# Patient Record
Sex: FEMALE | Race: BLACK OR AFRICAN AMERICAN | NOT HISPANIC OR LATINO | URBAN - METROPOLITAN AREA
[De-identification: names, ages, dates, MRNs, and addresses within clinical notes are randomized per-mention and may not be internally consistent; named-entity substitution may affect disease eponyms.]

---

## 2017-01-20 ENCOUNTER — INPATIENT (INPATIENT)
Facility: HOSPITAL | Age: 72
LOS: 2 days | Discharge: HOME | End: 2017-01-23
Attending: INTERNAL MEDICINE | Admitting: INTERNAL MEDICINE
Payer: COMMERCIAL

## 2017-01-20 VITALS
SYSTOLIC BLOOD PRESSURE: 108 MMHG | DIASTOLIC BLOOD PRESSURE: 60 MMHG | TEMPERATURE: 98 F | OXYGEN SATURATION: 99 % | HEART RATE: 87 BPM | RESPIRATION RATE: 16 BRPM

## 2017-01-20 DIAGNOSIS — Z98.89 OTHER SPECIFIED POSTPROCEDURAL STATES: Chronic | ICD-10-CM

## 2017-01-20 DIAGNOSIS — R57.9 SHOCK, UNSPECIFIED: ICD-10-CM

## 2017-01-20 LAB
ALBUMIN SERPL ELPH-MCNC: 3.6 G/DL — SIGNIFICANT CHANGE UP (ref 3.3–5)
ALP SERPL-CCNC: 98 U/L — SIGNIFICANT CHANGE UP (ref 40–120)
ALT FLD-CCNC: 26 U/L — SIGNIFICANT CHANGE UP (ref 4–33)
ANISOCYTOSIS BLD QL: SLIGHT — SIGNIFICANT CHANGE UP
APPEARANCE UR: CLEAR — SIGNIFICANT CHANGE UP
AST SERPL-CCNC: 48 U/L — HIGH (ref 4–32)
BASOPHILS # BLD AUTO: 0.02 K/UL — SIGNIFICANT CHANGE UP (ref 0–0.2)
BASOPHILS NFR BLD AUTO: 0.1 % — SIGNIFICANT CHANGE UP (ref 0–2)
BASOPHILS NFR SPEC: 0 % — SIGNIFICANT CHANGE UP (ref 0–2)
BILIRUB SERPL-MCNC: 0.5 MG/DL — SIGNIFICANT CHANGE UP (ref 0.2–1.2)
BILIRUB UR-MCNC: NEGATIVE — SIGNIFICANT CHANGE UP
BLOOD UR QL VISUAL: NEGATIVE — SIGNIFICANT CHANGE UP
BUN SERPL-MCNC: 21 MG/DL — SIGNIFICANT CHANGE UP (ref 7–23)
CALCIUM SERPL-MCNC: 8.5 MG/DL — SIGNIFICANT CHANGE UP (ref 8.4–10.5)
CHLORIDE SERPL-SCNC: 103 MMOL/L — SIGNIFICANT CHANGE UP (ref 98–107)
CO2 SERPL-SCNC: 19 MMOL/L — LOW (ref 22–31)
COLOR SPEC: YELLOW — SIGNIFICANT CHANGE UP
CREAT SERPL-MCNC: 0.75 MG/DL — SIGNIFICANT CHANGE UP (ref 0.5–1.3)
EOSINOPHIL # BLD AUTO: 0.37 K/UL — SIGNIFICANT CHANGE UP (ref 0–0.5)
EOSINOPHIL NFR BLD AUTO: 1 % — SIGNIFICANT CHANGE UP (ref 0–6)
EOSINOPHIL NFR FLD: 1 % — SIGNIFICANT CHANGE UP (ref 0–6)
GLUCOSE SERPL-MCNC: 192 MG/DL — HIGH (ref 70–99)
GLUCOSE UR-MCNC: NEGATIVE — SIGNIFICANT CHANGE UP
HCT VFR BLD CALC: 31.2 % — LOW (ref 34.5–45)
HGB BLD-MCNC: 10.5 G/DL — LOW (ref 11.5–15.5)
HYALINE CASTS # UR AUTO: SIGNIFICANT CHANGE UP (ref 0–?)
HYPOCHROMIA BLD QL: SLIGHT — SIGNIFICANT CHANGE UP
IMM GRANULOCYTES NFR BLD AUTO: 1.6 % — HIGH (ref 0–1.5)
KETONES UR-MCNC: NEGATIVE — SIGNIFICANT CHANGE UP
LEUKOCYTE ESTERASE UR-ACNC: NEGATIVE — SIGNIFICANT CHANGE UP
LG PLATELETS BLD QL AUTO: SLIGHT — SIGNIFICANT CHANGE UP
LYMPHOCYTES # BLD AUTO: 1.44 K/UL — SIGNIFICANT CHANGE UP (ref 1–3.3)
LYMPHOCYTES # BLD AUTO: 3.8 % — LOW (ref 13–44)
LYMPHOCYTES NFR SPEC AUTO: 9 % — LOW (ref 13–44)
MACROCYTES BLD QL: SLIGHT — SIGNIFICANT CHANGE UP
MANUAL SMEAR VERIFICATION: SIGNIFICANT CHANGE UP
MCHC RBC-ENTMCNC: 31.6 PG — SIGNIFICANT CHANGE UP (ref 27–34)
MCHC RBC-ENTMCNC: 33.7 % — SIGNIFICANT CHANGE UP (ref 32–36)
MCV RBC AUTO: 94 FL — SIGNIFICANT CHANGE UP (ref 80–100)
MONOCYTES # BLD AUTO: 0.33 K/UL — SIGNIFICANT CHANGE UP (ref 0–0.9)
MONOCYTES NFR BLD AUTO: 0.9 % — LOW (ref 2–14)
MONOCYTES NFR BLD: 0 % — LOW (ref 2–9)
MUCOUS THREADS # UR AUTO: SIGNIFICANT CHANGE UP
NEUTROPHIL AB SER-ACNC: 85 % — HIGH (ref 43–77)
NEUTROPHILS # BLD AUTO: 35.42 K/UL — HIGH (ref 1.8–7.4)
NEUTROPHILS NFR BLD AUTO: 92.6 % — HIGH (ref 43–77)
NEUTS BAND # BLD: 5 % — SIGNIFICANT CHANGE UP (ref 0–6)
NITRITE UR-MCNC: NEGATIVE — SIGNIFICANT CHANGE UP
OVALOCYTES BLD QL SMEAR: SLIGHT — SIGNIFICANT CHANGE UP
PH UR: 6.5 — SIGNIFICANT CHANGE UP (ref 4.6–8)
PLATELET # BLD AUTO: 167 K/UL — SIGNIFICANT CHANGE UP (ref 150–400)
PLATELET COUNT - ESTIMATE: NORMAL — SIGNIFICANT CHANGE UP
PMV BLD: 12 FL — SIGNIFICANT CHANGE UP (ref 7–13)
POTASSIUM SERPL-MCNC: 3.6 MMOL/L — SIGNIFICANT CHANGE UP (ref 3.5–5.3)
POTASSIUM SERPL-MCNC: SIGNIFICANT CHANGE UP MMOL/L (ref 3.5–5.3)
POTASSIUM SERPL-SCNC: 3.6 MMOL/L — SIGNIFICANT CHANGE UP (ref 3.5–5.3)
POTASSIUM SERPL-SCNC: SIGNIFICANT CHANGE UP MMOL/L (ref 3.5–5.3)
PROT SERPL-MCNC: 6.6 G/DL — SIGNIFICANT CHANGE UP (ref 6–8.3)
PROT UR-MCNC: 10 — SIGNIFICANT CHANGE UP
RBC # BLD: 3.32 M/UL — LOW (ref 3.8–5.2)
RBC # FLD: 16.9 % — HIGH (ref 10.3–14.5)
RBC CASTS # UR COMP ASSIST: SIGNIFICANT CHANGE UP (ref 0–?)
SODIUM SERPL-SCNC: 139 MMOL/L — SIGNIFICANT CHANGE UP (ref 135–145)
SP GR SPEC: 1.02 — SIGNIFICANT CHANGE UP (ref 1–1.03)
SQUAMOUS # UR AUTO: SIGNIFICANT CHANGE UP
TROPONIN T SERPL-MCNC: < 0.06 NG/ML — SIGNIFICANT CHANGE UP (ref 0–0.06)
UROBILINOGEN FLD QL: NORMAL E.U. — SIGNIFICANT CHANGE UP (ref 0.1–0.2)
WBC # BLD: 38.19 K/UL — HIGH (ref 3.8–10.5)
WBC # FLD AUTO: 38.19 K/UL — HIGH (ref 3.8–10.5)
WBC UR QL: SIGNIFICANT CHANGE UP (ref 0–?)

## 2017-01-20 PROCEDURE — 71010: CPT | Mod: 26

## 2017-01-20 RX ORDER — SODIUM CHLORIDE 9 MG/ML
1000 INJECTION INTRAMUSCULAR; INTRAVENOUS; SUBCUTANEOUS ONCE
Qty: 0 | Refills: 0 | Status: COMPLETED | OUTPATIENT
Start: 2017-01-20 | End: 2017-01-20

## 2017-01-20 RX ORDER — ENOXAPARIN SODIUM 100 MG/ML
40 INJECTION SUBCUTANEOUS DAILY
Qty: 0 | Refills: 0 | Status: DISCONTINUED | OUTPATIENT
Start: 2017-01-20 | End: 2017-01-23

## 2017-01-20 RX ORDER — MECLIZINE HCL 12.5 MG
25 TABLET ORAL ONCE
Qty: 0 | Refills: 0 | Status: COMPLETED | OUTPATIENT
Start: 2017-01-20 | End: 2017-01-20

## 2017-01-20 RX ADMIN — SODIUM CHLORIDE 1000 MILLILITER(S): 9 INJECTION INTRAMUSCULAR; INTRAVENOUS; SUBCUTANEOUS at 22:19

## 2017-01-20 RX ADMIN — Medication 25 MILLIGRAM(S): at 22:19

## 2017-01-20 NOTE — ED ADULT NURSE NOTE - ED STAT RN HANDOFF DETAILS
Patient is in NAD, and has room available.  Report given to RADS RN & Pt transported by RN to RADS @, Family member at bedside.

## 2017-01-20 NOTE — ED PROVIDER NOTE - MEDICAL DECISION MAKING DETAILS
70 yo woman w/ syncope. Unknown etiology, though potentially related to chemotherapy or poor po intake. Will check labs, ua, cxr, ekg.

## 2017-01-20 NOTE — ED ADULT NURSE REASSESSMENT NOTE - NS ED NURSE REASSESS COMMENT FT1
Report received from previous shift RN. Pt A&Ox4, in NAD. VS as noted. Pt awaiting CXR results. Family at bedside, call bell within reach, will continue to monitor for safety and comfort.

## 2017-01-20 NOTE — ED PROVIDER NOTE - OBJECTIVE STATEMENT
72 yo woman w/ h/o htn, pancreatic ca (stage ii on chemo) p/w syncope. States she had gone to oncologist for shots (, afterward was in supermarket with son when she began to feel lightheaded and passed out. Was caught by son prior to falling to floor. States after regaining consciousness she has felt fine. Denies fever, cough, abd pain, n/v/d, chest pain, sob, poor po.

## 2017-01-20 NOTE — ED ADULT TRIAGE NOTE - CHIEF COMPLAINT QUOTE
Pt s/p fall/syncope today after feeling weak.  Denies head injury, sob, chest pain.  Pt last chemo Wednesday

## 2017-01-20 NOTE — ED PROVIDER NOTE - PMH
Asthma    Depression    HTN (hypertension)    Hyperlipidemia    Malignant neoplasm of pancreas, unspecified location of malignancy    Malignant neoplasm of right female breast, unspecified site of breast

## 2017-01-20 NOTE — ED ADULT NURSE NOTE - OBJECTIVE STATEMENT
Patient received to room 4 awake, alert, oriented x3, able to make needs known verbally.  Patient vitally stable, s/p syncopal episode at grocery store this morning.  Son was with her and helped her to the ground, she did not fall or hit her head.  Cardiac monitor shows normal sinus rhythm in the 70's.  No complaints of SOB or dizziness.  Patient undergoing chemo medication, last dose given by injection today. labwork obtained as per provider order.  Peripheral IV started.  Patient has left chest wall infusiport not accessed.   Skin intact, lives at home with son.  Patient ambulatory free from injury.  call bell within reach, safety maintained, will continue to monitor.

## 2017-01-20 NOTE — ED PROVIDER NOTE - PROGRESS NOTE DETAILS
Graciela PGY3: Labs, imaging reviewed. Unknown cause of elevated wbc. Decision made to hold abx for now as no source identified and no other signs of infection. Received meclizine and ivf. Case d/w Dr. Mehta, accepted to service. MAR singout @[id number]

## 2017-01-20 NOTE — ED PROVIDER NOTE - PSH
H/O abdominal hysterectomy    H/O umbilical hernia repair    Status post right breast lumpectomy  malignant treated with radiation

## 2017-01-20 NOTE — ED PROVIDER NOTE - ATTENDING CONTRIBUTION TO CARE
INDIRA Attending Note - Dr. Marino  72 yo woman w/ h/o htn, pancreatic ca (stage ii on chemo) p/w syncope. Pt had prodrome lasting almost a minute before she syncopised.   PE: pt is alert and oriented, perrl, ent normal, membranes are moist, neck supple. no lymphadenopathy or thyroid enlargement, No JVD.  Chest clear to P&A, Heart- reg rhythm without murmur, rubs or gallops, radial pulses equal bilaterally.  Abd is soft, non-tender, Bowel sounds are active. no mass or organomegaly. : No CVA tenderness. Neuro:  Pt alert and oriented x 3. Perrl    Distal neurosensory is intact. Motor function is 5/5 strength bilaterally.  No focal deficits. Extremities:  No edema.  Skin: warm and dry.

## 2017-01-21 DIAGNOSIS — C25.9 MALIGNANT NEOPLASM OF PANCREAS, UNSPECIFIED: ICD-10-CM

## 2017-01-21 DIAGNOSIS — D72.829 ELEVATED WHITE BLOOD CELL COUNT, UNSPECIFIED: ICD-10-CM

## 2017-01-21 DIAGNOSIS — F32.9 MAJOR DEPRESSIVE DISORDER, SINGLE EPISODE, UNSPECIFIED: ICD-10-CM

## 2017-01-21 DIAGNOSIS — R55 SYNCOPE AND COLLAPSE: ICD-10-CM

## 2017-01-21 DIAGNOSIS — Z29.9 ENCOUNTER FOR PROPHYLACTIC MEASURES, UNSPECIFIED: ICD-10-CM

## 2017-01-21 DIAGNOSIS — I10 ESSENTIAL (PRIMARY) HYPERTENSION: ICD-10-CM

## 2017-01-21 DIAGNOSIS — J45.909 UNSPECIFIED ASTHMA, UNCOMPLICATED: ICD-10-CM

## 2017-01-21 DIAGNOSIS — E78.5 HYPERLIPIDEMIA, UNSPECIFIED: ICD-10-CM

## 2017-01-21 LAB
BUN SERPL-MCNC: 15 MG/DL — SIGNIFICANT CHANGE UP (ref 7–23)
CALCIUM SERPL-MCNC: 8.8 MG/DL — SIGNIFICANT CHANGE UP (ref 8.4–10.5)
CHLORIDE SERPL-SCNC: 101 MMOL/L — SIGNIFICANT CHANGE UP (ref 98–107)
CHOLEST SERPL-MCNC: 113 MG/DL — LOW (ref 120–199)
CK SERPL-CCNC: 34 U/L — SIGNIFICANT CHANGE UP (ref 25–170)
CO2 SERPL-SCNC: 25 MMOL/L — SIGNIFICANT CHANGE UP (ref 22–31)
CREAT SERPL-MCNC: 0.55 MG/DL — SIGNIFICANT CHANGE UP (ref 0.5–1.3)
GLUCOSE SERPL-MCNC: 87 MG/DL — SIGNIFICANT CHANGE UP (ref 70–99)
HBA1C BLD-MCNC: 6.8 % — HIGH (ref 4–5.6)
HCT VFR BLD CALC: 32.6 % — LOW (ref 34.5–45)
HDLC SERPL-MCNC: 51 MG/DL — SIGNIFICANT CHANGE UP (ref 45–65)
HGB BLD-MCNC: 10.8 G/DL — LOW (ref 11.5–15.5)
LIPID PNL WITH DIRECT LDL SERPL: 58 MG/DL — SIGNIFICANT CHANGE UP
MCHC RBC-ENTMCNC: 31.5 PG — SIGNIFICANT CHANGE UP (ref 27–34)
MCHC RBC-ENTMCNC: 33.1 % — SIGNIFICANT CHANGE UP (ref 32–36)
MCV RBC AUTO: 95 FL — SIGNIFICANT CHANGE UP (ref 80–100)
PLATELET # BLD AUTO: 233 K/UL — SIGNIFICANT CHANGE UP (ref 150–400)
PMV BLD: 11.1 FL — SIGNIFICANT CHANGE UP (ref 7–13)
POTASSIUM SERPL-MCNC: 3.2 MMOL/L — LOW (ref 3.5–5.3)
POTASSIUM SERPL-SCNC: 3.2 MMOL/L — LOW (ref 3.5–5.3)
RBC # BLD: 3.43 M/UL — LOW (ref 3.8–5.2)
RBC # FLD: 16.7 % — HIGH (ref 10.3–14.5)
SODIUM SERPL-SCNC: 142 MMOL/L — SIGNIFICANT CHANGE UP (ref 135–145)
TRIGL SERPL-MCNC: 89 MG/DL — SIGNIFICANT CHANGE UP (ref 10–149)
TROPONIN T SERPL-MCNC: < 0.06 NG/ML — SIGNIFICANT CHANGE UP (ref 0–0.06)
WBC # BLD: 45.03 K/UL — CRITICAL HIGH (ref 3.8–10.5)
WBC # FLD AUTO: 45.03 K/UL — CRITICAL HIGH (ref 3.8–10.5)

## 2017-01-21 RX ORDER — SERTRALINE 25 MG/1
75 TABLET, FILM COATED ORAL DAILY
Qty: 0 | Refills: 0 | Status: DISCONTINUED | OUTPATIENT
Start: 2017-01-21 | End: 2017-01-23

## 2017-01-21 RX ORDER — BUDESONIDE AND FORMOTEROL FUMARATE DIHYDRATE 160; 4.5 UG/1; UG/1
2 AEROSOL RESPIRATORY (INHALATION)
Qty: 0 | Refills: 0 | Status: DISCONTINUED | OUTPATIENT
Start: 2017-01-21 | End: 2017-01-23

## 2017-01-21 RX ORDER — AMLODIPINE BESYLATE 2.5 MG/1
5 TABLET ORAL DAILY
Qty: 0 | Refills: 0 | Status: DISCONTINUED | OUTPATIENT
Start: 2017-01-21 | End: 2017-01-23

## 2017-01-21 RX ORDER — TRAZODONE HCL 50 MG
50 TABLET ORAL AT BEDTIME
Qty: 0 | Refills: 0 | Status: DISCONTINUED | OUTPATIENT
Start: 2017-01-21 | End: 2017-01-23

## 2017-01-21 RX ORDER — PANTOPRAZOLE SODIUM 20 MG/1
40 TABLET, DELAYED RELEASE ORAL
Qty: 0 | Refills: 0 | Status: DISCONTINUED | OUTPATIENT
Start: 2017-01-21 | End: 2017-01-23

## 2017-01-21 RX ORDER — SIMETHICONE 80 MG/1
80 TABLET, CHEWABLE ORAL ONCE
Qty: 0 | Refills: 0 | Status: COMPLETED | OUTPATIENT
Start: 2017-01-21 | End: 2017-01-21

## 2017-01-21 RX ORDER — ATORVASTATIN CALCIUM 80 MG/1
10 TABLET, FILM COATED ORAL AT BEDTIME
Qty: 0 | Refills: 0 | Status: DISCONTINUED | OUTPATIENT
Start: 2017-01-21 | End: 2017-01-23

## 2017-01-21 RX ORDER — ACETAMINOPHEN 500 MG
650 TABLET ORAL ONCE
Qty: 0 | Refills: 0 | Status: COMPLETED | OUTPATIENT
Start: 2017-01-21 | End: 2017-01-21

## 2017-01-21 RX ORDER — POTASSIUM CHLORIDE 20 MEQ
20 PACKET (EA) ORAL
Qty: 0 | Refills: 0 | Status: COMPLETED | OUTPATIENT
Start: 2017-01-21 | End: 2017-01-21

## 2017-01-21 RX ORDER — SODIUM CHLORIDE 9 MG/ML
1000 INJECTION INTRAMUSCULAR; INTRAVENOUS; SUBCUTANEOUS
Qty: 0 | Refills: 0 | Status: DISCONTINUED | OUTPATIENT
Start: 2017-01-21 | End: 2017-01-23

## 2017-01-21 RX ORDER — ALBUTEROL 90 UG/1
2 AEROSOL, METERED ORAL EVERY 6 HOURS
Qty: 0 | Refills: 0 | Status: DISCONTINUED | OUTPATIENT
Start: 2017-01-21 | End: 2017-01-23

## 2017-01-21 RX ORDER — LOSARTAN POTASSIUM 100 MG/1
100 TABLET, FILM COATED ORAL DAILY
Qty: 0 | Refills: 0 | Status: DISCONTINUED | OUTPATIENT
Start: 2017-01-21 | End: 2017-01-23

## 2017-01-21 RX ORDER — DOCUSATE SODIUM 100 MG
100 CAPSULE ORAL DAILY
Qty: 0 | Refills: 0 | Status: DISCONTINUED | OUTPATIENT
Start: 2017-01-21 | End: 2017-01-23

## 2017-01-21 RX ADMIN — SERTRALINE 75 MILLIGRAM(S): 25 TABLET, FILM COATED ORAL at 12:47

## 2017-01-21 RX ADMIN — Medication 100 MILLIGRAM(S): at 22:56

## 2017-01-21 RX ADMIN — SODIUM CHLORIDE 75 MILLILITER(S): 9 INJECTION INTRAMUSCULAR; INTRAVENOUS; SUBCUTANEOUS at 21:27

## 2017-01-21 RX ADMIN — SODIUM CHLORIDE 75 MILLILITER(S): 9 INJECTION INTRAMUSCULAR; INTRAVENOUS; SUBCUTANEOUS at 18:13

## 2017-01-21 RX ADMIN — Medication 20 MILLIEQUIVALENT(S): at 10:18

## 2017-01-21 RX ADMIN — Medication 650 MILLIGRAM(S): at 22:57

## 2017-01-21 RX ADMIN — PANTOPRAZOLE SODIUM 40 MILLIGRAM(S): 20 TABLET, DELAYED RELEASE ORAL at 18:00

## 2017-01-21 RX ADMIN — SIMETHICONE 80 MILLIGRAM(S): 80 TABLET, CHEWABLE ORAL at 22:57

## 2017-01-21 RX ADMIN — BUDESONIDE AND FORMOTEROL FUMARATE DIHYDRATE 2 PUFF(S): 160; 4.5 AEROSOL RESPIRATORY (INHALATION) at 09:40

## 2017-01-21 RX ADMIN — BUDESONIDE AND FORMOTEROL FUMARATE DIHYDRATE 2 PUFF(S): 160; 4.5 AEROSOL RESPIRATORY (INHALATION) at 21:27

## 2017-01-21 RX ADMIN — Medication 20 MILLIEQUIVALENT(S): at 12:46

## 2017-01-21 RX ADMIN — PANTOPRAZOLE SODIUM 40 MILLIGRAM(S): 20 TABLET, DELAYED RELEASE ORAL at 06:57

## 2017-01-21 RX ADMIN — ATORVASTATIN CALCIUM 10 MILLIGRAM(S): 80 TABLET, FILM COATED ORAL at 21:27

## 2017-01-21 RX ADMIN — LOSARTAN POTASSIUM 100 MILLIGRAM(S): 100 TABLET, FILM COATED ORAL at 06:23

## 2017-01-21 RX ADMIN — ENOXAPARIN SODIUM 40 MILLIGRAM(S): 100 INJECTION SUBCUTANEOUS at 12:47

## 2017-01-21 RX ADMIN — AMLODIPINE BESYLATE 5 MILLIGRAM(S): 2.5 TABLET ORAL at 06:57

## 2017-01-21 RX ADMIN — Medication 650 MILLIGRAM(S): at 23:58

## 2017-01-21 RX ADMIN — Medication 20 MILLIEQUIVALENT(S): at 14:16

## 2017-01-21 NOTE — H&P ADULT. - HISTORY OF PRESENT ILLNESS
Self ambulating 71 F with a history of stage 2,  Pancreatic CA, diagnosed 11/ 2016, currently receiving chemo therapy, last treatment was 1/18, s/p Neupogen injections on 1/19 and 1/20, R breast CA s/p lumpectomy and radiation, Asthma ., depression, dyslipidemia. She was out with her son shopping on 1/20. She began to feel dizzy, like her head was spinning and she experienced b/l LE weakness. She had a positive LOC and her son held her up to prevent a free fall to the floor. He laid her on the floor and she came too in 1 minutes. She was on the floor for 3 to 4 minutes, helped up and was taken to the Ed by her son.  She denies nausea, vomit, tinnitus, chest pain , palpitations, chill and diaphoresis.  She only feels generalized weakness, which is chronic due to her condition.      In the ED, she had CE negative x 1, CXR preliminary = clear, EKG NSR

## 2017-01-21 NOTE — H&P ADULT. - PROBLEM SELECTOR PLAN 1
CM  F/U CE & EKG  Give O2,   Check orthostatics and Neuro checks  Get TTE   Fall and Aspiration precautions

## 2017-01-22 LAB
BUN SERPL-MCNC: 15 MG/DL — SIGNIFICANT CHANGE UP (ref 7–23)
CALCIUM SERPL-MCNC: 8.6 MG/DL — SIGNIFICANT CHANGE UP (ref 8.4–10.5)
CHLORIDE SERPL-SCNC: 104 MMOL/L — SIGNIFICANT CHANGE UP (ref 98–107)
CO2 SERPL-SCNC: 22 MMOL/L — SIGNIFICANT CHANGE UP (ref 22–31)
CREAT SERPL-MCNC: 0.58 MG/DL — SIGNIFICANT CHANGE UP (ref 0.5–1.3)
GLUCOSE SERPL-MCNC: 103 MG/DL — HIGH (ref 70–99)
HCT VFR BLD CALC: 31.3 % — LOW (ref 34.5–45)
HGB BLD-MCNC: 10.3 G/DL — LOW (ref 11.5–15.5)
MCHC RBC-ENTMCNC: 30.9 PG — SIGNIFICANT CHANGE UP (ref 27–34)
MCHC RBC-ENTMCNC: 32.9 % — SIGNIFICANT CHANGE UP (ref 32–36)
MCV RBC AUTO: 94 FL — SIGNIFICANT CHANGE UP (ref 80–100)
PLATELET # BLD AUTO: 223 K/UL — SIGNIFICANT CHANGE UP (ref 150–400)
PMV BLD: 11.3 FL — SIGNIFICANT CHANGE UP (ref 7–13)
POTASSIUM SERPL-MCNC: 3.6 MMOL/L — SIGNIFICANT CHANGE UP (ref 3.5–5.3)
POTASSIUM SERPL-SCNC: 3.6 MMOL/L — SIGNIFICANT CHANGE UP (ref 3.5–5.3)
RBC # BLD: 3.33 M/UL — LOW (ref 3.8–5.2)
RBC # FLD: 16.4 % — HIGH (ref 10.3–14.5)
SODIUM SERPL-SCNC: 141 MMOL/L — SIGNIFICANT CHANGE UP (ref 135–145)
WBC # BLD: 19.07 K/UL — HIGH (ref 3.8–10.5)
WBC # FLD AUTO: 19.07 K/UL — HIGH (ref 3.8–10.5)

## 2017-01-22 PROCEDURE — 70450 CT HEAD/BRAIN W/O DYE: CPT | Mod: 26

## 2017-01-22 PROCEDURE — 74176 CT ABD & PELVIS W/O CONTRAST: CPT | Mod: 26

## 2017-01-22 RX ORDER — PYRIDOXINE HCL (VITAMIN B6) 100 MG
50 TABLET ORAL DAILY
Qty: 0 | Refills: 0 | Status: DISCONTINUED | OUTPATIENT
Start: 2017-01-22 | End: 2017-01-23

## 2017-01-22 RX ORDER — ACETAMINOPHEN 500 MG
650 TABLET ORAL EVERY 6 HOURS
Qty: 0 | Refills: 0 | Status: DISCONTINUED | OUTPATIENT
Start: 2017-01-22 | End: 2017-01-23

## 2017-01-22 RX ORDER — GABAPENTIN 400 MG/1
200 CAPSULE ORAL AT BEDTIME
Qty: 0 | Refills: 0 | Status: DISCONTINUED | OUTPATIENT
Start: 2017-01-22 | End: 2017-01-23

## 2017-01-22 RX ADMIN — ATORVASTATIN CALCIUM 10 MILLIGRAM(S): 80 TABLET, FILM COATED ORAL at 22:07

## 2017-01-22 RX ADMIN — Medication 50 MILLIGRAM(S): at 17:20

## 2017-01-22 RX ADMIN — Medication 100 MILLIGRAM(S): at 11:17

## 2017-01-22 RX ADMIN — SERTRALINE 75 MILLIGRAM(S): 25 TABLET, FILM COATED ORAL at 11:18

## 2017-01-22 RX ADMIN — AMLODIPINE BESYLATE 5 MILLIGRAM(S): 2.5 TABLET ORAL at 05:18

## 2017-01-22 RX ADMIN — LOSARTAN POTASSIUM 100 MILLIGRAM(S): 100 TABLET, FILM COATED ORAL at 05:18

## 2017-01-22 RX ADMIN — PANTOPRAZOLE SODIUM 40 MILLIGRAM(S): 20 TABLET, DELAYED RELEASE ORAL at 17:20

## 2017-01-22 RX ADMIN — GABAPENTIN 200 MILLIGRAM(S): 400 CAPSULE ORAL at 22:07

## 2017-01-22 RX ADMIN — Medication 650 MILLIGRAM(S): at 11:45

## 2017-01-22 RX ADMIN — BUDESONIDE AND FORMOTEROL FUMARATE DIHYDRATE 2 PUFF(S): 160; 4.5 AEROSOL RESPIRATORY (INHALATION) at 08:47

## 2017-01-22 RX ADMIN — ENOXAPARIN SODIUM 40 MILLIGRAM(S): 100 INJECTION SUBCUTANEOUS at 11:18

## 2017-01-22 RX ADMIN — PANTOPRAZOLE SODIUM 40 MILLIGRAM(S): 20 TABLET, DELAYED RELEASE ORAL at 05:18

## 2017-01-22 RX ADMIN — BUDESONIDE AND FORMOTEROL FUMARATE DIHYDRATE 2 PUFF(S): 160; 4.5 AEROSOL RESPIRATORY (INHALATION) at 22:07

## 2017-01-22 RX ADMIN — Medication 650 MILLIGRAM(S): at 11:17

## 2017-01-23 VITALS
DIASTOLIC BLOOD PRESSURE: 67 MMHG | SYSTOLIC BLOOD PRESSURE: 121 MMHG | TEMPERATURE: 98 F | HEART RATE: 72 BPM | RESPIRATION RATE: 18 BRPM | OXYGEN SATURATION: 96 %

## 2017-01-23 LAB
BUN SERPL-MCNC: 14 MG/DL — SIGNIFICANT CHANGE UP (ref 7–23)
CALCIUM SERPL-MCNC: 8.6 MG/DL — SIGNIFICANT CHANGE UP (ref 8.4–10.5)
CHLORIDE SERPL-SCNC: 105 MMOL/L — SIGNIFICANT CHANGE UP (ref 98–107)
CO2 SERPL-SCNC: 22 MMOL/L — SIGNIFICANT CHANGE UP (ref 22–31)
CREAT SERPL-MCNC: 0.54 MG/DL — SIGNIFICANT CHANGE UP (ref 0.5–1.3)
GLUCOSE SERPL-MCNC: 103 MG/DL — HIGH (ref 70–99)
HCT VFR BLD CALC: 30 % — LOW (ref 34.5–45)
HGB BLD-MCNC: 9.9 G/DL — LOW (ref 11.5–15.5)
MAGNESIUM SERPL-MCNC: 1.8 MG/DL — SIGNIFICANT CHANGE UP (ref 1.6–2.6)
MCHC RBC-ENTMCNC: 30.8 PG — SIGNIFICANT CHANGE UP (ref 27–34)
MCHC RBC-ENTMCNC: 33 % — SIGNIFICANT CHANGE UP (ref 32–36)
MCV RBC AUTO: 93.5 FL — SIGNIFICANT CHANGE UP (ref 80–100)
PLATELET # BLD AUTO: 222 K/UL — SIGNIFICANT CHANGE UP (ref 150–400)
PMV BLD: 11 FL — SIGNIFICANT CHANGE UP (ref 7–13)
POTASSIUM SERPL-MCNC: 3.4 MMOL/L — LOW (ref 3.5–5.3)
POTASSIUM SERPL-SCNC: 3.4 MMOL/L — LOW (ref 3.5–5.3)
RBC # BLD: 3.21 M/UL — LOW (ref 3.8–5.2)
RBC # FLD: 16.2 % — HIGH (ref 10.3–14.5)
SODIUM SERPL-SCNC: 141 MMOL/L — SIGNIFICANT CHANGE UP (ref 135–145)
WBC # BLD: 6.93 K/UL — SIGNIFICANT CHANGE UP (ref 3.8–10.5)
WBC # FLD AUTO: 6.93 K/UL — SIGNIFICANT CHANGE UP (ref 3.8–10.5)

## 2017-01-23 PROCEDURE — 93306 TTE W/DOPPLER COMPLETE: CPT | Mod: 26

## 2017-01-23 RX ORDER — POTASSIUM CHLORIDE 20 MEQ
20 PACKET (EA) ORAL ONCE
Qty: 0 | Refills: 0 | Status: COMPLETED | OUTPATIENT
Start: 2017-01-23 | End: 2017-01-23

## 2017-01-23 RX ORDER — PYRIDOXINE HCL (VITAMIN B6) 100 MG
1 TABLET ORAL
Qty: 0 | Refills: 0 | COMMUNITY
Start: 2017-01-23

## 2017-01-23 RX ORDER — TRAZODONE HCL 50 MG
1 TABLET ORAL
Qty: 0 | Refills: 0 | COMMUNITY

## 2017-01-23 RX ORDER — GABAPENTIN 400 MG/1
2 CAPSULE ORAL
Qty: 60 | Refills: 0 | OUTPATIENT
Start: 2017-01-23 | End: 2017-02-22

## 2017-01-23 RX ORDER — DOCUSATE SODIUM 100 MG
1 CAPSULE ORAL
Qty: 0 | Refills: 0 | COMMUNITY
Start: 2017-01-23

## 2017-01-23 RX ORDER — LOSARTAN POTASSIUM 100 MG/1
1 TABLET, FILM COATED ORAL
Qty: 0 | Refills: 0 | COMMUNITY
Start: 2017-01-23

## 2017-01-23 RX ORDER — GABAPENTIN 400 MG/1
2 CAPSULE ORAL
Qty: 0 | Refills: 0 | COMMUNITY
Start: 2017-01-23

## 2017-01-23 RX ORDER — LOSARTAN POTASSIUM 100 MG/1
1 TABLET, FILM COATED ORAL
Qty: 30 | Refills: 0 | OUTPATIENT
Start: 2017-01-23 | End: 2017-02-22

## 2017-01-23 RX ADMIN — ENOXAPARIN SODIUM 40 MILLIGRAM(S): 100 INJECTION SUBCUTANEOUS at 11:48

## 2017-01-23 RX ADMIN — BUDESONIDE AND FORMOTEROL FUMARATE DIHYDRATE 2 PUFF(S): 160; 4.5 AEROSOL RESPIRATORY (INHALATION) at 10:54

## 2017-01-23 RX ADMIN — PANTOPRAZOLE SODIUM 40 MILLIGRAM(S): 20 TABLET, DELAYED RELEASE ORAL at 05:02

## 2017-01-23 RX ADMIN — Medication 50 MILLIGRAM(S): at 11:47

## 2017-01-23 RX ADMIN — Medication 50 MILLIGRAM(S): at 00:45

## 2017-01-23 RX ADMIN — PANTOPRAZOLE SODIUM 40 MILLIGRAM(S): 20 TABLET, DELAYED RELEASE ORAL at 17:48

## 2017-01-23 RX ADMIN — LOSARTAN POTASSIUM 100 MILLIGRAM(S): 100 TABLET, FILM COATED ORAL at 05:01

## 2017-01-23 RX ADMIN — Medication 100 MILLIGRAM(S): at 11:48

## 2017-01-23 RX ADMIN — AMLODIPINE BESYLATE 5 MILLIGRAM(S): 2.5 TABLET ORAL at 05:01

## 2017-01-23 RX ADMIN — Medication 20 MILLIEQUIVALENT(S): at 11:47

## 2017-01-23 RX ADMIN — SERTRALINE 75 MILLIGRAM(S): 25 TABLET, FILM COATED ORAL at 11:48

## 2017-01-23 NOTE — DISCHARGE NOTE ADULT - PLAN OF CARE
Follow up with your primary care physician for further monitoring in 1-2 weeks. Please call to arrange appointment. Continue current medications as directed. Stay well hydrated. Follow up with your hematologist/oncologist, Dr. Mitchell for further monitoring in 1 week. Please call to arrange appointment. You will need to arrange for PET scan Follow up with your primary care physician for further monitoring in 1-2 weeks. Please call to arrange appointment. Low cholesterol diet. Follow up with your primary care physician for further monitoring in 1-2 weeks. Please call to arrange appointment. Low salt diet. Continue current medications as directed to maintain blood pressure goal 120/80mmhg. Continue Lipitor at bedtime. Goal LDL <100

## 2017-01-23 NOTE — DISCHARGE NOTE ADULT - SECONDARY DIAGNOSIS.
Malignant neoplasm of pancreas, unspecified location of malignancy Hyperlipidemia HTN (hypertension)

## 2017-01-23 NOTE — DISCHARGE NOTE ADULT - PATIENT PORTAL LINK FT
“You can access the Follow[facility] Patient Portal, offered by [facility], by registering with the following website: [email or web address]”

## 2017-01-23 NOTE — DISCHARGE NOTE ADULT - CARE PROVIDER_API CALL
Sunny Mitchell (CESAR), Hematology; HospicePalliative Medicine; Internal Medicine; Medical Oncology  [id number] [place] [address]  Phone: [phone]  Fax: [phone]

## 2017-01-23 NOTE — DISCHARGE NOTE ADULT - MEDICATION SUMMARY - MEDICATIONS TO TAKE
I will START or STAY ON the medications listed below when I get home from the hospital:    losartan 100 mg oral tablet  -- 1 tab(s) by mouth once a day  -- Indication: For High blood pressure    gabapentin 100 mg oral capsule  -- 2 cap(s) by mouth once a day (at bedtime)  -- Indication: For Neuropathy    traZODone 50 mg oral tablet  -- 1 tab(s) by mouth once a day (at bedtime), As Needed - for insomnia  -- Indication: For Insomnia    sertraline 25 mg oral tablet  -- 3 tab(s) by mouth once a day  -- Indication: For Depression, unspecified depression type    atorvastatin 10 mg oral tablet  -- 1 tab(s) by mouth once a day  -- Indication: For High cholesterol    ProAir HFA 90 mcg/inh inhalation aerosol  -- 2 puff(s) inhaled 4 times a day, As Needed  -- Indication: For Asthma    Advair Diskus 250 mcg-50 mcg inhalation powder  -- 1 puff(s) inhaled 2 times a day  -- Indication: For Asthma    amLODIPine 5 mg oral tablet  -- 1 tab(s) by mouth once a day  -- Indication: For High blood pressure    docusate sodium 100 mg oral capsule  -- 1 cap(s) by mouth once a day  -- Indication: For Constipation, take as needed (Over the counter)    Protonix 40 mg oral delayed release tablet  -- 1 tab(s) by mouth 2 times a day  -- Indication: For Acid reflux    pyridoxine 50 mg oral tablet  -- 1 tab(s) by mouth once a day  -- Indication: For Supplement

## 2017-01-23 NOTE — DISCHARGE NOTE ADULT - HOSPITAL COURSE
Self ambulating 71 F with a history of stage 2,  Pancreatic CA, diagnosed 11/ 2016, currently receiving chemo therapy, last treatment was 1/18, s/p Neupogen injections on 1/19 and 1/20, R breast CA s/p lumpectomy and radiation, Asthma ., depression, dyslipidemia. She was out with her son shopping on 1/20. She began to feel dizzy, like her head was spinning and she experienced b/l LE weakness. She had a positive LOC and her son held her up to prevent a free fall to the floor. He laid her on the floor and she came too in 1 minutes. She was on the floor for 3 to 4 minutes, helped up and was taken to the Ed by her son.  She denies nausea, vomit, tinnitus, chest pain , palpitations, chill and diaphoresis.  She only feels generalized weakness, which is chronic due to her condition.      On admission: Self ambulating 71 F with a history of stage 2,  Pancreatic CA, diagnosed 11/ 2016, currently receiving chemo therapy, last treatment was 1/18, s/p Neupogen injections on 1/19 and 1/20, R breast CA s/p lumpectomy and radiation, Asthma ., depression, dyslipidemia. She was out with her son shopping on 1/20. She began to feel dizzy, like her head was spinning and she experienced b/l LE weakness. She had a positive LOC and her son held her up to prevent a free fall to the floor. He laid her on the floor and she came too in 1 minutes. She was on the floor for 3 to 4 minutes, helped up and was taken to the Ed by her son.  She denies nausea, vomit, tinnitus, chest pain , palpitations, chill and diaphoresis.  She only feels generalized weakness, which is chronic due to her condition.      On admission: EKG: NSR @ 71b/ min , Q wave 1, AVL ,QT/ QTC = 408/ 443. ACS ruled out by negative cardiac enzymes.      WBC: 38.49-->45.03 (following Neupogen UA negative. HgbA1-c: 6.8%). CXR: clear lungs.     Heme/Onc c/s: Dr Mitchell: check CT head. For echo. Start neurontin 200mg qhs. Start pyridoxine 50mg qd. Outpt PET scheduled for 1/28. Medicine recommended discontune HCTZ. CT Head: No evidence of edema or mass effect to suggest presence of   space-occupying lesion. No CT evidence of acute territorial infarct. No  intracranial hemorrhage. Contrast-enhanced CT or MRI would be more  sensitive for further evaluation. CT abd/pel: No bowel obstruction. Low-attenuation pancreatic head lesion with persistent encasement of the superior mesenteric vein, not significantly changed. Patent common bile duct stent.    Cardiology was consulted, Dr. Be, recommended orthostatics which were negative and echocardiogram which displayed ********incomplete. Self ambulating 71 F with a history of stage 2,  Pancreatic CA, diagnosed 11/ 2016, currently receiving chemo therapy, last treatment was 1/18, s/p Neupogen injections on 1/19 and 1/20, R breast CA s/p lumpectomy and radiation, Asthma ., depression, dyslipidemia. She was out with her son shopping on 1/20. She began to feel dizzy, like her head was spinning and she experienced b/l LE weakness. She had a positive LOC and her son held her up to prevent a free fall to the floor. He laid her on the floor and she came too in 1 minutes. She was on the floor for 3 to 4 minutes, helped up and was taken to the Ed by her son.  She denies nausea, vomit, tinnitus, chest pain , palpitations, chill and diaphoresis.  She only feels generalized weakness, which is chronic due to her condition.      On admission: EKG: NSR @ 71b/ min , Q wave 1, AVL ,QT/ QTC = 408/ 443. ACS ruled out by negative cardiac enzymes. WBC: 38.49-->45.03 (following Neupogen UA negative. HgbA1-c: 6.8%). CXR: clear lungs.     Heme/Onc c/s: Dr Mitchell: check CT head. For echo. Start Neurontin 200mg qhs. Start pyridoxine 50mg qd. Outpt PET scheduled for 1/28. Medicine recommended discontinue HCTZ. CT Head: No evidence of edema or mass effect to suggest presence of space-occupying lesion. No CT evidence of acute territorial infarct. No  intracranial hemorrhage. Contrast-enhanced CT or MRI would be more  sensitive for further evaluation. CT abd/pel: No bowel obstruction. Low-attenuation pancreatic head lesion with persistent encasement of the superior mesenteric vein, not significantly changed. Patent common bile duct stent.    Cardiology was consulted, Dr. Be, recommended orthostatics which were negative and echocardiogram which displayed ********incomplete. Self ambulating 71 F with a history of stage 2,  Pancreatic CA, diagnosed 11/ 2016, currently receiving chemo therapy, last treatment was 1/18, s/p Neupogen injections on 1/19 and 1/20, R breast CA s/p lumpectomy and radiation, Asthma ., depression, dyslipidemia. She was out with her son shopping on 1/20. She began to feel dizzy, like her head was spinning and she experienced b/l LE weakness. She had a positive LOC and her son held her up to prevent a free fall to the floor. He laid her on the floor and she came too in 1 minutes. She was on the floor for 3 to 4 minutes, helped up and was taken to the Ed by her son.  She denies nausea, vomit, tinnitus, chest pain , palpitations, chill and diaphoresis.  She only feels generalized weakness, which is chronic due to her condition.      On admission: EKG: NSR @ 71b/ min , Q wave 1, AVL ,QT/ QTC = 408/ 443. ACS ruled out by negative cardiac enzymes. WBC: 38.49-->45.03 (following Neupogen UA negative. HgbA1-c: 6.8%). CXR: clear lungs.     Heme/Onc c/s: Dr Mitchell: check CT head. For echo. Start Neurontin 200mg qhs. Start pyridoxine 50mg qd. Outpt PET scheduled for 1/28. Medicine recommended discontinue HCTZ. CT Head: No evidence of edema or mass effect to suggest presence of space-occupying lesion. No CT evidence of acute territorial infarct. No  intracranial hemorrhage. Contrast-enhanced CT or MRI would be more  sensitive for further evaluation. CT abd/pel: No bowel obstruction. Low-attenuation pancreatic head lesion with persistent encasement of the superior mesenteric vein, not significantly changed. Patent common bile duct stent.    Cardiology was consulted, Dr. Be, recommended orthostatics which were negative and echocardiogram which displayed EF 66%. Mitral annular calcification, otherwise normal mitral valve. Minimal mitral regurgitation. Normal left ventricular internal dimensions and wall thicknesses. Normal left ventricular systolic function. No segmental wall motion abnormalities. Normal right ventricular size and function. Estimated right ventricular systolic pressure equals 38  mm Hg, assuming right atrial pressure equals 10 mm Hg, consistent with borderline pulmonary hypertension. *** Compared with echocardiogram of 9/22/2016, no significant changes noted.    Case discussed with Dr. Mehta, Pt medically cleared for discharge to home with outpatient follow up noted above.

## 2017-01-23 NOTE — DISCHARGE NOTE ADULT - CARE PLAN
Principal Discharge DX:	Syncope  Goal:	Continue current medications as directed. Stay well hydrated.  Instructions for follow-up, activity and diet:	Follow up with your primary care physician for further monitoring in 1-2 weeks. Please call to arrange appointment.  Secondary Diagnosis:	Malignant neoplasm of pancreas, unspecified location of malignancy  Instructions for follow-up, activity and diet:	Follow up with your hematologist/oncologist, Dr. Mitchell for further monitoring in 1 week. Please call to arrange appointment. You will need to arrange for PET scan  Secondary Diagnosis:	Hyperlipidemia  Goal:	Continue Lipitor at bedtime. Goal LDL <100  Instructions for follow-up, activity and diet:	Follow up with your primary care physician for further monitoring in 1-2 weeks. Please call to arrange appointment. Low cholesterol diet.  Secondary Diagnosis:	HTN (hypertension)  Goal:	Continue current medications as directed to maintain blood pressure goal 120/80mmhg.  Instructions for follow-up, activity and diet:	Follow up with your primary care physician for further monitoring in 1-2 weeks. Please call to arrange appointment. Low salt diet.

## 2017-01-23 NOTE — DISCHARGE NOTE ADULT - MEDICATION SUMMARY - MEDICATIONS TO STOP TAKING
I will STOP taking the medications listed below when I get home from the hospital:    losartan-hydroCHLOROthiazide 100mg-12.5mg oral tablet  -- 1 tab(s) by mouth once a day

## 2017-01-28 ENCOUNTER — APPOINTMENT (OUTPATIENT)
Dept: NUCLEAR MEDICINE | Facility: IMAGING CENTER | Age: 72
End: 2017-01-28

## 2017-01-28 ENCOUNTER — OUTPATIENT (OUTPATIENT)
Dept: OUTPATIENT SERVICES | Facility: HOSPITAL | Age: 72
LOS: 1 days | End: 2017-01-28
Payer: COMMERCIAL

## 2017-01-28 DIAGNOSIS — Z98.89 OTHER SPECIFIED POSTPROCEDURAL STATES: Chronic | ICD-10-CM

## 2017-01-28 DIAGNOSIS — Z00.8 ENCOUNTER FOR OTHER GENERAL EXAMINATION: ICD-10-CM

## 2017-01-28 PROCEDURE — 78816 PET IMAGE W/CT FULL BODY: CPT

## 2017-01-28 PROCEDURE — A9552: CPT

## 2017-01-31 ENCOUNTER — APPOINTMENT (OUTPATIENT)
Dept: RADIATION ONCOLOGY | Facility: CLINIC | Age: 72
End: 2017-01-31

## 2017-01-31 ENCOUNTER — OUTPATIENT (OUTPATIENT)
Dept: OUTPATIENT SERVICES | Facility: HOSPITAL | Age: 72
LOS: 1 days | Discharge: HOME | End: 2017-01-31

## 2017-01-31 DIAGNOSIS — Z98.89 OTHER SPECIFIED POSTPROCEDURAL STATES: Chronic | ICD-10-CM

## 2017-02-01 ENCOUNTER — FORM ENCOUNTER (OUTPATIENT)
Age: 72
End: 2017-02-01

## 2017-02-02 ENCOUNTER — APPOINTMENT (OUTPATIENT)
Dept: CT IMAGING | Facility: IMAGING CENTER | Age: 72
End: 2017-02-02

## 2017-02-02 ENCOUNTER — OUTPATIENT (OUTPATIENT)
Dept: OUTPATIENT SERVICES | Facility: HOSPITAL | Age: 72
LOS: 1 days | End: 2017-02-02
Payer: COMMERCIAL

## 2017-02-02 DIAGNOSIS — C25.9 MALIGNANT NEOPLASM OF PANCREAS, UNSPECIFIED: ICD-10-CM

## 2017-02-02 DIAGNOSIS — Z98.89 OTHER SPECIFIED POSTPROCEDURAL STATES: Chronic | ICD-10-CM

## 2017-02-02 PROCEDURE — 74170 CT ABD WO CNTRST FLWD CNTRST: CPT

## 2017-02-21 ENCOUNTER — OUTPATIENT (OUTPATIENT)
Dept: OUTPATIENT SERVICES | Facility: HOSPITAL | Age: 72
LOS: 1 days | End: 2017-02-21
Payer: COMMERCIAL

## 2017-02-21 VITALS
SYSTOLIC BLOOD PRESSURE: 119 MMHG | DIASTOLIC BLOOD PRESSURE: 83 MMHG | HEIGHT: 64 IN | HEART RATE: 90 BPM | WEIGHT: 181.44 LBS | TEMPERATURE: 98 F | OXYGEN SATURATION: 97 % | RESPIRATION RATE: 16 BRPM

## 2017-02-21 DIAGNOSIS — Z98.89 OTHER SPECIFIED POSTPROCEDURAL STATES: Chronic | ICD-10-CM

## 2017-02-21 DIAGNOSIS — Z01.818 ENCOUNTER FOR OTHER PREPROCEDURAL EXAMINATION: ICD-10-CM

## 2017-02-21 DIAGNOSIS — C25.9 MALIGNANT NEOPLASM OF PANCREAS, UNSPECIFIED: ICD-10-CM

## 2017-02-21 DIAGNOSIS — C25.3 MALIGNANT NEOPLASM OF PANCREATIC DUCT: ICD-10-CM

## 2017-02-21 LAB
ALBUMIN SERPL ELPH-MCNC: 3.9 G/DL — SIGNIFICANT CHANGE UP (ref 3.3–5)
ALP SERPL-CCNC: 82 U/L — SIGNIFICANT CHANGE UP (ref 40–120)
ALT FLD-CCNC: 22 U/L — SIGNIFICANT CHANGE UP (ref 10–45)
ANION GAP SERPL CALC-SCNC: 18 MMOL/L — HIGH (ref 5–17)
AST SERPL-CCNC: 30 U/L — SIGNIFICANT CHANGE UP (ref 10–40)
BILIRUB SERPL-MCNC: 0.4 MG/DL — SIGNIFICANT CHANGE UP (ref 0.2–1.2)
BUN SERPL-MCNC: 12 MG/DL — SIGNIFICANT CHANGE UP (ref 7–23)
CALCIUM SERPL-MCNC: 9.6 MG/DL — SIGNIFICANT CHANGE UP (ref 8.4–10.5)
CHLORIDE SERPL-SCNC: 101 MMOL/L — SIGNIFICANT CHANGE UP (ref 96–108)
CO2 SERPL-SCNC: 22 MMOL/L — SIGNIFICANT CHANGE UP (ref 22–31)
CREAT SERPL-MCNC: 0.69 MG/DL — SIGNIFICANT CHANGE UP (ref 0.5–1.3)
GLUCOSE SERPL-MCNC: 108 MG/DL — HIGH (ref 70–99)
HCT VFR BLD CALC: 36.9 % — SIGNIFICANT CHANGE UP (ref 34.5–45)
HGB BLD-MCNC: 12.4 G/DL — SIGNIFICANT CHANGE UP (ref 11.5–15.5)
MCHC RBC-ENTMCNC: 31.3 PG — SIGNIFICANT CHANGE UP (ref 27–34)
MCHC RBC-ENTMCNC: 33.6 GM/DL — SIGNIFICANT CHANGE UP (ref 32–36)
MCV RBC AUTO: 93.2 FL — SIGNIFICANT CHANGE UP (ref 80–100)
PLATELET # BLD AUTO: 199 K/UL — SIGNIFICANT CHANGE UP (ref 150–400)
POTASSIUM SERPL-MCNC: 3.7 MMOL/L — SIGNIFICANT CHANGE UP (ref 3.5–5.3)
POTASSIUM SERPL-SCNC: 3.7 MMOL/L — SIGNIFICANT CHANGE UP (ref 3.5–5.3)
PROT SERPL-MCNC: 7.3 G/DL — SIGNIFICANT CHANGE UP (ref 6–8.3)
RBC # BLD: 3.96 M/UL — SIGNIFICANT CHANGE UP (ref 3.8–5.2)
RBC # FLD: 15.1 % — HIGH (ref 10.3–14.5)
SODIUM SERPL-SCNC: 141 MMOL/L — SIGNIFICANT CHANGE UP (ref 135–145)
WBC # BLD: 7.59 K/UL — SIGNIFICANT CHANGE UP (ref 3.8–10.5)
WBC # FLD AUTO: 7.59 K/UL — SIGNIFICANT CHANGE UP (ref 3.8–10.5)

## 2017-02-21 PROCEDURE — 36415 COLL VENOUS BLD VENIPUNCTURE: CPT

## 2017-02-21 PROCEDURE — 80053 COMPREHEN METABOLIC PANEL: CPT

## 2017-02-21 PROCEDURE — G0463: CPT

## 2017-02-21 PROCEDURE — 85027 COMPLETE CBC AUTOMATED: CPT

## 2017-02-21 RX ORDER — ATORVASTATIN CALCIUM 80 MG/1
1 TABLET, FILM COATED ORAL
Qty: 0 | Refills: 0 | COMMUNITY

## 2017-02-21 RX ORDER — LOVASTATIN 20 MG
1 TABLET ORAL
Qty: 0 | Refills: 0 | COMMUNITY

## 2017-02-21 RX ORDER — TRAZODONE HCL 50 MG
1 TABLET ORAL
Qty: 0 | Refills: 0 | COMMUNITY

## 2017-02-21 NOTE — H&P PST ADULT - NSANTHOSAYNRD_GEN_A_CORE
No. AIMEE screening performed.  STOP BANG Legend: 0-2 = LOW Risk; 3-4 = INTERMEDIATE Risk; 5-8 = HIGH Risk

## 2017-02-21 NOTE — H&P PST ADULT - RS GEN PE MLT RESP DETAILS PC
respirations non-labored/clear to auscultation bilaterally/breath sounds equal/good air movement/airway patent

## 2017-02-21 NOTE — H&P PST ADULT - PMH
Asthma    Colitis  due to chemo 1/2017  Depression    Hiatal hernia with GERD  no changes  HTN (hypertension)    Hyperlipidemia    Insomnia    Malignant neoplasm of pancreas, unspecified location of malignancy  started chemo 10/2016, has mediport - left chest  Malignant neoplasm of right female breast, unspecified site of breast

## 2017-02-21 NOTE — H&P PST ADULT - FAMILY HISTORY
Mother  Still living? No  Family history of ovarian cancer, Age at diagnosis: Age Unknown     Sibling  Still living? No  Family history of brain cancer, Age at diagnosis: Age Unknown     Sibling  Still living? No  Family history of kidney cancer, Age at diagnosis: Age Unknown

## 2017-02-21 NOTE — H&P PST ADULT - HISTORY OF PRESENT ILLNESS
70 yr old female with medical hx of HTN, Asthma, GERD, h/o breast ca s/p right lumpectomy treated with radiation ( 1996), newly diagnosed pancreatic cancer, on chemo weekly, presents to PST today for scheduled upper endoscopy with bx on 2/28/17. 70 yr old female with medical hx of HTN, Asthma, GERD, h/o breast ca s/p right lumpectomy treated with radiation ( 1996), pancreatic cancer, diagnosed 11/2016,  on chemo weekly, presents to PST today for scheduled upper endoscopy with bx on 2/28/17.

## 2017-02-21 NOTE — H&P PST ADULT - PSH
H/O abdominal hysterectomy    H/O umbilical hernia repair  with mesh  Status post right breast lumpectomy  malignant treated with radiation

## 2017-02-28 ENCOUNTER — OUTPATIENT (OUTPATIENT)
Dept: OUTPATIENT SERVICES | Facility: HOSPITAL | Age: 72
LOS: 1 days | End: 2017-02-28
Payer: COMMERCIAL

## 2017-02-28 DIAGNOSIS — C25.3 MALIGNANT NEOPLASM OF PANCREATIC DUCT: ICD-10-CM

## 2017-02-28 DIAGNOSIS — Z98.89 OTHER SPECIFIED POSTPROCEDURAL STATES: Chronic | ICD-10-CM

## 2017-02-28 PROCEDURE — 10035 PLMT SFT TISS LOCLZJ DEV 1ST: CPT

## 2017-05-15 ENCOUNTER — APPOINTMENT (OUTPATIENT)
Dept: RADIATION ONCOLOGY | Facility: CLINIC | Age: 72
End: 2017-05-15

## 2017-05-15 VITALS
OXYGEN SATURATION: 95 % | HEART RATE: 72 BPM | SYSTOLIC BLOOD PRESSURE: 136 MMHG | RESPIRATION RATE: 16 BRPM | BODY MASS INDEX: 28.64 KG/M2 | WEIGHT: 177.47 LBS | DIASTOLIC BLOOD PRESSURE: 79 MMHG

## 2017-06-26 ENCOUNTER — FORM ENCOUNTER (OUTPATIENT)
Age: 72
End: 2017-06-26

## 2017-06-27 ENCOUNTER — OUTPATIENT (OUTPATIENT)
Dept: OUTPATIENT SERVICES | Facility: HOSPITAL | Age: 72
LOS: 1 days | End: 2017-06-27
Payer: COMMERCIAL

## 2017-06-27 ENCOUNTER — APPOINTMENT (OUTPATIENT)
Dept: CT IMAGING | Facility: IMAGING CENTER | Age: 72
End: 2017-06-27

## 2017-06-27 DIAGNOSIS — C25.9 MALIGNANT NEOPLASM OF PANCREAS, UNSPECIFIED: ICD-10-CM

## 2017-06-27 DIAGNOSIS — Z98.89 OTHER SPECIFIED POSTPROCEDURAL STATES: Chronic | ICD-10-CM

## 2017-06-27 PROCEDURE — 74160 CT ABDOMEN W/CONTRAST: CPT

## 2017-06-27 PROCEDURE — 82565 ASSAY OF CREATININE: CPT

## 2017-07-06 ENCOUNTER — APPOINTMENT (OUTPATIENT)
Dept: SURGICAL ONCOLOGY | Facility: CLINIC | Age: 72
End: 2017-07-06
Payer: COMMERCIAL

## 2017-07-06 VITALS
HEART RATE: 65 BPM | WEIGHT: 178 LBS | DIASTOLIC BLOOD PRESSURE: 84 MMHG | BODY MASS INDEX: 29.66 KG/M2 | SYSTOLIC BLOOD PRESSURE: 145 MMHG | OXYGEN SATURATION: 97 % | HEIGHT: 65 IN

## 2017-07-06 PROCEDURE — 99214 OFFICE O/P EST MOD 30 MIN: CPT

## 2017-08-21 ENCOUNTER — APPOINTMENT (OUTPATIENT)
Dept: THORACIC SURGERY | Facility: CLINIC | Age: 72
End: 2017-08-21
Payer: MEDICARE

## 2017-08-21 VITALS
DIASTOLIC BLOOD PRESSURE: 87 MMHG | SYSTOLIC BLOOD PRESSURE: 146 MMHG | WEIGHT: 184 LBS | OXYGEN SATURATION: 96 % | HEART RATE: 68 BPM | RESPIRATION RATE: 16 BRPM | HEIGHT: 65 IN | BODY MASS INDEX: 30.66 KG/M2

## 2017-08-21 PROCEDURE — 99201 OFFICE OUTPATIENT NEW 10 MINUTES: CPT

## 2017-08-23 ENCOUNTER — APPOINTMENT (OUTPATIENT)
Dept: PULMONOLOGY | Facility: CLINIC | Age: 72
End: 2017-08-23
Payer: MEDICARE

## 2017-08-23 PROCEDURE — 94726 PLETHYSMOGRAPHY LUNG VOLUMES: CPT

## 2017-08-23 PROCEDURE — 94010 BREATHING CAPACITY TEST: CPT

## 2017-08-23 PROCEDURE — 94729 DIFFUSING CAPACITY: CPT

## 2017-08-25 ENCOUNTER — OUTPATIENT (OUTPATIENT)
Dept: OUTPATIENT SERVICES | Facility: HOSPITAL | Age: 72
LOS: 1 days | End: 2017-08-25
Payer: MEDICARE

## 2017-08-25 VITALS
TEMPERATURE: 98 F | HEIGHT: 65 IN | DIASTOLIC BLOOD PRESSURE: 82 MMHG | RESPIRATION RATE: 16 BRPM | HEART RATE: 55 BPM | SYSTOLIC BLOOD PRESSURE: 148 MMHG | WEIGHT: 182.98 LBS | OXYGEN SATURATION: 98 %

## 2017-08-25 DIAGNOSIS — E78.5 HYPERLIPIDEMIA, UNSPECIFIED: ICD-10-CM

## 2017-08-25 DIAGNOSIS — G47.00 INSOMNIA, UNSPECIFIED: ICD-10-CM

## 2017-08-25 DIAGNOSIS — Z95.828 PRESENCE OF OTHER VASCULAR IMPLANTS AND GRAFTS: Chronic | ICD-10-CM

## 2017-08-25 DIAGNOSIS — F32.9 MAJOR DEPRESSIVE DISORDER, SINGLE EPISODE, UNSPECIFIED: ICD-10-CM

## 2017-08-25 DIAGNOSIS — R91.1 SOLITARY PULMONARY NODULE: ICD-10-CM

## 2017-08-25 DIAGNOSIS — C25.9 MALIGNANT NEOPLASM OF PANCREAS, UNSPECIFIED: ICD-10-CM

## 2017-08-25 DIAGNOSIS — I10 ESSENTIAL (PRIMARY) HYPERTENSION: ICD-10-CM

## 2017-08-25 DIAGNOSIS — Z95.828 PRESENCE OF OTHER VASCULAR IMPLANTS AND GRAFTS: ICD-10-CM

## 2017-08-25 DIAGNOSIS — Z98.89 OTHER SPECIFIED POSTPROCEDURAL STATES: Chronic | ICD-10-CM

## 2017-08-25 DIAGNOSIS — K21.9 GASTRO-ESOPHAGEAL REFLUX DISEASE WITHOUT ESOPHAGITIS: ICD-10-CM

## 2017-08-25 LAB
ALBUMIN SERPL ELPH-MCNC: 3.7 G/DL — SIGNIFICANT CHANGE UP (ref 3.3–5)
ALP SERPL-CCNC: 62 U/L — SIGNIFICANT CHANGE UP (ref 40–120)
ALT FLD-CCNC: 17 U/L — SIGNIFICANT CHANGE UP (ref 4–33)
AST SERPL-CCNC: 19 U/L — SIGNIFICANT CHANGE UP (ref 4–32)
BILIRUB SERPL-MCNC: 0.4 MG/DL — SIGNIFICANT CHANGE UP (ref 0.2–1.2)
BLD GP AB SCN SERPL QL: NEGATIVE — SIGNIFICANT CHANGE UP
BUN SERPL-MCNC: 17 MG/DL — SIGNIFICANT CHANGE UP (ref 7–23)
CALCIUM SERPL-MCNC: 8.9 MG/DL — SIGNIFICANT CHANGE UP (ref 8.4–10.5)
CHLORIDE SERPL-SCNC: 107 MMOL/L — SIGNIFICANT CHANGE UP (ref 98–107)
CO2 SERPL-SCNC: 25 MMOL/L — SIGNIFICANT CHANGE UP (ref 22–31)
CREAT SERPL-MCNC: 0.67 MG/DL — SIGNIFICANT CHANGE UP (ref 0.5–1.3)
GLUCOSE SERPL-MCNC: 61 MG/DL — LOW (ref 70–99)
HCT VFR BLD CALC: 36.4 % — SIGNIFICANT CHANGE UP (ref 34.5–45)
HGB BLD-MCNC: 11.8 G/DL — SIGNIFICANT CHANGE UP (ref 11.5–15.5)
MCHC RBC-ENTMCNC: 29.6 PG — SIGNIFICANT CHANGE UP (ref 27–34)
MCHC RBC-ENTMCNC: 32.4 % — SIGNIFICANT CHANGE UP (ref 32–36)
MCV RBC AUTO: 91.5 FL — SIGNIFICANT CHANGE UP (ref 80–100)
NRBC # FLD: 0 — SIGNIFICANT CHANGE UP
PLATELET # BLD AUTO: 208 K/UL — SIGNIFICANT CHANGE UP (ref 150–400)
PMV BLD: 11.2 FL — SIGNIFICANT CHANGE UP (ref 7–13)
POTASSIUM SERPL-MCNC: 3.5 MMOL/L — SIGNIFICANT CHANGE UP (ref 3.5–5.3)
POTASSIUM SERPL-SCNC: 3.5 MMOL/L — SIGNIFICANT CHANGE UP (ref 3.5–5.3)
PROT SERPL-MCNC: 7.1 G/DL — SIGNIFICANT CHANGE UP (ref 6–8.3)
RBC # BLD: 3.98 M/UL — SIGNIFICANT CHANGE UP (ref 3.8–5.2)
RBC # FLD: 13.2 % — SIGNIFICANT CHANGE UP (ref 10.3–14.5)
RH IG SCN BLD-IMP: POSITIVE — SIGNIFICANT CHANGE UP
SODIUM SERPL-SCNC: 143 MMOL/L — SIGNIFICANT CHANGE UP (ref 135–145)
WBC # BLD: 6.49 K/UL — SIGNIFICANT CHANGE UP (ref 3.8–10.5)
WBC # FLD AUTO: 6.49 K/UL — SIGNIFICANT CHANGE UP (ref 3.8–10.5)

## 2017-08-25 PROCEDURE — 93010 ELECTROCARDIOGRAM REPORT: CPT

## 2017-08-25 RX ORDER — FAMOTIDINE 10 MG/ML
1 INJECTION INTRAVENOUS
Qty: 0 | Refills: 0 | COMMUNITY

## 2017-08-25 RX ORDER — SODIUM CHLORIDE 9 MG/ML
3 INJECTION INTRAMUSCULAR; INTRAVENOUS; SUBCUTANEOUS EVERY 8 HOURS
Qty: 0 | Refills: 0 | Status: DISCONTINUED | OUTPATIENT
Start: 2017-09-05 | End: 2017-09-07

## 2017-08-25 RX ORDER — SODIUM CHLORIDE 9 MG/ML
1000 INJECTION, SOLUTION INTRAVENOUS
Qty: 0 | Refills: 0 | Status: DISCONTINUED | OUTPATIENT
Start: 2017-09-05 | End: 2017-09-05

## 2017-08-25 RX ORDER — POTASSIUM CHLORIDE 20 MEQ
10 PACKET (EA) ORAL
Qty: 0 | Refills: 0 | COMMUNITY

## 2017-08-25 RX ORDER — GABAPENTIN 400 MG/1
3 CAPSULE ORAL
Qty: 0 | Refills: 0 | COMMUNITY

## 2017-08-25 NOTE — H&P PST ADULT - NS MD HP INPLANTS MED DEV
port-a-cath left chest wall, Pancreatic stent Vascular access device/port-a-cath left chest wall, Pancreatic stent

## 2017-08-25 NOTE — H&P PST ADULT - NEGATIVE PSYCHIATRIC SYMPTOMS
no memory loss/no mood swings/no agitation/no depression/no paranoia/no suicidal ideation/no anxiety

## 2017-08-25 NOTE — H&P PST ADULT - ENDOCRINE COMMENTS
Pancreatic cancer completed chem/radiation treatment Pancreatic cancer completed chemotherapy & radiation treatment

## 2017-08-25 NOTE — H&P PST ADULT - PSH
H/O abdominal hysterectomy    H/O umbilical hernia repair  with mesh  Port-a-cath in place    Status post right breast lumpectomy  malignant treated with radiation

## 2017-08-25 NOTE — H&P PST ADULT - CONSTITUTIONAL DETAILS
well-nourished/BMI 30.4/well-groomed/no distress/obese/well-developed well-nourished/BMI 30.4/no distress/well-developed/well-groomed

## 2017-08-25 NOTE — H&P PST ADULT - HISTORY OF PRESENT ILLNESS
70yo female with HDL, HTN, Asthma, GERD, Insomnia, Urinary urgency, she reports being diagnosed with Pancreatic cancer in 11/2016. Pt reports completing chemotherapy and radiation in 3/2017. Pt reports Lung nodule was found via CT during examinations for Pancreatic cancer. Pt presents today for presurgical evaluation for Robotic Assisted Flexible Bronchoscopy, Wedge Resection Possible Lobectomy scheduled for 9/05/2017. 72yo female with HDL, HTN, Asthma, GERD, Insomnia, and Overactive bladder, reports being diagnosed with Pancreatic cancer in 11/2016. Pt reports completing chemotherapy and radiation in 3/2017, and Lung nodule was found via CT during examinations for Pancreatic cancer. Pt presents today for presurgical evaluation for Robotic Assisted Flexible Bronchoscopy, Wedge Resection Possible Lobectomy scheduled for 9/05/2017.

## 2017-08-25 NOTE — H&P PST ADULT - MUSCULOSKELETAL
details… detailed exam ROM intact/no calf tenderness/no joint warmth/no joint erythema/no joint swelling/normal strength

## 2017-08-25 NOTE — H&P PST ADULT - PROBLEM SELECTOR PLAN 1
Robotic Assisted Flexible Bronchoscopy, Wedge Resection Possible Lobectomy scheduled for 9/05/2017.  Pre-op instructions given. Pt verbalized understanding.  Chlorhexidine wash instructions given.  ABO ordered STAT for day of procedure .  Medical clearance requested - including copy of Stress/Echo results if available

## 2017-08-25 NOTE — H&P PST ADULT - LYMPHATIC
anterior cervical L/supraclavicular L/posterior cervical L/supraclavicular R/anterior cervical R/posterior cervical R

## 2017-08-25 NOTE — H&P PST ADULT - PMH
Asthma    Colitis  due to chemo 1/2017  Depression    Hiatal hernia with GERD  no changes  HTN (hypertension)    Hyperlipidemia    Insomnia    Malignant neoplasm of pancreas, unspecified location of malignancy  started chemo 10/2016, has mediport - left chest  Malignant neoplasm of right female breast, unspecified site of breast Asthma    Colitis  due to chemo 1/2017  Depression    Hiatal hernia with GERD  no changes  HTN (hypertension)    Hyperlipidemia    Insomnia    Malignant neoplasm of pancreas, unspecified location of malignancy  started chemo 10/2016, has mediport - left chest  Malignant neoplasm of right female breast, unspecified site of breast    Solitary pulmonary nodule

## 2017-08-29 DIAGNOSIS — R91.1 SOLITARY PULMONARY NODULE: ICD-10-CM

## 2017-09-01 NOTE — ASU PATIENT PROFILE, ADULT - PMH
Asthma    Colitis  due to chemo 1/2017  Depression    Hiatal hernia with GERD  no changes  HTN (hypertension)    Hyperlipidemia    Insomnia    Malignant neoplasm of pancreas, unspecified location of malignancy  started chemo 10/2016, has mediport - left chest  Malignant neoplasm of right female breast, unspecified site of breast    Solitary pulmonary nodule

## 2017-09-05 ENCOUNTER — TRANSCRIPTION ENCOUNTER (OUTPATIENT)
Age: 72
End: 2017-09-05

## 2017-09-05 ENCOUNTER — INPATIENT (INPATIENT)
Facility: HOSPITAL | Age: 72
LOS: 1 days | Discharge: HOME | End: 2017-09-07
Attending: THORACIC SURGERY (CARDIOTHORACIC VASCULAR SURGERY) | Admitting: THORACIC SURGERY (CARDIOTHORACIC VASCULAR SURGERY)
Payer: MEDICARE

## 2017-09-05 ENCOUNTER — APPOINTMENT (OUTPATIENT)
Dept: THORACIC SURGERY | Facility: HOSPITAL | Age: 72
End: 2017-09-05
Payer: MEDICARE

## 2017-09-05 ENCOUNTER — RESULT REVIEW (OUTPATIENT)
Age: 72
End: 2017-09-05

## 2017-09-05 VITALS
OXYGEN SATURATION: 96 % | WEIGHT: 182.98 LBS | TEMPERATURE: 99 F | HEIGHT: 65 IN | RESPIRATION RATE: 15 BRPM | DIASTOLIC BLOOD PRESSURE: 70 MMHG | SYSTOLIC BLOOD PRESSURE: 153 MMHG | HEART RATE: 50 BPM

## 2017-09-05 DIAGNOSIS — Z95.828 PRESENCE OF OTHER VASCULAR IMPLANTS AND GRAFTS: Chronic | ICD-10-CM

## 2017-09-05 DIAGNOSIS — Z98.89 OTHER SPECIFIED POSTPROCEDURAL STATES: Chronic | ICD-10-CM

## 2017-09-05 DIAGNOSIS — R91.1 SOLITARY PULMONARY NODULE: ICD-10-CM

## 2017-09-05 LAB
GRAM STN SPT: SIGNIFICANT CHANGE UP
GRAM STN WND: SIGNIFICANT CHANGE UP
RH IG SCN BLD-IMP: POSITIVE — SIGNIFICANT CHANGE UP
SPECIMEN SOURCE: SIGNIFICANT CHANGE UP
SPECIMEN SOURCE: SIGNIFICANT CHANGE UP

## 2017-09-05 PROCEDURE — 88334 PATH CONSLTJ SURG CYTO XM EA: CPT | Mod: 26,59

## 2017-09-05 PROCEDURE — 88305 TISSUE EXAM BY PATHOLOGIST: CPT | Mod: 26

## 2017-09-05 PROCEDURE — 88332 PATH CONSLTJ SURG EA ADD BLK: CPT | Mod: 26

## 2017-09-05 PROCEDURE — S2900 ROBOTIC SURGICAL SYSTEM: CPT | Mod: NC

## 2017-09-05 PROCEDURE — 71010: CPT | Mod: 26

## 2017-09-05 PROCEDURE — 99233 SBSQ HOSP IP/OBS HIGH 50: CPT | Mod: 25

## 2017-09-05 PROCEDURE — 88331 PATH CONSLTJ SURG 1 BLK 1SPC: CPT | Mod: 26

## 2017-09-05 PROCEDURE — 88309 TISSUE EXAM BY PATHOLOGIST: CPT | Mod: 26

## 2017-09-05 PROCEDURE — 31622 DX BRONCHOSCOPE/WASH: CPT

## 2017-09-05 PROCEDURE — 32666 THORACOSCOPY W/WEDGE RESECT: CPT

## 2017-09-05 PROCEDURE — 32674 THORACOSCOPY LYMPH NODE EXC: CPT

## 2017-09-05 PROCEDURE — 88312 SPECIAL STAINS GROUP 1: CPT | Mod: 26

## 2017-09-05 RX ORDER — ONDANSETRON 8 MG/1
4 TABLET, FILM COATED ORAL EVERY 6 HOURS
Qty: 0 | Refills: 0 | Status: DISCONTINUED | OUTPATIENT
Start: 2017-09-05 | End: 2017-09-06

## 2017-09-05 RX ORDER — SODIUM CHLORIDE 9 MG/ML
3 INJECTION INTRAMUSCULAR; INTRAVENOUS; SUBCUTANEOUS EVERY 8 HOURS
Qty: 0 | Refills: 0 | Status: COMPLETED | OUTPATIENT
Start: 2017-09-05 | End: 2017-09-06

## 2017-09-05 RX ORDER — ALBUTEROL 90 UG/1
2 AEROSOL, METERED ORAL EVERY 6 HOURS
Qty: 0 | Refills: 0 | Status: DISCONTINUED | OUTPATIENT
Start: 2017-09-05 | End: 2017-09-07

## 2017-09-05 RX ORDER — BUDESONIDE AND FORMOTEROL FUMARATE DIHYDRATE 160; 4.5 UG/1; UG/1
2 AEROSOL RESPIRATORY (INHALATION)
Qty: 0 | Refills: 0 | Status: DISCONTINUED | OUTPATIENT
Start: 2017-09-05 | End: 2017-09-07

## 2017-09-05 RX ORDER — NALOXONE HYDROCHLORIDE 4 MG/.1ML
0.1 SPRAY NASAL
Qty: 0 | Refills: 0 | Status: DISCONTINUED | OUTPATIENT
Start: 2017-09-05 | End: 2017-09-06

## 2017-09-05 RX ORDER — DOCUSATE SODIUM 100 MG
100 CAPSULE ORAL THREE TIMES A DAY
Qty: 0 | Refills: 0 | Status: DISCONTINUED | OUTPATIENT
Start: 2017-09-05 | End: 2017-09-07

## 2017-09-05 RX ORDER — SODIUM CHLORIDE 9 MG/ML
1000 INJECTION, SOLUTION INTRAVENOUS
Qty: 0 | Refills: 0 | Status: DISCONTINUED | OUTPATIENT
Start: 2017-09-05 | End: 2017-09-06

## 2017-09-05 RX ORDER — HEPARIN SODIUM 5000 [USP'U]/ML
5000 INJECTION INTRAVENOUS; SUBCUTANEOUS ONCE
Qty: 0 | Refills: 0 | Status: COMPLETED | OUTPATIENT
Start: 2017-09-05 | End: 2017-09-05

## 2017-09-05 RX ORDER — SENNA PLUS 8.6 MG/1
2 TABLET ORAL AT BEDTIME
Qty: 0 | Refills: 0 | Status: DISCONTINUED | OUTPATIENT
Start: 2017-09-05 | End: 2017-09-07

## 2017-09-05 RX ORDER — DIPHENHYDRAMINE HCL 50 MG
25 CAPSULE ORAL EVERY 4 HOURS
Qty: 0 | Refills: 0 | Status: DISCONTINUED | OUTPATIENT
Start: 2017-09-05 | End: 2017-09-06

## 2017-09-05 RX ORDER — HYDROMORPHONE HYDROCHLORIDE 2 MG/ML
0.5 INJECTION INTRAMUSCULAR; INTRAVENOUS; SUBCUTANEOUS
Qty: 0 | Refills: 0 | Status: DISCONTINUED | OUTPATIENT
Start: 2017-09-05 | End: 2017-09-06

## 2017-09-05 RX ORDER — SODIUM CHLORIDE 9 MG/ML
5 INJECTION INTRAMUSCULAR; INTRAVENOUS; SUBCUTANEOUS ONCE
Qty: 0 | Refills: 0 | Status: COMPLETED | OUTPATIENT
Start: 2017-09-05 | End: 2017-09-05

## 2017-09-05 RX ORDER — HEPARIN SODIUM 5000 [USP'U]/ML
5000 INJECTION INTRAVENOUS; SUBCUTANEOUS EVERY 8 HOURS
Qty: 0 | Refills: 0 | Status: DISCONTINUED | OUTPATIENT
Start: 2017-09-05 | End: 2017-09-07

## 2017-09-05 RX ORDER — ATORVASTATIN CALCIUM 80 MG/1
10 TABLET, FILM COATED ORAL AT BEDTIME
Qty: 0 | Refills: 0 | Status: DISCONTINUED | OUTPATIENT
Start: 2017-09-05 | End: 2017-09-07

## 2017-09-05 RX ORDER — SERTRALINE 25 MG/1
50 TABLET, FILM COATED ORAL DAILY
Qty: 0 | Refills: 0 | Status: DISCONTINUED | OUTPATIENT
Start: 2017-09-05 | End: 2017-09-07

## 2017-09-05 RX ORDER — HYDROMORPHONE HYDROCHLORIDE 2 MG/ML
30 INJECTION INTRAMUSCULAR; INTRAVENOUS; SUBCUTANEOUS
Qty: 0 | Refills: 0 | Status: DISCONTINUED | OUTPATIENT
Start: 2017-09-05 | End: 2017-09-06

## 2017-09-05 RX ORDER — FAMOTIDINE 10 MG/ML
40 INJECTION INTRAVENOUS
Qty: 0 | Refills: 0 | Status: DISCONTINUED | OUTPATIENT
Start: 2017-09-05 | End: 2017-09-06

## 2017-09-05 RX ORDER — FUROSEMIDE 40 MG
10 TABLET ORAL ONCE
Qty: 0 | Refills: 0 | Status: COMPLETED | OUTPATIENT
Start: 2017-09-05 | End: 2017-09-05

## 2017-09-05 RX ADMIN — SODIUM CHLORIDE 30 MILLILITER(S): 9 INJECTION, SOLUTION INTRAVENOUS at 19:11

## 2017-09-05 RX ADMIN — BUDESONIDE AND FORMOTEROL FUMARATE DIHYDRATE 2 PUFF(S): 160; 4.5 AEROSOL RESPIRATORY (INHALATION) at 21:30

## 2017-09-05 RX ADMIN — SODIUM CHLORIDE 30 MILLILITER(S): 9 INJECTION, SOLUTION INTRAVENOUS at 11:00

## 2017-09-05 RX ADMIN — HEPARIN SODIUM 5000 UNIT(S): 5000 INJECTION INTRAVENOUS; SUBCUTANEOUS at 06:15

## 2017-09-05 RX ADMIN — Medication 100 MILLIGRAM(S): at 21:00

## 2017-09-05 RX ADMIN — SENNA PLUS 2 TABLET(S): 8.6 TABLET ORAL at 21:01

## 2017-09-05 RX ADMIN — Medication 10 MILLIGRAM(S): at 22:28

## 2017-09-05 RX ADMIN — HYDROMORPHONE HYDROCHLORIDE 30 MILLILITER(S): 2 INJECTION INTRAMUSCULAR; INTRAVENOUS; SUBCUTANEOUS at 19:07

## 2017-09-05 RX ADMIN — HEPARIN SODIUM 5000 UNIT(S): 5000 INJECTION INTRAVENOUS; SUBCUTANEOUS at 13:09

## 2017-09-05 RX ADMIN — SERTRALINE 50 MILLIGRAM(S): 25 TABLET, FILM COATED ORAL at 13:09

## 2017-09-05 RX ADMIN — SODIUM CHLORIDE 3 MILLILITER(S): 9 INJECTION INTRAMUSCULAR; INTRAVENOUS; SUBCUTANEOUS at 13:35

## 2017-09-05 RX ADMIN — HEPARIN SODIUM 5000 UNIT(S): 5000 INJECTION INTRAVENOUS; SUBCUTANEOUS at 21:01

## 2017-09-05 RX ADMIN — SODIUM CHLORIDE 3 MILLILITER(S): 9 INJECTION INTRAMUSCULAR; INTRAVENOUS; SUBCUTANEOUS at 22:23

## 2017-09-05 RX ADMIN — HYDROMORPHONE HYDROCHLORIDE 30 MILLILITER(S): 2 INJECTION INTRAMUSCULAR; INTRAVENOUS; SUBCUTANEOUS at 11:00

## 2017-09-05 RX ADMIN — ATORVASTATIN CALCIUM 10 MILLIGRAM(S): 80 TABLET, FILM COATED ORAL at 21:00

## 2017-09-05 RX ADMIN — SODIUM CHLORIDE 3 MILLILITER(S): 9 INJECTION INTRAMUSCULAR; INTRAVENOUS; SUBCUTANEOUS at 20:32

## 2017-09-05 RX ADMIN — Medication 100 MILLIGRAM(S): at 13:09

## 2017-09-05 NOTE — BRIEF OPERATIVE NOTE - PROCEDURE
Robot-assisted wedge resection of lung using video-assisted thoracoscopic surgery (VATS) using da Trino Xi  09/05/2017  RLL wedge, lysis of adhesions, mediastinal lymph node biopsy  Active  NLE

## 2017-09-05 NOTE — PROGRESS NOTE ADULT - SUBJECTIVE AND OBJECTIVE BOX
INGRID JOSEPH          MRN-[id number]    HPI:  70yo female with HDL, HTN, Asthma, GERD, Insomnia, and Overactive bladder, reports being diagnosed with Pancreatic cancer in 11/2016. Pt reports completing chemotherapy and radiation in 3/2017, and Lung nodule was found via CT during examinations for Pancreatic cancer. Pt presents today for presurgical evaluation for Robotic Assisted Flexible Bronchoscopy, Wedge Resection Possible Lobectomy scheduled for 9/05/2017. (25 Aug 2017 13:54)      Procedure:  POD # :     Issues:        Interval/Overnight Events/ ROS  Pt remained hemodynamically stable overnight, not on any pressors or inotropes. OOB to chair, breathing comfortably with minimal pain. Ambulated several times . Denies pain, no SOB, no palpitations, no nausea/ no vomiting, no dizziness  A-line and brown d/giles         PAST MEDICAL & SURGICAL HISTORY:  Solitary pulmonary nodule  Insomnia  Hiatal hernia with GERD: no changes  Colitis: due to chemo 1/2017  Malignant neoplasm of pancreas, unspecified location of malignancy: started chemo 10/2016, has mediport - left chest  Malignant neoplasm of right female breast, unspecified site of breast  Asthma  Depression  Hyperlipidemia  HTN (hypertension)  Port-a-cath in place  Status post right breast lumpectomy: malignant treated with radiation  H/O abdominal hysterectomy  H/O umbilical hernia repair: with mesh    Allergies    No Known Drug Allergies  SteriStrips (Blisters)    Intolerances            ***VITAL SIGNS:  Vital Signs Last 24 Hrs  T(C): 36.2 (05 Sep 2017 12:00), Max: 37 (05 Sep 2017 05:51)  T(F): 97.1 (05 Sep 2017 12:00), Max: 98.6 (05 Sep 2017 05:51)  HR: 73 (05 Sep 2017 12:30) (50 - 79)  BP: 116/57 (05 Sep 2017 12:00) (116/57 - 153/70)  BP(mean): 72 (05 Sep 2017 12:00) (72 - 87)  RR: 17 (05 Sep 2017 12:30) (15 - 21)  SpO2: 96% (05 Sep 2017 12:30) (92% - 97%)    I/Os:   I&O's Detail    05 Sep 2017 07:01  -  05 Sep 2017 12:48  --------------------------------------------------------  IN:  Total IN: 0 mL    OUT:    Indwelling Catheter - Urethral: 35 mL  Total OUT: 35 mL    Total NET: -35 mL          CAPILLARY BLOOD GLUCOSE          =======================  MEDICATIONS  ===================  MEDICATIONS  (STANDING):  sodium chloride 0.9% lock flush 3 milliLiter(s) IV Push every 8 hours  HYDROmorphone PCA (1 mG/mL) 30 milliLiter(s) PCA Continuous PCA Continuous  heparin  Injectable 5000 Unit(s) SubCutaneous every 8 hours  lactated ringers. 1000 milliLiter(s) (30 mL/Hr) IV Continuous <Continuous>  docusate sodium 100 milliGRAM(s) Oral three times a day  senna 2 Tablet(s) Oral at bedtime  sertraline 50 milliGRAM(s) Oral daily  atorvastatin 10 milliGRAM(s) Oral at bedtime  famotidine    Tablet 40 milliGRAM(s) Oral two times a day  buDESOnide 160 MICROgram(s)/formoterol 4.5 MICROgram(s) Inhaler 2 Puff(s) Inhalation two times a day    MEDICATIONS  (PRN):  HYDROmorphone PCA (1 mG/mL) Rescue Clinician Bolus 0.5 milliGRAM(s) IV Push every 15 minutes PRN for Pain Scale GREATER THAN 6  naloxone Injectable 0.1 milliGRAM(s) IV Push every 3 minutes PRN For ANY of the following changes in patient status:  A. RR LESS THAN 10 breaths per minute, B. Oxygen saturation LESS THAN 90%, C. Sedation score of 6  ondansetron Injectable 4 milliGRAM(s) IV Push every 6 hours PRN Nausea  diphenhydrAMINE   Injectable 25 milliGRAM(s) IV Push every 4 hours PRN Pruritus  ALBUTerol    90 MICROgram(s) HFA Inhaler 2 Puff(s) Inhalation every 6 hours PRN Wheezing      ======================VENTILATOR SETTINGS  ==============      =================== PATIENT CARE ACCESS DEVICES ==========  Peripheral IV  Central Venous Line	R	L	IJ	Fem	SC			Placed:   Arterial Line	R	L	PT	DP	Fem	Rad	Ax	Placed:   Midline:				  Urinary Catheter, Date Placed:   Necessity of urinary, arterial, and venous catheters discussed    ======================= PHYSICAL EXAM===================  General:                         Comfortable, Awake, alert, not in any distress  Neuro:                            Moving all extremities to commands. No focal deficits	  HEENT:                           KEVIN/ ETT/ NGT/ trach  Respiratory:	Lungs clear on auscultation bilaterally with good aeration.                                           No rales, rhonchi, no wheezing. Effort even and unlabored.  CV:		Regular rate and rhythm. Normal S1/S2. No murmurs  Abdomen:	                     Soft,  nontender, not-distended. Bowel sounds present / absent.   Skin:		No rash.  Extremities:	Warm, no cyanosis or edema.  Palpable pulses    ============================ LABS =======================                      ===================== IMAGING STUDIES ===================  Radiology personally reviewed.    ====================ASSESSMENT AND PLAN ================      ====================== NEUROLOGY=======================  Pain control with PCA / PCEA / Tylenol IV / Toradol / Percocet  Pt is on Precedex for agitation  Pt is sedated with Propofol / Fentanyl    ==================== RESPIRATORY========================  Pt is on            L nasal canula / Face tent____% FiO2  Comfortable, no evidence of distress.  Using incentive spirometry & doing                ml  Monitor chest tube output  Chest tube to suction / water seal	    Mechanical Ventilation:    Mechanical ventilator status assessed & settings reviewed  Continue bronchodilators, pulmonary toilet  Head of bed elevation to 30-40 degrees    ====================CARDIOVASCULAR=====================  Continue hemodynamic monitoring/ telemetry  Not on any pressors  Continue cardiovascular / antihypertensive medications    ===================== RENAL ============================  Continue LR 30CC/hr      D/C IVF  Monitor I/Os, BUN/ Cr  and electrolytes  D/C Brown      Keep Brown  BPH: Continue Flomax/ Finasteride      ==================== GASTROINTESTINAL===================  On regular diet, tolerating well  Continue GI prophylaxis with Pepcid / Protonix  Continue Zofran / Reglan for nausea - PRN	  NPO    =======================    ENDOCRIN  =====================  Glycemic monitoring  F/S with coverage  ===================HEMATOLOGIC/ONCOLOGIC =============  Monitor chest tube output. No signs of active bleeding.   Follow CBC, coags  in AM  DVT prophylaxis with SCD, sc Heparin    ========================INFECTIOUS DISEASE===============  No signs of infection. Monitor for fever / leukocytosis.  All surgical incision / chest tube  sites look clean  D/C Brown      Pertinent clinical, laboratory, radiographic, hemodynamic, echocardiographic, respiratory data, microbiologic data and chart were reviewed and analyzed frequently throughout the course of the day and night. GI and DVT prophylaxis, glycemic control, head of bed elevation and skin care issues were addressed.  Patient seen, examined and plan discussed with CT Surgery / CTICU team during rounds.         I have spent               minutes of critical care time with this pt between            am/pm    and               am/ pm         minutes spent on total encounter; more than 50% of the visit was spent counseling and/or coordinating care by the attending physician.        GISELLE Ramírez MD INGRID JOSEPH          MRN-[id number]    HPI:  72yo female with HDL, HTN, Asthma, GERD, Insomnia, and Overactive bladder, reports being diagnosed with Pancreatic cancer in 11/2016. Pt reports completing chemotherapy and radiation in 3/2017, and Lung nodule was found via CT during examinations for Pancreatic cancer. Pt admitted for Robotic Assisted Flexible Bronchoscopy, Wedge Resection Possible Lobectomy on  9/05/2017.       Procedure:  9/5/17 Robot-assisted wedge resection of lung using video-assisted thoracoscopic surgery (VATS) using da Trino Xi ,  RLL wedge, lysis of adhesions, mediastinal lymph node biopsy ( frozen : necrotizing granuloma)    POD # : 0    Issues: postoperative pain              R/o TB              Right chest tube in place              Hx Panceratic cancer, HTN, dyslipidemia, Asthma        Interval/ ROS  Intraop course uneventful. Pt extubated prior to arrival in ICU. No bledding , no air leak from right chest tube. Pain so far only mild at chest tube site.   No SOB, no palpitations, no nausea/ no vomiting, no dizziness      PAST MEDICAL & SURGICAL HISTORY:  Solitary pulmonary nodule  Insomnia  Hiatal hernia with GERD: no changes  Colitis: due to chemo 1/2017  Malignant neoplasm of pancreas, unspecified location of malignancy: started chemo 10/2016, has mediport - left chest  Malignant neoplasm of right female breast, unspecified site of breast  Asthma  Depression  Hyperlipidemia  HTN (hypertension)  Port-a-cath in place  Status post right breast lumpectomy: malignant treated with radiation  H/O abdominal hysterectomy  H/O umbilical hernia repair: with mesh    Allergies    No Known Drug Allergies  SteriStrips (Blisters)    Intolerances            ***VITAL SIGNS:  Vital Signs Last 24 Hrs  T(C): 36.2 (05 Sep 2017 12:00), Max: 37 (05 Sep 2017 05:51)  T(F): 97.1 (05 Sep 2017 12:00), Max: 98.6 (05 Sep 2017 05:51)  HR: 73 (05 Sep 2017 12:30) (50 - 79)  BP: 116/57 (05 Sep 2017 12:00) (116/57 - 153/70)  BP(mean): 72 (05 Sep 2017 12:00) (72 - 87)  RR: 17 (05 Sep 2017 12:30) (15 - 21)  SpO2: 96% (05 Sep 2017 12:30) (92% - 97%)    I/Os:   I&O's Detail    05 Sep 2017 07:01  -  05 Sep 2017 12:48  --------------------------------------------------------  IN:  Total IN: 0 mL    OUT:    Indwelling Catheter - Urethral: 35 mL  Total OUT: 35 mL    Total NET: -35 mL          CAPILLARY BLOOD GLUCOSE          =======================  MEDICATIONS  ===================  MEDICATIONS  (STANDING):  sodium chloride 0.9% lock flush 3 milliLiter(s) IV Push every 8 hours  HYDROmorphone PCA (1 mG/mL) 30 milliLiter(s) PCA Continuous PCA Continuous  heparin  Injectable 5000 Unit(s) SubCutaneous every 8 hours  lactated ringers. 1000 milliLiter(s) (30 mL/Hr) IV Continuous <Continuous>  docusate sodium 100 milliGRAM(s) Oral three times a day  senna 2 Tablet(s) Oral at bedtime  sertraline 50 milliGRAM(s) Oral daily  atorvastatin 10 milliGRAM(s) Oral at bedtime  famotidine    Tablet 40 milliGRAM(s) Oral two times a day  buDESOnide 160 MICROgram(s)/formoterol 4.5 MICROgram(s) Inhaler 2 Puff(s) Inhalation two times a day    MEDICATIONS  (PRN):  HYDROmorphone PCA (1 mG/mL) Rescue Clinician Bolus 0.5 milliGRAM(s) IV Push every 15 minutes PRN for Pain Scale GREATER THAN 6  naloxone Injectable 0.1 milliGRAM(s) IV Push every 3 minutes PRN For ANY of the following changes in patient status:  A. RR LESS THAN 10 breaths per minute, B. Oxygen saturation LESS THAN 90%, C. Sedation score of 6  ondansetron Injectable 4 milliGRAM(s) IV Push every 6 hours PRN Nausea  diphenhydrAMINE   Injectable 25 milliGRAM(s) IV Push every 4 hours PRN Pruritus  ALBUTerol    90 MICROgram(s) HFA Inhaler 2 Puff(s) Inhalation every 6 hours PRN Wheezing      ======================VENTILATOR SETTINGS  ==============      =================== PATIENT CARE ACCESS DEVICES ==========  Peripheral IV  Central Venous Line	R	L	IJ	Fem	SC			Placed:   Arterial Line	R	L	PT	DP	Fem	Rad	Ax	Placed:   Midline:				  Urinary Catheter, Date Placed:   Necessity of urinary, arterial, and venous catheters discussed    ======================= PHYSICAL EXAM===================  General:                         Comfortable, Awake, alert, not in any distress  Neuro:                            Moving all extremities to commands. No focal deficits	  HEENT:                           KEVIN/ ETT/ NGT/ trach  Respiratory:	Lungs clear on auscultation bilaterally with good aeration.                                           No rales, rhonchi, no wheezing. Effort even and unlabored.  CV:		Regular rate and rhythm. Normal S1/S2. No murmurs  Abdomen:	                     Soft,  nontender, not-distended. Bowel sounds present / absent.   Skin:		No rash.  Extremities:	Warm, no cyanosis or edema.  Palpable pulses    ============================ LABS =======================                      ===================== IMAGING STUDIES ===================  Radiology personally reviewed.    ====================ASSESSMENT AND PLAN ================      ====================== NEUROLOGY=======================  Pain control with PCA / PCEA / Tylenol IV / Toradol / Percocet  Pt is on Precedex for agitation  Pt is sedated with Propofol / Fentanyl    ==================== RESPIRATORY========================  Pt is on            L nasal canula / Face tent____% FiO2  Comfortable, no evidence of distress.  Using incentive spirometry & doing                ml  Monitor chest tube output  Chest tube to suction / water seal	    Mechanical Ventilation:    Mechanical ventilator status assessed & settings reviewed  Continue bronchodilators, pulmonary toilet  Head of bed elevation to 30-40 degrees    ====================CARDIOVASCULAR=====================  Continue hemodynamic monitoring/ telemetry  Not on any pressors  Continue cardiovascular / antihypertensive medications    ===================== RENAL ============================  Continue LR 30CC/hr      D/C IVF  Monitor I/Os, BUN/ Cr  and electrolytes  D/C Stoner      Keep Stoner  BPH: Continue Flomax/ Finasteride      ==================== GASTROINTESTINAL===================  On regular diet, tolerating well  Continue GI prophylaxis with Pepcid / Protonix  Continue Zofran / Reglan for nausea - PRN	  NPO    =======================    ENDOCRIN  =====================  Glycemic monitoring  F/S with coverage  ===================HEMATOLOGIC/ONCOLOGIC =============  Monitor chest tube output. No signs of active bleeding.   Follow CBC, coags  in AM  DVT prophylaxis with SCD, sc Heparin    ========================INFECTIOUS DISEASE===============  No signs of infection. Monitor for fever / leukocytosis.  All surgical incision / chest tube  sites look clean  D/C Stoner      Pertinent clinical, laboratory, radiographic, hemodynamic, echocardiographic, respiratory data, microbiologic data and chart were reviewed and analyzed frequently throughout the course of the day and night. GI and DVT prophylaxis, glycemic control, head of bed elevation and skin care issues were addressed.  Patient seen, examined and plan discussed with CT Surgery / CTICU team during rounds.         I have spent               minutes of critical care time with this pt between            am/pm    and               am/ pm         minutes spent on total encounter; more than 50% of the visit was spent counseling and/or coordinating care by the attending physician.        GISELLE Ramírez MD INGRID JOSEPH          MRN-[id number]    HPI:  70yo female with HDL, HTN, Asthma, GERD, Insomnia, and Overactive bladder, reports being diagnosed with Pancreatic cancer in 11/2016. Pt reports completing chemotherapy and radiation in 3/2017, and Lung nodule was found via CT during examinations for Pancreatic cancer. Pt admitted for Robotic Assisted Flexible Bronchoscopy, Wedge Resection Possible Lobectomy on  9/05/2017.       Procedure:  9/5/17 Robot-assisted wedge resection of lung using video-assisted thoracoscopic surgery (VATS) using da Trino Xi ,  RLL wedge, lysis of adhesions, mediastinal lymph node biopsy ( frozen : necrotizing granuloma)    POD # : 0    Issues: postoperative pain              R/o TB              Right chest tube in place              Hx Panceratic cancer, HTN, dyslipidemia, Asthma        Interval/ ROS  Intraop course uneventful. Pt extubated prior to arrival in ICU. No bleeding , no air leak from right chest tube. Pain so far only mild at chest tube site.   No SOB, no palpitations, no nausea/ no vomiting, no dizziness      PAST MEDICAL & SURGICAL HISTORY:  Solitary pulmonary nodule  Insomnia  Hiatal hernia with GERD: no changes  Colitis: due to chemo 1/2017  Malignant neoplasm of pancreas, unspecified location of malignancy: started chemo 10/2016, has mediport - left chest  Malignant neoplasm of right female breast, unspecified site of breast  Asthma  Depression  Hyperlipidemia  HTN (hypertension)  Port-a-cath in place  Status post right breast lumpectomy: malignant treated with radiation  H/O abdominal hysterectomy  H/O umbilical hernia repair: with mesh    Allergies    No Known Drug Allergies  SteriStrips (Blisters)          ***VITAL SIGNS:  Vital Signs Last 24 Hrs  T(C): 36.2 (05 Sep 2017 12:00), Max: 37 (05 Sep 2017 05:51)  T(F): 97.1 (05 Sep 2017 12:00), Max: 98.6 (05 Sep 2017 05:51)  HR: 73 (05 Sep 2017 12:30) (50 - 79)  BP: 116/57 (05 Sep 2017 12:00) (116/57 - 153/70)  BP(mean): 72 (05 Sep 2017 12:00) (72 - 87)  RR: 17 (05 Sep 2017 12:30) (15 - 21)  SpO2: 96% (05 Sep 2017 12:30) (92% - 97%)    I/Os:   I&O's Detail    05 Sep 2017 07:01  -  05 Sep 2017 12:48  --------------------------------------------------------  IN:  Total IN: 0 mL    OUT:    Indwelling Catheter - Urethral: 35 mL  Total OUT: 35 mL    Total NET: -35 mL        =======================  MEDICATIONS  ===================  MEDICATIONS  (STANDING):  sodium chloride 0.9% lock flush 3 milliLiter(s) IV Push every 8 hours  HYDROmorphone PCA (1 mG/mL) 30 milliLiter(s) PCA Continuous   heparin  Injectable 5000 Unit(s) SubCutaneous every 8 hours  lactated ringers. 1000 milliLiter(s) (30 mL/Hr) IV Continuous   docusate sodium 100 milliGRAM(s) Oral three times a day  senna 2 Tablet(s) Oral at bedtime  sertraline 50 milliGRAM(s) Oral daily  atorvastatin 10 milliGRAM(s) Oral at bedtime  famotidine    Tablet 40 milliGRAM(s) Oral two times a day  buDESOnide 160 MICROgram(s)/formoterol 4.5 MICROgram(s) Inhaler 2 Puff(s) Inhalation two times a day    MEDICATIONS  (PRN):  HYDROmorphone PCA (1 mG/mL) Rescue Clinician Bolus 0.5 milliGRAM(s) IV Push every 15 minutes PRN for Pain Scale GREATER THAN 6  naloxone Injectable 0.1 milliGRAM(s) IV Push every 3 minutes PRN For ANY of the following changes in patient status:  A. RR LESS THAN 10 breaths per minute, B. Oxygen saturation LESS THAN 90%, C. Sedation score of 6  ondansetron Injectable 4 milliGRAM(s) IV Push every 6 hours PRN Nausea  diphenhydrAMINE   Injectable 25 milliGRAM(s) IV Push every 4 hours PRN Pruritus  ALBUTerol    90 MICROgram(s) HFA Inhaler 2 Puff(s) Inhalation every 6 hours PRN Wheezing        =================== PATIENT CARE ACCESS DEVICES ==========  Peripheral IV (+)   Arterial Line  L /	Rad	Placed: 9/5/17			  Urinary Catheter, Date Placed: 9/5/17  Necessity of urinary, arterial, and venous catheters discussed    ======================= PHYSICAL EXAM===================  General:            Comfortable, Awake, alert, not in any distress  Neuro:               Moving all extremities to commands. No focal deficits	  HEENT:             KEVIN  Respiratory:	Lungs clear on auscultation bilaterally with good aeration.                            No rales, rhonchi, no wheezing. Effort even and unlabored.                          Right chest tube site - clean, no air leak  CV:		Regular rate and rhythm. Normal S1/S2. No murmurs  Abdomen:	Soft,  nontender, not-distended. Bowel sounds present   Skin:		No rash.  Extremities:	Warm, no cyanosis or edema.  Palpable pulses    ============================ LABS =======================    normal preop lab work      ===================== IMAGING STUDIES ===================  Radiology personally reviewed.    CXR  Sep  5 2017   INTERPRETATION:  Clinical information: Status post right thoracotomy.    Findings: Portable frontal view of the chest dated 9/5/2017 is compared   to 1/20/2017. Postsurgical changes are noted in the right lung base.   Right chest tube is in place. There is bibasilar linear atelectasis.   There is no pleural effusion or pneumothorax. Cardiomediastinal   silhouette cannot be evaluated. Tip of left power injectable   [id number] is unchanged. Mild right-sided subcutaneous emphysema is   noted.    Impression: Postsurgical changes in the right lung. No pneumothorax.      ====================ASSESSMENT AND PLAN ================    71 y. F  9/5/17 Robot-assisted wedge resection of lung using video-assisted thoracoscopic surgery (VATS) using da Trino Xi ,  RLL wedge, lysis of adhesions, mediastinal lymph node biopsy ( frozen : necrotizing granuloma)    POD # : 0    Issues: postoperative pain              R/o TB              Right chest tube in place              Hx Panceratic cancer, HTN, dyslipidemia, Asthma    ====================== NEUROLOGY=======================  Pain control with PCA / PRN Tylenol IV       ==================== RESPIRATORY========================  Pt is on   2   L nasal canula   Comfortable, no evidence of distress.  Due to start using incentive spirometry when fully awakens  Monitor chest tube output  Chest tube to suction 	  F/up AFB and Quantiferon  Continue bronchodilators, pulmonary toilet  buDESOnide 160 MICROgram(s)/formoterol 4.5 MICROgram(s) Inhaler 2 Puff(s) Inhalation two times a day  Head of bed elevation to 30-40 degrees    ====================CARDIOVASCULAR=====================  Continue hemodynamic monitoring/ telemetry  Not on any pressors    ===================== RENAL ============================  Continue LR 30CC/hr   Monitor I/Os, BUN/ Cr  and electrolytes  Keep Stoner for UO  monitoring     ==================== GASTROINTESTINAL===================  Continue GI prophylaxis with Pepcid   Continue Zofran / Reglan for nausea - PRN	  NPO untill fully awakens post anesthesia    =======================    ENDOCRIN  =====================  Glycemic monitoring  F/S with coverage    ===================HEMATOLOGIC/ONCOLOGIC =============  Monitor chest tube output. No signs of active bleeding.   Follow CBC, coags  in AM  DVT prophylaxis with SCD, sc Heparin    ========================INFECTIOUS DISEASE===============  No signs of infection. Monitor for fever / leukocytosis.  All surgical incision / chest tube  sites look clean  D/C Stoner in Am      Pertinent clinical, laboratory, radiographic, hemodynamic, echocardiographic, respiratory data, microbiologic data and chart were reviewed and analyzed frequently throughout the course of the day and night. GI and DVT prophylaxis, glycemic control, head of bed elevation and skin care issues were addressed.  Patient seen, examined and plan discussed with CT Surgery / CTICU team during rounds.         I have spent   40 minutes of critical care time with this pt between   11  am   and   1 pm          GISELLE Ramírez MD INGRID JOSEPH          MRN-[id number]    HPI:  70yo female with HDL, HTN, Asthma, GERD, Insomnia, and Overactive bladder, reports being diagnosed with Pancreatic cancer in 11/2016. Pt reports completing chemotherapy and radiation in 3/2017, and Lung nodule was found via CT during examinations for Pancreatic cancer. Pt admitted for Robotic Assisted Flexible Bronchoscopy, Wedge Resection Possible Lobectomy on  9/05/2017.       Procedure:  9/5/17 Robot-assisted wedge resection of lung using video-assisted thoracoscopic surgery (VATS) using da Trino Xi ,  RLL wedge, lysis of adhesions, mediastinal lymph node biopsy ( frozen : necrotizing granuloma)    POD # : 0    Issues: postoperative pain              R/o TB              Right chest tube in place              Hx Panceratic cancer, HTN, dyslipidemia, Asthma        Interval/ ROS  Intraop course uneventful. Pt extubated prior to arrival in ICU. No bleeding , no air leak from right chest tube. Pain so far only mild at chest tube site.   No SOB, no palpitations, no nausea/ no vomiting, no dizziness      PAST MEDICAL & SURGICAL HISTORY:  Solitary pulmonary nodule  Insomnia  Hiatal hernia with GERD: no changes  Colitis: due to chemo 1/2017  Malignant neoplasm of pancreas, unspecified location of malignancy: started chemo 10/2016, has mediport - left chest  Malignant neoplasm of right female breast, unspecified site of breast  Asthma  Depression  Hyperlipidemia  HTN (hypertension)  Port-a-cath in place  Status post right breast lumpectomy: malignant treated with radiation  H/O abdominal hysterectomy  H/O umbilical hernia repair: with mesh    Allergies    No Known Drug Allergies  SteriStrips (Blisters)          ***VITAL SIGNS:  Vital Signs Last 24 Hrs  T(C): 36.2 (05 Sep 2017 12:00), Max: 37 (05 Sep 2017 05:51)  T(F): 97.1 (05 Sep 2017 12:00), Max: 98.6 (05 Sep 2017 05:51)  HR: 73 (05 Sep 2017 12:30) (50 - 79)  BP: 116/57 (05 Sep 2017 12:00) (116/57 - 153/70)  BP(mean): 72 (05 Sep 2017 12:00) (72 - 87)  RR: 17 (05 Sep 2017 12:30) (15 - 21)  SpO2: 96% (05 Sep 2017 12:30) (92% - 97%)    I/Os:   I&O's Detail    05 Sep 2017 07:01  -  05 Sep 2017 12:48  --------------------------------------------------------  IN:  Total IN: 0 mL    OUT:    Indwelling Catheter - Urethral: 35 mL  Total OUT: 35 mL    Total NET: -35 mL        =======================  MEDICATIONS  ===================  MEDICATIONS  (STANDING):  sodium chloride 0.9% lock flush 3 milliLiter(s) IV Push every 8 hours  HYDROmorphone PCA (1 mG/mL) 30 milliLiter(s) PCA Continuous   heparin  Injectable 5000 Unit(s) SubCutaneous every 8 hours  lactated ringers. 1000 milliLiter(s) (30 mL/Hr) IV Continuous   docusate sodium 100 milliGRAM(s) Oral three times a day  senna 2 Tablet(s) Oral at bedtime  sertraline 50 milliGRAM(s) Oral daily  atorvastatin 10 milliGRAM(s) Oral at bedtime  famotidine    Tablet 40 milliGRAM(s) Oral two times a day  buDESOnide 160 MICROgram(s)/formoterol 4.5 MICROgram(s) Inhaler 2 Puff(s) Inhalation two times a day    MEDICATIONS  (PRN):  HYDROmorphone PCA (1 mG/mL) Rescue Clinician Bolus 0.5 milliGRAM(s) IV Push every 15 minutes PRN for Pain Scale GREATER THAN 6  naloxone Injectable 0.1 milliGRAM(s) IV Push every 3 minutes PRN For ANY of the following changes in patient status:  A. RR LESS THAN 10 breaths per minute, B. Oxygen saturation LESS THAN 90%, C. Sedation score of 6  ondansetron Injectable 4 milliGRAM(s) IV Push every 6 hours PRN Nausea  diphenhydrAMINE   Injectable 25 milliGRAM(s) IV Push every 4 hours PRN Pruritus  ALBUTerol    90 MICROgram(s) HFA Inhaler 2 Puff(s) Inhalation every 6 hours PRN Wheezing        =================== PATIENT CARE ACCESS DEVICES ==========  Peripheral IV (+)   Arterial Line  L /	Rad	Placed: 9/5/17			  Urinary Catheter, Date Placed: 9/5/17  Necessity of urinary, arterial, and venous catheters discussed    ======================= PHYSICAL EXAM===================  General:            Comfortable, Awake, alert, not in any distress  Neuro:               Moving all extremities to commands. No focal deficits	  HEENT:             KEVIN  Respiratory:	Lungs clear on auscultation bilaterally with good aeration.                            No rales, rhonchi, no wheezing. Effort even and unlabored.                          Right chest tube site - clean, no air leak  CV:		Regular rate and rhythm. Normal S1/S2. No murmurs  Abdomen:	Soft,  nontender, not-distended. Bowel sounds present   Skin:		No rash.  Extremities:	Warm, no cyanosis or edema.  Palpable pulses    ============================ LABS =======================    normal preop lab work      ===================== IMAGING STUDIES ===================  Radiology personally reviewed.    CXR  Sep  5 2017   INTERPRETATION:  Clinical information: Status post right thoracotomy.    Findings: Portable frontal view of the chest dated 9/5/2017 is compared   to 1/20/2017. Postsurgical changes are noted in the right lung base.   Right chest tube is in place. There is bibasilar linear atelectasis.   There is no pleural effusion or pneumothorax. Cardiomediastinal   silhouette cannot be evaluated. Tip of left power injectable   Vwgewy-o-Ozso is unchanged. Mild right-sided subcutaneous emphysema is   noted.    Impression: Postsurgical changes in the right lung. No pneumothorax.      ====================ASSESSMENT AND PLAN ================    71 y. F  9/5/17 Robot-assisted wedge resection of lung using video-assisted thoracoscopic surgery (VATS) using da Trino Xi ,  RLL wedge, lysis of adhesions, mediastinal lymph node biopsy ( frozen : necrotizing granuloma)    POD # : 0    Issues: postoperative pain              R/o TB              Right chest tube in place              Hx Panceratic cancer, HTN, dyslipidemia, Asthma    ====================== NEUROLOGY=======================  Pain control with PCA / PRN Tylenol IV       ==================== RESPIRATORY========================  Pt is on   2   L nasal canula   Comfortable, no evidence of distress.  Due to start using incentive spirometry when fully awakens  Monitor chest tube output  Chest tube to suction 	  F/up AFB and Quantiferon  Airborne precautions  Continue bronchodilators, pulmonary toilet  buDESOnide 160 MICROgram(s)/formoterol 4.5 MICROgram(s) Inhaler 2 Puff(s) Inhalation two times a day  Head of bed elevation to 30-40 degrees    ====================CARDIOVASCULAR=====================  Continue hemodynamic monitoring/ telemetry  Not on any pressors    ===================== RENAL ============================  Continue LR 30CC/hr   Monitor I/Os, BUN/ Cr  and electrolytes  Keep Stoner for UO  monitoring     ==================== GASTROINTESTINAL===================  Continue GI prophylaxis with Pepcid   Continue Zofran / Reglan for nausea - PRN	  NPO untill fully awakens post anesthesia    =======================    ENDOCRIN  =====================  Glycemic monitoring  F/S with coverage    ===================HEMATOLOGIC/ONCOLOGIC =============  Monitor chest tube output. No signs of active bleeding.   Follow CBC, coags  in AM  DVT prophylaxis with SCD, sc Heparin    ========================INFECTIOUS DISEASE===============  No signs of infection. Monitor for fever / leukocytosis.  All surgical incision / chest tube  sites look clean  D/C Stoner in Am      Pertinent clinical, laboratory, radiographic, hemodynamic, echocardiographic, respiratory data, microbiologic data and chart were reviewed and analyzed frequently throughout the course of the day and night. GI and DVT prophylaxis, glycemic control, head of bed elevation and skin care issues were addressed.  Patient seen, examined and plan discussed with CT Surgery / CTICU team during rounds.         I have spent   40 minutes of critical care time with this pt between   11  am   and   1 pm          GISELLE Ramírez MD [facility] JOSEPH          MRN-[id number]    HPI:  72yo female with HDL, HTN, Asthma, GERD, Insomnia, and Overactive bladder, reports being diagnosed with Pancreatic cancer in 11/2016. Pt reports completing chemotherapy and radiation in 3/2017, and Lung nodule was found via CT during examinations for Pancreatic cancer. Pt admitted for Robotic Assisted Flexible Bronchoscopy, Wedge Resection Possible Lobectomy on  9/05/2017.       Procedure:  9/5/17 Robot-assisted wedge resection of lung using video-assisted thoracoscopic surgery (VATS) using da Trino Xi ,  RLL wedge, lysis of adhesions, mediastinal lymph node biopsy ( frozen : necrotizing granuloma)    POD # : 0    Issues: postoperative pain              R/o TB              Right chest tube in place              Hx Panceratic cancer, HTN, dyslipidemia, Asthma      Interval/ ROS  Intraop course uneventful. Pt extubated prior to arrival in ICU. No bleeding , no air leak from right chest tube. Pain so far only mild at chest tube site.   No SOB, no palpitations, no nausea/ no vomiting, no dizziness      PAST MEDICAL & SURGICAL HISTORY:  Solitary pulmonary nodule  Insomnia  Hiatal hernia with GERD: no changes  Colitis: due to chemo 1/2017  Malignant neoplasm of pancreas, unspecified location of malignancy: started chemo 10/2016, has mediport - left chest  Malignant neoplasm of right female breast, unspecified site of breast  Asthma  Depression  Hyperlipidemia  HTN (hypertension)  Port-a-cath in place  Status post right breast lumpectomy: malignant treated with radiation  H/O abdominal hysterectomy  H/O umbilical hernia repair: with mesh    Allergies    No Known Drug Allergies  SteriStrips (Blisters)        ***VITAL SIGNS:  Vital Signs Last 24 Hrs  T(C): 36.2 (05 Sep 2017 12:00), Max: 37 (05 Sep 2017 05:51)  T(F): 97.1 (05 Sep 2017 12:00), Max: 98.6 (05 Sep 2017 05:51)  HR: 73 (05 Sep 2017 12:30) (50 - 79)  BP: 116/57 (05 Sep 2017 12:00) (116/57 - 153/70)  BP(mean): 72 (05 Sep 2017 12:00) (72 - 87)  RR: 17 (05 Sep 2017 12:30) (15 - 21)  SpO2: 96% (05 Sep 2017 12:30) (92% - 97%)    I/Os:   I&O's Detail    05 Sep 2017 07:01  -  05 Sep 2017 12:48  --------------------------------------------------------  IN:  Total IN: 0 mL    OUT:    Indwelling Catheter - Urethral: 35 mL  Total OUT: 35 mL    Total NET: -35 mL      =======================  MEDICATIONS  ===================  MEDICATIONS  (STANDING):  sodium chloride 0.9% lock flush 3 milliLiter(s) IV Push every 8 hours  HYDROmorphone PCA (1 mG/mL) 30 milliLiter(s) PCA Continuous   heparin  Injectable 5000 Unit(s) SubCutaneous every 8 hours  lactated ringers. 1000 milliLiter(s) (30 mL/Hr) IV Continuous   docusate sodium 100 milliGRAM(s) Oral three times a day  senna 2 Tablet(s) Oral at bedtime  sertraline 50 milliGRAM(s) Oral daily  atorvastatin 10 milliGRAM(s) Oral at bedtime  famotidine    Tablet 40 milliGRAM(s) Oral two times a day  buDESOnide 160 MICROgram(s)/formoterol 4.5 MICROgram(s) Inhaler 2 Puff(s) Inhalation two times a day    MEDICATIONS  (PRN):  HYDROmorphone PCA (1 mG/mL) Rescue Clinician Bolus 0.5 milliGRAM(s) IV Push every 15 minutes PRN for Pain Scale GREATER THAN 6  naloxone Injectable 0.1 milliGRAM(s) IV Push every 3 minutes PRN For ANY of the following changes in patient status:  A. RR LESS THAN 10 breaths per minute, B. Oxygen saturation LESS THAN 90%, C. Sedation score of 6  ondansetron Injectable 4 milliGRAM(s) IV Push every 6 hours PRN Nausea  diphenhydrAMINE   Injectable 25 milliGRAM(s) IV Push every 4 hours PRN Pruritus  ALBUTerol    90 MICROgram(s) HFA Inhaler 2 Puff(s) Inhalation every 6 hours PRN Wheezing        =================== PATIENT CARE ACCESS DEVICES ==========  Peripheral IV (+)   Arterial Line  L /	Rad	Placed: 9/5/17			  Urinary Catheter, Date Placed: 9/5/17  Necessity of urinary, arterial, and venous catheters discussed    ======================= PHYSICAL EXAM===================  General:            Comfortable, Awake, alert, not in any distress  Neuro:               Moving all extremities to commands. No focal deficits	  HEENT:             KEVIN  Respiratory:	Lungs clear on auscultation bilaterally with good aeration.                            No rales, rhonchi, no wheezing. Effort even and unlabored.                          Right chest tube site - clean, no air leak  CV:		Regular rate and rhythm. Normal S1/S2. No murmurs  Abdomen:	Soft,  nontender, not-distended. Bowel sounds present   Skin:		No rash.  Extremities:	Warm, no cyanosis or edema.  Palpable pulses    ============================ LABS =======================    normal preop lab work      ===================== IMAGING STUDIES ===================  Radiology personally reviewed.    CXR  Sep  5 2017   INTERPRETATION:  Clinical information: Status post right thoracotomy.    Findings: Portable frontal view of the chest dated 9/5/2017 is compared   to 1/20/2017. Postsurgical changes are noted in the right lung base.   Right chest tube is in place. There is bibasilar linear atelectasis.   There is no pleural effusion or pneumothorax. Cardiomediastinal   silhouette cannot be evaluated. Tip of left power injectable   Ekkzam-i-Xjdr is unchanged. Mild right-sided subcutaneous emphysema is   noted.    Impression: Postsurgical changes in the right lung. No pneumothorax.      ====================ASSESSMENT AND PLAN ================    71 y. F  9/5/17 Robot-assisted wedge resection of lung using video-assisted thoracoscopic surgery (VATS) using da Trino Xi ,  RLL wedge, lysis of adhesions, mediastinal lymph node biopsy ( frozen : necrotizing granuloma)    POD # : 0    Issues: postoperative pain              R/o TB              Right chest tube in place              Hx Panceratic cancer, HTN, dyslipidemia, Asthma    ====================== NEUROLOGY=======================  Pain control with PCA / PRN Tylenol IV     ==================== RESPIRATORY========================  Pt is on   2   L nasal canula   Comfortable, no evidence of distress.  Due to start using incentive spirometry when fully awakens  Monitor chest tube output  Chest tube to suction 	  F/up AFB and Quantiferon  Airborne precautions  Continue bronchodilators, pulmonary toilet  buDESOnide 160 MICROgram(s)/formoterol 4.5 MICROgram(s) Inhaler 2 Puff(s) Inhalation two times a day  Head of bed elevation to 30-40 degrees    ====================CARDIOVASCULAR=====================  Continue hemodynamic monitoring/ telemetry  Not on any pressors    ===================== RENAL ============================  Continue LR 30CC/hr   Monitor I/Os, BUN/ Cr  and electrolytes  Keep Stoner for UO  monitoring     ==================== GASTROINTESTINAL===================  Continue GI prophylaxis with Pepcid   Continue Zofran / Reglan for nausea - PRN	  NPO untill fully awakens post anesthesia    =======================    ENDOCRIN  =====================  Glycemic monitoring  F/S with coverage    ===================HEMATOLOGIC/ONCOLOGIC =============  Monitor chest tube output. No signs of active bleeding.   Follow CBC, coags  in AM  DVT prophylaxis with SCD, sc Heparin    ========================INFECTIOUS DISEASE===============  No signs of infection. Monitor for fever / leukocytosis.  All surgical incision / chest tube  sites look clean  D/C Stoner in Am      Pertinent clinical, laboratory, radiographic, hemodynamic, echocardiographic, respiratory data, microbiologic data and chart were reviewed and analyzed frequently throughout the course of the day and night. GI and DVT prophylaxis, glycemic control, head of bed elevation and skin care issues were addressed.  Patient seen, examined and plan discussed with CT Surgery / CTICU team during rounds.       I have spent   40 minutes of critical care time with this pt between   11  am   and   1 pm      GISELLE Ramírez MD

## 2017-09-06 DIAGNOSIS — K59.01 SLOW TRANSIT CONSTIPATION: ICD-10-CM

## 2017-09-06 DIAGNOSIS — C25.0 MALIGNANT NEOPLASM OF HEAD OF PANCREAS: ICD-10-CM

## 2017-09-06 LAB
APTT BLD: 27.4 SEC — LOW (ref 27.5–37.4)
BASOPHILS # BLD AUTO: 0.02 K/UL — SIGNIFICANT CHANGE UP (ref 0–0.2)
BASOPHILS NFR BLD AUTO: 0.2 % — SIGNIFICANT CHANGE UP (ref 0–2)
BUN SERPL-MCNC: 13 MG/DL — SIGNIFICANT CHANGE UP (ref 7–23)
CALCIUM SERPL-MCNC: 9.1 MG/DL — SIGNIFICANT CHANGE UP (ref 8.4–10.5)
CHLORIDE SERPL-SCNC: 103 MMOL/L — SIGNIFICANT CHANGE UP (ref 98–107)
CO2 SERPL-SCNC: 27 MMOL/L — SIGNIFICANT CHANGE UP (ref 22–31)
CREAT SERPL-MCNC: 0.67 MG/DL — SIGNIFICANT CHANGE UP (ref 0.5–1.3)
CULTURE - ACID FAST SMEAR CONCENTRATED: SIGNIFICANT CHANGE UP
EOSINOPHIL # BLD AUTO: 0.61 K/UL — HIGH (ref 0–0.5)
EOSINOPHIL NFR BLD AUTO: 6.1 % — HIGH (ref 0–6)
GLUCOSE SERPL-MCNC: 121 MG/DL — HIGH (ref 70–99)
HCT VFR BLD CALC: 35.2 % — SIGNIFICANT CHANGE UP (ref 34.5–45)
HGB BLD-MCNC: 11.5 G/DL — SIGNIFICANT CHANGE UP (ref 11.5–15.5)
IMM GRANULOCYTES # BLD AUTO: 0.03 # — SIGNIFICANT CHANGE UP
IMM GRANULOCYTES NFR BLD AUTO: 0.3 % — SIGNIFICANT CHANGE UP (ref 0–1.5)
INR BLD: 1.17 — SIGNIFICANT CHANGE UP (ref 0.88–1.17)
LYMPHOCYTES # BLD AUTO: 1.38 K/UL — SIGNIFICANT CHANGE UP (ref 1–3.3)
LYMPHOCYTES # BLD AUTO: 13.9 % — SIGNIFICANT CHANGE UP (ref 13–44)
MAGNESIUM SERPL-MCNC: 1.7 MG/DL — SIGNIFICANT CHANGE UP (ref 1.6–2.6)
MCHC RBC-ENTMCNC: 29.6 PG — SIGNIFICANT CHANGE UP (ref 27–34)
MCHC RBC-ENTMCNC: 32.7 % — SIGNIFICANT CHANGE UP (ref 32–36)
MCV RBC AUTO: 90.7 FL — SIGNIFICANT CHANGE UP (ref 80–100)
MONOCYTES # BLD AUTO: 0.77 K/UL — SIGNIFICANT CHANGE UP (ref 0–0.9)
MONOCYTES NFR BLD AUTO: 7.8 % — SIGNIFICANT CHANGE UP (ref 2–14)
NEUTROPHILS # BLD AUTO: 7.12 K/UL — SIGNIFICANT CHANGE UP (ref 1.8–7.4)
NEUTROPHILS NFR BLD AUTO: 71.7 % — SIGNIFICANT CHANGE UP (ref 43–77)
NRBC # FLD: 0 — SIGNIFICANT CHANGE UP
PHOSPHATE SERPL-MCNC: 4.1 MG/DL — SIGNIFICANT CHANGE UP (ref 2.5–4.5)
PLATELET # BLD AUTO: 193 K/UL — SIGNIFICANT CHANGE UP (ref 150–400)
PMV BLD: 10.7 FL — SIGNIFICANT CHANGE UP (ref 7–13)
POTASSIUM SERPL-MCNC: 3.6 MMOL/L — SIGNIFICANT CHANGE UP (ref 3.5–5.3)
POTASSIUM SERPL-SCNC: 3.6 MMOL/L — SIGNIFICANT CHANGE UP (ref 3.5–5.3)
PROTHROM AB SERPL-ACNC: 13.1 SEC — SIGNIFICANT CHANGE UP (ref 9.8–13.1)
RBC # BLD: 3.88 M/UL — SIGNIFICANT CHANGE UP (ref 3.8–5.2)
RBC # FLD: 13.4 % — SIGNIFICANT CHANGE UP (ref 10.3–14.5)
SODIUM SERPL-SCNC: 142 MMOL/L — SIGNIFICANT CHANGE UP (ref 135–145)
SPECIMEN SOURCE: SIGNIFICANT CHANGE UP
WBC # BLD: 9.93 K/UL — SIGNIFICANT CHANGE UP (ref 3.8–10.5)
WBC # FLD AUTO: 9.93 K/UL — SIGNIFICANT CHANGE UP (ref 3.8–10.5)

## 2017-09-06 PROCEDURE — 99232 SBSQ HOSP IP/OBS MODERATE 35: CPT

## 2017-09-06 PROCEDURE — 71010: CPT | Mod: 26

## 2017-09-06 PROCEDURE — 71010: CPT | Mod: 26,77

## 2017-09-06 RX ORDER — MAGNESIUM SULFATE 500 MG/ML
2 VIAL (ML) INJECTION ONCE
Qty: 0 | Refills: 0 | Status: COMPLETED | OUTPATIENT
Start: 2017-09-06 | End: 2017-09-06

## 2017-09-06 RX ORDER — OXYCODONE AND ACETAMINOPHEN 5; 325 MG/1; MG/1
2 TABLET ORAL EVERY 4 HOURS
Qty: 0 | Refills: 0 | Status: DISCONTINUED | OUTPATIENT
Start: 2017-09-06 | End: 2017-09-07

## 2017-09-06 RX ORDER — OXYCODONE AND ACETAMINOPHEN 5; 325 MG/1; MG/1
1 TABLET ORAL EVERY 4 HOURS
Qty: 0 | Refills: 0 | Status: DISCONTINUED | OUTPATIENT
Start: 2017-09-06 | End: 2017-09-07

## 2017-09-06 RX ORDER — PANTOPRAZOLE SODIUM 20 MG/1
40 TABLET, DELAYED RELEASE ORAL
Qty: 0 | Refills: 0 | Status: DISCONTINUED | OUTPATIENT
Start: 2017-09-06 | End: 2017-09-07

## 2017-09-06 RX ORDER — POTASSIUM CHLORIDE 20 MEQ
20 PACKET (EA) ORAL ONCE
Qty: 0 | Refills: 0 | Status: COMPLETED | OUTPATIENT
Start: 2017-09-06 | End: 2017-09-06

## 2017-09-06 RX ADMIN — PANTOPRAZOLE SODIUM 40 MILLIGRAM(S): 20 TABLET, DELAYED RELEASE ORAL at 06:22

## 2017-09-06 RX ADMIN — ATORVASTATIN CALCIUM 10 MILLIGRAM(S): 80 TABLET, FILM COATED ORAL at 22:35

## 2017-09-06 RX ADMIN — Medication 20 MILLIEQUIVALENT(S): at 06:22

## 2017-09-06 RX ADMIN — HYDROMORPHONE HYDROCHLORIDE 30 MILLILITER(S): 2 INJECTION INTRAMUSCULAR; INTRAVENOUS; SUBCUTANEOUS at 09:18

## 2017-09-06 RX ADMIN — Medication 100 MILLIGRAM(S): at 06:22

## 2017-09-06 RX ADMIN — OXYCODONE AND ACETAMINOPHEN 2 TABLET(S): 5; 325 TABLET ORAL at 21:21

## 2017-09-06 RX ADMIN — OXYCODONE AND ACETAMINOPHEN 2 TABLET(S): 5; 325 TABLET ORAL at 20:21

## 2017-09-06 RX ADMIN — Medication 50 GRAM(S): at 06:22

## 2017-09-06 RX ADMIN — HEPARIN SODIUM 5000 UNIT(S): 5000 INJECTION INTRAVENOUS; SUBCUTANEOUS at 06:22

## 2017-09-06 RX ADMIN — BUDESONIDE AND FORMOTEROL FUMARATE DIHYDRATE 2 PUFF(S): 160; 4.5 AEROSOL RESPIRATORY (INHALATION) at 13:59

## 2017-09-06 RX ADMIN — HEPARIN SODIUM 5000 UNIT(S): 5000 INJECTION INTRAVENOUS; SUBCUTANEOUS at 22:35

## 2017-09-06 RX ADMIN — SODIUM CHLORIDE 3 MILLILITER(S): 9 INJECTION INTRAMUSCULAR; INTRAVENOUS; SUBCUTANEOUS at 21:49

## 2017-09-06 RX ADMIN — SENNA PLUS 2 TABLET(S): 8.6 TABLET ORAL at 22:35

## 2017-09-06 RX ADMIN — BUDESONIDE AND FORMOTEROL FUMARATE DIHYDRATE 2 PUFF(S): 160; 4.5 AEROSOL RESPIRATORY (INHALATION) at 22:38

## 2017-09-06 RX ADMIN — Medication 100 MILLIGRAM(S): at 13:21

## 2017-09-06 RX ADMIN — OXYCODONE AND ACETAMINOPHEN 1 TABLET(S): 5; 325 TABLET ORAL at 18:11

## 2017-09-06 RX ADMIN — Medication 100 MILLIGRAM(S): at 22:35

## 2017-09-06 RX ADMIN — HYDROMORPHONE HYDROCHLORIDE 30 MILLILITER(S): 2 INJECTION INTRAMUSCULAR; INTRAVENOUS; SUBCUTANEOUS at 07:37

## 2017-09-06 RX ADMIN — SODIUM CHLORIDE 3 MILLILITER(S): 9 INJECTION INTRAMUSCULAR; INTRAVENOUS; SUBCUTANEOUS at 06:21

## 2017-09-06 RX ADMIN — SODIUM CHLORIDE 3 MILLILITER(S): 9 INJECTION INTRAMUSCULAR; INTRAVENOUS; SUBCUTANEOUS at 10:58

## 2017-09-06 RX ADMIN — SODIUM CHLORIDE 3 MILLILITER(S): 9 INJECTION INTRAMUSCULAR; INTRAVENOUS; SUBCUTANEOUS at 13:14

## 2017-09-06 RX ADMIN — SERTRALINE 50 MILLIGRAM(S): 25 TABLET, FILM COATED ORAL at 13:21

## 2017-09-06 RX ADMIN — HEPARIN SODIUM 5000 UNIT(S): 5000 INJECTION INTRAVENOUS; SUBCUTANEOUS at 13:21

## 2017-09-06 NOTE — PROGRESS NOTE ADULT - SUBJECTIVE AND OBJECTIVE BOX
INGRID JOSEPH          MRN-[id number]    HPI:  70yo female with HDL, HTN, Asthma, GERD, Insomnia, and Overactive bladder, reports being diagnosed with Pancreatic cancer in 11/2016. Pt reports completing chemotherapy and radiation in 3/2017, and Lung nodule was found via CT during examinations for Pancreatic cancer. Pt presents today for presurgical evaluation for Robotic Assisted Flexible Bronchoscopy, Wedge Resection Possible Lobectomy scheduled for 9/05/2017. (25 Aug 2017 13:54)      Procedure:  POD # :     Issues:        Interval/Overnight Events/ ROS  Pt remained hemodynamically stable overnight, not on any pressors or inotropes. OOB to chair, breathing comfortably with minimal pain. Ambulated several times . Denies pain, no SOB, no palpitations, no nausea/ no vomiting, no dizziness  A-line and brown d/giles         PAST MEDICAL & SURGICAL HISTORY:  Solitary pulmonary nodule  Insomnia  Hiatal hernia with GERD: no changes  Colitis: due to chemo 1/2017  Malignant neoplasm of pancreas, unspecified location of malignancy: started chemo 10/2016, has mediport - left chest  Malignant neoplasm of right female breast, unspecified site of breast  Asthma  Depression  Hyperlipidemia  HTN (hypertension)  Port-a-cath in place  Status post right breast lumpectomy: malignant treated with radiation  H/O abdominal hysterectomy  H/O umbilical hernia repair: with mesh    Allergies    No Known Drug Allergies  SteriStrips (Blisters)    Intolerances            ***VITAL SIGNS:  Vital Signs Last 24 Hrs  T(C): 36.7 (06 Sep 2017 04:00), Max: 37.6 (06 Sep 2017 00:00)  T(F): 98.1 (06 Sep 2017 04:00), Max: 99.6 (06 Sep 2017 00:00)  HR: 63 (06 Sep 2017 07:00) (57 - 98)  BP: 138/68 (05 Sep 2017 16:00) (116/57 - 143/74)  BP(mean): 86 (05 Sep 2017 16:00) (72 - 87)  RR: 20 (06 Sep 2017 07:00) (14 - 27)  SpO2: 97% (06 Sep 2017 07:00) (92% - 99%)    I/Os:   I&O's Detail    05 Sep 2017 07:01  -  06 Sep 2017 07:00  --------------------------------------------------------  IN:    IV PiggyBack: 50 mL    lactated ringers.: 630 mL    Oral Fluid: 420 mL  Total IN: 1100 mL    OUT:    Chest Tube: 60 mL    Indwelling Catheter - Urethral: 1035 mL  Total OUT: 1095 mL    Total NET: 5 mL          CAPILLARY BLOOD GLUCOSE          =======================  MEDICATIONS  ===================  MEDICATIONS  (STANDING):  sodium chloride 0.9% lock flush 3 milliLiter(s) IV Push every 8 hours  HYDROmorphone PCA (1 mG/mL) 30 milliLiter(s) PCA Continuous PCA Continuous  heparin  Injectable 5000 Unit(s) SubCutaneous every 8 hours  lactated ringers. 1000 milliLiter(s) (30 mL/Hr) IV Continuous <Continuous>  docusate sodium 100 milliGRAM(s) Oral three times a day  senna 2 Tablet(s) Oral at bedtime  sertraline 50 milliGRAM(s) Oral daily  atorvastatin 10 milliGRAM(s) Oral at bedtime  buDESOnide 160 MICROgram(s)/formoterol 4.5 MICROgram(s) Inhaler 2 Puff(s) Inhalation two times a day  sodium chloride 3%  Inhalation 3 milliLiter(s) Inhalation every 8 hours  pantoprazole    Tablet 40 milliGRAM(s) Oral before breakfast    MEDICATIONS  (PRN):  HYDROmorphone PCA (1 mG/mL) Rescue Clinician Bolus 0.5 milliGRAM(s) IV Push every 15 minutes PRN for Pain Scale GREATER THAN 6  naloxone Injectable 0.1 milliGRAM(s) IV Push every 3 minutes PRN For ANY of the following changes in patient status:  A. RR LESS THAN 10 breaths per minute, B. Oxygen saturation LESS THAN 90%, C. Sedation score of 6  ondansetron Injectable 4 milliGRAM(s) IV Push every 6 hours PRN Nausea  diphenhydrAMINE   Injectable 25 milliGRAM(s) IV Push every 4 hours PRN Pruritus  ALBUTerol    90 MICROgram(s) HFA Inhaler 2 Puff(s) Inhalation every 6 hours PRN Wheezing      ======================VENTILATOR SETTINGS  ==============      =================== PATIENT CARE ACCESS DEVICES ==========  Peripheral IV  Central Venous Line	R	L	IJ	Fem	SC			Placed:   Arterial Line	R	L	PT	DP	Fem	Rad	Ax	Placed:   Midline:				  Urinary Catheter, Date Placed:   Necessity of urinary, arterial, and venous catheters discussed    ======================= PHYSICAL EXAM===================  General:                         Comfortable, Awake, alert, not in any distress  Neuro:                            Moving all extremities to commands. No focal deficits	  HEENT:                           KEVIN/ ETT/ NGT/ trach  Respiratory:	Lungs clear on auscultation bilaterally with good aeration.                                           No rales, rhonchi, no wheezing. Effort even and unlabored.  CV:		Regular rate and rhythm. Normal S1/S2. No murmurs  Abdomen:	                     Soft,  nontender, not-distended. Bowel sounds present / absent.   Skin:		No rash.  Extremities:	Warm, no cyanosis or edema.  Palpable pulses    ============================ LABS =======================                        11.5   9.93  )-----------( 193      ( 06 Sep 2017 03:05 )             35.2     09-06    142  |  103  |  13  ----------------------------<  121<H>  3.6   |  27  |  0.67    Ca    9.1      06 Sep 2017 03:05  Phos  4.1     09-06  Mg     1.7     09-06        PT/INR - ( 06 Sep 2017 03:05 )   PT: 13.1 SEC;   INR: 1.17          PTT - ( 06 Sep 2017 03:05 )  PTT:27.4 SEC          ===================== IMAGING STUDIES ===================  Radiology personally reviewed.    ====================ASSESSMENT AND PLAN ================      ====================== NEUROLOGY=======================  Pain control with PCA / PCEA / Tylenol IV / Toradol / Percocet  Pt is on Precedex for agitation  Pt is sedated with Propofol / Fentanyl    ==================== RESPIRATORY========================  Pt is on            L nasal canula / Face tent____% FiO2  Comfortable, no evidence of distress.  Using incentive spirometry & doing                ml  Monitor chest tube output  Chest tube to suction / water seal	    Mechanical Ventilation:    Mechanical ventilator status assessed & settings reviewed  Continue bronchodilators, pulmonary toilet  Head of bed elevation to 30-40 degrees    ====================CARDIOVASCULAR=====================  Continue hemodynamic monitoring/ telemetry  Not on any pressors  Continue cardiovascular / antihypertensive medications    ===================== RENAL ============================  Continue LR 30CC/hr      D/C IVF  Monitor I/Os, BUN/ Cr  and electrolytes  D/C Brown      Keep Brown  BPH: Continue Flomax/ Finasteride      ==================== GASTROINTESTINAL===================  On regular diet, tolerating well  Continue GI prophylaxis with Pepcid / Protonix  Continue Zofran / Reglan for nausea - PRN	  NPO    =======================    ENDOCRIN  =====================  Glycemic monitoring  F/S with coverage  ===================HEMATOLOGIC/ONCOLOGIC =============  Monitor chest tube output. No signs of active bleeding.   Follow CBC, coags  in AM  DVT prophylaxis with SCD, sc Heparin    ========================INFECTIOUS DISEASE===============  No signs of infection. Monitor for fever / leukocytosis.  All surgical incision / chest tube  sites look clean  D/C Brown      Pertinent clinical, laboratory, radiographic, hemodynamic, echocardiographic, respiratory data, microbiologic data and chart were reviewed and analyzed frequently throughout the course of the day and night. GI and DVT prophylaxis, glycemic control, head of bed elevation and skin care issues were addressed.  Patient seen, examined and plan discussed with CT Surgery / CTICU team during rounds.         I have spent               minutes of critical care time with this pt between            am/pm    and               am/ pm         minutes spent on total encounter; more than 50% of the visit was spent counseling and/or coordinating care by the attending physician.        GISELLE JOSEPH INGRID          MRN-[id number]    HPI:  70yo female with HDL, HTN, Asthma, GERD, Insomnia, and Overactive bladder, reports being diagnosed with Pancreatic cancer in 11/2016. Pt reports completing chemotherapy and radiation in 3/2017, and Lung nodule was found via CT during examinations for Pancreatic cancer. Pt presents today for presurgical evaluation for Robotic Assisted Flexible Bronchoscopy, Wedge Resection Possible Lobectomy scheduled for 9/05/2017. (25 Aug 2017 13:54)      Procedure:  POD # :     Issues:        Interval/Overnight Events/ ROS  Pt remained hemodynamically stable overnight, not on any pressors or inotropes. OOB to chair, breathing comfortably with minimal pain. Ambulated several times . Denies pain, no SOB, no palpitations, no nausea/ no vomiting, no dizziness  A-line and brown d/giles         PAST MEDICAL & SURGICAL HISTORY:  Solitary pulmonary nodule  Insomnia  Hiatal hernia with GERD: no changes  Colitis: due to chemo 1/2017  Malignant neoplasm of pancreas, unspecified location of malignancy: started chemo 10/2016, has mediport - left chest  Malignant neoplasm of right female breast, unspecified site of breast  Asthma  Depression  Hyperlipidemia  HTN (hypertension)  Port-a-cath in place  Status post right breast lumpectomy: malignant treated with radiation  H/O abdominal hysterectomy  H/O umbilical hernia repair: with mesh    Allergies    No Known Drug Allergies  SteriStrips (Blisters)    Intolerances            ***VITAL SIGNS:  Vital Signs Last 24 Hrs  T(C): 36.7 (06 Sep 2017 04:00), Max: 37.6 (06 Sep 2017 00:00)  T(F): 98.1 (06 Sep 2017 04:00), Max: 99.6 (06 Sep 2017 00:00)  HR: 63 (06 Sep 2017 07:00) (57 - 98)  BP: 138/68 (05 Sep 2017 16:00) (116/57 - 143/74)  BP(mean): 86 (05 Sep 2017 16:00) (72 - 87)  RR: 20 (06 Sep 2017 07:00) (14 - 27)  SpO2: 97% (06 Sep 2017 07:00) (92% - 99%)    I/Os:   I&O's Detail    05 Sep 2017 07:01  -  06 Sep 2017 07:00  --------------------------------------------------------  IN:    IV PiggyBack: 50 mL    lactated ringers.: 630 mL    Oral Fluid: 420 mL  Total IN: 1100 mL    OUT:    Chest Tube: 60 mL    Indwelling Catheter - Urethral: 1035 mL  Total OUT: 1095 mL    Total NET: 5 mL          CAPILLARY BLOOD GLUCOSE          =======================  MEDICATIONS  ===================  MEDICATIONS  (STANDING):  sodium chloride 0.9% lock flush 3 milliLiter(s) IV Push every 8 hours  HYDROmorphone PCA (1 mG/mL) 30 milliLiter(s) PCA Continuous PCA Continuous  heparin  Injectable 5000 Unit(s) SubCutaneous every 8 hours  lactated ringers. 1000 milliLiter(s) (30 mL/Hr) IV Continuous <Continuous>  docusate sodium 100 milliGRAM(s) Oral three times a day  senna 2 Tablet(s) Oral at bedtime  sertraline 50 milliGRAM(s) Oral daily  atorvastatin 10 milliGRAM(s) Oral at bedtime  buDESOnide 160 MICROgram(s)/formoterol 4.5 MICROgram(s) Inhaler 2 Puff(s) Inhalation two times a day  sodium chloride 3%  Inhalation 3 milliLiter(s) Inhalation every 8 hours  pantoprazole    Tablet 40 milliGRAM(s) Oral before breakfast    MEDICATIONS  (PRN):  HYDROmorphone PCA (1 mG/mL) Rescue Clinician Bolus 0.5 milliGRAM(s) IV Push every 15 minutes PRN for Pain Scale GREATER THAN 6  naloxone Injectable 0.1 milliGRAM(s) IV Push every 3 minutes PRN For ANY of the following changes in patient status:  A. RR LESS THAN 10 breaths per minute, B. Oxygen saturation LESS THAN 90%, C. Sedation score of 6  ondansetron Injectable 4 milliGRAM(s) IV Push every 6 hours PRN Nausea  diphenhydrAMINE   Injectable 25 milliGRAM(s) IV Push every 4 hours PRN Pruritus  ALBUTerol    90 MICROgram(s) HFA Inhaler 2 Puff(s) Inhalation every 6 hours PRN Wheezing      ======================VENTILATOR SETTINGS  ==============      =================== PATIENT CARE ACCESS DEVICES ==========  Peripheral IV  Central Venous Line	R	L	IJ	Fem	SC			Placed:   Arterial Line	R	L	PT	DP	Fem	Rad	Ax	Placed:   Midline:				  Urinary Catheter, Date Placed:   Necessity of urinary, arterial, and venous catheters discussed    ======================= PHYSICAL EXAM===================  General:                         Comfortable, Awake, alert, not in any distress  Neuro:                            Moving all extremities to commands. No focal deficits	  HEENT:                           KEVIN/ ETT/ NGT/ trach  Respiratory:	Lungs clear on auscultation bilaterally with good aeration.                                           No rales, rhonchi, no wheezing. Effort even and unlabored.  CV:		Regular rate and rhythm. Normal S1/S2. No murmurs  Abdomen:	                     Soft,  nontender, not-distended. Bowel sounds present / absent.   Skin:		No rash.  Extremities:	Warm, no cyanosis or edema.  Palpable pulses    ============================ LABS =======================                        11.5   9.93  )-----------( 193      ( 06 Sep 2017 03:05 )             35.2     09-06    142  |  103  |  13  ----------------------------<  121<H>  3.6   |  27  |  0.67    Ca    9.1      06 Sep 2017 03:05  Phos  4.1     09-06  Mg     1.7     09-06        PT/INR - ( 06 Sep 2017 03:05 )   PT: 13.1 SEC;   INR: 1.17          PTT - ( 06 Sep 2017 03:05 )  PTT:27.4 SEC          ===================== IMAGING STUDIES ===================  Radiology personally reviewed.    ====================ASSESSMENT AND PLAN ================      ====================== NEUROLOGY=======================  Pain control with PCA / PCEA / Tylenol IV / Toradol / Percocet  Pt is on Precedex for agitation  Pt is sedated with Propofol / Fentanyl    ==================== RESPIRATORY========================  Pt is on            L nasal canula / Face tent____% FiO2  Comfortable, no evidence of distress.  Using incentive spirometry & doing                ml  Monitor chest tube output  Chest tube to suction / water seal	    Mechanical Ventilation:    Mechanical ventilator status assessed & settings reviewed  Continue bronchodilators, pulmonary toilet  Head of bed elevation to 30-40 degrees    ====================CARDIOVASCULAR=====================  Continue hemodynamic monitoring/ telemetry  Not on any pressors  Continue cardiovascular / antihypertensive medications    ===================== RENAL ============================  Continue LR 30CC/hr      D/C IVF  Monitor I/Os, BUN/ Cr  and electrolytes  D/C Brown      Keep Brown  BPH: Continue Flomax/ Finasteride      ==================== GASTROINTESTINAL===================  On regular diet, tolerating well  Continue GI prophylaxis with Pepcid / Protonix  Continue Zofran / Reglan for nausea - PRN	  NPO    =======================    ENDOCRIN  =====================  Glycemic monitoring  F/S with coverage  ===================HEMATOLOGIC/ONCOLOGIC =============  Monitor chest tube output. No signs of active bleeding.   Follow CBC, coags  in AM  DVT prophylaxis with SCD, sc Heparin    ========================INFECTIOUS DISEASE===============  No signs of infection. Monitor for fever / leukocytosis.  All surgical incision / chest tube  sites look clean  D/C Brown      Pertinent clinical, laboratory, radiographic, hemodynamic, echocardiographic, respiratory data, microbiologic data and chart were reviewed and analyzed frequently throughout the course of the day and night. GI and DVT prophylaxis, glycemic control, head of bed elevation and skin care issues were addressed.  Patient seen, examined and plan discussed with CT Surgery / CTICU team during rounds.         I have spent               minutes of critical care time with this pt between            am/pm    and               am/ pm         minutes spent on total encounter; more than 50% of the visit was spent counseling and/or coordinating care by the attending physician.        GISELLE Ramírez MD INGRID JOSEPH          MRN-[id number]    HPI:  70yo female with HDL, HTN, Asthma, GERD, Insomnia, and Overactive bladder, reports being diagnosed with Pancreatic cancer in 11/2016. Pt reports completing chemotherapy and radiation in 3/2017, and Lung nodule was found via CT during examinations for Pancreatic cancer. Pt admitted for Robotic Assisted Flexible Bronchoscopy, Wedge Resection Possible Lobectomy on  9/05/2017.       Procedure:  9/5/17 Robot-assisted wedge resection of lung using video-assisted thoracoscopic surgery (VATS) using da Trino Xi ,  RLL wedge, lysis of adhesions, mediastinal lymph node biopsy ( frozen : necrotizing granuloma)    POD # : 0    Issues: postoperative pain              R/o TB              Right chest tube in place              Hx Panceratic cancer, HTN, dyslipidemia, Asthma      Interval/Overnight Events/ ROS  Pt remained hemodynamically stable overnight, not on any pressors or inotropes. OOB to chair, breathing comfortably with minimal pain. Ambulated several times . Denies pain, no SOB, no palpitations, no nausea/ no vomiting, no dizziness  A-line and brown d/giles         PAST MEDICAL & SURGICAL HISTORY:  Solitary pulmonary nodule  Insomnia  Hiatal hernia with GERD: no changes  Colitis: due to chemo 1/2017  Malignant neoplasm of pancreas, unspecified location of malignancy: started chemo 10/2016, has mediport - left chest  Malignant neoplasm of right female breast, unspecified site of breast  Asthma  Depression  Hyperlipidemia  HTN (hypertension)  Port-a-cath in place  Status post right breast lumpectomy: malignant treated with radiation  H/O abdominal hysterectomy  H/O umbilical hernia repair: with mesh    Allergies    No Known Drug Allergies  SteriStrips (Blisters)    Intolerances            ***VITAL SIGNS:  Vital Signs Last 24 Hrs  T(C): 36.7 (06 Sep 2017 04:00), Max: 37.6 (06 Sep 2017 00:00)  T(F): 98.1 (06 Sep 2017 04:00), Max: 99.6 (06 Sep 2017 00:00)  HR: 63 (06 Sep 2017 07:00) (57 - 98)  BP: 138/68 (05 Sep 2017 16:00) (116/57 - 143/74)  BP(mean): 86 (05 Sep 2017 16:00) (72 - 87)  RR: 20 (06 Sep 2017 07:00) (14 - 27)  SpO2: 97% (06 Sep 2017 07:00) (92% - 99%)    I/Os:   I&O's Detail    05 Sep 2017 07:01  -  06 Sep 2017 07:00  --------------------------------------------------------  IN:    IV PiggyBack: 50 mL    lactated ringers.: 630 mL    Oral Fluid: 420 mL  Total IN: 1100 mL    OUT:    Chest Tube: 60 mL    Indwelling Catheter - Urethral: 1035 mL  Total OUT: 1095 mL    Total NET: 5 mL          CAPILLARY BLOOD GLUCOSE          =======================  MEDICATIONS  ===================  MEDICATIONS  (STANDING):  sodium chloride 0.9% lock flush 3 milliLiter(s) IV Push every 8 hours  HYDROmorphone PCA (1 mG/mL) 30 milliLiter(s) PCA Continuous PCA Continuous  heparin  Injectable 5000 Unit(s) SubCutaneous every 8 hours  lactated ringers. 1000 milliLiter(s) (30 mL/Hr) IV Continuous <Continuous>  docusate sodium 100 milliGRAM(s) Oral three times a day  senna 2 Tablet(s) Oral at bedtime  sertraline 50 milliGRAM(s) Oral daily  atorvastatin 10 milliGRAM(s) Oral at bedtime  buDESOnide 160 MICROgram(s)/formoterol 4.5 MICROgram(s) Inhaler 2 Puff(s) Inhalation two times a day  sodium chloride 3%  Inhalation 3 milliLiter(s) Inhalation every 8 hours  pantoprazole    Tablet 40 milliGRAM(s) Oral before breakfast    MEDICATIONS  (PRN):  HYDROmorphone PCA (1 mG/mL) Rescue Clinician Bolus 0.5 milliGRAM(s) IV Push every 15 minutes PRN for Pain Scale GREATER THAN 6  naloxone Injectable 0.1 milliGRAM(s) IV Push every 3 minutes PRN For ANY of the following changes in patient status:  A. RR LESS THAN 10 breaths per minute, B. Oxygen saturation LESS THAN 90%, C. Sedation score of 6  ondansetron Injectable 4 milliGRAM(s) IV Push every 6 hours PRN Nausea  diphenhydrAMINE   Injectable 25 milliGRAM(s) IV Push every 4 hours PRN Pruritus  ALBUTerol    90 MICROgram(s) HFA Inhaler 2 Puff(s) Inhalation every 6 hours PRN Wheezing            =================== PATIENT CARE ACCESS DEVICES ==========  Peripheral IV (+)       ======================= PHYSICAL EXAM===================  General:             OOB, Comfortable, Awake, alert, not in any distress  Neuro:               Moving all extremities to commands. No focal deficits	  HEENT:                           KEVIN  Respiratory:	Lungs clear on auscultation bilaterally with good aeration.                           No rales, rhonchi, no wheezing. Effort even and unlabored.                          Right chest tube site - clean, no air leak  CV:		Regular rate and rhythm. Normal S1/S2. No murmurs  Abdomen:	 Soft,  nontender, not-distended. Bowel sounds present   Skin:		No rash.  Extremities:	Warm, no cyanosis or edema.  Palpable pulses    ============================ LABS =======================                        11.5   9.93  )-----------( 193      ( 06 Sep 2017 03:05 )             35.2     09-06    142  |  103  |  13  ----------------------------<  121<H>  3.6   |  27  |  0.67    Ca    9.1      06 Sep 2017 03:05  Phos  4.1     09-06  Mg     1.7     09-06        PT/INR - ( 06 Sep 2017 03:05 )   PT: 13.1 SEC;   INR: 1.17          PTT - ( 06 Sep 2017 03:05 )  PTT:27.4 SEC          ===================== IMAGING STUDIES ===================  Radiology personally reviewed.    ====================ASSESSMENT AND PLAN ================  9/5/17 Robot-assisted wedge resection of lung using video-assisted thoracoscopic surgery (VATS) using da Trino Xi ,  RLL wedge, lysis of adhesions, mediastinal lymph node biopsy ( frozen : necrotizing granuloma)    POD # : 0    Issues: postoperative pain              R/o TB              Right chest tube in place              Hx Panceratic cancer, HTN, dyslipidemia, Asthma    ====================== NEUROLOGY=======================  Pain control with PCA l    ==================== RESPIRATORY========================  Pt is on        2    L nasal canula / Face tent____% FiO2  Comfortable, no evidence of distress.  Using incentive spirometry & doing    500-750            ml  Monitor chest tube output  Chest tube to suction 	      Continue bronchodilators, pulmonary toilet  Head of bed elevation to 30-40 degrees    ====================CARDIOVASCULAR=====================  Continue hemodynamic monitoring/ telemetry  Not on any pressors  Continue cardiovascular / antihypertensive medications    ===================== RENAL ============================  Continue LR 30CC/hr        Monitor I/Os, BUN/ Cr  and electrolytes  D/C Brown        ==================== GASTROINTESTINAL===================  On regular diet,   Continue GI prophylaxis with  Protonix  Continue Zofran / Reglan for nausea - PRN	      =======================    ENDOCRIN  =====================  Glycemic monitoring  F/S with coverage  ===================HEMATOLOGIC/ONCOLOGIC =============  Monitor chest tube output. No signs of active bleeding.   Follow CBC, coags  in AM  DVT prophylaxis with SCD, sc Heparin    ========================INFECTIOUS DISEASE===============  No signs of infection. Monitor for fever / leukocytosis.  All surgical incision / chest tube  sites look clean  D/C Brown      Pertinent clinical, laboratory, radiographic, hemodynamic, echocardiographic, respiratory data, microbiologic data and chart were reviewed and analyzed frequently throughout the course of the day and night. GI and DVT prophylaxis, glycemic control, head of bed elevation and skin care issues were addressed.  Patient seen, examined and plan discussed with CT Surgery / CTICU team during rounds.         I have spent      30         minutes of critical care time with this pt between  12          am    and      7         am/                 GISELLE Ramírez MD

## 2017-09-06 NOTE — CONSULT NOTE ADULT - SUBJECTIVE AND OBJECTIVE BOX
I have seen and examined the patient and history noted: Pt to me tells that she has no history of puloary disease except Asthma, and never had any exposure to tuberculosis.   Patient is a 71y old  Female who presents with a chief complaint of "lung nodule removal" (25 Aug 2017 13:54)      HPI:  70yo female with HDL, HTN, Asthma, GERD, Insomnia, and Overactive bladder, reports being diagnosed with Pancreatic cancer in 11/2016. Pt reports completing chemotherapy and radiation in 3/2017, and Lung nodule was found via CT during examinations for Pancreatic cancer. Pt presents today for presurgical evaluation for Robotic Assisted Flexible Bronchoscopy, Wedge Resection Possible Lobectomy scheduled for 9/05/2017. (25 Aug 2017 13:54)      ?FOLLOWING PRESENT  [ x] Hx of PE/DVT, [ x] Hx COPD, [x ] Hx of Asthma, [x ] Hx of Hospitalization, [x ]  Hx of BiPAP/CPAP use, [ x] Hx of AIMEE    Allergies    No Known Drug Allergies  SteriStrips (Blisters)    Intolerances        PAST MEDICAL & SURGICAL HISTORY:  Solitary pulmonary nodule  Insomnia  Hiatal hernia with GERD: no changes  Colitis: due to chemo 1/2017  Malignant neoplasm of pancreas, unspecified location of malignancy: started chemo 10/2016, has mediport - left chest  Malignant neoplasm of right female breast, unspecified site of breast  Asthma  Depression  Hyperlipidemia  HTN (hypertension)  Port-a-cath in place  Status post right breast lumpectomy: malignant treated with radiation  H/O abdominal hysterectomy  H/O umbilical hernia repair: with mesh      FAMILY HISTORY:  Family history of kidney cancer (Sibling): brother  Family history of brain cancer (Sibling): sister  Family history of ovarian cancer (Mother): mother      Social History: [x  ] TOBACCO                  [ x ] ETOH                                 [x  ] IVDA/DRUGS    REVIEW OF SYSTEMS      General:x	    Skin/Breast:x  	  Ophthalmologic:x  	  ENMT:	x    Respiratory and Thorax: chest pain at the site of surgery !!  	  Cardiovascular:	x    Gastrointestinal:	x    Genitourinary:	x    Musculoskeletal:x	    Neurological:	x    Psychiatric:	x    Hematology/Lymphatics:x	    Endocrine:	x    Allergic/Immunologic:	x    MEDICATIONS  (STANDING):  sodium chloride 0.9% lock flush 3 milliLiter(s) IV Push every 8 hours  HYDROmorphone PCA (1 mG/mL) 30 milliLiter(s) PCA Continuous PCA Continuous  heparin  Injectable 5000 Unit(s) SubCutaneous every 8 hours  lactated ringers. 1000 milliLiter(s) (30 mL/Hr) IV Continuous <Continuous>  docusate sodium 100 milliGRAM(s) Oral three times a day  senna 2 Tablet(s) Oral at bedtime  sertraline 50 milliGRAM(s) Oral daily  atorvastatin 10 milliGRAM(s) Oral at bedtime  buDESOnide 160 MICROgram(s)/formoterol 4.5 MICROgram(s) Inhaler 2 Puff(s) Inhalation two times a day  sodium chloride 3%  Inhalation 3 milliLiter(s) Inhalation every 8 hours  pantoprazole    Tablet 40 milliGRAM(s) Oral before breakfast    MEDICATIONS  (PRN):  HYDROmorphone PCA (1 mG/mL) Rescue Clinician Bolus 0.5 milliGRAM(s) IV Push every 15 minutes PRN for Pain Scale GREATER THAN 6  naloxone Injectable 0.1 milliGRAM(s) IV Push every 3 minutes PRN For ANY of the following changes in patient status:  A. RR LESS THAN 10 breaths per minute, B. Oxygen saturation LESS THAN 90%, C. Sedation score of 6  ondansetron Injectable 4 milliGRAM(s) IV Push every 6 hours PRN Nausea  diphenhydrAMINE   Injectable 25 milliGRAM(s) IV Push every 4 hours PRN Pruritus  ALBUTerol    90 MICROgram(s) HFA Inhaler 2 Puff(s) Inhalation every 6 hours PRN Wheezing       Vital Signs Last 24 Hrs  T(C): 36.8 (06 Sep 2017 08:00), Max: 37.6 (06 Sep 2017 00:00)  T(F): 98.3 (06 Sep 2017 08:00), Max: 99.6 (06 Sep 2017 00:00)  HR: 67 (06 Sep 2017 08:00) (57 - 98)  BP: 121/63 (06 Sep 2017 08:00) (116/57 - 143/74)  BP(mean): 78 (06 Sep 2017 08:00) (72 - 87)  RR: 20 (06 Sep 2017 08:00) (14 - 27)  SpO2: 97% (06 Sep 2017 08:00) (92% - 99%)        I&O's Summary    05 Sep 2017 07:01  -  06 Sep 2017 07:00  --------------------------------------------------------  IN: 1100 mL / OUT: 1095 mL / NET: 5 mL    06 Sep 2017 07:01  -  06 Sep 2017 10:35  --------------------------------------------------------  IN: 360 mL / OUT: 15 mL / NET: 345 mL        Physical Exam:   GENERAL: NAD, well-groomed, well-developed  HEENT: KEVIN/   Atraumatic, Normocephalic  ENMT: No tonsillar erythema, exudates, or enlargement; Moist mucous membranes, Good dentition, No lesions  NECK: Supple, No JVD, Normal thyroid  CHEST/LUNG: Clear to auscultation bilaterally  CVS: Regular rate and rhythm; No murmurs, rubs, or gallops  GI: : Soft, Nontender, Nondistended; Bowel sounds present  NERVOUS SYSTEM:  Alert & Oriented X3  EXTREMITIES:  2+ Peripheral Pulses, No clubbing, cyanosis, or edema  LYMPH: No lymphadenopathy noted  SKIN: No rashes or lesions  ENDOCRINOLOGY: No Thyromegaly  PSYCH: Appropriate    Labs:                              11.5   9.93  )-----------( 193      ( 06 Sep 2017 03:05 )             35.2     09-06    142  |  103  |  13  ----------------------------<  121<H>  3.6   |  27  |  0.67    Ca    9.1      06 Sep 2017 03:05  Phos  4.1     09-06  Mg     1.7     09-06      CAPILLARY BLOOD GLUCOSE          PT/INR - ( 06 Sep 2017 03:05 )   PT: 13.1 SEC;   INR: 1.17          PTT - ( 06 Sep 2017 03:05 )  PTT:27.4 SEC    D DImer    Cultures:     Studies  Chest X-RAY  CT SCAN Chest   CT Abdomen  Venous Dopplers: LE:   Others  < from: Xray Chest 1 View AP- PORTABLE-Urgent (09.05.17 @ 11:22) >    INTERPRETATION:  Clinical information: Status post right thoracotomy.    Findings: Portable frontal view of the chest dated 9/5/2017 is compared   to 1/20/2017. Postsurgical changes are noted in the right lung base.   Right chest tube is in place. There is bibasilar linear atelectasis.   There is no pleural effusion or pneumothorax. Cardiomediastinal   silhouette cannot be evaluated. Tip of left power injectable   Jamozd-a-Vlen is unchanged. Mild right-sided subcutaneous emphysema is   noted.    Impression: Postsurgical changes in the right lung. No pneumothorax.    JASIEL PADGETT M.D., ATTENDING RADIOLOGIST  This document has been electronically signed. Sep  5 2017  1:01PM    < end of copied text >    Surgical Pathology Report:   ACCESSION No:  [id number]    INGRID JOSEPH        Surgical Final Report          Final Diagnosis    Soft tissue at mediport site, excision  - Fibrofatty tissue with fat necrosis and chronic  inflammation  - Negative for malignancy    Harry Kaufman M.D.  (Electronic Signature)  Reported on: 11/09/16    Clinical History  None provided  Operation performed: Revision of Mediport    Specimen(s) Submitted  1     Subcutaneous fat over port    Gross Description  1.  The specimen is received in formalin and the specimen  container is labeled: Subcutaneous fat  over port.  It consists  of a 4.0 x 3.0 x 1.0 cm aggregate of three portions of yellow-tan  lobulated fibroadipose tissue.  Representative sections  submitted.  One cassette.    In addition to other data that may appear on the specimen  container, the label has been inspected to confirm the presence  of the patient's name and date of birth.  MUN11/02/16 01:38 (11.01.16 @ 12:30)    Cytopathology - Non Gyn Report:   ACCESSION No:  [id number]    INGRID JOSEPH        Cytopathology Report            Final Diagnosis  PANCREAS, HEAD, EUS GUIDED FNA  POSITIVE FOR MALIGNANCY.  Consistent with adenocarcinoma.    Note: Cytology specimen consists of atypical neoplastic  cells  with prominent nucleoli, coarse chromatin, enlarged/eccentric  nuclei, pleomorphism, delicate/vacuolated cytoplasm, and arranged  singly, clusters, and disorganized sheets. Scanty evidence on  cell block.  Note:  GIcontaminant is also present.    CT and EUS reports are reviewed    ODILIA Araiza([facility])  Temo Arellano M.D.  (Electronic Signature)  Reported on: 09/25/16  ________________________________________________________________      Specimen Description  PANCREAS, HEAD, EUS GUIDED FNA      Statement of Adequacy  Immediate cytologic study for adequacy of specimen using a Diff-  Quik stain was performed. FNA acceptable for further evaluation  by ODILIA Moran([facility]).      Clinical Information  Head of pancreas mass.      Gross Description  Received: Needle rinses  in formalin. 20 ml of tinted fluid.  9 Smear received ( 5 air dried and 4 fixed in  alcohol)  Prepared:  1 cell block (09.22.16 @ 16:15)                Cytopathology - Non Gyn Report:   ACCESSION No:  [id number]    INGRID JOSEPH                       1        Cytopathology Report            Final Diagnosis  PANCREAS, HEAD, EUS GUIDED FNA  POSITIVE FOR MALIGNANCY.  Consistent with adenocarcinoma.    Note: Cytology specimen consists of atypical neoplastic  cells  with prominent nucleoli, coarse chromatin, enlarged/eccentric  nuclei, pleomorphism, delicate/vacuolated cytoplasm, and arranged  singly, clusters, and disorganized sheets. Scanty evidence on  cell block.  Note:  GIcontaminant is also present.    CT and EUS reports are reviewed    ODILIA Araiza([facility])  Temo Arellano M.D.  (Electronic Signature)  Reported on: 09/25/16  ________________________________________________________________      Specimen Description  PANCREAS, HEAD, EUS GUIDED FNA      Statement of Adequacy  Immediate cytologic study for adequacy of specimen using a Diff-  Quik stain was performed. FNA acceptable for further evaluation  by ODILIA Moran([facility]).      Clinical Information  Head of pancreas mass.      Gross Description  Received: Needle rinses  in formalin. 20 ml of tinted fluid.  9 Smear received ( 5 air dried and 4 fixed in  alcohol)  Prepared:  1 cell block (09.22.16 @ 16:15)    < from: Xray Chest 1 View AP- PORTABLE-Urgent (09.05.17 @ 11:22) >  Ferstr-s-Zjea is unchanged. Mild right-sided subcutaneous emphysema is   noted.    Impression: Postsurgical changes in the right lung. No pneumothorax.                  JASIEL PADGETT M.D., ATTENDING RADIOLOGIST  This document has been electronically signed. Sep  5 2017  1:01PM    < end of copied text >

## 2017-09-06 NOTE — CONSULT NOTE ADULT - ASSESSMENT
Ms. Sinha is a 69 y/o f with PMH GERD, HTN, HLD, depression, asthma, insomnia, overactive bladder, breast ca s/p lumpectomy (1999), Colitis (d/t chemo 2016), recently dx pancreatic ca 11/2016 s/p chemotherapy 2/2017 and Lung nodule was found via CT presents for scheduled VATS. Pt is now s/p Robot-assisted wedge resection of lung using video-assisted thoracoscopic surgery (VATS) using da Trino Xi ,  RLL wedge, lysis of adhesions, mediastinal lymph node biopsy ( frozen : necrotizing granuloma). Since procedure pt has been with constipation Ms. Sinha is a 72 y/o f with PMH GERD, HTN, HLD, depression, asthma, insomnia, overactive bladder, breast ca s/p lumpectomy (1999), Colitis (d/t chemo 2016), recently dx pancreatic ca 11/2016 s/p chemotherapy 2/2017 and Lung nodule was found via CT presents for scheduled VATS. Pt is now s/p Robot-assisted wedge resection of lung using video-assisted thoracoscopic surgery (VATS) using da Trino Xi ,  RLL wedge, lysis of adhesions, mediastinal lymph node biopsy ( frozen : necrotizing granuloma). Since procedure pt has been with constipation

## 2017-09-06 NOTE — PROGRESS NOTE ADULT - SUBJECTIVE AND OBJECTIVE BOX
Anesthesia Pain Management Service    SUBJECTIVE: Patient is doing well with IV PCA and no significant problems reported.    Pain Scale Score	At rest: ___ 	With Activity: ___ 	[X ] Refer to charted pain scores    THERAPY:    [ ] IV PCA Morphine		[ ] 5 mg/mL	[ ] 1 mg/mL  [X ] IV PCA Hydromorphone	[ ] 5 mg/mL	[X ] 1 mg/mL  [ ] IV PCA Fentanyl		[ ] 50 micrograms/mL    Demand dose __0.2_ lockout __6_ (minutes) Continuous Rate _0__ Total: _2mg__  Daily      MEDICATIONS  (STANDING):  sodium chloride 0.9% lock flush 3 milliLiter(s) IV Push every 8 hours  HYDROmorphone PCA (1 mG/mL) 30 milliLiter(s) PCA Continuous PCA Continuous  heparin  Injectable 5000 Unit(s) SubCutaneous every 8 hours  lactated ringers. 1000 milliLiter(s) (30 mL/Hr) IV Continuous <Continuous>  docusate sodium 100 milliGRAM(s) Oral three times a day  senna 2 Tablet(s) Oral at bedtime  sertraline 50 milliGRAM(s) Oral daily  atorvastatin 10 milliGRAM(s) Oral at bedtime  buDESOnide 160 MICROgram(s)/formoterol 4.5 MICROgram(s) Inhaler 2 Puff(s) Inhalation two times a day  pantoprazole    Tablet 40 milliGRAM(s) Oral before breakfast    MEDICATIONS  (PRN):  HYDROmorphone PCA (1 mG/mL) Rescue Clinician Bolus 0.5 milliGRAM(s) IV Push every 15 minutes PRN for Pain Scale GREATER THAN 6  naloxone Injectable 0.1 milliGRAM(s) IV Push every 3 minutes PRN For ANY of the following changes in patient status:  A. RR LESS THAN 10 breaths per minute, B. Oxygen saturation LESS THAN 90%, C. Sedation score of 6  ondansetron Injectable 4 milliGRAM(s) IV Push every 6 hours PRN Nausea  diphenhydrAMINE   Injectable 25 milliGRAM(s) IV Push every 4 hours PRN Pruritus  ALBUTerol    90 MICROgram(s) HFA Inhaler 2 Puff(s) Inhalation every 6 hours PRN Wheezing      OBJECTIVE:    Sedation Score:	[ X] Alert	[ ] Drowsy 	[ ] Arousable	[ ] Asleep	[ ] Unresponsive    Side Effects:	[X ] None	[ ] Nausea	[ ] Vomiting	[ ] Pruritus  		[ ] Other:    Vital Signs Last 24 Hrs  T(C): 36.8 (06 Sep 2017 08:00), Max: 37.6 (06 Sep 2017 00:00)  T(F): 98.3 (06 Sep 2017 08:00), Max: 99.6 (06 Sep 2017 00:00)  HR: 77 (06 Sep 2017 11:01) (57 - 98)  BP: 121/63 (06 Sep 2017 08:00) (117/69 - 138/68)  BP(mean): 78 (06 Sep 2017 08:00) (78 - 86)  RR: 20 (06 Sep 2017 08:00) (14 - 27)  SpO2: 97% (06 Sep 2017 08:00) (93% - 99%)    ASSESSMENT/ PLAN    Therapy to  be:	[ X] Continue   [ ] Discontinued   [ ] Change to prn Analgesics    Documentation and Verification of current medications:   [X] Done	[ ] Not done, not elligible    Comments: + CT

## 2017-09-06 NOTE — CONSULT NOTE ADULT - PROBLEM SELECTOR RECOMMENDATION 3
- s/p VATS, RLL wedge, DIMA, mediastinal lymph node biopsy noted,w/frozen: necrotizing granuloma; ruling out TB  - care per CTSx and Pulm
Controlled

## 2017-09-06 NOTE — CONSULT NOTE ADULT - PROBLEM SELECTOR RECOMMENDATION 2
- oncology, Dr. Huang, following closely as outpatient s/p chemotherapy
per oncology: pt is getting chemotherapy as an outpatient

## 2017-09-06 NOTE — CONSULT NOTE ADULT - PROBLEM SELECTOR RECOMMENDATION 9
- likely anesthesia effect  - monitor gi fxn; passing flatus  - senna/colace  - miralax qd
9/5/17 Robot-assisted wedge resection of lung using video-assisted thoracoscopic surgery (VATS) using da Trino Xi ,  RLL wedge, lysis of adhesions, mediastinal lymph node biopsy ( frozen : necrotizing granuloma): hence patient is isolation to rule out tuberculsosi: ? KARI : check quantiferon

## 2017-09-06 NOTE — CONSULT NOTE ADULT - SUBJECTIVE AND OBJECTIVE BOX
Chief Complaint:  Patient is a 71y old  Female who presents with a chief complaint of "lung nodule removal" (25 Aug 2017 13:54)    Solitary pulmonary nodule  Insomnia  Hiatal hernia with GERD  Colitis  Malignant neoplasm of pancreas, unspecified location of malignancy  Malignant neoplasm of right female breast, unspecified site of breast  Asthma  Depression  Hyperlipidemia  HTN (hypertension)  Port-a-cath in place  Status post right breast lumpectomy  H/O abdominal hysterectomy  H/O umbilical hernia repair     HPI:  72yo female with HDL, HTN, Asthma, GERD, Insomnia, and Overactive bladder, reports being diagnosed with Pancreatic cancer in 2016. Pt reports completing chemotherapy and radiation in 3/2017, and Lung nodule was found via CT during examinations for Pancreatic cancer. Pt admitted for Robotic Assisted Flexible Bronchoscopy, Wedge Resection Possible Lobectomy on  2017. The patient is now s/p Robot-assisted wedge resection of lung using video-assisted thoracoscopic surgery (VATS) using da Trino Xi ,  RLL wedge, lysis of adhesions, mediastinal lymph node biopsy ( frozen : necrotizing granuloma). Since the procedure the patient has noted to be without bm or flatus. Denying any n/v/d, abdominal pain/distention, melena, brbpr or dysphagia.     2017 EUS - Impression: 29 mm by 27 mm pancreatic head mass. 4 fiducial markers were placed.  2016 Cytopathology Report Final Diagnosis PANCREAS, HEAD, EUS GUIDED FNA POSITIVE FOR MALIGNANCY.  Consistent with adenocarcinoma              No Known Drug Allergies  SteriStrips (Blisters)      sodium chloride 0.9% lock flush 3 milliLiter(s) IV Push every 8 hours  heparin  Injectable 5000 Unit(s) SubCutaneous every 8 hours  docusate sodium 100 milliGRAM(s) Oral three times a day  senna 2 Tablet(s) Oral at bedtime  sertraline 50 milliGRAM(s) Oral daily  atorvastatin 10 milliGRAM(s) Oral at bedtime  ALBUTerol    90 MICROgram(s) HFA Inhaler 2 Puff(s) Inhalation every 6 hours PRN  buDESOnide 160 MICROgram(s)/formoterol 4.5 MICROgram(s) Inhaler 2 Puff(s) Inhalation two times a day  pantoprazole    Tablet 40 milliGRAM(s) Oral before breakfast  oxyCODONE    5 mG/acetaminophen 325 mG 1 Tablet(s) Oral every 4 hours PRN  oxyCODONE    5 mG/acetaminophen 325 mG 2 Tablet(s) Oral every 4 hours PRN        FAMILY HISTORY:  Family history of kidney cancer (Sibling): brother  Family history of brain cancer (Sibling): sister  Family history of ovarian cancer (Mother): mother        Review of Systems:    General:  No wt loss, fevers, chills, night sweats, fatigue  Eyes:  Good vision, no reported pain  ENT:  No sore throat, pain, runny nose, dysphagia  CV:  No pain, palpitations, no lightheadedness  Resp:  No dyspnea, cough, tachypnea, wheezing  GI: denies n/v/d, abdominal pain, melena or brbpr   :  No pain, bleeding, incontinence, nocturia  Muscle:  No pain, weakness  Neuro:  No weakness, tingling, memory problems  Psych:  No fatigue, insomnia, mood problems, depression  Endocrine:  No polyuria, polydypsia, cold/heat intolerance  Heme:  No petechiae, ecchymosis, easy bruisability  Skin:  No rash, tattoos, scars, edema    Relevant Family History:   n/c    Relevant Social History: n/c      Physical Exam:    Vital Signs:  Vital Signs Last 24 Hrs  T(C): 36.8 (06 Sep 2017 08:00), Max: 37.6 (06 Sep 2017 00:00)  T(F): 98.3 (06 Sep 2017 08:00), Max: 99.6 (06 Sep 2017 00:00)  HR: 77 (06 Sep 2017 11:01) (57 - 98)  BP: 121/63 (06 Sep 2017 08:00) (117/69 - 138/68)  BP(mean): 78 (06 Sep 2017 08:00) (78 - 86)  RR: 20 (06 Sep 2017 08:00) (14 - 27)  SpO2: 97% (06 Sep 2017 08:00) (93% - 99%)  Daily     Daily Weight in k.1 (06 Sep 2017 06:00)    General:  Appears stated age, well-groomed, nad  HEENT:  NC/AT,  conjunctivae clear and pink, no thyromegaly, nodules, adenopathy, no JVD  Chest:  Full & symmetric excursion, no increased effort, breath sounds clear  Cardiovascular:  Regular rhythm, S1, S2, no murmur/rub/S3/S4, no abdominal bruit, no edema  Abdomen:  Soft, non-tender, non-distended, normoactive bowel sounds,  no masses ,no hepatosplenomeagaly, no signs of chronic liver disease  Extremities:  no cyanosis,clubbing or edema  Skin:  No rash/erythema/ecchymoses/petechiae/wounds/abscess/warm/dry  Neuro/Psych:  A&Ox3, no asterixis, no tremor, no encephalopathy    Laboratory:                            11.5   9.93  )-----------( 193      ( 06 Sep 2017 03:05 )             35.2         142  |  103  |  13  ----------------------------<  121<H>  3.6   |  27  |  0.67    Ca    9.1      06 Sep 2017 03:05  Phos  4.1       Mg     1.7     -        PT/INR - ( 06 Sep 2017 03:05 )   PT: 13.1 SEC;   INR: 1.17          PTT - ( 06 Sep 2017 03:05 )  PTT:27.4 SEC      Imagin/2016 Cytopathology Report Final Diagnosis PANCREAS, HEAD, EUS GUIDED FNA POSITIVE FOR MALIGNANCY.  Consistent with adenocarcinoma    2017 EUS - Impression: 29 mm by 27 mm pancreatic head mass. 4 fiducial markers were placed. Chief Complaint:  Patient is a 71y old  Female who presents with a chief complaint of "lung nodule removal" (25 Aug 2017 13:54)    Solitary pulmonary nodule  Insomnia  Hiatal hernia with GERD  Colitis  Malignant neoplasm of pancreas, unspecified location of malignancy  Malignant neoplasm of right female breast, unspecified site of breast  Asthma  Depression  Hyperlipidemia  HTN (hypertension)  Port-a-cath in place  Status post right breast lumpectomy  H/O abdominal hysterectomy  H/O umbilical hernia repair     HPI:  Ms. Sinha is a 70 y/o f with PMH GERD, HTN, HLD, depression, asthma, insomnia, overactive bladder, breast ca s/p lumpectomy (), Colitis (d/t chemo ), recently dx pancreatic ca 2016 s/p chemotherapy 2017 and Lung nodule was found via CT during examinations for Pancreatic cancer. Pt admitted for Robotic Assisted Flexible Bronchoscopy, Wedge Resection Possible Lobectomy on  2017. The patient is now s/p Robot-assisted wedge resection of lung using video-assisted thoracoscopic surgery (VATS) using da Trino Xi ,  RLL wedge, lysis of adhesions, mediastinal lymph node biopsy ( frozen : necrotizing granuloma). Since the procedure the patient has noted to be without bm or flatus. Denying any n/v/d, abdominal pain/distention, melena, brbpr or dysphagia.     2017 EUS - Impression: 29 mm by 27 mm pancreatic head mass. 4 fiducial markers were placed.  2016 Cytopathology Report Final Diagnosis PANCREAS, HEAD, EUS GUIDED FNA POSITIVE FOR MALIGNANCY.  Consistent with adenocarcinoma              No Known Drug Allergies  SteriStrips (Blisters)      sodium chloride 0.9% lock flush 3 milliLiter(s) IV Push every 8 hours  heparin  Injectable 5000 Unit(s) SubCutaneous every 8 hours  docusate sodium 100 milliGRAM(s) Oral three times a day  senna 2 Tablet(s) Oral at bedtime  sertraline 50 milliGRAM(s) Oral daily  atorvastatin 10 milliGRAM(s) Oral at bedtime  ALBUTerol    90 MICROgram(s) HFA Inhaler 2 Puff(s) Inhalation every 6 hours PRN  buDESOnide 160 MICROgram(s)/formoterol 4.5 MICROgram(s) Inhaler 2 Puff(s) Inhalation two times a day  pantoprazole    Tablet 40 milliGRAM(s) Oral before breakfast  oxyCODONE    5 mG/acetaminophen 325 mG 1 Tablet(s) Oral every 4 hours PRN  oxyCODONE    5 mG/acetaminophen 325 mG 2 Tablet(s) Oral every 4 hours PRN        FAMILY HISTORY:  Family history of kidney cancer (Sibling): brother  Family history of brain cancer (Sibling): sister  Family history of ovarian cancer (Mother): mother        Review of Systems:    General:  No wt loss, fevers, chills, night sweats, fatigue  Eyes:  Good vision, no reported pain  ENT:  No sore throat, pain, runny nose, dysphagia  CV:  No pain, palpitations, no lightheadedness  Resp:  No dyspnea, cough, tachypnea, wheezing  GI: denies n/v/d, abdominal pain, melena or brbpr   :  No pain, bleeding, incontinence, nocturia  Muscle:  No pain, weakness  Neuro:  No weakness, tingling, memory problems  Psych:  No fatigue, insomnia, mood problems, depression  Endocrine:  No polyuria, polydypsia, cold/heat intolerance  Heme:  No petechiae, ecchymosis, easy bruisability  Skin:  No rash, tattoos, scars, edema    Relevant Family History:   n/c    Relevant Social History: n/c      Physical Exam:    Vital Signs:  Vital Signs Last 24 Hrs  T(C): 36.8 (06 Sep 2017 08:00), Max: 37.6 (06 Sep 2017 00:00)  T(F): 98.3 (06 Sep 2017 08:00), Max: 99.6 (06 Sep 2017 00:00)  HR: 77 (06 Sep 2017 11:01) (57 - 98)  BP: 121/63 (06 Sep 2017 08:00) (117/69 - 138/68)  BP(mean): 78 (06 Sep 2017 08:00) (78 - 86)  RR: 20 (06 Sep 2017 08:00) (14 - 27)  SpO2: 97% (06 Sep 2017 08:00) (93% - 99%)  Daily     Daily Weight in k.1 (06 Sep 2017 06:00)    General:  Appears stated age, well-groomed, nad  HEENT:  NC/AT,  conjunctivae clear and pink, no thyromegaly, nodules, adenopathy, no JVD  Chest:  Full & symmetric excursion, no increased effort, breath sounds clear  Cardiovascular:  Regular rhythm, S1, S2, no murmur/rub/S3/S4, no abdominal bruit, no edema  Abdomen:  Soft, non-tender, non-distended, normoactive bowel sounds,  no masses ,no hepatosplenomeagaly, no signs of chronic liver disease  Extremities:  no cyanosis,clubbing or edema  Skin:  No rash/erythema/ecchymoses/petechiae/wounds/abscess/warm/dry  Neuro/Psych:  A&Ox3, no asterixis, no tremor, no encephalopathy    Laboratory:                            11.5   9.93  )-----------( 193      ( 06 Sep 2017 03:05 )             35.2     -06    142  |  103  |  13  ----------------------------<  121<H>  3.6   |  27  |  0.67    Ca    9.1      06 Sep 2017 03:05  Phos  4.1     09-  Mg     1.7     -06        PT/INR - ( 06 Sep 2017 03:05 )   PT: 13.1 SEC;   INR: 1.17          PTT - ( 06 Sep 2017 03:05 )  PTT:27.4 SEC      Imagin/2016 Cytopathology Report Final Diagnosis PANCREAS, HEAD, EUS GUIDED FNA POSITIVE FOR MALIGNANCY.  Consistent with adenocarcinoma    2017 EUS - Impression: 29 mm by 27 mm pancreatic head mass. 4 fiducial markers were placed.

## 2017-09-06 NOTE — PROGRESS NOTE ADULT - SUBJECTIVE AND OBJECTIVE BOX
POST ANESTHESIA EVALUATION    71y Female POSTOP DAY 1 S/P VATS    MENTAL STATUS: Patient participation [ x ] Awake     [  ] Arousable     [  ] Sedated    AIRWAY PATENCY: [ x ] Satisfactory  [  ] Other:     Vital Signs Last 24 Hrs  T(C): 36.8 (06 Sep 2017 08:00), Max: 37.6 (06 Sep 2017 00:00)  T(F): 98.3 (06 Sep 2017 08:00), Max: 99.6 (06 Sep 2017 00:00)  HR: 77 (06 Sep 2017 11:01) (57 - 98)  BP: 121/63 (06 Sep 2017 08:00) (117/69 - 138/68)  BP(mean): 78 (06 Sep 2017 08:00) (78 - 86)  RR: 20 (06 Sep 2017 08:00) (14 - 27)  SpO2: 97% (06 Sep 2017 08:00) (93% - 99%)  I&O's Summary    05 Sep 2017 07:01  -  06 Sep 2017 07:00  --------------------------------------------------------  IN: 1100 mL / OUT: 1095 mL / NET: 5 mL    06 Sep 2017 07:01  -  06 Sep 2017 13:14  --------------------------------------------------------  IN: 360 mL / OUT: 15 mL / NET: 345 mL          NAUSEA/ VOMITTING:  [ x ] NONE  [  ] CONTROLLED [  ] OTHER     PAIN: [ x ] CONTROLLED WITH CURRENT REGIMEN  [  ] OTHER    [ x ] NO APPARENT ANESTHESIA COMPLICATIONS      Comments:

## 2017-09-06 NOTE — CONSULT NOTE ADULT - ASSESSMENT
72yo female with HDL, HTN, Asthma, GERD, Insomnia, and Overactive bladder, reports being diagnosed with Pancreatic cancer in 11/2016. Pt reports completing chemotherapy and radiation in 3/2017, and Lung nodule was found via CT during examinations for Pancreatic cancer. Pt presents today for presurgical evaluation for Robotic Assisted Flexible Bronchoscopy, Wedge Resection Possible Lobectomy scheduled for 9/05/2017. (25 Aug 2017 13:54).  I have seen and examined the patient and history noted: Pt to me tells that she has no history of puloary disease except Asthma, and never had any exposure to tuberculosis.

## 2017-09-07 ENCOUNTER — TRANSCRIPTION ENCOUNTER (OUTPATIENT)
Age: 72
End: 2017-09-07

## 2017-09-07 VITALS
OXYGEN SATURATION: 98 % | RESPIRATION RATE: 20 BRPM | DIASTOLIC BLOOD PRESSURE: 63 MMHG | SYSTOLIC BLOOD PRESSURE: 144 MMHG | TEMPERATURE: 99 F | HEART RATE: 69 BPM

## 2017-09-07 LAB
CULTURE - ACID FAST SMEAR CONCENTRATED: SIGNIFICANT CHANGE UP
M TB TUBERC IFN-G BLD QL: 0.02 IU/ML — SIGNIFICANT CHANGE UP
M TB TUBERC IFN-G BLD QL: 0.03 IU/ML — SIGNIFICANT CHANGE UP
M TB TUBERC IFN-G BLD QL: SIGNIFICANT CHANGE UP
MITOGEN IGNF BCKGRD COR BLD-ACNC: 0.09 IU/ML — SIGNIFICANT CHANGE UP
SPECIMEN SOURCE: SIGNIFICANT CHANGE UP

## 2017-09-07 PROCEDURE — 99238 HOSP IP/OBS DSCHRG MGMT 30/<: CPT

## 2017-09-07 PROCEDURE — 71010: CPT | Mod: 26

## 2017-09-07 RX ORDER — OXYCODONE HYDROCHLORIDE 5 MG/1
2 TABLET ORAL
Qty: 60 | Refills: 0 | OUTPATIENT
Start: 2017-09-07 | End: 2017-09-12

## 2017-09-07 RX ORDER — DOCUSATE SODIUM 100 MG
1 CAPSULE ORAL
Qty: 0 | Refills: 0 | COMMUNITY
Start: 2017-09-07

## 2017-09-07 RX ORDER — SENNA PLUS 8.6 MG/1
2 TABLET ORAL
Qty: 0 | Refills: 0 | COMMUNITY
Start: 2017-09-07

## 2017-09-07 RX ADMIN — PANTOPRAZOLE SODIUM 40 MILLIGRAM(S): 20 TABLET, DELAYED RELEASE ORAL at 05:01

## 2017-09-07 RX ADMIN — HEPARIN SODIUM 5000 UNIT(S): 5000 INJECTION INTRAVENOUS; SUBCUTANEOUS at 05:01

## 2017-09-07 RX ADMIN — Medication 100 MILLIGRAM(S): at 05:00

## 2017-09-07 RX ADMIN — BUDESONIDE AND FORMOTEROL FUMARATE DIHYDRATE 2 PUFF(S): 160; 4.5 AEROSOL RESPIRATORY (INHALATION) at 11:34

## 2017-09-07 RX ADMIN — SODIUM CHLORIDE 3 MILLILITER(S): 9 INJECTION INTRAMUSCULAR; INTRAVENOUS; SUBCUTANEOUS at 05:00

## 2017-09-07 RX ADMIN — OXYCODONE AND ACETAMINOPHEN 2 TABLET(S): 5; 325 TABLET ORAL at 11:34

## 2017-09-07 NOTE — PROGRESS NOTE ADULT - SUBJECTIVE AND OBJECTIVE BOX
Patient is a 71y old  Female who presents with a chief complaint of "lung nodule removal" (25 Aug 2017 13:54)      doing ok  no sob   no cough  no pain     Any change in ROS:     MEDICATIONS  (STANDING):  sodium chloride 0.9% lock flush 3 milliLiter(s) IV Push every 8 hours  heparin  Injectable 5000 Unit(s) SubCutaneous every 8 hours  docusate sodium 100 milliGRAM(s) Oral three times a day  senna 2 Tablet(s) Oral at bedtime  sertraline 50 milliGRAM(s) Oral daily  atorvastatin 10 milliGRAM(s) Oral at bedtime  buDESOnide 160 MICROgram(s)/formoterol 4.5 MICROgram(s) Inhaler 2 Puff(s) Inhalation two times a day  pantoprazole    Tablet 40 milliGRAM(s) Oral before breakfast    MEDICATIONS  (PRN):  ALBUTerol    90 MICROgram(s) HFA Inhaler 2 Puff(s) Inhalation every 6 hours PRN Wheezing  oxyCODONE    5 mG/acetaminophen 325 mG 1 Tablet(s) Oral every 4 hours PRN Moderate Pain (4 - 6)  oxyCODONE    5 mG/acetaminophen 325 mG 2 Tablet(s) Oral every 4 hours PRN Severe Pain (7 - 10)    Vital Signs Last 24 Hrs  T(C): 36.7 (07 Sep 2017 09:19), Max: 37.2 (06 Sep 2017 16:54)  T(F): 98.1 (07 Sep 2017 09:19), Max: 98.9 (06 Sep 2017 16:54)  HR: 72 (07 Sep 2017 09:19) (51 - 72)  BP: 130/60 (07 Sep 2017 09:19) (125/57 - 144/59)  BP(mean): --  RR: 18 (07 Sep 2017 09:19) (18 - 18)  SpO2: 92% (07 Sep 2017 09:19) (92% - 98%)    I&O's Summary    06 Sep 2017 07:01  -  07 Sep 2017 07:00  --------------------------------------------------------  IN: 660 mL / OUT: 215 mL / NET: 445 mL          Physical Exam:   GENERAL: NAD, well-groomed, well-developed  HEENT: KEVIN/   Atraumatic, Normocephalic  ENMT: No tonsillar erythema, exudates, or enlargement; Moist mucous membranes, Good dentition, No lesions  NECK: Supple, No JVD, Normal thyroid  CHEST/LUNG: Clear to auscultaion  CVS: Regular rate and rhythm; No murmurs, rubs, or gallops  GI: : Soft, Nontender, Nondistended; Bowel sounds present  NERVOUS SYSTEM:  Alert & Oriented X3  EXTREMITIES:  2+ Peripheral Pulses, No clubbing, cyanosis, or edema  LYMPH: No lymphadenopathy noted  SKIN: No rashes or lesions  ENDOCRINOLOGY: No Thyromegaly  PSYCH: Appropriate    Labs:                              11.5   9.93  )-----------( 193      ( 06 Sep 2017 03:05 )             35.2     09-06    142  |  103  |  13  ----------------------------<  121<H>  3.6   |  27  |  0.67    Ca    9.1      06 Sep 2017 03:05  Phos  4.1     09-06  Mg     1.7     09-06      CAPILLARY BLOOD GLUCOSE            PT/INR - ( 06 Sep 2017 03:05 )   PT: 13.1 SEC;   INR: 1.17          PTT - ( 06 Sep 2017 03:05 )  PTT:27.4 SEC    Cultures:   Studies  Chest X-RAY  CT SCAN Chest   Venous Dopplers: LE:   CT Abdomen  Others      < from: Xray Chest 1 View AP- PORTABLE-Urgent (09.06.17 @ 13:51) >  Cardiomediastinal silhouette cannotbe evaluated.   Tip of left power injectable [id number] is unchanged from prior   examination.  Partially visualized vascular stent is seen in the lower chest.   Resolving right subcutaneous emphysema is seen.     Impression:   Postsurgical changes in the right lung.   Interval removal of right chest tube.  No pneumothorax.                 ANTONETTE MICHELLE M.D., RADIOLOGY RESIDENT  This document has been electronically signed.  PRIETO STAPLETON M.D., ATTENDING RADIOLOGIST  This document has been electronically signed. Sep  6 2017  2:38PM    < end of copied text >

## 2017-09-07 NOTE — PROGRESS NOTE ADULT - SUBJECTIVE AND OBJECTIVE BOX
INTERVAL HPI/OVERNIGHT EVENTS:    pt reports no abdominal pain, tolerating po intake  reports +flatus    MEDICATIONS  (STANDING):  sodium chloride 0.9% lock flush 3 milliLiter(s) IV Push every 8 hours  heparin  Injectable 5000 Unit(s) SubCutaneous every 8 hours  docusate sodium 100 milliGRAM(s) Oral three times a day  senna 2 Tablet(s) Oral at bedtime  sertraline 50 milliGRAM(s) Oral daily  atorvastatin 10 milliGRAM(s) Oral at bedtime  buDESOnide 160 MICROgram(s)/formoterol 4.5 MICROgram(s) Inhaler 2 Puff(s) Inhalation two times a day  pantoprazole    Tablet 40 milliGRAM(s) Oral before breakfast    MEDICATIONS  (PRN):  ALBUTerol    90 MICROgram(s) HFA Inhaler 2 Puff(s) Inhalation every 6 hours PRN Wheezing  oxyCODONE    5 mG/acetaminophen 325 mG 1 Tablet(s) Oral every 4 hours PRN Moderate Pain (4 - 6)  oxyCODONE    5 mG/acetaminophen 325 mG 2 Tablet(s) Oral every 4 hours PRN Severe Pain (7 - 10)      Allergies    No Known Drug Allergies  SteriStrips (Blisters)    Intolerances        Review of Systems:    General:  No wt loss, fevers, chills, night sweats, fatigue   Eyes:  Good vision, no reported pain  ENT:  No sore throat, pain, runny nose, dysphagia  CV:  No pain, palpitations, hypo/hypertension  Resp:  No dyspnea, cough, tachypnea, wheezing  GI:  No pain, No nausea, No vomiting, No diarrhea, No constipation, No weight loss, No fever, No pruritis, No rectal bleeding, No melena, No dysphagia  :  No pain, bleeding, incontinence, nocturia  Muscle:  No pain, weakness  Neuro:  No weakness, tingling, memory problems  Psych:  No fatigue, insomnia, mood problems, depression  Endocrine:  No polyuria, polydypsia, cold/heat intolerance  Heme:  No petechiae, ecchymosis, easy bruisability  Skin:  No rash, tattoos, scars, edema      Vital Signs Last 24 Hrs  T(C): 36.7 (07 Sep 2017 09:19), Max: 37.2 (06 Sep 2017 16:54)  T(F): 98.1 (07 Sep 2017 09:19), Max: 98.9 (06 Sep 2017 16:54)  HR: 72 (07 Sep 2017 09:19) (51 - 72)  BP: 130/60 (07 Sep 2017 09:19) (125/57 - 144/59)  BP(mean): --  RR: 18 (07 Sep 2017 09:19) (18 - 18)  SpO2: 92% (07 Sep 2017 09:19) (92% - 98%)    PHYSICAL EXAM:    Constitutional: NAD  HEENT: EOMI, throat clear  Neck: No LAD, supple  Respiratory: CTA and P  Cardiovascular: S1 and S2, RRR, no M  Gastrointestinal: BS+, soft, NT/ND, neg HSM,  Extremities: No peripheral edema, neg clubbing, cyanosis  Vascular: 2+ peripheral pulses  Neurological: A/O x 3, no focal deficits  Psychiatric: Normal mood, normal affect  Skin: No rashes      LABS:                        11.5   9.93  )-----------( 193      ( 06 Sep 2017 03:05 )             35.2     09-06    142  |  103  |  13  ----------------------------<  121<H>  3.6   |  27  |  0.67    Ca    9.1      06 Sep 2017 03:05  Phos  4.1     09-06  Mg     1.7     09-06      PT/INR - ( 06 Sep 2017 03:05 )   PT: 13.1 SEC;   INR: 1.17          PTT - ( 06 Sep 2017 03:05 )  PTT:27.4 SEC      RADIOLOGY & ADDITIONAL TESTS:

## 2017-09-07 NOTE — DISCHARGE NOTE ADULT - CARE PROVIDER_API CALL
Duane Ocampo), Thoracic Surgery  [address] [place] [address]  Phone: [phone]  Fax: [phone]

## 2017-09-07 NOTE — DISCHARGE NOTE ADULT - PLAN OF CARE
s/p lung resection. Goal is wound healing, final pathology. Walk 4-5 x per day. Increase as tolerated. You may climb stairs. Continue to use incentive spirometer. You may remove all dressings tomorrow then begin to shower daily. Keep all wounds uncovered. Suture will be removed in office.  See Dr. Ocampo in 2 weeks. Call for your apt.   Have chest xray prior to your apt.

## 2017-09-07 NOTE — DISCHARGE NOTE ADULT - HOSPITAL COURSE
72yo female with HDL, HTN, Asthma, GERD, Insomnia, and Overactive bladder, reports being diagnosed with Pancreatic cancer in 11/2016. Pt reports completing chemotherapy and radiation in 3/2017, and Lung nodule was found via CT during examinations for Pancreatic cancer. On 9/5 pt had a Rt. VATS, RLL wedge resection. 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 Put on resp isolation. Sputum for AFB neg x 3. CT removed yesterday. CXR stable. Pt. feels well. Ambulating frequently. VATS c/d/i. Cleared for discharge by Dr. Ocampo.

## 2017-09-07 NOTE — PROGRESS NOTE ADULT - PROBLEM SELECTOR PLAN 2
- oncology, Dr. Huang, following closely as outpatient s/p chemotherapy
chemotherapy as an outpatient

## 2017-09-07 NOTE — PROGRESS NOTE ADULT - ASSESSMENT
72yo female with HDL, HTN, Asthma, GERD, Insomnia, and Overactive bladder, reports being diagnosed with Pancreatic cancer in 11/2016. Pt reports completing chemotherapy and radiation in 3/2017, and Lung nodule was found via CT during examinations for Pancreatic cancer. Pt presents today for presurgical evaluation for Robotic Assisted Flexible Bronchoscopy, Wedge Resection Possible Lobectomy scheduled for 9/05/2017. (25 Aug 2017 13:54).  I have seen and examined the patient and history noted: Pt to me tells that she has no history of pulmonary disease except Asthma, and never had any exposure to tuberculosis.

## 2017-09-07 NOTE — DISCHARGE NOTE ADULT - INSTRUCTIONS
resume your usual diet Take medication as ordered, follow up with MD as advised, follow up with private MD for flu shot in 3-4 weeks, monitor chest tube site for healing, call MD if any sign of infection, eat heart healthy diet, call MD if sign of infection at CT site

## 2017-09-07 NOTE — PROGRESS NOTE ADULT - ASSESSMENT
Ms. Sinha is a 72 y/o f with PMH GERD, HTN, HLD, depression, asthma, insomnia, overactive bladder, breast ca s/p lumpectomy (1999), Colitis (d/t chemo 2016), recently dx pancreatic ca 11/2016 s/p chemotherapy 2/2017 and Lung nodule was found via CT presents for scheduled VATS. Pt is now s/p Robot-assisted wedge resection of lung using video-assisted thoracoscopic surgery (VATS) using da Trino Xi ,  RLL wedge, lysis of adhesions, mediastinal lymph node biopsy ( frozen : necrotizing granuloma). Since procedure pt has been with constipation

## 2017-09-07 NOTE — DISCHARGE NOTE ADULT - CARE PLAN
Principal Discharge DX:	Solitary pulmonary nodule  Goal:	s/p lung resection. Goal is wound healing, final pathology.  Instructions for follow-up, activity and diet:	Walk 4-5 x per day. Increase as tolerated. You may climb stairs. Continue to use incentive spirometer. You may remove all dressings tomorrow then begin to shower daily. Keep all wounds uncovered. Suture will be removed in office.  See Dr. Ocampo in 2 weeks. Call for your apt.   Have chest xray prior to your apt.

## 2017-09-07 NOTE — DISCHARGE NOTE ADULT - NS AS ACTIVITY OBS
Stairs allowed/Walking-Outdoors allowed/Do not make important decisions/Do not drive or operate machinery/No Heavy lifting/straining/Sex allowed/Showering allowed/Walking-Indoors allowed

## 2017-09-07 NOTE — DISCHARGE NOTE ADULT - PATIENT PORTAL LINK FT
“You can access the [facility] Patient Portal, offered by [facility], by registering with the following website: [email or web address]”

## 2017-09-07 NOTE — DISCHARGE NOTE ADULT - MEDICATION SUMMARY - MEDICATIONS TO TAKE
I will START or STAY ON the medications listed below when I get home from the hospital:    acetaminophen-oxycodone 325 mg-5 mg oral tablet  -- 2 tab(s) by mouth every 4 hours, As needed, Severe Pain (7 - 10) MDD:10  -- Indication: For Severe pain    Tylenol 325 mg oral capsule  -- 2 tab(s) by mouth every 6 hours, As Needed for mild pain.   -- Indication: For Mild to moderate pain    traZODone 100 mg oral tablet  -- 1 tab(s) by mouth once a day (at bedtime), As Needed - for insomnia  -- Indication: For anti depressant    sertraline 50 mg oral tablet  -- 1.5 tab(s) by mouth once a day in pm  -- Indication: For anti depressant    lovastatin 40 mg oral tablet  -- 1 tab(s) by mouth once a day (at bedtime)  -- Indication: For Cholesterol    losartan-hydrochlorothiazide 100mg-12.5mg oral tablet  -- 1 tab(s) by mouth once a day in am  -- Indication: For blood pressure    Advair Diskus 250 mcg-50 mcg inhalation powder  -- 1 puff(s) inhaled 2 times a day  -- Indication: For breathing    ProAir HFA 90 mcg/inh inhalation aerosol  -- 2 puff(s) inhaled 4 times a day, As Needed  -- Indication: For breathing    famotidine 40 mg oral tablet  -- 1 tab(s) by mouth 2 times a day  -- Indication: For Pepcid    senna oral tablet  -- 2 tab(s) by mouth once a day (at bedtime)  -- Indication: For Constipation, slow transit    docusate sodium 100 mg oral capsule  -- 1 cap(s) by mouth 3 times a day  -- Indication: For Constipation, slow transit    Protonix 40 mg oral delayed release tablet  -- 1 tab(s) by mouth once a day in am  -- Indication: For Stomach    oxybutynin 5 mg oral tablet  -- 1 tab(s) by mouth once a day (at bedtime)  -- Indication: For urinary spasm

## 2017-09-07 NOTE — PROGRESS NOTE ADULT - PROBLEM SELECTOR PLAN 1
- likely anesthesia effect  - monitor gi fxn; passing flatus  - senna/colace  - miralax qd
resected: await final path: ct removed: no ptx  await AFB x 3

## 2017-09-08 LAB — BACTERIA SPT RESP CULT: SIGNIFICANT CHANGE UP

## 2017-09-11 LAB — CULTURE - SURGICAL SITE: SIGNIFICANT CHANGE UP

## 2017-09-12 LAB — SPECIMEN SOURCE: SIGNIFICANT CHANGE UP

## 2017-09-19 LAB — ACID FAST STN SPT: SIGNIFICANT CHANGE UP

## 2017-09-24 LAB — ACID FAST STN SPT: SIGNIFICANT CHANGE UP

## 2017-09-27 LAB — ACID FAST STN SPT: SIGNIFICANT CHANGE UP

## 2017-10-04 LAB
FUNGUS SPEC QL CULT: SIGNIFICANT CHANGE UP
FUNGUS SPEC QL CULT: SIGNIFICANT CHANGE UP

## 2017-10-08 ENCOUNTER — TRANSCRIPTION ENCOUNTER (OUTPATIENT)
Age: 72
End: 2017-10-08

## 2017-10-16 ENCOUNTER — APPOINTMENT (OUTPATIENT)
Dept: SURGICAL ONCOLOGY | Facility: CLINIC | Age: 72
End: 2017-10-16
Payer: MEDICARE

## 2017-10-16 VITALS
DIASTOLIC BLOOD PRESSURE: 82 MMHG | BODY MASS INDEX: 29.66 KG/M2 | OXYGEN SATURATION: 94 % | TEMPERATURE: 97.6 F | SYSTOLIC BLOOD PRESSURE: 130 MMHG | HEIGHT: 65 IN | WEIGHT: 178 LBS | HEART RATE: 94 BPM | RESPIRATION RATE: 16 BRPM

## 2017-10-16 PROCEDURE — 99214 OFFICE O/P EST MOD 30 MIN: CPT

## 2017-10-17 LAB — ACID FAST STN SPEC: SIGNIFICANT CHANGE UP

## 2017-10-25 ENCOUNTER — OUTPATIENT (OUTPATIENT)
Dept: OUTPATIENT SERVICES | Facility: HOSPITAL | Age: 72
LOS: 1 days | End: 2017-10-25
Payer: MEDICARE

## 2017-10-25 VITALS
HEIGHT: 64 IN | TEMPERATURE: 97 F | SYSTOLIC BLOOD PRESSURE: 154 MMHG | DIASTOLIC BLOOD PRESSURE: 84 MMHG | OXYGEN SATURATION: 98 % | RESPIRATION RATE: 16 BRPM | WEIGHT: 181 LBS | HEART RATE: 58 BPM

## 2017-10-25 DIAGNOSIS — Z95.828 PRESENCE OF OTHER VASCULAR IMPLANTS AND GRAFTS: Chronic | ICD-10-CM

## 2017-10-25 DIAGNOSIS — I10 ESSENTIAL (PRIMARY) HYPERTENSION: ICD-10-CM

## 2017-10-25 DIAGNOSIS — J45.909 UNSPECIFIED ASTHMA, UNCOMPLICATED: ICD-10-CM

## 2017-10-25 DIAGNOSIS — K21.9 GASTRO-ESOPHAGEAL REFLUX DISEASE WITHOUT ESOPHAGITIS: ICD-10-CM

## 2017-10-25 DIAGNOSIS — Z98.890 OTHER SPECIFIED POSTPROCEDURAL STATES: Chronic | ICD-10-CM

## 2017-10-25 DIAGNOSIS — C25.9 MALIGNANT NEOPLASM OF PANCREAS, UNSPECIFIED: ICD-10-CM

## 2017-10-25 DIAGNOSIS — Z98.89 OTHER SPECIFIED POSTPROCEDURAL STATES: Chronic | ICD-10-CM

## 2017-10-25 LAB
ALBUMIN SERPL ELPH-MCNC: 3.9 G/DL — SIGNIFICANT CHANGE UP (ref 3.3–5)
ALP SERPL-CCNC: 88 U/L — SIGNIFICANT CHANGE UP (ref 40–120)
ALT FLD-CCNC: 26 U/L — SIGNIFICANT CHANGE UP (ref 4–33)
AST SERPL-CCNC: 22 U/L — SIGNIFICANT CHANGE UP (ref 4–32)
BILIRUB SERPL-MCNC: 0.6 MG/DL — SIGNIFICANT CHANGE UP (ref 0.2–1.2)
BLD GP AB SCN SERPL QL: NEGATIVE — SIGNIFICANT CHANGE UP
BUN SERPL-MCNC: 16 MG/DL — SIGNIFICANT CHANGE UP (ref 7–23)
CALCIUM SERPL-MCNC: 8.9 MG/DL — SIGNIFICANT CHANGE UP (ref 8.4–10.5)
CHLORIDE SERPL-SCNC: 103 MMOL/L — SIGNIFICANT CHANGE UP (ref 98–107)
CO2 SERPL-SCNC: 26 MMOL/L — SIGNIFICANT CHANGE UP (ref 22–31)
CREAT SERPL-MCNC: 0.74 MG/DL — SIGNIFICANT CHANGE UP (ref 0.5–1.3)
GLUCOSE SERPL-MCNC: 77 MG/DL — SIGNIFICANT CHANGE UP (ref 70–99)
HCT VFR BLD CALC: 36.9 % — SIGNIFICANT CHANGE UP (ref 34.5–45)
HGB BLD-MCNC: 12.2 G/DL — SIGNIFICANT CHANGE UP (ref 11.5–15.5)
MCHC RBC-ENTMCNC: 30 PG — SIGNIFICANT CHANGE UP (ref 27–34)
MCHC RBC-ENTMCNC: 33.1 % — SIGNIFICANT CHANGE UP (ref 32–36)
MCV RBC AUTO: 90.9 FL — SIGNIFICANT CHANGE UP (ref 80–100)
NRBC # FLD: 0 — SIGNIFICANT CHANGE UP
PLATELET # BLD AUTO: 324 K/UL — SIGNIFICANT CHANGE UP (ref 150–400)
PMV BLD: 10.7 FL — SIGNIFICANT CHANGE UP (ref 7–13)
POTASSIUM SERPL-MCNC: 3.4 MMOL/L — LOW (ref 3.5–5.3)
POTASSIUM SERPL-SCNC: 3.4 MMOL/L — LOW (ref 3.5–5.3)
PROT SERPL-MCNC: 7.3 G/DL — SIGNIFICANT CHANGE UP (ref 6–8.3)
RBC # BLD: 4.06 M/UL — SIGNIFICANT CHANGE UP (ref 3.8–5.2)
RBC # FLD: 13.7 % — SIGNIFICANT CHANGE UP (ref 10.3–14.5)
RH IG SCN BLD-IMP: POSITIVE — SIGNIFICANT CHANGE UP
SODIUM SERPL-SCNC: 142 MMOL/L — SIGNIFICANT CHANGE UP (ref 135–145)
WBC # BLD: 8.12 K/UL — SIGNIFICANT CHANGE UP (ref 3.8–10.5)
WBC # FLD AUTO: 8.12 K/UL — SIGNIFICANT CHANGE UP (ref 3.8–10.5)

## 2017-10-25 PROCEDURE — 93010 ELECTROCARDIOGRAM REPORT: CPT

## 2017-10-25 RX ORDER — SODIUM CHLORIDE 9 MG/ML
1000 INJECTION, SOLUTION INTRAVENOUS
Qty: 0 | Refills: 0 | Status: DISCONTINUED | OUTPATIENT
Start: 2017-11-07 | End: 2017-11-07

## 2017-10-25 NOTE — H&P PST ADULT - NEGATIVE CARDIOVASCULAR SYMPTOMS
no dyspnea on exertion/no paroxysmal nocturnal dyspnea/no peripheral edema/no palpitations/no chest pain

## 2017-10-25 NOTE — H&P PST ADULT - PAIN COMMENT, PROFILE
Patient states she had surgery " on the lung" in September and has residual tenderness, for which she takes Gabapentin.

## 2017-10-25 NOTE — H&P PST ADULT - PSH
H/O abdominal hysterectomy    H/O umbilical hernia repair  with mesh  History of lumpectomy  right (1996)  History of lung surgery  right VATS, wedge resection (August 2017) benign neoplasm  Port-a-cath in place    Status post right breast lumpectomy  malignant treated with radiation

## 2017-10-25 NOTE — H&P PST ADULT - HISTORY OF PRESENT ILLNESS
72 y/o female with history of pancreatic cancer diagnosed 2016,     last chemo Feb 2017, last RT- March 2017 72 y/o female with history of pancreatic cancer presents to PAST today for presurgical evaluation.  She was diagnosed with pancreatic cancer in 2016 and was treated with chemotherapy (completed in) and Radiation (completed in 3/2017).  She is s/p right VATS, wedge resection for benign neoplasm in August 2017.  She is scheduled for diagnostic laparoscopic laparotomy whipple on 11/7/17.

## 2017-10-25 NOTE — H&P PST ADULT - PROBLEM SELECTOR PLAN 1
Pt. is scheduled for diagnostic laparoscopic laparotomy whipple on 11/7/17.  Preoperative instructions reviewed, pt. verbalized understanding.  Preop Chlorhexidine provided.  Lab results pending, EKG done

## 2017-10-25 NOTE — H&P PST ADULT - ATTENDING COMMENTS
D/w pt plan for diagnostic lap, laparotomy, whipple, feeding joannej    Discussed r/b/a post op expectations poss complications incl bleeding infxn, leak, need for additional procedures.      Pt understands and agrees to proceed.

## 2017-10-25 NOTE — H&P PST ADULT - PMH
Asthma  denies any recent hospitalizations/intubations  Breast cancer  Dx 1996 s/p RT and lumpectomy  Colitis  due to chemo 1/2017  Depression    Hiatal hernia with GERD  no changes  HTN (hypertension)    Hyperlipidemia    Insomnia    Malignant neoplasm of pancreas, unspecified location of malignancy  started chemo 10/2016, has mediport - left chest  Malignant neoplasm of right female breast, unspecified site of breast    Solitary pulmonary nodule Asthma  denies any recent hospitalizations/intubations  Breast cancer  Dx 1996 s/p RT and lumpectomy  Colitis  due to chemo 1/2017  Depression    Hiatal hernia with GERD  no changes  HTN (hypertension)    Hyperlipidemia    Insomnia    Malignant neoplasm of pancreas, unspecified location of malignancy  started chemo 10/2016, has mediport - left chest  Malignant neoplasm of right female breast, unspecified site of breast    Pancreatic cancer    Solitary pulmonary nodule

## 2017-11-06 NOTE — ASU PATIENT PROFILE, ADULT - PMH
Asthma  denies any recent hospitalizations/intubations  Breast cancer  Dx 1996 s/p RT and lumpectomy  Colitis  due to chemo 1/2017  Depression    Hiatal hernia with GERD  no changes  HTN (hypertension)    Hyperlipidemia    Insomnia    Malignant neoplasm of pancreas, unspecified location of malignancy  started chemo 10/2016, has mediport - left chest  Malignant neoplasm of right female breast, unspecified site of breast    Pancreatic cancer    Solitary pulmonary nodule

## 2017-11-06 NOTE — ASU PATIENT PROFILE, ADULT - VISION (WITH CORRECTIVE LENSES IF THE PATIENT USUALLY WEARS THEM):
Reading glasses/Normal vision: sees adequately in most situations; can see medication labels, newsprint Reading glasses/Partially impaired: cannot see medication labels or newsprint, but can see obstacles in path, and the surrounding layout; can count fingers at arm's length

## 2017-11-07 ENCOUNTER — INPATIENT (INPATIENT)
Facility: HOSPITAL | Age: 72
LOS: 8 days | Discharge: NURSING, REHAB OR LONG-TERM CARE | End: 2017-11-16
Attending: SURGERY | Admitting: SURGERY
Payer: MEDICARE

## 2017-11-07 ENCOUNTER — RESULT REVIEW (OUTPATIENT)
Age: 72
End: 2017-11-07

## 2017-11-07 ENCOUNTER — APPOINTMENT (OUTPATIENT)
Dept: SURGICAL ONCOLOGY | Facility: HOSPITAL | Age: 72
End: 2017-11-07

## 2017-11-07 VITALS
WEIGHT: 181 LBS | RESPIRATION RATE: 16 BRPM | TEMPERATURE: 98 F | HEIGHT: 64 IN | HEART RATE: 59 BPM | DIASTOLIC BLOOD PRESSURE: 80 MMHG | OXYGEN SATURATION: 96 % | SYSTOLIC BLOOD PRESSURE: 152 MMHG

## 2017-11-07 DIAGNOSIS — Z98.890 OTHER SPECIFIED POSTPROCEDURAL STATES: Chronic | ICD-10-CM

## 2017-11-07 DIAGNOSIS — Z95.828 PRESENCE OF OTHER VASCULAR IMPLANTS AND GRAFTS: Chronic | ICD-10-CM

## 2017-11-07 DIAGNOSIS — Z98.89 OTHER SPECIFIED POSTPROCEDURAL STATES: Chronic | ICD-10-CM

## 2017-11-07 DIAGNOSIS — C25.9 MALIGNANT NEOPLASM OF PANCREAS, UNSPECIFIED: ICD-10-CM

## 2017-11-07 LAB
ALBUMIN SERPL ELPH-MCNC: 3.5 G/DL — SIGNIFICANT CHANGE UP (ref 3.3–5)
ALBUMIN SERPL ELPH-MCNC: 3.6 G/DL — SIGNIFICANT CHANGE UP (ref 3.3–5)
ALP SERPL-CCNC: 65 U/L — SIGNIFICANT CHANGE UP (ref 40–120)
ALP SERPL-CCNC: 66 U/L — SIGNIFICANT CHANGE UP (ref 40–120)
ALT FLD-CCNC: 269 U/L — HIGH (ref 4–33)
ALT FLD-CCNC: 272 U/L — HIGH (ref 4–33)
APTT BLD: 25.7 SEC — LOW (ref 27.5–37.4)
AST SERPL-CCNC: 353 U/L — HIGH (ref 4–32)
AST SERPL-CCNC: 365 U/L — HIGH (ref 4–32)
BASE EXCESS BLDA CALC-SCNC: -0.2 MMOL/L — SIGNIFICANT CHANGE UP
BASE EXCESS BLDA CALC-SCNC: -1.6 MMOL/L — SIGNIFICANT CHANGE UP
BASE EXCESS BLDA CALC-SCNC: 0.4 MMOL/L — SIGNIFICANT CHANGE UP
BASE EXCESS BLDA CALC-SCNC: 1.9 MMOL/L — SIGNIFICANT CHANGE UP
BASOPHILS # BLD AUTO: 0.02 K/UL — SIGNIFICANT CHANGE UP (ref 0–0.2)
BASOPHILS NFR BLD AUTO: 0.1 % — SIGNIFICANT CHANGE UP (ref 0–2)
BILIRUB SERPL-MCNC: 1.4 MG/DL — HIGH (ref 0.2–1.2)
BILIRUB SERPL-MCNC: 1.5 MG/DL — HIGH (ref 0.2–1.2)
BLD GP AB SCN SERPL QL: NEGATIVE — SIGNIFICANT CHANGE UP
BUN SERPL-MCNC: 12 MG/DL — SIGNIFICANT CHANGE UP (ref 7–23)
BUN SERPL-MCNC: 14 MG/DL — SIGNIFICANT CHANGE UP (ref 7–23)
CA-I BLDA-SCNC: 1.13 MMOL/L — LOW (ref 1.15–1.29)
CA-I BLDA-SCNC: 1.16 MMOL/L — SIGNIFICANT CHANGE UP (ref 1.15–1.29)
CA-I BLDA-SCNC: 1.17 MMOL/L — SIGNIFICANT CHANGE UP (ref 1.15–1.29)
CA-I BLDA-SCNC: 1.21 MMOL/L — SIGNIFICANT CHANGE UP (ref 1.15–1.29)
CALCIUM SERPL-MCNC: 8.5 MG/DL — SIGNIFICANT CHANGE UP (ref 8.4–10.5)
CALCIUM SERPL-MCNC: 8.6 MG/DL — SIGNIFICANT CHANGE UP (ref 8.4–10.5)
CHLORIDE SERPL-SCNC: 102 MMOL/L — SIGNIFICANT CHANGE UP (ref 98–107)
CHLORIDE SERPL-SCNC: 102 MMOL/L — SIGNIFICANT CHANGE UP (ref 98–107)
CO2 SERPL-SCNC: 21 MMOL/L — LOW (ref 22–31)
CO2 SERPL-SCNC: 22 MMOL/L — SIGNIFICANT CHANGE UP (ref 22–31)
CREAT SERPL-MCNC: 0.78 MG/DL — SIGNIFICANT CHANGE UP (ref 0.5–1.3)
CREAT SERPL-MCNC: 0.79 MG/DL — SIGNIFICANT CHANGE UP (ref 0.5–1.3)
EOSINOPHIL # BLD AUTO: 0.01 K/UL — SIGNIFICANT CHANGE UP (ref 0–0.5)
EOSINOPHIL NFR BLD AUTO: 0.1 % — SIGNIFICANT CHANGE UP (ref 0–6)
GLUCOSE BLDA-MCNC: 131 MG/DL — HIGH (ref 70–99)
GLUCOSE BLDA-MCNC: 161 MG/DL — HIGH (ref 70–99)
GLUCOSE BLDA-MCNC: 171 MG/DL — HIGH (ref 70–99)
GLUCOSE BLDA-MCNC: 179 MG/DL — HIGH (ref 70–99)
GLUCOSE SERPL-MCNC: 225 MG/DL — HIGH (ref 70–99)
GLUCOSE SERPL-MCNC: 231 MG/DL — HIGH (ref 70–99)
HCO3 BLDA-SCNC: 23 MMOL/L — SIGNIFICANT CHANGE UP (ref 22–26)
HCO3 BLDA-SCNC: 25 MMOL/L — SIGNIFICANT CHANGE UP (ref 22–26)
HCO3 BLDA-SCNC: 25 MMOL/L — SIGNIFICANT CHANGE UP (ref 22–26)
HCO3 BLDA-SCNC: 26 MMOL/L — SIGNIFICANT CHANGE UP (ref 22–26)
HCT VFR BLD CALC: 33.4 % — LOW (ref 34.5–45)
HCT VFR BLD CALC: 34.2 % — LOW (ref 34.5–45)
HCT VFR BLDA CALC: 32.8 % — LOW (ref 34.5–46.5)
HCT VFR BLDA CALC: 33.4 % — LOW (ref 34.5–46.5)
HCT VFR BLDA CALC: 35.3 % — SIGNIFICANT CHANGE UP (ref 34.5–46.5)
HCT VFR BLDA CALC: 36.9 % — SIGNIFICANT CHANGE UP (ref 34.5–46.5)
HGB BLD-MCNC: 11 G/DL — LOW (ref 11.5–15.5)
HGB BLD-MCNC: 11.1 G/DL — LOW (ref 11.5–15.5)
HGB BLDA-MCNC: 10.6 G/DL — LOW (ref 11.5–15.5)
HGB BLDA-MCNC: 10.8 G/DL — LOW (ref 11.5–15.5)
HGB BLDA-MCNC: 11.5 G/DL — SIGNIFICANT CHANGE UP (ref 11.5–15.5)
HGB BLDA-MCNC: 12 G/DL — SIGNIFICANT CHANGE UP (ref 11.5–15.5)
IMM GRANULOCYTES # BLD AUTO: 0.06 # — SIGNIFICANT CHANGE UP
IMM GRANULOCYTES NFR BLD AUTO: 0.4 % — SIGNIFICANT CHANGE UP (ref 0–1.5)
INR BLD: 1.22 — HIGH (ref 0.88–1.17)
LACTATE BLDA-SCNC: 2.1 MMOL/L — HIGH (ref 0.5–2)
LACTATE BLDA-SCNC: 3 MMOL/L — HIGH (ref 0.5–2)
LACTATE BLDA-SCNC: 3.3 MMOL/L — HIGH (ref 0.5–2)
LYMPHOCYTES # BLD AUTO: 0.61 K/UL — LOW (ref 1–3.3)
LYMPHOCYTES # BLD AUTO: 3.6 % — LOW (ref 13–44)
MAGNESIUM SERPL-MCNC: 1.4 MG/DL — LOW (ref 1.6–2.6)
MAGNESIUM SERPL-MCNC: 1.4 MG/DL — LOW (ref 1.6–2.6)
MCHC RBC-ENTMCNC: 29.7 PG — SIGNIFICANT CHANGE UP (ref 27–34)
MCHC RBC-ENTMCNC: 29.8 PG — SIGNIFICANT CHANGE UP (ref 27–34)
MCHC RBC-ENTMCNC: 32.5 % — SIGNIFICANT CHANGE UP (ref 32–36)
MCHC RBC-ENTMCNC: 32.9 % — SIGNIFICANT CHANGE UP (ref 32–36)
MCV RBC AUTO: 90.3 FL — SIGNIFICANT CHANGE UP (ref 80–100)
MCV RBC AUTO: 91.7 FL — SIGNIFICANT CHANGE UP (ref 80–100)
MONOCYTES # BLD AUTO: 0.62 K/UL — SIGNIFICANT CHANGE UP (ref 0–0.9)
MONOCYTES NFR BLD AUTO: 3.7 % — SIGNIFICANT CHANGE UP (ref 2–14)
NEUTROPHILS # BLD AUTO: 15.65 K/UL — HIGH (ref 1.8–7.4)
NEUTROPHILS NFR BLD AUTO: 92.1 % — HIGH (ref 43–77)
NRBC # FLD: 0 — SIGNIFICANT CHANGE UP
NRBC # FLD: 0 — SIGNIFICANT CHANGE UP
PCO2 BLDA: 33 MMHG — SIGNIFICANT CHANGE UP (ref 32–48)
PCO2 BLDA: 33 MMHG — SIGNIFICANT CHANGE UP (ref 32–48)
PCO2 BLDA: 36 MMHG — SIGNIFICANT CHANGE UP (ref 32–48)
PCO2 BLDA: 41 MMHG — SIGNIFICANT CHANGE UP (ref 32–48)
PH BLDA: 7.37 PH — SIGNIFICANT CHANGE UP (ref 7.35–7.45)
PH BLDA: 7.46 PH — HIGH (ref 7.35–7.45)
PH BLDA: 7.46 PH — HIGH (ref 7.35–7.45)
PH BLDA: 7.47 PH — HIGH (ref 7.35–7.45)
PHOSPHATE SERPL-MCNC: 4.5 MG/DL — SIGNIFICANT CHANGE UP (ref 2.5–4.5)
PHOSPHATE SERPL-MCNC: 4.6 MG/DL — HIGH (ref 2.5–4.5)
PLATELET # BLD AUTO: 198 K/UL — SIGNIFICANT CHANGE UP (ref 150–400)
PLATELET # BLD AUTO: 200 K/UL — SIGNIFICANT CHANGE UP (ref 150–400)
PMV BLD: 10.4 FL — SIGNIFICANT CHANGE UP (ref 7–13)
PMV BLD: 10.5 FL — SIGNIFICANT CHANGE UP (ref 7–13)
PO2 BLDA: 232 MMHG — HIGH (ref 83–108)
PO2 BLDA: 296 MMHG — HIGH (ref 83–108)
PO2 BLDA: 354 MMHG — HIGH (ref 83–108)
PO2 BLDA: 380 MMHG — HIGH (ref 83–108)
POTASSIUM BLDA-SCNC: 3 MMOL/L — LOW (ref 3.4–4.5)
POTASSIUM BLDA-SCNC: 3.3 MMOL/L — LOW (ref 3.4–4.5)
POTASSIUM BLDA-SCNC: 3.7 MMOL/L — SIGNIFICANT CHANGE UP (ref 3.4–4.5)
POTASSIUM BLDA-SCNC: 4 MMOL/L — SIGNIFICANT CHANGE UP (ref 3.4–4.5)
POTASSIUM SERPL-MCNC: 4 MMOL/L — SIGNIFICANT CHANGE UP (ref 3.5–5.3)
POTASSIUM SERPL-MCNC: 4.1 MMOL/L — SIGNIFICANT CHANGE UP (ref 3.5–5.3)
POTASSIUM SERPL-SCNC: 4 MMOL/L — SIGNIFICANT CHANGE UP (ref 3.5–5.3)
POTASSIUM SERPL-SCNC: 4.1 MMOL/L — SIGNIFICANT CHANGE UP (ref 3.5–5.3)
PROT SERPL-MCNC: 5.9 G/DL — LOW (ref 6–8.3)
PROT SERPL-MCNC: 5.9 G/DL — LOW (ref 6–8.3)
PROTHROM AB SERPL-ACNC: 13.7 SEC — HIGH (ref 9.8–13.1)
RBC # BLD: 3.7 M/UL — LOW (ref 3.8–5.2)
RBC # BLD: 3.73 M/UL — LOW (ref 3.8–5.2)
RBC # FLD: 13.9 % — SIGNIFICANT CHANGE UP (ref 10.3–14.5)
RBC # FLD: 13.9 % — SIGNIFICANT CHANGE UP (ref 10.3–14.5)
RH IG SCN BLD-IMP: POSITIVE — SIGNIFICANT CHANGE UP
SAO2 % BLDA: 97.2 % — SIGNIFICANT CHANGE UP (ref 95–99)
SAO2 % BLDA: 97.3 % — SIGNIFICANT CHANGE UP (ref 95–99)
SODIUM BLDA-SCNC: 135 MMOL/L — LOW (ref 136–146)
SODIUM BLDA-SCNC: 136 MMOL/L — SIGNIFICANT CHANGE UP (ref 136–146)
SODIUM BLDA-SCNC: 136 MMOL/L — SIGNIFICANT CHANGE UP (ref 136–146)
SODIUM BLDA-SCNC: 137 MMOL/L — SIGNIFICANT CHANGE UP (ref 136–146)
SODIUM SERPL-SCNC: 138 MMOL/L — SIGNIFICANT CHANGE UP (ref 135–145)
SODIUM SERPL-SCNC: 139 MMOL/L — SIGNIFICANT CHANGE UP (ref 135–145)
WBC # BLD: 16.12 K/UL — HIGH (ref 3.8–10.5)
WBC # BLD: 16.97 K/UL — HIGH (ref 3.8–10.5)
WBC # FLD AUTO: 16.12 K/UL — HIGH (ref 3.8–10.5)
WBC # FLD AUTO: 16.97 K/UL — HIGH (ref 3.8–10.5)

## 2017-11-07 PROCEDURE — 88360 TUMOR IMMUNOHISTOCHEM/MANUAL: CPT | Mod: 26

## 2017-11-07 PROCEDURE — 44015 INSERT NEEDLE CATH BOWEL: CPT | Mod: 59

## 2017-11-07 PROCEDURE — 47000 NEEDLE BIOPSY OF LIVER PERQ: CPT | Mod: 59

## 2017-11-07 PROCEDURE — 49320 DIAG LAPARO SEPARATE PROC: CPT | Mod: 82,59

## 2017-11-07 PROCEDURE — 88312 SPECIAL STAINS GROUP 1: CPT | Mod: 26

## 2017-11-07 PROCEDURE — 49320 DIAG LAPARO SEPARATE PROC: CPT | Mod: 59

## 2017-11-07 PROCEDURE — 88331 PATH CONSLTJ SURG 1 BLK 1SPC: CPT | Mod: 26

## 2017-11-07 PROCEDURE — 88307 TISSUE EXAM BY PATHOLOGIST: CPT | Mod: 26

## 2017-11-07 PROCEDURE — 88341 IMHCHEM/IMCYTCHM EA ADD ANTB: CPT | Mod: 26,59

## 2017-11-07 PROCEDURE — 48150 PARTIAL REMOVAL OF PANCREAS: CPT | Mod: 59

## 2017-11-07 PROCEDURE — 88309 TISSUE EXAM BY PATHOLOGIST: CPT | Mod: 26

## 2017-11-07 PROCEDURE — 49204: CPT | Mod: 59

## 2017-11-07 PROCEDURE — 88304 TISSUE EXAM BY PATHOLOGIST: CPT | Mod: 26

## 2017-11-07 PROCEDURE — 88342 IMHCHEM/IMCYTCHM 1ST ANTB: CPT | Mod: 26,59

## 2017-11-07 PROCEDURE — 35221 RPR BLD VSL DIR INTRA-ABDL: CPT | Mod: RT

## 2017-11-07 PROCEDURE — 88305 TISSUE EXAM BY PATHOLOGIST: CPT | Mod: 26

## 2017-11-07 PROCEDURE — 49204: CPT | Mod: 82,59

## 2017-11-07 PROCEDURE — 48150 PARTIAL REMOVAL OF PANCREAS: CPT | Mod: 82

## 2017-11-07 PROCEDURE — 44015 INSERT NEEDLE CATH BOWEL: CPT | Mod: 82,59

## 2017-11-07 RX ORDER — ONDANSETRON 8 MG/1
4 TABLET, FILM COATED ORAL EVERY 6 HOURS
Qty: 0 | Refills: 0 | Status: DISCONTINUED | OUTPATIENT
Start: 2017-11-07 | End: 2017-11-11

## 2017-11-07 RX ORDER — BUTORPHANOL TARTRATE 2 MG/ML
0.25 INJECTION, SOLUTION INTRAMUSCULAR; INTRAVENOUS EVERY 6 HOURS
Qty: 0 | Refills: 0 | Status: DISCONTINUED | OUTPATIENT
Start: 2017-11-07 | End: 2017-11-11

## 2017-11-07 RX ORDER — SODIUM CHLORIDE 9 MG/ML
1000 INJECTION, SOLUTION INTRAVENOUS ONCE
Qty: 0 | Refills: 0 | Status: COMPLETED | OUTPATIENT
Start: 2017-11-07 | End: 2017-11-07

## 2017-11-07 RX ORDER — MAGNESIUM SULFATE 500 MG/ML
2 VIAL (ML) INJECTION ONCE
Qty: 0 | Refills: 0 | Status: COMPLETED | OUTPATIENT
Start: 2017-11-07 | End: 2017-11-07

## 2017-11-07 RX ORDER — NALOXONE HYDROCHLORIDE 4 MG/.1ML
0.1 SPRAY NASAL
Qty: 0 | Refills: 0 | Status: DISCONTINUED | OUTPATIENT
Start: 2017-11-07 | End: 2017-11-11

## 2017-11-07 RX ORDER — PANTOPRAZOLE SODIUM 20 MG/1
40 TABLET, DELAYED RELEASE ORAL DAILY
Qty: 0 | Refills: 0 | Status: DISCONTINUED | OUTPATIENT
Start: 2017-11-07 | End: 2017-11-11

## 2017-11-07 RX ORDER — HYDROMORPHONE HYDROCHLORIDE 2 MG/ML
0.8 INJECTION INTRAMUSCULAR; INTRAVENOUS; SUBCUTANEOUS
Qty: 0 | Refills: 0 | Status: DISCONTINUED | OUTPATIENT
Start: 2017-11-07 | End: 2017-11-08

## 2017-11-07 RX ORDER — SODIUM CHLORIDE 9 MG/ML
1000 INJECTION, SOLUTION INTRAVENOUS
Qty: 0 | Refills: 0 | Status: DISCONTINUED | OUTPATIENT
Start: 2017-11-07 | End: 2017-11-09

## 2017-11-07 RX ORDER — HYDROMORPHONE HYDROCHLORIDE 2 MG/ML
0.4 INJECTION INTRAMUSCULAR; INTRAVENOUS; SUBCUTANEOUS
Qty: 0 | Refills: 0 | Status: DISCONTINUED | OUTPATIENT
Start: 2017-11-07 | End: 2017-11-08

## 2017-11-07 RX ORDER — BUDESONIDE AND FORMOTEROL FUMARATE DIHYDRATE 160; 4.5 UG/1; UG/1
2 AEROSOL RESPIRATORY (INHALATION)
Qty: 0 | Refills: 0 | Status: DISCONTINUED | OUTPATIENT
Start: 2017-11-07 | End: 2017-11-16

## 2017-11-07 RX ORDER — MEPERIDINE HYDROCHLORIDE 50 MG/ML
12.5 INJECTION INTRAMUSCULAR; INTRAVENOUS; SUBCUTANEOUS
Qty: 0 | Refills: 0 | Status: DISCONTINUED | OUTPATIENT
Start: 2017-11-07 | End: 2017-11-08

## 2017-11-07 RX ORDER — ENOXAPARIN SODIUM 100 MG/ML
40 INJECTION SUBCUTANEOUS DAILY
Qty: 0 | Refills: 0 | Status: DISCONTINUED | OUTPATIENT
Start: 2017-11-07 | End: 2017-11-08

## 2017-11-07 RX ORDER — ALBUMIN HUMAN 25 %
250 VIAL (ML) INTRAVENOUS
Qty: 0 | Refills: 0 | Status: COMPLETED | OUTPATIENT
Start: 2017-11-07 | End: 2017-11-07

## 2017-11-07 RX ORDER — ALBUTEROL 90 UG/1
2 AEROSOL, METERED ORAL EVERY 6 HOURS
Qty: 0 | Refills: 0 | Status: DISCONTINUED | OUTPATIENT
Start: 2017-11-07 | End: 2017-11-16

## 2017-11-07 RX ORDER — SODIUM CHLORIDE 9 MG/ML
1000 INJECTION, SOLUTION INTRAVENOUS ONCE
Qty: 0 | Refills: 0 | Status: COMPLETED | OUTPATIENT
Start: 2017-11-07 | End: 2017-11-08

## 2017-11-07 RX ADMIN — SODIUM CHLORIDE 125 MILLILITER(S): 9 INJECTION, SOLUTION INTRAVENOUS at 21:59

## 2017-11-07 RX ADMIN — SODIUM CHLORIDE 100 MILLILITER(S): 9 INJECTION, SOLUTION INTRAVENOUS at 21:00

## 2017-11-07 RX ADMIN — SODIUM CHLORIDE 4000 MILLILITER(S): 9 INJECTION, SOLUTION INTRAVENOUS at 21:59

## 2017-11-07 RX ADMIN — HYDROMORPHONE HYDROCHLORIDE 0.8 MILLIGRAM(S): 2 INJECTION INTRAMUSCULAR; INTRAVENOUS; SUBCUTANEOUS at 20:40

## 2017-11-07 RX ADMIN — HYDROMORPHONE HYDROCHLORIDE 0.8 MILLIGRAM(S): 2 INJECTION INTRAMUSCULAR; INTRAVENOUS; SUBCUTANEOUS at 20:19

## 2017-11-07 RX ADMIN — Medication 1000 MILLILITER(S): at 23:44

## 2017-11-07 RX ADMIN — HYDROMORPHONE HYDROCHLORIDE 0.8 MILLIGRAM(S): 2 INJECTION INTRAMUSCULAR; INTRAVENOUS; SUBCUTANEOUS at 21:00

## 2017-11-07 RX ADMIN — HYDROMORPHONE HYDROCHLORIDE 0.8 MILLIGRAM(S): 2 INJECTION INTRAMUSCULAR; INTRAVENOUS; SUBCUTANEOUS at 20:30

## 2017-11-07 RX ADMIN — Medication 1000 MILLILITER(S): at 23:07

## 2017-11-07 RX ADMIN — Medication 50 GRAM(S): at 22:00

## 2017-11-07 NOTE — BRIEF OPERATIVE NOTE - OPERATION/FINDINGS
DIAGNOSTIC LAP, LIVER LESIONS BIOPSIED NO MALIGNANCY ON FROZEN SECTION, PERITONEAL SURFACE LESIONS AT DIAPHRAGM- NEGATIVE FOR MALIGNANCY. DENSE ADHESIONS TO PORTAL VEIN, VENOTOMY REPAIRED PRIMARILY. CBD AND PANCREATIC DUCT MARGINS NEGATIVE FOR MALIGNANCY ON FROZEN SECTION. the P-J limb was passed in the retrolic space created during the mobilization of the ligament of treitz and fourth portion of the duodenum. CBD to SB anastomosis created with interrupted 4-0 PDS. Pancrease was firm, duct was 1.5 to 2mm, Pancreatic duct to small bowel anastomosis created. A stapled antecolic, retrogastric anastomosis was created. 2 hugh drains were left behind. eviseal was used on  P-J anastomoses. Jejunostomy tube was placed and witzeled.  Abdomen closed with #1 prolene (old mesh in place)

## 2017-11-07 NOTE — CONSULT NOTE ADULT - ASSESSMENT
ASSESSMENT:  71F h/o HTN, HLD, GERD, asthma, R breast Ca s/p lumpectomy/chemo/rad, pancreatic cancer s/p chemo (Feb 2017) & radiation (March 2017), s/p right VATS, wedge resection for benign neoplasm in August 2017, now s/p Whipple & Jejunostomy Tube.     PLAN:  Neurologic: c/w PCEA, will restart Zoloft & Trazodone tomorrow  Respiratory: c/w Symbicort, Proventil PRN  Cardiovascular: holding home antihypertensives at this time, will restart Losartan & HCTZ tomorrow  Gastrointestinal/Nutrition: NPO, Jejunostomy tube in this place  Renal/Genitourinary: Stoner in place  Hematologic: Lovenox for DVT prophylaxis  Infectious Disease: no acute issues  Endocrine: No acuteissues  Tubes/Lines/Drains: Stoner, 2 SHAYY drains, Dionisio tube, NGT, L rad A-line, 18G EJ PIV    Disposition: SICU    --------------------------------------------------------------------------------------    Critical Care Diagnoses: ASSESSMENT:  71F h/o HTN, HLD, GERD, asthma, R breast Ca s/p lumpectomy/chemo/rad, pancreatic cancer s/p chemo (Feb 2017) & radiation (March 2017), s/p right VATS, wedge resection for benign neoplasm in August 2017, now s/p Whipple & Jejunostomy Tube.     PLAN:  Neurologic: c/w PCEA, will restart Zoloft & Trazodone tomorrow  Respiratory: c/w Symbicort, Proventil PRN  Cardiovascular: holding home antihypertensives at this time, will restart Losartan & HCTZ tomorrow  Gastrointestinal/Nutrition: NPO, Jejunostomy tube in this place  Renal/Genitourinary: c/w IVF, Stoner in place, monitor I&O  Hematologic: Lovenox for DVT prophylaxis  Infectious Disease: no acute issues  Endocrine: No acute issues  Tubes/Lines/Drains: Stoner, 2 SHAYY drains, Dionisio tube, NGT, L rad A-line, 18G EJ PIV    Disposition: SICU    --------------------------------------------------------------------------------------    Critical Care Diagnoses:

## 2017-11-07 NOTE — CONSULT NOTE ADULT - SUBJECTIVE AND OBJECTIVE BOX
SICU Consultation Note  =====================================================    HPI:  71F h/o HTN, HLD, GERD, asthma, R breast Ca s/p lumpectomy/chemo/rad, pancreatic cancer s/p chemo (Feb 2017) & radiation (March 2017), s/p right VATS, wedge resection for benign neoplasm in August 2017, today is s/p Whipple & Jejunostomy Tube.  Pt is currently extubated and off pressors.  Pt has a thoracic epidural, brown catheter, Dionisio tube, NGT, 2 SHAYY drains, Left radial A-line, and an 18G EJ PIV in place.  Pt currently complaining of mild abdominal pain - acute pain called for PCEA to be connected.  Pt denies any CP/SOB, N/V, HA, dizziness, or weakness.    Surgery Information   Case Duration: 	EBL: 500	IV Fluids: 5000 LR	Blood Products: None	Urine Output: 1300  Complications: None    Allergies: SteriStrips (Blisters)    PAST MEDICAL & SURGICAL HISTORY:  Pancreatic cancer  Breast cancer: Dx 1996 s/p RT and lumpectomy  Solitary pulmonary nodule  Insomnia  Hiatal hernia with GERD: no changes  Colitis: due to chemo 1/2017  Malignant neoplasm of pancreas, unspecified location of malignancy: started chemo 10/2016, has mediport - left chest  Malignant neoplasm of right female breast, unspecified site of breast  Asthma: denies any recent hospitalizations/intubations  Depression  Hyperlipidemia  HTN (hypertension)  History of lung surgery: right VATS, wedge resection (August 2017) benign neoplasm  History of lumpectomy: right (1996)  Port-a-cath in place  Status post right breast lumpectomy: malignant treated with radiation  H/O abdominal hysterectomy  H/O umbilical hernia repair: with mesh    FAMILY HISTORY:  Family history of kidney cancer (Sibling): brother  Family history of brain cancer (Sibling): sister  Family history of ovarian cancer (Mother): mother      SOCIAL HISTORY:  non-smoker    ADVANCE DIRECTIVES: Presumed Full Code    REVIEW OF SYSTEMS:   General: Non-Contributory  Skin/Breast: Non-Contributory  Ophthalmologic: Non-Contributory  ENMT: Non-Contributory  Respiratory and Thorax: Non-Contributory  Cardiovascular: Non-Contributory  Gastrointestinal: abdominal pain 2/2 surgery  Genitourinary: Non-Contributory  Musculoskeletal: Non-Contributory  Neurological: Non-Contributory  Psychiatric: Non-Contributory  Hematology/Lymphatics: Non-Contributory  Endocrine: Non-Contributory  Allergic/Immunologic: Non-Contributory    HOME MEDICATIONS:    Home Medications:  Advair Diskus 250 mcg-50 mcg inhalation powder: 1 puff(s) inhaled 2 times a day (07 Nov 2017 06:11)  docusate sodium 100 mg oral capsule: 1 cap(s) orally 3 times a day (07 Nov 2017 06:11)  famotidine 40 mg oral tablet: 1 tab(s) orally 2 times a day (07 Nov 2017 06:11)  famotidine 40 mg oral tablet: 1 tab(s) orally 2 times a day (07 Nov 2017 06:11)  losartan-hydrochlorothiazide 100mg-12.5mg oral tablet: 1 tab(s) orally once a day in am (07 Nov 2017 06:11)  lovastatin 40 mg oral tablet: 1 tab(s) orally once a day (at bedtime) (07 Nov 2017 06:11)  oxybutynin 5 mg oral tablet: 1 tab(s) orally once a day (at bedtime) (07 Nov 2017 06:11)  ProAir HFA 90 mcg/inh inhalation aerosol: 2 puff(s) inhaled 4 times a day, As Needed (07 Nov 2017 06:11)  Protonix 40 mg oral delayed release tablet: 1 tab(s) orally once a day in am (07 Nov 2017 06:11)  sertraline 50 mg oral tablet: 1.5 tab(s) orally once a day in pm (07 Nov 2017 06:11)  traZODone 100 mg oral tablet: 1 tab(s) orally once a day (at bedtime), As Needed - for insomnia (07 Nov 2017 06:11)  Tylenol 325 mg oral capsule: 2 tab(s) orally every 6 hours, As Needed for mild pain.  (07 Nov 2017 06:11)  Vitamin D3 2000 intl units oral capsule: 1 cap(s) orally once a day (07 Nov 2017 06:11)      CURRENT MEDICATIONS:   --------------------------------------------------------------------------------------  Neurologic Medications  butorphanol Injectable 0.25 milliGRAM(s) IV Push every 6 hours PRN Pruritus  HYDROmorphone  Injectable 0.8 milliGRAM(s) IV Push every 10 minutes PRN Severe Pain (7 - 10)  HYDROmorphone  Injectable 0.4 milliGRAM(s) IV Push every 10 minutes PRN Moderate Pain (4 - 6)  HYDROmorphone (10 MICROgram(s)/mL) + BUpivacaine 0.0625% in 0.9% Sodium Chloride PCEA 250 milliLiter(s) Epidural PCA Continuous  HYDROmorphone (10 MICROgram(s)/mL) + BUpivacaine 0.0625% in 0.9% Sodium Chloride PCEA Rescue Clinician  Bolus 5 milliLiter(s) Epidural every 30 minutes PRN for Pain Score greater than 6  meperidine     Injectable 12.5 milliGRAM(s) IV Push every 10 minutes PRN Shivering  ondansetron Injectable 4 milliGRAM(s) IV Push every 6 hours PRN Nausea  promethazine IVPB 6.25 milliGRAM(s) IV Intermittent once PRN Nausea and/or Vomiting    Respiratory Medications    Cardiovascular Medications    Gastrointestinal Medications  lactated ringers. 1000 milliLiter(s) IV Continuous <Continuous>    Genitourinary Medications    Hematologic/Oncologic Medications  enoxaparin Injectable 40 milliGRAM(s) SubCutaneous daily    Antimicrobial/Immunologic Medications    Endocrine/Metabolic Medications    Topical/Other Medications  naloxone Injectable 0.1 milliGRAM(s) IV Push every 3 minutes PRN For ANY of the following changes in patient status:  A. RR LESS THAN 10 breaths per minute, B. Oxygen saturation LESS THAN 90%, C. Sedation score of 6    --------------------------------------------------------------------------------------    VITAL SIGNS, INS/OUTS (last 24 hours):  --------------------------------------------------------------------------------------  I&O's Detail    07 Nov 2017 07:01  -  07 Nov 2017 21:06  --------------------------------------------------------  IN:  Total IN: 0 mL    OUT:    Accordian: 35 mL    Accordian: 25 mL    Indwelling Catheter - Urethral: 40 mL  Total OUT: 100 mL    Total NET: -100 mL        --------------------------------------------------------------------------------------    EXAM  NEUROLOGY  RASS:   	GCS:    Exam: Normal, NAD, alert, oriented x 3, no focal deficits.    HEENT  Exam: Normocephalic, atraumatic.  EOMI    RESPIRATORY  Exam: Lungs clear to auscultation, Normal expansion/effort.    CARDIOVASCULAR  Exam: S1, S2.  Regular rate and rhythm.    GI/NUTRITION  Exam: Abdomen soft, mildly tender, Non-distended.  Wound:   clean, dry, intact  Current Diet:  NPO    VASCULAR  Exam: Extremities warm, pink, well-perfused.    MUSCULOSKELETAL  Exam: All extremities moving spontaneously without limitations.    SKIN:  Exam: Good skin turgor, no skin breakdown.    METABOLIC/FLUIDS/ELECTROLYTES  lactated ringers. 1000 milliLiter(s) IV Continuous <Continuous>      HEMATOLOGIC  [x] DVT Prophylaxis: enoxaparin Injectable 40 milliGRAM(s) SubCutaneous daily    Transfusions:	[] PRBC	[] Platelets		[] FFP	[] Cryoprecipitate    INFECTIOUS DISEASE  Antimicrobials/Immunologic Medications:    Day #  	of    ***    Tubes/Lines/Drains  ***  [x] Peripheral IV  [] Central Venous Line     	[] R	[] L	[] IJ	[] Fem	[] SC	Date Placed:   [x] Arterial Line		[] R	[x] L	[] Fem	[x] Rad	[] Ax	Date Placed: 11/7/2017  [] PICC:         	[] Midline		[] Mediport  [x] Urinary Catheter		Date Placed: 11/7/2017    LABS  --------------------------------------------------------------------------------------  CBC (11-07 @ 20:00)                          11.1<L>                   16.12<H>  )--------------(  200        --    % Neuts, --    % Lymphs, ANC: --                              34.2<L>            ABG (11-07 @ 17:11)     7.37 / 41 / 296<H> / 23 / -1.6 / 97.2%     Lactate:    ABG (11-07 @ 15:04)     7.47<H> / 33 / 232<H> / 25 / 0.4 / 97.2%     Lactate: 2.1<H>           --------------------------------------------------------------------------------------    OTHER LABS    IMAGING RESULTS  Echo:   CT:   Xray:

## 2017-11-07 NOTE — BRIEF OPERATIVE NOTE - PROCEDURE
<<-----Click on this checkbox to enter Procedure Liver biopsy  11/07/2017    Active  THEODORA  Diagnostic laparoscopy  11/07/2017    Active  THEODORA  Whipple procedure  11/07/2017    Active  THEODORA  Jejunostomy tube placement  11/07/2017    Active  AKHADER

## 2017-11-08 ENCOUNTER — TRANSCRIPTION ENCOUNTER (OUTPATIENT)
Age: 72
End: 2017-11-08

## 2017-11-08 LAB
ALBUMIN SERPL ELPH-MCNC: 3.7 G/DL — SIGNIFICANT CHANGE UP (ref 3.3–5)
ALP SERPL-CCNC: 55 U/L — SIGNIFICANT CHANGE UP (ref 40–120)
ALT FLD-CCNC: 227 U/L — HIGH (ref 4–33)
APTT BLD: 28.7 SEC — SIGNIFICANT CHANGE UP (ref 27.5–37.4)
AST SERPL-CCNC: 269 U/L — HIGH (ref 4–32)
BILIRUB SERPL-MCNC: 1.2 MG/DL — SIGNIFICANT CHANGE UP (ref 0.2–1.2)
BUN SERPL-MCNC: 14 MG/DL — SIGNIFICANT CHANGE UP (ref 7–23)
CALCIUM SERPL-MCNC: 8.5 MG/DL — SIGNIFICANT CHANGE UP (ref 8.4–10.5)
CHLORIDE SERPL-SCNC: 100 MMOL/L — SIGNIFICANT CHANGE UP (ref 98–107)
CO2 SERPL-SCNC: 25 MMOL/L — SIGNIFICANT CHANGE UP (ref 22–31)
CREAT SERPL-MCNC: 0.81 MG/DL — SIGNIFICANT CHANGE UP (ref 0.5–1.3)
GLUCOSE BLDC GLUCOMTR-MCNC: 132 MG/DL — HIGH (ref 70–99)
GLUCOSE BLDC GLUCOMTR-MCNC: 148 MG/DL — HIGH (ref 70–99)
GLUCOSE BLDC GLUCOMTR-MCNC: 158 MG/DL — HIGH (ref 70–99)
GLUCOSE BLDC GLUCOMTR-MCNC: 173 MG/DL — HIGH (ref 70–99)
GLUCOSE SERPL-MCNC: 190 MG/DL — HIGH (ref 70–99)
HBA1C BLD-MCNC: 5.8 % — HIGH (ref 4–5.6)
HCT VFR BLD CALC: 29.6 % — LOW (ref 34.5–45)
HGB BLD-MCNC: 9.6 G/DL — LOW (ref 11.5–15.5)
INR BLD: 1.26 — HIGH (ref 0.88–1.17)
LACTATE BLDA-SCNC: 2.5 MMOL/L — HIGH (ref 0.5–2)
MAGNESIUM SERPL-MCNC: 1.8 MG/DL — SIGNIFICANT CHANGE UP (ref 1.6–2.6)
MCHC RBC-ENTMCNC: 29.7 PG — SIGNIFICANT CHANGE UP (ref 27–34)
MCHC RBC-ENTMCNC: 32.4 % — SIGNIFICANT CHANGE UP (ref 32–36)
MCV RBC AUTO: 91.6 FL — SIGNIFICANT CHANGE UP (ref 80–100)
NRBC # FLD: 0 — SIGNIFICANT CHANGE UP
PHOSPHATE SERPL-MCNC: 4.5 MG/DL — SIGNIFICANT CHANGE UP (ref 2.5–4.5)
PLATELET # BLD AUTO: 169 K/UL — SIGNIFICANT CHANGE UP (ref 150–400)
PMV BLD: 10.7 FL — SIGNIFICANT CHANGE UP (ref 7–13)
POTASSIUM SERPL-MCNC: 3.9 MMOL/L — SIGNIFICANT CHANGE UP (ref 3.5–5.3)
POTASSIUM SERPL-SCNC: 3.9 MMOL/L — SIGNIFICANT CHANGE UP (ref 3.5–5.3)
PROT SERPL-MCNC: 5.7 G/DL — LOW (ref 6–8.3)
PROTHROM AB SERPL-ACNC: 14.2 SEC — HIGH (ref 9.8–13.1)
RBC # BLD: 3.23 M/UL — LOW (ref 3.8–5.2)
RBC # FLD: 13.9 % — SIGNIFICANT CHANGE UP (ref 10.3–14.5)
SODIUM SERPL-SCNC: 139 MMOL/L — SIGNIFICANT CHANGE UP (ref 135–145)
WBC # BLD: 16.77 K/UL — HIGH (ref 3.8–10.5)
WBC # FLD AUTO: 16.77 K/UL — HIGH (ref 3.8–10.5)

## 2017-11-08 PROCEDURE — 74000: CPT | Mod: 26

## 2017-11-08 PROCEDURE — 76705 ECHO EXAM OF ABDOMEN: CPT | Mod: 26

## 2017-11-08 PROCEDURE — 99233 SBSQ HOSP IP/OBS HIGH 50: CPT

## 2017-11-08 RX ORDER — HYDROMORPHONE HYDROCHLORIDE 2 MG/ML
0.5 INJECTION INTRAMUSCULAR; INTRAVENOUS; SUBCUTANEOUS
Qty: 0 | Refills: 0 | Status: DISCONTINUED | OUTPATIENT
Start: 2017-11-08 | End: 2017-11-11

## 2017-11-08 RX ORDER — TRAZODONE HCL 50 MG
100 TABLET ORAL AT BEDTIME
Qty: 0 | Refills: 0 | Status: DISCONTINUED | OUTPATIENT
Start: 2017-11-08 | End: 2017-11-11

## 2017-11-08 RX ORDER — INSULIN LISPRO 100/ML
VIAL (ML) SUBCUTANEOUS EVERY 6 HOURS
Qty: 0 | Refills: 0 | Status: DISCONTINUED | OUTPATIENT
Start: 2017-11-08 | End: 2017-11-10

## 2017-11-08 RX ORDER — SERTRALINE 25 MG/1
50 TABLET, FILM COATED ORAL DAILY
Qty: 0 | Refills: 0 | Status: DISCONTINUED | OUTPATIENT
Start: 2017-11-08 | End: 2017-11-11

## 2017-11-08 RX ORDER — HEPARIN SODIUM 5000 [USP'U]/ML
5000 INJECTION INTRAVENOUS; SUBCUTANEOUS EVERY 8 HOURS
Qty: 0 | Refills: 0 | Status: DISCONTINUED | OUTPATIENT
Start: 2017-11-08 | End: 2017-11-11

## 2017-11-08 RX ADMIN — HYDROMORPHONE HYDROCHLORIDE 0.5 MILLIGRAM(S): 2 INJECTION INTRAMUSCULAR; INTRAVENOUS; SUBCUTANEOUS at 21:38

## 2017-11-08 RX ADMIN — Medication 1: at 06:25

## 2017-11-08 RX ADMIN — Medication 100 MILLIGRAM(S): at 21:38

## 2017-11-08 RX ADMIN — SODIUM CHLORIDE 125 MILLILITER(S): 9 INJECTION, SOLUTION INTRAVENOUS at 21:39

## 2017-11-08 RX ADMIN — PANTOPRAZOLE SODIUM 40 MILLIGRAM(S): 20 TABLET, DELAYED RELEASE ORAL at 12:16

## 2017-11-08 RX ADMIN — HYDROMORPHONE HYDROCHLORIDE 0.5 MILLIGRAM(S): 2 INJECTION INTRAMUSCULAR; INTRAVENOUS; SUBCUTANEOUS at 21:55

## 2017-11-08 RX ADMIN — HYDROMORPHONE HYDROCHLORIDE 0.5 MILLIGRAM(S): 2 INJECTION INTRAMUSCULAR; INTRAVENOUS; SUBCUTANEOUS at 14:05

## 2017-11-08 RX ADMIN — SERTRALINE 50 MILLIGRAM(S): 25 TABLET, FILM COATED ORAL at 21:50

## 2017-11-08 RX ADMIN — HYDROMORPHONE HYDROCHLORIDE 0.5 MILLIGRAM(S): 2 INJECTION INTRAMUSCULAR; INTRAVENOUS; SUBCUTANEOUS at 14:20

## 2017-11-08 RX ADMIN — BUDESONIDE AND FORMOTEROL FUMARATE DIHYDRATE 2 PUFF(S): 160; 4.5 AEROSOL RESPIRATORY (INHALATION) at 10:45

## 2017-11-08 RX ADMIN — HEPARIN SODIUM 5000 UNIT(S): 5000 INJECTION INTRAVENOUS; SUBCUTANEOUS at 21:38

## 2017-11-08 RX ADMIN — HEPARIN SODIUM 5000 UNIT(S): 5000 INJECTION INTRAVENOUS; SUBCUTANEOUS at 14:11

## 2017-11-08 RX ADMIN — SODIUM CHLORIDE 2000 MILLILITER(S): 9 INJECTION, SOLUTION INTRAVENOUS at 00:24

## 2017-11-08 NOTE — PHYSICAL THERAPY INITIAL EVALUATION ADULT - IMPAIRMENTS FOUND, PT EVAL
gait, locomotion, and balance/gross motor/muscle strength/poor safety awareness/aerobic capacity/endurance

## 2017-11-08 NOTE — CHART NOTE - NSCHARTNOTEFT_GEN_A_CORE
Post Operative Check    Patient is post op from a Jejunostomy tube placement, Whipple procedure, Diagnostic laparoscopy , Liver biopsy  and is seen at bedside.     Vitals    T(C): 37.1 (11-07-17 @ 21:30), Max: 37.1 (11-07-17 @ 21:30)  HR: 92 (11-07-17 @ 23:30) (59 - 115)  BP: 95/61 (11-07-17 @ 23:00) (92/50 - 152/80)  RR: 15 (11-07-17 @ 23:00) (15 - 20)  SpO2: 98% (11-07-17 @ 23:00) (96% - 99%)      11-07 @ 07:01  -  11-08 @ 00:04  --------------------------------------------------------  IN:    lactated ringers.: 250 mL  Total IN: 250 mL    OUT:    Accordian: 61 mL    Accordian: 58 mL    Indwelling Catheter - Urethral: 165 mL    Nasoenteral Tube: 100 mL  Total OUT: 384 mL    Total NET: -134 mL          Labs                        11.0   16.97 )-----------( 198      ( 07 Nov 2017 20:50 )             33.4       CBC Full  -  ( 07 Nov 2017 20:50 )  WBC Count : 16.97 K/uL  Hemoglobin : 11.0 g/dL  Hematocrit : 33.4 %  Platelet Count - Automated : 198 K/uL  Mean Cell Volume : 90.3 fL  Mean Cell Hemoglobin : 29.7 pg  Mean Cell Hemoglobin Concentration : 32.9 %  Auto Neutrophil # : 15.65 K/uL  Auto Lymphocyte # : 0.61 K/uL  Auto Monocyte # : 0.62 K/uL  Auto Eosinophil # : 0.01 K/uL  Auto Basophil # : 0.02 K/uL  Auto Neutrophil % : 92.1 %  Auto Lymphocyte % : 3.6 %  Auto Monocyte % : 3.7 %  Auto Eosinophil % : 0.1 %  Auto Basophil % : 0.1 %      Physical Exam  General:   Abdomen:  Lines - Stoner catheter, Dionisio tube, NGT, 2 SHAYY drains, Left radial A-line, 18G EJ PIV      Patient is a 71y old Female s/p Jejunostomy tube placement, Whipple procedure, Diagnostic laparoscopy , Liver biopsy     - Pain control  - Lovenox  - Monitor drain output  - NPO/NGT  - Monitor BP Post Operative Check    Patient is post op from a Jejunostomy tube placement, Whipple procedure, Diagnostic laparoscopy , Liver biopsy  and is seen at bedside. Denies chest pain, nausea, vomiting, dyspnea,    Vitals    T(C): 37.1 (11-07-17 @ 21:30), Max: 37.1 (11-07-17 @ 21:30)  HR: 92 (11-07-17 @ 23:30) (59 - 115)  BP: 95/61 (11-07-17 @ 23:00) (92/50 - 152/80)  RR: 15 (11-07-17 @ 23:00) (15 - 20)  SpO2: 98% (11-07-17 @ 23:00) (96% - 99%)      11-07 @ 07:01  -  11-08 @ 00:04  --------------------------------------------------------  IN:    lactated ringers.: 250 mL  Total IN: 250 mL    OUT:    Accordian: 61 mL    Accordian: 58 mL    Indwelling Catheter - Urethral: 165 mL    Nasoenteral Tube: 100 mL  Total OUT: 384 mL    Total NET: -134 mL          Labs                        11.0   16.97 )-----------( 198      ( 07 Nov 2017 20:50 )             33.4       CBC Full  -  ( 07 Nov 2017 20:50 )  WBC Count : 16.97 K/uL  Hemoglobin : 11.0 g/dL  Hematocrit : 33.4 %  Platelet Count - Automated : 198 K/uL  Mean Cell Volume : 90.3 fL  Mean Cell Hemoglobin : 29.7 pg  Mean Cell Hemoglobin Concentration : 32.9 %  Auto Neutrophil # : 15.65 K/uL  Auto Lymphocyte # : 0.61 K/uL  Auto Monocyte # : 0.62 K/uL  Auto Eosinophil # : 0.01 K/uL  Auto Basophil # : 0.02 K/uL  Auto Neutrophil % : 92.1 %  Auto Lymphocyte % : 3.6 %  Auto Monocyte % : 3.7 %  Auto Eosinophil % : 0.1 %  Auto Basophil % : 0.1 %      Physical Exam  General:   Abdomen: soft, mildly distended, appropriately TTP in epigastric region, SHAYY drains with serosanguinous fluid, dressing c/d/i  Lines - Stoner catheter, Dionisio tube, NGT, 2 SHAYY drains, Left radial A-line, 18G EJ PIV      Patient is a 71y old Female s/p Jejunostomy tube placement, Whipple procedure, Diagnostic laparoscopy , Liver biopsy     - Pain control  - Lovenox  - Monitor drain output  - NPO/NGT  - Monitor BP

## 2017-11-08 NOTE — PROGRESS NOTE ADULT - SUBJECTIVE AND OBJECTIVE BOX
ANESTHESIA POSTOP CHECK    71y Female POSTOP DAY 1 S/P   [x ] General Anesthesia  [ ] Tha Anesthesia  [ ] MAC    Vital Signs Last 24 Hrs  T(C): 36.9 (08 Nov 2017 04:00), Max: 37.2 (08 Nov 2017 00:15)  T(F): 98.5 (08 Nov 2017 04:00), Max: 99 (08 Nov 2017 00:15)  HR: 80 (08 Nov 2017 08:00) (80 - 115)  BP: 95/61 (07 Nov 2017 23:00) (92/50 - 120/78)  BP(mean): 64 (07 Nov 2017 21:30) (64 - 64)  RR: 11 (08 Nov 2017 08:00) (10 - 20)  SpO2: 98% (08 Nov 2017 08:00) (95% - 99%)  I&O's Summary    07 Nov 2017 07:01  -  08 Nov 2017 07:00  --------------------------------------------------------  IN: 2000 mL / OUT: 809 mL / NET: 1191 mL    08 Nov 2017 07:01  -  08 Nov 2017 09:02  --------------------------------------------------------  IN: 250 mL / OUT: 0 mL / NET: 250 mL        [x ] NO APPARENT ANESTHESIA COMPLICATIONS      Comments:

## 2017-11-08 NOTE — PROGRESS NOTE ADULT - SUBJECTIVE AND OBJECTIVE BOX
Day __2_ of Anesthesia Pain Management Service    Allergies    No Known Drug Allergies  SteriStrips (Blisters)    Intolerances        SUBJECTIVE: "the pump helps alot with my pain"  Pain Scale Score	At rest: __2_ 	With Activity: ___ 	[  ] Refer to charted pain scores    THERAPY:  [X] Epidural Bupivacaine 0.0625% and Hydromorphone  		[X] 10 micrograms/mL	[ ] 5 micrograms/mL  [ ] Epidural Bupivacaine 0.0625% and Fentanyl - 2 micrograms/mL  [ ] Epidural Ropivacaine 0.1% plain – 1 mg/mL  [ ] Patient Controlled Regional Anesthesia (PCRA) Ropivacaine  		[ ] 0.2%			[ ] 0.1%    Demand dose _3mL_ lockout _15min_ (minutes) Continuous Rate _5mL_ Total: __65.6_ Daily      MEDICATIONS  (STANDING):  buDESOnide 160 MICROgram(s)/formoterol 4.5 MICROgram(s) Inhaler 2 Puff(s) Inhalation two times a day  heparin  Injectable 5000 Unit(s) SubCutaneous every 8 hours  HYDROmorphone (10 MICROgram(s)/mL) + BUpivacaine 0.0625% in 0.9% Sodium Chloride PCEA 250 milliLiter(s) Epidural PCA Continuous  insulin lispro (HumaLOG) corrective regimen sliding scale   SubCutaneous every 6 hours  lactated ringers. 1000 milliLiter(s) (125 mL/Hr) IV Continuous <Continuous>  pantoprazole  Injectable 40 milliGRAM(s) IV Push daily    MEDICATIONS  (PRN):  ALBUTerol    90 MICROgram(s) HFA Inhaler 2 Puff(s) Inhalation every 6 hours PRN Shortness of Breath and/or Wheezing  butorphanol Injectable 0.25 milliGRAM(s) IV Push every 6 hours PRN Pruritus  HYDROmorphone (10 MICROgram(s)/mL) + BUpivacaine 0.0625% in 0.9% Sodium Chloride PCEA Rescue Clinician  Bolus 5 milliLiter(s) Epidural every 30 minutes PRN for Pain Score greater than 6  naloxone Injectable 0.1 milliGRAM(s) IV Push every 3 minutes PRN For ANY of the following changes in patient status:  A. RR LESS THAN 10 breaths per minute, B. Oxygen saturation LESS THAN 90%, C. Sedation score of 6  ondansetron Injectable 4 milliGRAM(s) IV Push every 6 hours PRN Nausea  promethazine IVPB 6.25 milliGRAM(s) IV Intermittent once PRN Nausea and/or Vomiting      OBJECTIVE: Patient A&Ox3, NAD, supine in bed.    Assessment of Catheter Site:	[ ] Left	[ ] Right  [X] Epidural 	[ ] Femoral	      [ ] Saphenous   [ ] Supraclavicular   [ ] Other:    [X] Dressing intact	[X] Site non-tender	[X] Site without erythema, discharge, edema  [ ] Epidural tubing and connection checked	[X] Gross neurological exam within normal limits  [ ] Catheter removed – tip intact		    [X ] Temperatue:  ___ [TMax: ]    Sedation Score:	[X] Alert	[ ] Drowsy	[ ] Arousable	[ ] Asleep	[ ] Unresponsive    Side Effects:	[X] None	[ ] Nausea	[ ] Vomiting	[ ] Pruritus  		[ ] Weakness		[ ] Numbness	[ ] Other:    PT/INR - ( 07 Nov 2017 20:50 )   PT: 13.7 SEC;   INR: 1.22          PTT - ( 07 Nov 2017 20:50 )  PTT:25.7 SEC                          9.6    16.77 )-----------( 169      ( 08 Nov 2017 02:46 )             29.6       11-08    139  |  100  |  14  ----------------------------<  190<H>  3.9   |  25  |  0.81    Ca    8.5      08 Nov 2017 02:46  Phos  4.5     11-08  Mg     1.8     11-08    TPro  5.7<L>  /  Alb  3.7  /  TBili  1.2  /  DBili  x   /  AST  269<H>  /  ALT  227<H>  /  AlkPhos  55  11-08      ASSESSMENT/ PLAN:    Therapy to  be:	[X] Continue   [ ] Discontinued   [ ] Change to prn Analgesics    Documentation and Verification of current medications:  [X] Done	[ ] Not done, not eligible  [ ] Not done, reason not given    [ ]  [place]  Reviewed and Copied to Chart    COMMENTS: Pt. NPO with NG tube to wall suction. Continue current therapy   Dressing reinforced.  systemic anticoagulant sign placed above bed.

## 2017-11-08 NOTE — PROGRESS NOTE ADULT - ASSESSMENT
71y F POD 1 sp Jejunostomy tube placement, Whipple procedure, diagnostic lap, liver biopsy, doing well; avss, continued leukocytosis but it has trended down slightly (16.77 this AM down from 16.97 yesterday).    -J-Tube study today  -appreciate plans according to SICU (primary) reccs

## 2017-11-08 NOTE — PHYSICAL THERAPY INITIAL EVALUATION ADULT - PERTINENT HX OF CURRENT PROBLEM, REHAB EVAL
patient admitted for malignant neoplasm of head of pancreas with history of HTN, hyperlipidemia, depression, asthma

## 2017-11-08 NOTE — PHYSICAL THERAPY INITIAL EVALUATION ADULT - PATIENT PROFILE REVIEW, REHAB EVAL
spoke with Lorie CUENCA RN patient is OK to be seen for PT today. Formal activity orders to ambulate as tolerated. PT evaluate and treat/yes

## 2017-11-08 NOTE — PROGRESS NOTE ADULT - ASSESSMENT
ASSESSMENT:  71F h/o HTN, HLD, GERD, asthma, R breast Ca s/p lumpectomy/chemo/rad, pancreatic cancer s/p chemo (Feb 2017) & radiation (March 2017), s/p right VATS, wedge resection for benign neoplasm in August 2017, now s/p Whipple & Jejunostomy Tube.     PLAN:  Neurologic: c/w PCEA, restart Zoloft & Trazodone  Respiratory: c/w Symbicort, Proventil PRN  Cardiovascular: restart Losartan & HCTZ tomorrow.  monitor hemodynamic status, appears to be adequately resuscitated at this time  Gastrointestinal/Nutrition: NPO, Jejunostomy tube in this place  Renal/Genitourinary: c/w IVF, Stoner in place, monitor I&O  Hematologic: Lovenox for DVT prophylaxis  Infectious Disease: no acute issues  Endocrine: No acute issues  Tubes/Lines/Drains: Stoner, 2 SHAYY drains, Dionisio tube, NGT, L rad A-line, 18G EJ PIV    Disposition: SICU    --------------------------------------------------------------------------------------    Critical Care Diagnoses: ASSESSMENT:  71F h/o HTN, HLD, GERD, asthma, R breast Ca s/p lumpectomy/chemo/rad, pancreatic cancer s/p chemo (Feb 2017) & radiation (March 2017), s/p right VATS, wedge resection for benign neoplasm in August 2017, now s/p Whipple & Jejunostomy Tube.     PLAN:  Neurologic: c/w PCEA, restart Zoloft & Trazodone  Respiratory: c/w Symbicort, Proventil PRN  Cardiovascular: restart Losartan & HCTZ tomorrow.  monitor hemodynamic status, appears to be adequately resuscitated at this time  Gastrointestinal/Nutrition: NPO, Jejunostomy tube in this place  Renal/Genitourinary: c/w IVF, Stoner in place, monitor I&O  Hematologic: SQH for DVT prophylaxis  Infectious Disease: no acute issues  Endocrine: No acute issues  Tubes/Lines/Drains: Stoner, 2 SHAYY drains, Dionisio tube, NGT, L rad A-line, 18G EJ PIV    Disposition: SICU    --------------------------------------------------------------------------------------    Critical Care Diagnoses: ASSESSMENT:  71F h/o HTN, HLD, GERD, asthma, R breast Ca s/p lumpectomy/chemo/rad, pancreatic cancer s/p chemo (Feb 2017) & radiation (March 2017), s/p right VATS, wedge resection for benign neoplasm in August 2017, now s/p Whipple & Jejunostomy Tube.     PLAN:  Neurologic: c/w PCEA, restart Zoloft & Trazodone    Respiratory: c/w Symbicort, Proventil PRN, OOB    Cardiovascular: continue to hold Losartan & HCTZ tomorrow.  monitor hemodynamic status, appears to be adequately resuscitated at this time    Gastrointestinal/Nutrition: NPO, Jejunostomy tube in this place -   -J-tube study  -Liver US for portal vein patency    Renal/Genitourinary: c/w IVF, Stoner in place, monitor I&O    Hematologic: SQH for DVT prophylaxis    Infectious Disease: no acute issues    Endocrine: ISS    Tubes/Lines/Drains: Stoner, 2 SHAYY drains, Dionisio tube, NGT, L rad A-line, 18G EJ PIV    Disposition: SICU    --------------------------------------------------------------------------------------    Critical Care Diagnoses: hemodynamic monitoring

## 2017-11-08 NOTE — PROGRESS NOTE ADULT - SUBJECTIVE AND OBJECTIVE BOX
Day _2__ of Anesthesia Pain Management Service    SUBJECTIVE:    Therapy:	  [ ] IV PCA	   [ x] Epidural           [ ] s/p Spinal Opoid              [ ] Postpartum infusion	  [ ] Patient controlled regional anesthesia (PCRA)    [ ] prn Analgesics    OBJECTIVE:   [ x] No new signs     [ ] Other:    Side Effects:  [x ] None			[ ] Other:    Assessment of Catheter Site:		[x ] Intact		[ ] Other:    ASSESSMENT/PLAN  [ x] Continue current therapy    [ ] Therapy changed to:    [ ] IV PCA       [ ] Epidural     [ ] prn Analgesics     Comments:

## 2017-11-08 NOTE — PROGRESS NOTE ADULT - SUBJECTIVE AND OBJECTIVE BOX
Surgery Team D Progress Note:    Hospital Day: 2  Post-Operative Day: 1 sp Jejunostomy tube placement, Whipple procedure, diagnostic lap, liver biopsy    Subjective:  No acute overnight events, patient was transferred from PACU to SICU overnight to maintain hemodynamic stability.  Patient examined at bedside in SICU this AM.  States she has pain, but hasn't been using her PCA frequently.  No complaints at this time.    Objective:  T(C): 36.9 (11-08-17 @ 04:00), Max: 37.2 (11-08-17 @ 00:15)  HR: 87 (11-08-17 @ 06:00) (86 - 115)  BP: 95/61 (11-07-17 @ 23:00) (92/50 - 120/78)  RR: 19 (11-08-17 @ 06:00) (10 - 20)  SpO2: 98% (11-08-17 @ 06:00) (95% - 99%)    Labs:                        9.6    16.77 )-----------( 169      ( 08 Nov 2017 02:46 )             29.6       11-08    139  |  100  |  14  ----------------------------<  190<H>  3.9   |  25  |  0.81    Ca    8.5      08 Nov 2017 02:46  Phos  4.5     11-08  Mg     1.8     11-08    TPro  5.7<L>  /  Alb  3.7  /  TBili  1.2  /  DBili  x   /  AST  269<H>  /  ALT  227<H>  /  AlkPhos  55  11-08      I&O's Detail    07 Nov 2017 07:01  -  08 Nov 2017 07:00  --------------------------------------------------------  IN:    Lactated Ringers IV Bolus: 1000 mL    lactated ringers.: 1000 mL  Total IN: 2000 mL    OUT:    Accordian: 61 mL    Accordian: 58 mL    Bulb: 30 mL    Bulb: 45 mL    Indwelling Catheter - Urethral: 415 mL    Nasoenteral Tube: 200 mL  Total OUT: 809 mL    Total NET: 1191 mL    Focused Physical Exam:  General: NAD  Respiratory: Nonlabored breathing  Abdomen: soft, nt, nd, B/L SHAYY drains intact, stripped at bedside, draining serosanguinous, nonbilious fluid    Medications:  ALBUTerol    90 MICROgram(s) HFA Inhaler 2 Puff(s) Inhalation every 6 hours PRN  buDESOnide 160 MICROgram(s)/formoterol 4.5 MICROgram(s) Inhaler 2 Puff(s) Inhalation two times a day  butorphanol Injectable 0.25 milliGRAM(s) IV Push every 6 hours PRN  enoxaparin Injectable 40 milliGRAM(s) SubCutaneous daily  HYDROmorphone  Injectable 0.8 milliGRAM(s) IV Push every 10 minutes PRN  HYDROmorphone  Injectable 0.4 milliGRAM(s) IV Push every 10 minutes PRN  HYDROmorphone (10 MICROgram(s)/mL) + BUpivacaine 0.0625% in 0.9% Sodium Chloride PCEA 250 milliLiter(s) Epidural PCA Continuous  HYDROmorphone (10 MICROgram(s)/mL) + BUpivacaine 0.0625% in 0.9% Sodium Chloride PCEA Rescue Clinician  Bolus 5 milliLiter(s) Epidural every 30 minutes PRN  insulin lispro (HumaLOG) corrective regimen sliding scale   SubCutaneous every 6 hours  lactated ringers. 1000 milliLiter(s) IV Continuous <Continuous>  meperidine     Injectable 12.5 milliGRAM(s) IV Push every 10 minutes PRN  naloxone Injectable 0.1 milliGRAM(s) IV Push every 3 minutes PRN  ondansetron Injectable 4 milliGRAM(s) IV Push every 6 hours PRN  pantoprazole  Injectable 40 milliGRAM(s) IV Push daily  promethazine IVPB 6.25 milliGRAM(s) IV Intermittent once PRN

## 2017-11-08 NOTE — PROGRESS NOTE ADULT - SUBJECTIVE AND OBJECTIVE BOX
SICU Daily Progress Note  =====================================================    Overnight/Interval Events:  - Pt was hypotensive (SBP 80's) and tachycardic ('s), so pt was fluid resuscitated with 1L LR bolus, followed by 250cc 5% albumin bolus x2, and another 1L LR bolus.  HR and BP improved.        HPI:  71F h/o HTN, HLD, GERD, asthma, R breast Ca s/p lumpectomy/chemo/rad, pancreatic cancer s/p chemo (Feb 2017) & radiation (March 2017), s/p right VATS, wedge resection for benign neoplasm in August 2017, s/p Whipple & Jejunostomy Tube on 11/7.  Pt has a thoracic epidural, brown catheter, Dionisio tube, NGT, 2 SHAYY drains, Left radial A-line, and an 18G EJ PIV in place.  Pt denies any CP/SOB, fevers/chills, N/V, HA, dizziness, or weakness.    Surgery Information   Case Duration: 	EBL: 500	IV Fluids: 5000 LR	Blood Products: None	Urine Output: 1300  Complications: None    ADVANCE DIRECTIVES: Presumed Full Code    CURRENT MEDICATIONS:   --------------------------------------------------------------------------------------  Neurologic Medications  butorphanol Injectable 0.25 milliGRAM(s) IV Push every 6 hours PRN Pruritus  HYDROmorphone  Injectable 0.8 milliGRAM(s) IV Push every 10 minutes PRN Severe Pain (7 - 10)  HYDROmorphone  Injectable 0.4 milliGRAM(s) IV Push every 10 minutes PRN Moderate Pain (4 - 6)  HYDROmorphone (10 MICROgram(s)/mL) + BUpivacaine 0.0625% in 0.9% Sodium Chloride PCEA 250 milliLiter(s) Epidural PCA Continuous  HYDROmorphone (10 MICROgram(s)/mL) + BUpivacaine 0.0625% in 0.9% Sodium Chloride PCEA Rescue Clinician  Bolus 5 milliLiter(s) Epidural every 30 minutes PRN for Pain Score greater than 6  meperidine     Injectable 12.5 milliGRAM(s) IV Push every 10 minutes PRN Shivering  ondansetron Injectable 4 milliGRAM(s) IV Push every 6 hours PRN Nausea  promethazine IVPB 6.25 milliGRAM(s) IV Intermittent once PRN Nausea and/or Vomiting    Respiratory Medications  ALBUTerol    90 MICROgram(s) HFA Inhaler 2 Puff(s) Inhalation every 6 hours PRN Shortness of Breath and/or Wheezing  buDESOnide 160 MICROgram(s)/formoterol 4.5 MICROgram(s) Inhaler 2 Puff(s) Inhalation two times a day    Cardiovascular Medications    Gastrointestinal Medications  lactated ringers. 1000 milliLiter(s) IV Continuous <Continuous>  pantoprazole  Injectable 40 milliGRAM(s) IV Push daily    Genitourinary Medications    Hematologic/Oncologic Medications  enoxaparin Injectable 40 milliGRAM(s) SubCutaneous daily    Antimicrobial/Immunologic Medications    Endocrine/Metabolic Medications    Topical/Other Medications  naloxone Injectable 0.1 milliGRAM(s) IV Push every 3 minutes PRN For ANY of the following changes in patient status:  A. RR LESS THAN 10 breaths per minute, B. Oxygen saturation LESS THAN 90%, C. Sedation score of 6  --------------------------------------------------------------------------------------    VITAL SIGNS, INS/OUTS (last 24 hours):    ICU Vital Signs Last 24 Hrs  T(C): 37.2 (08 Nov 2017 00:15), Max: 37.2 (08 Nov 2017 00:15)  T(F): 99 (08 Nov 2017 00:15), Max: 99 (08 Nov 2017 00:15)  HR: 89 (08 Nov 2017 01:00) (59 - 115)  BP: 95/61 (07 Nov 2017 23:00) (92/50 - 152/80)  BP(mean): 64 (07 Nov 2017 21:30) (64 - 64)  ABP: 117/53 (08 Nov 2017 01:00) (90/56 - 136/64)  ABP(mean): 75 (08 Nov 2017 01:00) (70 - 87)  RR: 10 (08 Nov 2017 01:00) (10 - 20)  SpO2: 98% (08 Nov 2017 01:00) (95% - 99%)      --------------------------------------------------------------------------------------  I&O's Detail    07 Nov 2017 07:01  -  07 Nov 2017 21:06  --------------------------------------------------------  IN:  Total IN: 0 mL    OUT:    Accordian: 35 mL    Accordian: 25 mL    Indwelling Catheter - Urethral: 40 mL  Total OUT: 100 mL    Total NET: -100 mL        I&O's Detail    07 Nov 2017 07:01  -  08 Nov 2017 02:08  --------------------------------------------------------  IN:    Lactated Ringers IV Bolus: 1000 mL    lactated ringers.: 375 mL  Total IN: 1375 mL    OUT:    Accordian: 61 mL    Accordian: 58 mL    Indwelling Catheter - Urethral: 205 mL    Nasoenteral Tube: 100 mL  Total OUT: 424 mL    Total NET: 951 mL      --------------------------------------------------------------------------------------    EXAM  NEUROLOGY  RASS:   	GCS:    Exam: Normal, NAD, alert, oriented x 3, no focal deficits.    HEENT  Exam: Normocephalic, atraumatic.  EOMI    RESPIRATORY  Exam: Lungs clear to auscultation, Normal expansion/effort.    CARDIOVASCULAR  Exam: S1, S2.  Regular rate and rhythm.    GI/NUTRITION  Exam: Abdomen soft, mildly tender, Non-distended.  Wound:   clean, dry, intact  Current Diet:  NPO    VASCULAR  Exam: Extremities warm, pink, well-perfused.    MUSCULOSKELETAL  Exam: All extremities moving spontaneously without limitations.    SKIN:  Exam: Good skin turgor, no skin breakdown.    METABOLIC/FLUIDS/ELECTROLYTES  lactated ringers. 1000 milliLiter(s) IV Continuous <Continuous>      HEMATOLOGIC  [x] DVT Prophylaxis: enoxaparin Injectable 40 milliGRAM(s) SubCutaneous daily    Transfusions:	[] PRBC	[] Platelets		[] FFP	[] Cryoprecipitate    INFECTIOUS DISEASE  Antimicrobials/Immunologic Medications:    Day #  	of    ***    Tubes/Lines/Drains  ***  [x] Peripheral IV  [] Central Venous Line     	[] R	[] L	[] IJ	[] Fem	[] SC	Date Placed:   [x] Arterial Line		[] R	[x] L	[] Fem	[x] Rad	[] Ax	Date Placed: 11/7/2017  [] PICC:         	[] Midline		[] Mediport  [x] Urinary Catheter		Date Placed: 11/7/2017    LABS  -------------------------------------------------------------------------------------- SICU Daily Progress Note  =====================================================    Overnight/Interval Events:  - Pt was hypotensive (SBP 80's) and tachycardic ('s), so pt was fluid resuscitated with 1L LR bolus, followed by 250cc 5% albumin bolus x2, and another 1L LR bolus.  HR and BP improved.        HPI:  71F h/o HTN, HLD, GERD, asthma, R breast Ca s/p lumpectomy/chemo/rad, pancreatic cancer s/p chemo (Feb 2017) & radiation (March 2017), s/p right VATS, wedge resection for benign neoplasm in August 2017, s/p Whipple & Jejunostomy Tube on 11/7.  Pt has a thoracic epidural, brown catheter, Dionisio tube, NGT, 2 SHAYY drains, Left radial A-line, and an 18G EJ PIV in place.  Pt denies any CP/SOB, fevers/chills, N/V, HA, dizziness, or weakness.    Surgery Information   Case Duration: 	EBL: 500	IV Fluids: 5000 LR	Blood Products: None	Urine Output: 1300  Complications: None    ADVANCE DIRECTIVES: Presumed Full Code    CURRENT MEDICATIONS:   --------------------------------------------------------------------------------------  Neurologic Medications  butorphanol Injectable 0.25 milliGRAM(s) IV Push every 6 hours PRN Pruritus  HYDROmorphone  Injectable 0.8 milliGRAM(s) IV Push every 10 minutes PRN Severe Pain (7 - 10)  HYDROmorphone  Injectable 0.4 milliGRAM(s) IV Push every 10 minutes PRN Moderate Pain (4 - 6)  HYDROmorphone (10 MICROgram(s)/mL) + BUpivacaine 0.0625% in 0.9% Sodium Chloride PCEA 250 milliLiter(s) Epidural PCA Continuous  HYDROmorphone (10 MICROgram(s)/mL) + BUpivacaine 0.0625% in 0.9% Sodium Chloride PCEA Rescue Clinician  Bolus 5 milliLiter(s) Epidural every 30 minutes PRN for Pain Score greater than 6  meperidine     Injectable 12.5 milliGRAM(s) IV Push every 10 minutes PRN Shivering  ondansetron Injectable 4 milliGRAM(s) IV Push every 6 hours PRN Nausea  promethazine IVPB 6.25 milliGRAM(s) IV Intermittent once PRN Nausea and/or Vomiting    Respiratory Medications  ALBUTerol    90 MICROgram(s) HFA Inhaler 2 Puff(s) Inhalation every 6 hours PRN Shortness of Breath and/or Wheezing  buDESOnide 160 MICROgram(s)/formoterol 4.5 MICROgram(s) Inhaler 2 Puff(s) Inhalation two times a day    Cardiovascular Medications    Gastrointestinal Medications  lactated ringers. 1000 milliLiter(s) IV Continuous <Continuous>  pantoprazole  Injectable 40 milliGRAM(s) IV Push daily    Genitourinary Medications    Hematologic/Oncologic Medications  enoxaparin Injectable 40 milliGRAM(s) SubCutaneous daily    Antimicrobial/Immunologic Medications    Endocrine/Metabolic Medications    Topical/Other Medications  naloxone Injectable 0.1 milliGRAM(s) IV Push every 3 minutes PRN For ANY of the following changes in patient status:  A. RR LESS THAN 10 breaths per minute, B. Oxygen saturation LESS THAN 90%, C. Sedation score of 6  --------------------------------------------------------------------------------------    VITAL SIGNS, INS/OUTS (last 24 hours):    ICU Vital Signs Last 24 Hrs  T(C): 37.2 (08 Nov 2017 00:15), Max: 37.2 (08 Nov 2017 00:15)  T(F): 99 (08 Nov 2017 00:15), Max: 99 (08 Nov 2017 00:15)  HR: 89 (08 Nov 2017 01:00) (59 - 115)  BP: 95/61 (07 Nov 2017 23:00) (92/50 - 152/80)  BP(mean): 64 (07 Nov 2017 21:30) (64 - 64)  ABP: 117/53 (08 Nov 2017 01:00) (90/56 - 136/64)  ABP(mean): 75 (08 Nov 2017 01:00) (70 - 87)  RR: 10 (08 Nov 2017 01:00) (10 - 20)  SpO2: 98% (08 Nov 2017 01:00) (95% - 99%)      --------------------------------------------------------------------------------------  I&O's Detail    07 Nov 2017 07:01  -  07 Nov 2017 21:06  --------------------------------------------------------  IN:  Total IN: 0 mL    OUT:    Accordian: 35 mL    Accordian: 25 mL    Indwelling Catheter - Urethral: 40 mL  Total OUT: 100 mL    Total NET: -100 mL        I&O's Detail    07 Nov 2017 07:01  -  08 Nov 2017 02:08  --------------------------------------------------------  IN:    Lactated Ringers IV Bolus: 1000 mL    lactated ringers.: 375 mL  Total IN: 1375 mL    OUT:    Accordian: 61 mL    Accordian: 58 mL    Indwelling Catheter - Urethral: 205 mL    Nasoenteral Tube: 100 mL  Total OUT: 424 mL    Total NET: 951 mL      --------------------------------------------------------------------------------------    EXAM  NEUROLOGY  RASS:   	GCS:    Exam: Normal, NAD, alert, oriented x 3, no focal deficits.    HEENT  Exam: Normocephalic, atraumatic.  EOMI    RESPIRATORY  Exam: Lungs clear to auscultation, Normal expansion/effort.    CARDIOVASCULAR  Exam: S1, S2.  Regular rate and rhythm.    GI/NUTRITION  Exam: Abdomen soft, mildly tender, Non-distended.  Wound:   clean, dry, intact  Current Diet:  NPO    VASCULAR  Exam: Extremities warm, pink, well-perfused.    MUSCULOSKELETAL  Exam: All extremities moving spontaneously without limitations.    SKIN:  Exam: Good skin turgor, no skin breakdown.    METABOLIC/FLUIDS/ELECTROLYTES  lactated ringers. 1000 milliLiter(s) IV Continuous <Continuous>      HEMATOLOGIC  [x] DVT Prophylaxis: enoxaparin Injectable 40 milliGRAM(s) SubCutaneous daily    Transfusions:	[] PRBC	[] Platelets		[] FFP	[] Cryoprecipitate    INFECTIOUS DISEASE  Antimicrobials/Immunologic Medications:    Day #  	of    ***    Tubes/Lines/Drains  ***  [x] Peripheral IV  [] Central Venous Line     	[] R	[] L	[] IJ	[] Fem	[] SC	Date Placed:   [x] Arterial Line		[] R	[x] L	[] Fem	[x] Rad	[] Ax	Date Placed: 11/7/2017  [] PICC:         	[] Midline		[] Mediport  [x] Urinary Catheter		Date Placed: 11/7/2017    LABS  --------------------------------------------------------------------------------------  CBC (11-08 @ 02:46)                          9.6<L>                   16.77<H>  )--------------(  169        --    % Neuts, --    % Lymphs, ANC: --                              29.6<L>  CBC (11-07 @ 20:50)                          11.0<L>                   16.97<H>  )--------------(  198        92.1<H>% Neuts, 3.6<L>% Lymphs, ANC: 15.65<H>                          33.4<L>    BMP (11-08 @ 02:46)       139     |  100     |  14    			Ca++ --      Ca 8.5          ---------------------------------( 190<H>		Mg 1.8          3.9     |  25      |  0.81  			Ph 4.5     BMP (11-07 @ 20:50)       139     |  102     |  14    			Ca++ --      Ca 8.5          ---------------------------------( 231<H>		Mg 1.4<L>       4.0     |  22      |  0.78  			Ph 4.6<H>    LFTs (11-08 @ 02:46)      TPro 5.7<L> / Alb 3.7 / TBili 1.2 / DBili -- / <H> / <H> / AlkPhos 55  LFTs (11-07 @ 20:50)      TPro 5.9<L> / Alb 3.5 / TBili 1.4<H> / DBili -- / <H> / <H> / AlkPhos 65    Coags (11-07 @ 20:50)  aPTT 25.7<L> / INR 1.22<H> / PT 13.7<H>      ABG (11-08 @ 02:46)      /  /  /  /  / %     Lactate: 2.5<H>   ABG (11-07 @ 21:35)      /  /  /  /  / %     Lactate: 3.0<H>

## 2017-11-09 ENCOUNTER — TRANSCRIPTION ENCOUNTER (OUTPATIENT)
Age: 72
End: 2017-11-09

## 2017-11-09 DIAGNOSIS — C25.9 MALIGNANT NEOPLASM OF PANCREAS, UNSPECIFIED: ICD-10-CM

## 2017-11-09 DIAGNOSIS — E78.5 HYPERLIPIDEMIA, UNSPECIFIED: ICD-10-CM

## 2017-11-09 DIAGNOSIS — C50.921 MALIGNANT NEOPLASM OF UNSPECIFIED SITE OF RIGHT MALE BREAST: ICD-10-CM

## 2017-11-09 DIAGNOSIS — J45.909 UNSPECIFIED ASTHMA, UNCOMPLICATED: ICD-10-CM

## 2017-11-09 DIAGNOSIS — I10 ESSENTIAL (PRIMARY) HYPERTENSION: ICD-10-CM

## 2017-11-09 LAB
ALBUMIN SERPL ELPH-MCNC: 3.1 G/DL — LOW (ref 3.3–5)
ALP SERPL-CCNC: 63 U/L — SIGNIFICANT CHANGE UP (ref 40–120)
ALT FLD-CCNC: 231 U/L — HIGH (ref 4–33)
APTT BLD: 30.4 SEC — SIGNIFICANT CHANGE UP (ref 27.5–37.4)
AST SERPL-CCNC: 202 U/L — HIGH (ref 4–32)
BILIRUB SERPL-MCNC: 0.9 MG/DL — SIGNIFICANT CHANGE UP (ref 0.2–1.2)
BUN SERPL-MCNC: 14 MG/DL — SIGNIFICANT CHANGE UP (ref 7–23)
CA-I BLD-SCNC: 1.1 MMOL/L — SIGNIFICANT CHANGE UP (ref 1.03–1.23)
CALCIUM SERPL-MCNC: 8.4 MG/DL — SIGNIFICANT CHANGE UP (ref 8.4–10.5)
CHLORIDE SERPL-SCNC: 103 MMOL/L — SIGNIFICANT CHANGE UP (ref 98–107)
CO2 SERPL-SCNC: 27 MMOL/L — SIGNIFICANT CHANGE UP (ref 22–31)
CREAT SERPL-MCNC: 0.64 MG/DL — SIGNIFICANT CHANGE UP (ref 0.5–1.3)
GLUCOSE BLDC GLUCOMTR-MCNC: 149 MG/DL — HIGH (ref 70–99)
GLUCOSE BLDC GLUCOMTR-MCNC: 160 MG/DL — HIGH (ref 70–99)
GLUCOSE BLDC GLUCOMTR-MCNC: 186 MG/DL — HIGH (ref 70–99)
GLUCOSE SERPL-MCNC: 158 MG/DL — HIGH (ref 70–99)
HCT VFR BLD CALC: 27.1 % — LOW (ref 34.5–45)
HGB BLD-MCNC: 9 G/DL — LOW (ref 11.5–15.5)
INR BLD: 1.15 — SIGNIFICANT CHANGE UP (ref 0.88–1.17)
MAGNESIUM SERPL-MCNC: 2.1 MG/DL — SIGNIFICANT CHANGE UP (ref 1.6–2.6)
MCHC RBC-ENTMCNC: 30.3 PG — SIGNIFICANT CHANGE UP (ref 27–34)
MCHC RBC-ENTMCNC: 33.2 % — SIGNIFICANT CHANGE UP (ref 32–36)
MCV RBC AUTO: 91.2 FL — SIGNIFICANT CHANGE UP (ref 80–100)
NRBC # FLD: 0 — SIGNIFICANT CHANGE UP
PHOSPHATE SERPL-MCNC: 2.2 MG/DL — LOW (ref 2.5–4.5)
PLATELET # BLD AUTO: 138 K/UL — LOW (ref 150–400)
PMV BLD: 11.1 FL — SIGNIFICANT CHANGE UP (ref 7–13)
POTASSIUM SERPL-MCNC: 4 MMOL/L — SIGNIFICANT CHANGE UP (ref 3.5–5.3)
POTASSIUM SERPL-SCNC: 4 MMOL/L — SIGNIFICANT CHANGE UP (ref 3.5–5.3)
PROT SERPL-MCNC: 5.5 G/DL — LOW (ref 6–8.3)
PROTHROM AB SERPL-ACNC: 12.9 SEC — SIGNIFICANT CHANGE UP (ref 9.8–13.1)
RBC # BLD: 2.97 M/UL — LOW (ref 3.8–5.2)
RBC # FLD: 14.2 % — SIGNIFICANT CHANGE UP (ref 10.3–14.5)
SODIUM SERPL-SCNC: 140 MMOL/L — SIGNIFICANT CHANGE UP (ref 135–145)
WBC # BLD: 12.26 K/UL — HIGH (ref 3.8–10.5)
WBC # FLD AUTO: 12.26 K/UL — HIGH (ref 3.8–10.5)

## 2017-11-09 PROCEDURE — 99291 CRITICAL CARE FIRST HOUR: CPT | Mod: 25

## 2017-11-09 PROCEDURE — 99223 1ST HOSP IP/OBS HIGH 75: CPT | Mod: 25

## 2017-11-09 RX ORDER — ONDANSETRON 8 MG/1
4 TABLET, FILM COATED ORAL ONCE
Qty: 0 | Refills: 0 | Status: COMPLETED | OUTPATIENT
Start: 2017-11-09 | End: 2017-11-09

## 2017-11-09 RX ORDER — SODIUM CHLORIDE 9 MG/ML
1000 INJECTION, SOLUTION INTRAVENOUS
Qty: 0 | Refills: 0 | Status: DISCONTINUED | OUTPATIENT
Start: 2017-11-09 | End: 2017-11-10

## 2017-11-09 RX ADMIN — HEPARIN SODIUM 5000 UNIT(S): 5000 INJECTION INTRAVENOUS; SUBCUTANEOUS at 14:27

## 2017-11-09 RX ADMIN — Medication 100 MILLIGRAM(S): at 23:56

## 2017-11-09 RX ADMIN — Medication 1: at 00:01

## 2017-11-09 RX ADMIN — SODIUM CHLORIDE 125 MILLILITER(S): 9 INJECTION, SOLUTION INTRAVENOUS at 05:31

## 2017-11-09 RX ADMIN — SERTRALINE 50 MILLIGRAM(S): 25 TABLET, FILM COATED ORAL at 11:30

## 2017-11-09 RX ADMIN — Medication 1: at 05:31

## 2017-11-09 RX ADMIN — PANTOPRAZOLE SODIUM 40 MILLIGRAM(S): 20 TABLET, DELAYED RELEASE ORAL at 11:29

## 2017-11-09 RX ADMIN — Medication: at 19:31

## 2017-11-09 RX ADMIN — ONDANSETRON 4 MILLIGRAM(S): 8 TABLET, FILM COATED ORAL at 07:09

## 2017-11-09 RX ADMIN — HEPARIN SODIUM 5000 UNIT(S): 5000 INJECTION INTRAVENOUS; SUBCUTANEOUS at 23:01

## 2017-11-09 RX ADMIN — SODIUM CHLORIDE 50 MILLILITER(S): 9 INJECTION, SOLUTION INTRAVENOUS at 23:56

## 2017-11-09 RX ADMIN — BUDESONIDE AND FORMOTEROL FUMARATE DIHYDRATE 2 PUFF(S): 160; 4.5 AEROSOL RESPIRATORY (INHALATION) at 21:56

## 2017-11-09 RX ADMIN — SODIUM CHLORIDE 50 MILLILITER(S): 9 INJECTION, SOLUTION INTRAVENOUS at 09:49

## 2017-11-09 RX ADMIN — ONDANSETRON 4 MILLIGRAM(S): 8 TABLET, FILM COATED ORAL at 20:45

## 2017-11-09 RX ADMIN — HEPARIN SODIUM 5000 UNIT(S): 5000 INJECTION INTRAVENOUS; SUBCUTANEOUS at 05:31

## 2017-11-09 RX ADMIN — BUDESONIDE AND FORMOTEROL FUMARATE DIHYDRATE 2 PUFF(S): 160; 4.5 AEROSOL RESPIRATORY (INHALATION) at 08:42

## 2017-11-09 NOTE — PROGRESS NOTE ADULT - SUBJECTIVE AND OBJECTIVE BOX
Day _3__ of Anesthesia Pain Management Service    Allergies    No Known Drug Allergies  SteriStrips (Blisters)    Intolerances        SUBJECTIVE: "I'm doing ok I use the pump only when I'm in pain"  Pain Scale Score	At rest: _2-3__ 	With Activity: ___ 	[  ] Refer to charted pain scores    THERAPY:  [X] Epidural Bupivacaine 0.0625% and Hydromorphone  		[X] 10 micrograms/mL	[ ] 5 micrograms/mL  [ ] Epidural Bupivacaine 0.0625% and Fentanyl - 2 micrograms/mL  [ ] Epidural Ropivacaine 0.1% plain – 1 mg/mL  [ ] Patient Controlled Regional Anesthesia (PCRA) Ropivacaine  		[ ] 0.2%			[ ] 0.1%    Demand dose _3mL_ lockout _15min_ (minutes) Continuous Rate _6mL_ Total: _152.6ml__ Daily      MEDICATIONS  (STANDING):  buDESOnide 160 MICROgram(s)/formoterol 4.5 MICROgram(s) Inhaler 2 Puff(s) Inhalation two times a day  heparin  Injectable 5000 Unit(s) SubCutaneous every 8 hours  HYDROmorphone (10 MICROgram(s)/mL) + BUpivacaine 0.0625% in 0.9% Sodium Chloride PCEA 250 milliLiter(s) Epidural PCA Continuous  insulin lispro (HumaLOG) corrective regimen sliding scale   SubCutaneous every 6 hours  lactated ringers. 1000 milliLiter(s) (50 mL/Hr) IV Continuous <Continuous>  pantoprazole  Injectable 40 milliGRAM(s) IV Push daily  sertraline 50 milliGRAM(s) Oral daily  traZODone 100 milliGRAM(s) Oral at bedtime    MEDICATIONS  (PRN):  ALBUTerol    90 MICROgram(s) HFA Inhaler 2 Puff(s) Inhalation every 6 hours PRN Shortness of Breath and/or Wheezing  butorphanol Injectable 0.25 milliGRAM(s) IV Push every 6 hours PRN Pruritus  HYDROmorphone  Injectable 0.5 milliGRAM(s) IV Push every 3 hours PRN Severe Pain unrelieved by PCEA  HYDROmorphone (10 MICROgram(s)/mL) + BUpivacaine 0.0625% in 0.9% Sodium Chloride PCEA Rescue Clinician  Bolus 5 milliLiter(s) Epidural every 30 minutes PRN for Pain Score greater than 6  naloxone Injectable 0.1 milliGRAM(s) IV Push every 3 minutes PRN For ANY of the following changes in patient status:  A. RR LESS THAN 10 breaths per minute, B. Oxygen saturation LESS THAN 90%, C. Sedation score of 6  ondansetron Injectable 4 milliGRAM(s) IV Push every 6 hours PRN Nausea  promethazine IVPB 6.25 milliGRAM(s) IV Intermittent once PRN Nausea and/or Vomiting      OBJECTIVE: Patient A&Ox3, NAD, supine in bed.    Assessment of Catheter Site:	[ ] Left	[ ] Right  [X] Epidural 	[ ] Femoral	      [ ] Saphenous   [ ] Supraclavicular   [ ] Other:    [X] Dressing intact	[X] Site non-tender	[X] Site without erythema, discharge, edema  [ ] Epidural tubing and connection checked	[X] Gross neurological exam within normal limits  [ ] Catheter removed – tip intact		    [X ] Temperatue:  ___ [TMax: ]    Sedation Score:	[X] Alert	[ ] Drowsy	[ ] Arousable	[ ] Asleep	[ ] Unresponsive    Side Effects:	[X] None	[ ] Nausea	[ ] Vomiting	[ ] Pruritus  		[ ] Weakness		[ ] Numbness	[ ] Other:    PT/INR - ( 09 Nov 2017 02:30 )   PT: 12.9 SEC;   INR: 1.15          PTT - ( 09 Nov 2017 02:30 )  PTT:30.4 SEC                          9.0    12.26 )-----------( 138      ( 09 Nov 2017 02:30 )             27.1       11-09    140  |  103  |  14  ----------------------------<  158<H>  4.0   |  27  |  0.64    Ca    8.4      09 Nov 2017 02:30  Phos  2.2     11-09  Mg     2.1     11-09    TPro  5.5<L>  /  Alb  3.1<L>  /  TBili  0.9  /  DBili  x   /  AST  202<H>  /  ALT  231<H>  /  AlkPhos  63  11-09      ASSESSMENT/ PLAN:    Therapy to  be:	[X] Continue   [ ] Discontinued   [ ] Change to prn Analgesics    Documentation and Verification of current medications:  [X] Done	[ ] Not done, not eligible  [ ] Not done, reason not given    [ ]  [place]  Reviewed and Copied to Chart    COMMENTS: Pt. NPO with Tube feeds, continue current therapy   Dressing reinforced.   systemic anticoagulant sign placed above bed.

## 2017-11-09 NOTE — CONSULT NOTE ADULT - SUBJECTIVE AND OBJECTIVE BOX
Patient is a 71y old  Female who presents with a chief complaint of "pancreatic cancer" (25 Oct 2017 13:19)      HPI:  70 y/o female with history of pancreatic cancer presents to PAST today for presurgical evaluation.  She was diagnosed with pancreatic cancer in 2016 and was treated with chemotherapy (completed in) and Radiation (completed in 3/2017).  She is s/p right VATS, wedge resection for benign neoplasm in August 2017.  She is scheduled for diagnostic laparoscopic laparotomy whipple on 11/7/17. (25 Oct 2017 13:19)       ROS:  Negative except for:    PAST MEDICAL & SURGICAL HISTORY:  Pancreatic cancer  Breast cancer: Dx 1996 s/p RT and lumpectomy  Solitary pulmonary nodule  Insomnia  Hiatal hernia with GERD: no changes  Colitis: due to chemo 1/2017  Malignant neoplasm of pancreas, unspecified location of malignancy: started chemo 10/2016, has mediport - left chest  Malignant neoplasm of right female breast, unspecified site of breast  Asthma: denies any recent hospitalizations/intubations  Depression  Hyperlipidemia  HTN (hypertension)  History of lung surgery: right VATS, wedge resection (August 2017) benign neoplasm  History of lumpectomy: right (1996)  Port-a-cath in place  Status post right breast lumpectomy: malignant treated with radiation  H/O abdominal hysterectomy  H/O umbilical hernia repair: with mesh      SOCIAL HISTORY:    FAMILY HISTORY:  Family history of kidney cancer (Sibling): brother  Family history of brain cancer (Sibling): sister  Family history of ovarian cancer (Mother): mother      MEDICATIONS  (STANDING):  buDESOnide 160 MICROgram(s)/formoterol 4.5 MICROgram(s) Inhaler 2 Puff(s) Inhalation two times a day  heparin  Injectable 5000 Unit(s) SubCutaneous every 8 hours  HYDROmorphone (10 MICROgram(s)/mL) + BUpivacaine 0.0625% in 0.9% Sodium Chloride PCEA 250 milliLiter(s) Epidural PCA Continuous  insulin lispro (HumaLOG) corrective regimen sliding scale   SubCutaneous every 6 hours  lactated ringers. 1000 milliLiter(s) (50 mL/Hr) IV Continuous <Continuous>  pantoprazole  Injectable 40 milliGRAM(s) IV Push daily  sertraline 50 milliGRAM(s) Oral daily  traZODone 100 milliGRAM(s) Oral at bedtime    MEDICATIONS  (PRN):  ALBUTerol    90 MICROgram(s) HFA Inhaler 2 Puff(s) Inhalation every 6 hours PRN Shortness of Breath and/or Wheezing  butorphanol Injectable 0.25 milliGRAM(s) IV Push every 6 hours PRN Pruritus  HYDROmorphone  Injectable 0.5 milliGRAM(s) IV Push every 3 hours PRN Severe Pain unrelieved by PCEA  HYDROmorphone (10 MICROgram(s)/mL) + BUpivacaine 0.0625% in 0.9% Sodium Chloride PCEA Rescue Clinician  Bolus 5 milliLiter(s) Epidural every 30 minutes PRN for Pain Score greater than 6  naloxone Injectable 0.1 milliGRAM(s) IV Push every 3 minutes PRN For ANY of the following changes in patient status:  A. RR LESS THAN 10 breaths per minute, B. Oxygen saturation LESS THAN 90%, C. Sedation score of 6  ondansetron Injectable 4 milliGRAM(s) IV Push every 6 hours PRN Nausea  promethazine IVPB 6.25 milliGRAM(s) IV Intermittent once PRN Nausea and/or Vomiting      Allergies    No Known Drug Allergies  SteriStrips (Blisters)    Intolerances        Vital Signs Last 24 Hrs  T(C): 37.7 (09 Nov 2017 16:03), Max: 37.7 (09 Nov 2017 16:03)  T(F): 99.9 (09 Nov 2017 16:03), Max: 99.9 (09 Nov 2017 16:03)  HR: 72 (09 Nov 2017 16:03) (67 - 80)  BP: 143/63 (09 Nov 2017 16:03) (124/47 - 164/59)  BP(mean): 84 (09 Nov 2017 16:03) (66 - 88)  RR: 20 (09 Nov 2017 16:03) (16 - 24)  SpO2: 97% (09 Nov 2017 16:03) (93% - 98%)    PHYSICAL EXAM  General: adult in NAD  HEENT: clear oropharynx, anicteric sclera, pink conjunctiva  Neck: supple  CV: normal S1/S2 with no murmur rubs or gallops  Lungs: positive air movement b/l ant lungs,clear to auscultation, no wheezes, no rales  Abdomen: soft non-tender non-distended, no hepatosplenomegaly  Ext: no clubbing cyanosis or edema  Skin: no rashes and no petechiae  Neuro: alert and oriented X 4, no focal deficits      LABS:                          9.0    12.26 )-----------( 138      ( 09 Nov 2017 02:30 )             27.1         Mean Cell Volume : 91.2 fL  Mean Cell Hemoglobin : 30.3 pg  Mean Cell Hemoglobin Concentration : 33.2 %  Auto Neutrophil # : x  Auto Lymphocyte # : x  Auto Monocyte # : x  Auto Eosinophil # : x  Auto Basophil # : x  Auto Neutrophil % : x  Auto Lymphocyte % : x  Auto Monocyte % : x  Auto Eosinophil % : x  Auto Basophil % : x      Serial CBC's  11-09 @ 02:30  Hct-27.1 / Hgb-9.0 / Plat-138 / RBC-2.97 / WBC-12.26  Serial CBC's  11-08 @ 02:46  Hct-29.6 / Hgb-9.6 / Plat-169 / RBC-3.23 / WBC-16.77  Serial CBC's  11-07 @ 20:50  Hct-33.4 / Hgb-11.0 / Plat-198 / RBC-3.70 / WBC-16.97  Serial CBC's  11-07 @ 20:00  Hct-34.2 / Hgb-11.1 / Plat-200 / RBC-3.73 / WBC-16.12      11-09    140  |  103  |  14  ----------------------------<  158<H>  4.0   |  27  |  0.64    Ca    8.4      09 Nov 2017 02:30  Phos  2.2     11-09  Mg     2.1     11-09    TPro  5.5<L>  /  Alb  3.1<L>  /  TBili  0.9  /  DBili  x   /  AST  202<H>  /  ALT  231<H>  /  AlkPhos  63  11-09      PT/INR - ( 09 Nov 2017 02:30 )   PT: 12.9 SEC;   INR: 1.15          PTT - ( 09 Nov 2017 02:30 )  PTT:30.4 SEC                BLOOD SMEAR INTERPRETATION:       RADIOLOGY & ADDITIONAL STUDIES: Patient is a 71y old  Female who presents with a chief complaint of "pancreatic cancer" (25 Oct 2017 13:19)      HPI:  72 y/o female with history of pancreatic cancer diagnosed in 2016 and was treated with chemotherapy followed by Radiation (completed in 3/2017).  She is s/p right VATS, wedge resection for benign neoplasm in August 2017. She was admitted for definitive surgery for her pancreatic cancer. Re covering well.        PAST MEDICAL & SURGICAL HISTORY:  Pancreatic cancer  Breast cancer: Dx 1996 s/p RT and lumpectomy  Solitary pulmonary nodule  Insomnia  Hiatal hernia with GERD: no changes  Colitis: due to chemo 1/2017  Malignant neoplasm of pancreas, unspecified location of malignancy: started chemo 10/2016, has mediport - left chest  Malignant neoplasm of right female breast, unspecified site of breast  Asthma: denies any recent hospitalizations/intubations  Depression  Hyperlipidemia  HTN (hypertension)  History of lung surgery: right VATS, wedge resection (August 2017) benign neoplasm  History of lumpectomy: right (1996)  Port-a-cath in place  Status post right breast lumpectomy: malignant treated with radiation  H/O abdominal hysterectomy  H/O umbilical hernia repair: with mesh      SOCIAL HISTORY:    FAMILY HISTORY:  Family history of kidney cancer (Sibling): brother  Family history of brain cancer (Sibling): sister  Family history of ovarian cancer (Mother): mother      MEDICATIONS  (STANDING):  buDESOnide 160 MICROgram(s)/formoterol 4.5 MICROgram(s) Inhaler 2 Puff(s) Inhalation two times a day  heparin  Injectable 5000 Unit(s) SubCutaneous every 8 hours  HYDROmorphone (10 MICROgram(s)/mL) + BUpivacaine 0.0625% in 0.9% Sodium Chloride PCEA 250 milliLiter(s) Epidural PCA Continuous  insulin lispro (HumaLOG) corrective regimen sliding scale   SubCutaneous every 6 hours  lactated ringers. 1000 milliLiter(s) (50 mL/Hr) IV Continuous <Continuous>  pantoprazole  Injectable 40 milliGRAM(s) IV Push daily  sertraline 50 milliGRAM(s) Oral daily  traZODone 100 milliGRAM(s) Oral at bedtime    MEDICATIONS  (PRN):  ALBUTerol    90 MICROgram(s) HFA Inhaler 2 Puff(s) Inhalation every 6 hours PRN Shortness of Breath and/or Wheezing  butorphanol Injectable 0.25 milliGRAM(s) IV Push every 6 hours PRN Pruritus  HYDROmorphone  Injectable 0.5 milliGRAM(s) IV Push every 3 hours PRN Severe Pain unrelieved by PCEA  HYDROmorphone (10 MICROgram(s)/mL) + BUpivacaine 0.0625% in 0.9% Sodium Chloride PCEA Rescue Clinician  Bolus 5 milliLiter(s) Epidural every 30 minutes PRN for Pain Score greater than 6  naloxone Injectable 0.1 milliGRAM(s) IV Push every 3 minutes PRN For ANY of the following changes in patient status:  A. RR LESS THAN 10 breaths per minute, B. Oxygen saturation LESS THAN 90%, C. Sedation score of 6  ondansetron Injectable 4 milliGRAM(s) IV Push every 6 hours PRN Nausea  promethazine IVPB 6.25 milliGRAM(s) IV Intermittent once PRN Nausea and/or Vomiting      Allergies    No Known Drug Allergies  SteriStrips (Blisters)    Intolerances        Vital Signs Last 24 Hrs  T(C): 37.7 (09 Nov 2017 16:03), Max: 37.7 (09 Nov 2017 16:03)  T(F): 99.9 (09 Nov 2017 16:03), Max: 99.9 (09 Nov 2017 16:03)  HR: 72 (09 Nov 2017 16:03) (67 - 80)  BP: 143/63 (09 Nov 2017 16:03) (124/47 - 164/59)  BP(mean): 84 (09 Nov 2017 16:03) (66 - 88)  RR: 20 (09 Nov 2017 16:03) (16 - 24)  SpO2: 97% (09 Nov 2017 16:03) (93% - 98%)    PHYSICAL EXAM  General: adult in NAD  HEENT: clear oropharynx, anicteric sclera, pink conjunctiva  Neck: supple  CV: normal S1/S2 with no murmur rubs or gallops  Lungs: positive air movement b/l ant lungs,clear to auscultation, no wheezes, no rales  Abdomen: soft non-tender non-distended, no hepatosplenomegaly  Ext: no clubbing cyanosis or edema  Skin: no rashes and no petechiae  Neuro: alert and oriented X 4, no focal deficits      LABS:                          9.0    12.26 )-----------( 138      ( 09 Nov 2017 02:30 )             27.1         Mean Cell Volume : 91.2 fL  Mean Cell Hemoglobin : 30.3 pg  Mean Cell Hemoglobin Concentration : 33.2 %  Auto Neutrophil # : x  Auto Lymphocyte # : x  Auto Monocyte # : x  Auto Eosinophil # : x  Auto Basophil # : x  Auto Neutrophil % : x  Auto Lymphocyte % : x  Auto Monocyte % : x  Auto Eosinophil % : x  Auto Basophil % : x      Serial CBC's  11-09 @ 02:30  Hct-27.1 / Hgb-9.0 / Plat-138 / RBC-2.97 / WBC-12.26  Serial CBC's  11-08 @ 02:46  Hct-29.6 / Hgb-9.6 / Plat-169 / RBC-3.23 / WBC-16.77  Serial CBC's  11-07 @ 20:50  Hct-33.4 / Hgb-11.0 / Plat-198 / RBC-3.70 / WBC-16.97  Serial CBC's  11-07 @ 20:00  Hct-34.2 / Hgb-11.1 / Plat-200 / RBC-3.73 / WBC-16.12      11-09    140  |  103  |  14  ----------------------------<  158<H>  4.0   |  27  |  0.64    Ca    8.4      09 Nov 2017 02:30  Phos  2.2     11-09  Mg     2.1     11-09    TPro  5.5<L>  /  Alb  3.1<L>  /  TBili  0.9  /  DBili  x   /  AST  202<H>  /  ALT  231<H>  /  AlkPhos  63  11-09      PT/INR - ( 09 Nov 2017 02:30 )   PT: 12.9 SEC;   INR: 1.15          PTT - ( 09 Nov 2017 02:30 )  PTT:30.4 SEC                BLOOD SMEAR INTERPRETATION:       RADIOLOGY & ADDITIONAL STUDIES:

## 2017-11-09 NOTE — CONSULT NOTE ADULT - PROBLEM SELECTOR RECOMMENDATION 9
will monitor  will use metoprolol IV as needed
Recovering well post whipples.Will follow the pathology staging to decide about adjuvant chemotherapy. Surgery followup.

## 2017-11-09 NOTE — CONSULT NOTE ADULT - SUBJECTIVE AND OBJECTIVE BOX
Patient is a 71y old  Female who presents with a chief complaint of "pancreatic cancer, patient known to me from prior admission     HPI:  70 y/o female with history of pancreatic cancer admission for Whipple. .  She was diagnosed with pancreatic cancer in 2016 and was treated with chemotherapy (completed in) and Radiation (completed in 3/2017).  She is s/p right VATS, wedge resection for benign neoplasm in August 2017.  She was scheduled for diagnostic laparoscopic laparotomy whipple on 1which was done 11/7/17. Now transferred out of SICU to surgical floor. C/O some pain but controlled with meds. No fever, chills.       PAST MEDICAL & SURGICAL HISTORY:  Pancreatic cancer  Breast cancer: Dx 1996 s/p RT and lumpectomy  Solitary pulmonary nodule  Insomnia  Hiatal hernia with GERD: no changes  Colitis: due to chemo 1/2017  Malignant neoplasm of pancreas, unspecified location of malignancy: started chemo 10/2016, has mediport - left chest  Malignant neoplasm of right female breast, unspecified site of breast  Asthma: denies any recent hospitalizations/intubations  Depression  Hyperlipidemia  HTN (hypertension)  History of lung surgery: right VATS, wedge resection (August 2017) benign neoplasm  History of lumpectomy: right (1996)  Port-a-cath in place  Status post right breast lumpectomy: malignant treated with radiation  H/O abdominal hysterectomy  H/O umbilical hernia repair: with mesh      Review of Systems:   CONSTITUTIONAL: No fever, weight loss, or fatigue  EYES: No eye pain, visual disturbances, or discharge  ENMT:  No difficulty hearing, tinnitus, vertigo; No sinus or throat pain  NECK: No pain or stiffness  RESPIRATORY: No cough, wheezing, chills or hemoptysis; No shortness of breath  CARDIOVASCULAR: No chest pain, palpitations, dizziness, or leg swelling  GASTROINTESTINAL: see HPI above  GENITOURINARY: No dysuria, frequency, hematuria, or incontinence  NEUROLOGICAL: No headaches, memory loss, loss of strength, numbness, or tremors  SKIN: No itching, burning, rashes, or lesions   LYMPH NODES: No enlarged glands  ENDOCRINE: No heat or cold intolerance; No hair loss  MUSCULOSKELETAL: No joint pain or swelling; No muscle, back, or extremity pain  PSYCHIATRIC: No depression, anxiety, mood swings, or difficulty sleeping  HEME/LYMPH: No easy bruising, or bleeding gums  ALLERGY AND IMMUNOLOGIC: No hives or eczema    Allergies    No Known Drug Allergies  SteriStrips (Blisters)    Intolerances        Social History: nonsmoker  no IVDA  no ETOH    FAMILY HISTORY:  Family history of kidney cancer (Sibling): brother  Family history of brain cancer (Sibling): sister  Family history of ovarian cancer (Mother): mother      MEDICATIONS  (STANDING):  buDESOnide 160 MICROgram(s)/formoterol 4.5 MICROgram(s) Inhaler 2 Puff(s) Inhalation two times a day  heparin  Injectable 5000 Unit(s) SubCutaneous every 8 hours  HYDROmorphone (10 MICROgram(s)/mL) + BUpivacaine 0.0625% in 0.9% Sodium Chloride PCEA 250 milliLiter(s) Epidural PCA Continuous  insulin lispro (HumaLOG) corrective regimen sliding scale   SubCutaneous every 6 hours  lactated ringers. 1000 milliLiter(s) (50 mL/Hr) IV Continuous <Continuous>  pantoprazole  Injectable 40 milliGRAM(s) IV Push daily  sertraline 50 milliGRAM(s) Oral daily  traZODone 100 milliGRAM(s) Oral at bedtime    MEDICATIONS  (PRN):  ALBUTerol    90 MICROgram(s) HFA Inhaler 2 Puff(s) Inhalation every 6 hours PRN Shortness of Breath and/or Wheezing  butorphanol Injectable 0.25 milliGRAM(s) IV Push every 6 hours PRN Pruritus  HYDROmorphone  Injectable 0.5 milliGRAM(s) IV Push every 3 hours PRN Severe Pain unrelieved by PCEA  HYDROmorphone (10 MICROgram(s)/mL) + BUpivacaine 0.0625% in 0.9% Sodium Chloride PCEA Rescue Clinician  Bolus 5 milliLiter(s) Epidural every 30 minutes PRN for Pain Score greater than 6  naloxone Injectable 0.1 milliGRAM(s) IV Push every 3 minutes PRN For ANY of the following changes in patient status:  A. RR LESS THAN 10 breaths per minute, B. Oxygen saturation LESS THAN 90%, C. Sedation score of 6  ondansetron Injectable 4 milliGRAM(s) IV Push every 6 hours PRN Nausea  promethazine IVPB 6.25 milliGRAM(s) IV Intermittent once PRN Nausea and/or Vomiting      CAPILLARY BLOOD GLUCOSE  149 (09 Nov 2017 11:00)      POCT Blood Glucose.: 149 mg/dL (09 Nov 2017 11:27)  POCT Blood Glucose.: 186 mg/dL (09 Nov 2017 05:16)  POCT Blood Glucose.: 158 mg/dL (08 Nov 2017 23:48)  POCT Blood Glucose.: 132 mg/dL (08 Nov 2017 18:00)    I&O's Summary    08 Nov 2017 07:01  -  09 Nov 2017 07:00  --------------------------------------------------------  IN: 3525 mL / OUT: 1345 mL / NET: 2180 mL    09 Nov 2017 07:01  -  09 Nov 2017 17:09  --------------------------------------------------------  IN: 650 mL / OUT: 630 mL / NET: 20 mL        PHYSICAL EXAM:    GENERAL: NAD, well-developed appears comfortable  NGT in place  HEAD:  Atraumatic, Normocephalic  EYES: EOMI, PERRLA, conjunctiva and sclera clear  NECK: Supple, No JVD  CHEST/LUNG: decreased breath sounds at bases   HEART: S1S2; No rubs, or gallops, no murmurs  ABDOMEN: tender  no rebound sutures noted with drains   EXTREMITIES:  Peripheral Pulses, No clubbing or cyanosis, no edema  PSYCH: AO x 3,   NEUROLOGY: Alert, no focal motor or sensory deficits  SKIN: No rashes or lesions    LABS:                        9.0    12.26 )-----------( 138      ( 09 Nov 2017 02:30 )             27.1     11-09    140  |  103  |  14  ----------------------------<  158<H>  4.0   |  27  |  0.64    Ca    8.4      09 Nov 2017 02:30  Phos  2.2     11-09  Mg     2.1     11-09    TPro  5.5<L>  /  Alb  3.1<L>  /  TBili  0.9  /  DBili  x   /  AST  202<H>  /  ALT  231<H>  /  AlkPhos  63  11-09    PT/INR - ( 09 Nov 2017 02:30 )   PT: 12.9 SEC;   INR: 1.15          PTT - ( 09 Nov 2017 02:30 )  PTT:30.4 SEC      RADIOLOGY & ADDITIONAL TESTS:    Consultant(s) Notes Reviewed:      Care Discussed with Consultants/Other Providers:

## 2017-11-09 NOTE — PROGRESS NOTE ADULT - ASSESSMENT
A: 71y F POD 2 s/p Jejunostomy tube placement, Whipple procedure, diagnostic lap, liver biopsy, doing well.    P:  - Transfer to the floor  - [facility] DVT ppx  - PCEA for pain control  - Keep brown in  - Monitor SHAYY output  - Appreciate care of SICU team

## 2017-11-09 NOTE — CONSULT NOTE ADULT - PROBLEM SELECTOR RECOMMENDATION 2
s/p Whipple  defer to surgery
Continue current medications. At present stable without any evidence of worsening.

## 2017-11-09 NOTE — DIETITIAN INITIAL EVALUATION ADULT. - OTHER INFO
Pt seen for critical care LOS.  At this time, pt is s/p surgery for malignant neoplasm of head of pancreas.  Liver biopsy, diagnostic laparoscopy, whipple procedure and jejunostomy tube placement performed.  Noted pt on Vitamin D3 supplements PTA - 2000IU/d as per chart.  Pt started on enteral nutrition support yesterday, infusing at goal rate.

## 2017-11-09 NOTE — PROGRESS NOTE ADULT - SUBJECTIVE AND OBJECTIVE BOX
D TEAM SURGERY DAILY PROGRESS NOTE:    S: Patient seen and examined. She had a J tube study yesterday that showed proper positioning of the tube, so it is now being utilized for feeds. At bedside, she endorses pain and nausea.    O:  Vital Signs Last 24 Hrs  T(C): 36.7 (09 Nov 2017 04:00), Max: 37.5 (09 Nov 2017 00:00)  T(F): 98 (09 Nov 2017 04:00), Max: 99.5 (09 Nov 2017 00:00)  HR: 74 (09 Nov 2017 07:00) (69 - 87)  BP: 164/59 (09 Nov 2017 07:00) (164/59 - 164/59)  BP(mean): 84 (09 Nov 2017 07:00) (84 - 84)  RR: 20 (09 Nov 2017 07:00) (11 - 24)  SpO2: 98% (09 Nov 2017 07:00) (95% - 99%)    I&O's Detail    08 Nov 2017 07:01  -  09 Nov 2017 07:00  --------------------------------------------------------  IN:    Glucerna: 525 mL    lactated ringers.: 3000 mL  Total IN: 3525 mL    OUT:    Bulb: 65 mL    Bulb: 100 mL    Indwelling Catheter - Urethral: 1130 mL    Nasoenteral Tube: 50 mL  Total OUT: 1345 mL    Total NET: 2180 mL          MEDICATIONS  (STANDING):  buDESOnide 160 MICROgram(s)/formoterol 4.5 MICROgram(s) Inhaler 2 Puff(s) Inhalation two times a day  heparin  Injectable 5000 Unit(s) SubCutaneous every 8 hours  HYDROmorphone (10 MICROgram(s)/mL) + BUpivacaine 0.0625% in 0.9% Sodium Chloride PCEA 250 milliLiter(s) Epidural PCA Continuous  insulin lispro (HumaLOG) corrective regimen sliding scale   SubCutaneous every 6 hours  lactated ringers. 1000 milliLiter(s) (125 mL/Hr) IV Continuous <Continuous>  pantoprazole  Injectable 40 milliGRAM(s) IV Push daily  sertraline 50 milliGRAM(s) Oral daily  traZODone 100 milliGRAM(s) Oral at bedtime    MEDICATIONS  (PRN):  ALBUTerol    90 MICROgram(s) HFA Inhaler 2 Puff(s) Inhalation every 6 hours PRN Shortness of Breath and/or Wheezing  butorphanol Injectable 0.25 milliGRAM(s) IV Push every 6 hours PRN Pruritus  HYDROmorphone  Injectable 0.5 milliGRAM(s) IV Push every 3 hours PRN Severe Pain unrelieved by PCEA  HYDROmorphone (10 MICROgram(s)/mL) + BUpivacaine 0.0625% in 0.9% Sodium Chloride PCEA Rescue Clinician  Bolus 5 milliLiter(s) Epidural every 30 minutes PRN for Pain Score greater than 6  naloxone Injectable 0.1 milliGRAM(s) IV Push every 3 minutes PRN For ANY of the following changes in patient status:  A. RR LESS THAN 10 breaths per minute, B. Oxygen saturation LESS THAN 90%, C. Sedation score of 6  ondansetron Injectable 4 milliGRAM(s) IV Push every 6 hours PRN Nausea  promethazine IVPB 6.25 milliGRAM(s) IV Intermittent once PRN Nausea and/or Vomiting                        9.0    12.26 )-----------( 138      ( 09 Nov 2017 02:30 )             27.1     11-09    140  |  103  |  14  ----------------------------<  158<H>  4.0   |  27  |  0.64    Ca    8.4      09 Nov 2017 02:30  Phos  2.2     11-09  Mg     2.1     11-09    TPro  5.5<L>  /  Alb  3.1<L>  /  TBili  0.9  /  DBili  x   /  AST  202<H>  /  ALT  231<H>  /  AlkPhos  63  11-09    Physical Exam:  Gen: Laying in bed, NAD, alert and oriented.   Resp: Unlabored breathing  Abd: midline incision well-approximated with staples, R SHAYY serous, L SHAYY serosanguinous, J tube in place    Lines:   IV: patent, in place.

## 2017-11-09 NOTE — CONSULT NOTE ADULT - ASSESSMENT
71 year old lady with history of pancreatic adenocarcinoma diagnosed 2016 s/p neoadjuvant chemotherapy followed by RT. Had wedge resection of lung which was negative for metastasis. Had whipples procedure with negative margins on frozen. Recovering well from her surgery.On NGT suction.

## 2017-11-09 NOTE — CONSULT NOTE ADULT - PROBLEM SELECTOR PROBLEM 2
Malignant neoplasm of pancreas, unspecified location of malignancy
Persistent asthma, unspecified asthma severity, unspecified whether complicated

## 2017-11-09 NOTE — PROGRESS NOTE ADULT - SUBJECTIVE AND OBJECTIVE BOX
SICU Daily Progress Note  =====================================================  Overnight/Interval Events:  -US of liver with patent portal veins   -started on tube feeds with Glucerna yesterday, tolerated well  -no acute events overnight    HPI:  71F h/o HTN, HLD, GERD, asthma, R breast Ca s/p lumpectomy/chemo/rad, pancreatic cancer s/p chemo (Feb 2017) & radiation (March 2017), s/p right VATS, wedge resection for benign neoplasm in August 2017, s/p Whipple & Jejunostomy Tube on 11/7.  Pt has a thoracic epidural, brown catheter, Dionisio tube, NGT, 2 SHAYY drains, Left radial A-line, and an 18G EJ PIV in place.  Pt denies any CP/SOB, fevers/chills, N/V, HA, dizziness, or weakness.    Surgery Information   Case Duration: 	EBL: 500	IV Fluids: 5000 LR	Blood Products: None	Urine Output: 1300  Complications: None    ADVANCE DIRECTIVES: Presumed Full Code    CURRENT MEDICATIONS:   --------------------------------------------------------------------------------------  Neurologic Medications  butorphanol Injectable 0.25 milliGRAM(s) IV Push every 6 hours PRN Pruritus  HYDROmorphone  Injectable 0.8 milliGRAM(s) IV Push every 10 minutes PRN Severe Pain (7 - 10)  HYDROmorphone  Injectable 0.4 milliGRAM(s) IV Push every 10 minutes PRN Moderate Pain (4 - 6)  HYDROmorphone (10 MICROgram(s)/mL) + BUpivacaine 0.0625% in 0.9% Sodium Chloride PCEA 250 milliLiter(s) Epidural PCA Continuous  HYDROmorphone (10 MICROgram(s)/mL) + BUpivacaine 0.0625% in 0.9% Sodium Chloride PCEA Rescue Clinician  Bolus 5 milliLiter(s) Epidural every 30 minutes PRN for Pain Score greater than 6  meperidine     Injectable 12.5 milliGRAM(s) IV Push every 10 minutes PRN Shivering  ondansetron Injectable 4 milliGRAM(s) IV Push every 6 hours PRN Nausea  promethazine IVPB 6.25 milliGRAM(s) IV Intermittent once PRN Nausea and/or Vomiting    Respiratory Medications  ALBUTerol    90 MICROgram(s) HFA Inhaler 2 Puff(s) Inhalation every 6 hours PRN Shortness of Breath and/or Wheezing  buDESOnide 160 MICROgram(s)/formoterol 4.5 MICROgram(s) Inhaler 2 Puff(s) Inhalation two times a day    Cardiovascular Medications    Gastrointestinal Medications  lactated ringers. 1000 milliLiter(s) IV Continuous <Continuous>  pantoprazole  Injectable 40 milliGRAM(s) IV Push daily    Genitourinary Medications    Hematologic/Oncologic Medications  enoxaparin Injectable 40 milliGRAM(s) SubCutaneous daily    Antimicrobial/Immunologic Medications    Endocrine/Metabolic Medications    Topical/Other Medications  naloxone Injectable 0.1 milliGRAM(s) IV Push every 3 minutes PRN For ANY of the following changes in patient status:  A. RR LESS THAN 10 breaths per minute, B. Oxygen saturation LESS THAN 90%, C. Sedation score of 6  --------------------------------------------------------------------------------------    VITAL SIGNS, INS/OUTS (last 24 hours):    T(C): 37.5 (11-09-17 @ 00:00), Max: 37.5 (11-09-17 @ 00:00)  HR: 77 (11-09-17 @ 02:00) (71 - 89)  BP: --  ABP: 166/73 (11-09-17 @ 02:00) (98/48 - 166/73)  ABP(mean): 107 (11-09-17 @ 02:00) (65 - 107)  RR: 22 (11-09-17 @ 02:00) (10 - 24)  SpO2: 98% (11-09-17 @ 02:00) (95% - 99%)  Wt(kg): --  CVP(mm Hg): --      11-07 @ 07:01  -  11-08 @ 07:00  --------------------------------------------------------  IN:    Lactated Ringers IV Bolus: 1000 mL    lactated ringers.: 1000 mL  Total IN: 2000 mL    OUT:    Accordian: 61 mL    Accordian: 58 mL    Bulb: 30 mL    Bulb: 45 mL    Indwelling Catheter - Urethral: 415 mL    Nasoenteral Tube: 200 mL  Total OUT: 809 mL    Total NET: 1191 mL      11-08 @ 07:01  -  11-09 @ 03:46  --------------------------------------------------------  IN:    Glucerna: 325 mL    lactated ringers.: 2500 mL  Total IN: 2825 mL    OUT:    Bulb: 40 mL    Bulb: 55 mL    Indwelling Catheter - Urethral: 880 mL    Nasoenteral Tube: 50 mL  Total OUT: 1025 mL    Total NET: 1800 mL  --------------------------------------------------------------------------------------    EXAM  NEUROLOGY  RASS:   	GCS:    Exam: Normal, NAD, alert, oriented x 3, no focal deficits.    HEENT  Exam: Normocephalic, atraumatic.  EOMI    RESPIRATORY  Exam: Lungs clear to auscultation, Normal expansion/effort.    CARDIOVASCULAR  Exam: S1, S2.  Regular rate and rhythm.    GI/NUTRITION  Exam: Abdomen soft, mildly tender, Non-distended.  Wound:   clean, dry, intact  Current Diet:  NPO/TF     VASCULAR  Exam: Extremities warm, pink, well-perfused.    MUSCULOSKELETAL  Exam: All extremities moving spontaneously without limitations.    SKIN:  Exam: Good skin turgor, no skin breakdown.    METABOLIC/FLUIDS/ELECTROLYTES  lactated ringers. 1000 milliLiter(s) IV Continuous <Continuous>      HEMATOLOGIC  [x] DVT Prophylaxis: enoxaparin Injectable 40 milliGRAM(s) SubCutaneous daily    Transfusions:	[] PRBC	[] Platelets		[] FFP	[] Cryoprecipitate    INFECTIOUS DISEASE  Antimicrobials/Immunologic Medications:    Day #  	of    ***    Tubes/Lines/Drains  ***  [x] Peripheral IV  [] Central Venous Line     	[] R	[] L	[] IJ	[] Fem	[] SC	Date Placed:   [x] Arterial Line		[] R	[x] L	[] Fem	[x] Rad	[] Ax	Date Placed: 11/7/2017  [] PICC:         	[] Midline		[] Mediport  [x] Urinary Catheter		Date Placed: 11/7/2017    LABS  --------------------------------------------------------------------------------------  CBC (11-09 @ 02:30)                              9.0<L>                         12.26<H>  )----------------(  138<L>     --    % Neutrophils, --    % Lymphocytes, ANC: --                                  27.1<L>  CBC (11-08 @ 02:46)                              9.6<L>                         16.77<H>  )----------------(  169        --    % Neutrophils, --    % Lymphocytes, ANC: --                                  29.6<L>    BMP (11-09 @ 02:30)             --      |  --      |  --    		Ca++ 1.10    Ca --                 ---------------------------------( --    		Mg --                 --      |  --      |  --    			Ph --      BMP (11-08 @ 02:46)             139     |  100     |  14    		Ca++ --      Ca 8.5                ---------------------------------( 190<H>		Mg 1.8                3.9     |  25      |  0.81  			Ph 4.5       LFTs (11-08 @ 02:46)      TPro 5.7<L> / Alb 3.7 / TBili 1.2 / DBili -- / <H> / <H> / AlkPhos 55  LFTs (11-07 @ 20:50)      TPro 5.9<L> / Alb 3.5 / TBili 1.4<H> / DBili -- / <H> / <H> / AlkPhos 65    Coags (11-08 @ 09:22)  aPTT 28.7 / INR 1.26<H> / PT 14.2<H>  Coags (11-07 @ 20:50)  aPTT 25.7<L> / INR 1.22<H> / PT 13.7<H>      ABG (11-08 @ 02:46)      /  /  /  /  / %     Lactate: 2.5<H>   ABG (11-07 @ 21:35)      /  /  /  /  / %     Lactate: 3.0<H>     ASSESSMENT:    71F h/o HTN, HLD, GERD, asthma, R breast Ca s/p lumpectomy/chemo/rad, pancreatic cancer s/p chemo (Feb 2017) & radiation (March 2017), s/p right VATS, wedge resection for benign neoplasm in August 2017, now s/p Whipple & Jejunostomy Tube.     PLAN:  Neurologic: c/w PCEA, restart Zoloft & Trazodone    Respiratory: c/w Symbicort, Proventil PRN, OOB    Cardiovascular: ?restart home dose of Losartan & HCTZ     Gastrointestinal/Nutrition: NPO/ TF  Jejunostomy tube in this place     Renal/Genitourinary: c/w IVF, Brown in place, monitor I&O    Hematologic: SQH for DVT prophylaxis    Infectious Disease: no acute issues    Endocrine: ISS    Tubes/Lines/Drains: Brown, 2 SHAYY drains, Dionisio tube, NGT, L rad A-line, 18G EJ PIV    Disposition: SICU    --------------------------------------------------------------------------------------    Critical Care Diagnoses: hemodynamic monitoring SICU Daily Progress Note  =====================================================  Overnight/Interval Events:  -US of liver with patent portal veins   -started on tube feeds with Glucerna yesterday, tolerated well  -no acute events overnight    HPI:  71F h/o HTN, HLD, GERD, asthma, R breast Ca s/p lumpectomy/chemo/rad, pancreatic cancer s/p chemo (Feb 2017) & radiation (March 2017), s/p right VATS, wedge resection for benign neoplasm in August 2017, s/p Whipple & Jejunostomy Tube on 11/7.  Pt has a thoracic epidural, brown catheter, Dionisio tube, NGT, 2 SHAYY drains, Left radial A-line, and an 18G EJ PIV in place.  Pt denies any CP/SOB, fevers/chills, N/V, HA, dizziness, or weakness.    Surgery Information   Case Duration: 	EBL: 500	IV Fluids: 5000 LR	Blood Products: None	Urine Output: 1300  Complications: None    ADVANCE DIRECTIVES: Presumed Full Code    CURRENT MEDICATIONS:   --------------------------------------------------------------------------------------  Neurologic Medications  butorphanol Injectable 0.25 milliGRAM(s) IV Push every 6 hours PRN Pruritus  HYDROmorphone  Injectable 0.8 milliGRAM(s) IV Push every 10 minutes PRN Severe Pain (7 - 10)  HYDROmorphone  Injectable 0.4 milliGRAM(s) IV Push every 10 minutes PRN Moderate Pain (4 - 6)  HYDROmorphone (10 MICROgram(s)/mL) + BUpivacaine 0.0625% in 0.9% Sodium Chloride PCEA 250 milliLiter(s) Epidural PCA Continuous  HYDROmorphone (10 MICROgram(s)/mL) + BUpivacaine 0.0625% in 0.9% Sodium Chloride PCEA Rescue Clinician  Bolus 5 milliLiter(s) Epidural every 30 minutes PRN for Pain Score greater than 6  meperidine     Injectable 12.5 milliGRAM(s) IV Push every 10 minutes PRN Shivering  ondansetron Injectable 4 milliGRAM(s) IV Push every 6 hours PRN Nausea  promethazine IVPB 6.25 milliGRAM(s) IV Intermittent once PRN Nausea and/or Vomiting    Respiratory Medications  ALBUTerol    90 MICROgram(s) HFA Inhaler 2 Puff(s) Inhalation every 6 hours PRN Shortness of Breath and/or Wheezing  buDESOnide 160 MICROgram(s)/formoterol 4.5 MICROgram(s) Inhaler 2 Puff(s) Inhalation two times a day    Cardiovascular Medications    Gastrointestinal Medications  lactated ringers. 1000 milliLiter(s) IV Continuous <Continuous>  pantoprazole  Injectable 40 milliGRAM(s) IV Push daily    Genitourinary Medications    Hematologic/Oncologic Medications  enoxaparin Injectable 40 milliGRAM(s) SubCutaneous daily    Antimicrobial/Immunologic Medications    Endocrine/Metabolic Medications    Topical/Other Medications  naloxone Injectable 0.1 milliGRAM(s) IV Push every 3 minutes PRN For ANY of the following changes in patient status:  A. RR LESS THAN 10 breaths per minute, B. Oxygen saturation LESS THAN 90%, C. Sedation score of 6  --------------------------------------------------------------------------------------    VITAL SIGNS, INS/OUTS (last 24 hours):    T(C): 37.5 (11-09-17 @ 00:00), Max: 37.5 (11-09-17 @ 00:00)  HR: 77 (11-09-17 @ 02:00) (71 - 89)  BP: --  ABP: 166/73 (11-09-17 @ 02:00) (98/48 - 166/73)  ABP(mean): 107 (11-09-17 @ 02:00) (65 - 107)  RR: 22 (11-09-17 @ 02:00) (10 - 24)  SpO2: 98% (11-09-17 @ 02:00) (95% - 99%)  Wt(kg): --  CVP(mm Hg): --      11-07 @ 07:01  -  11-08 @ 07:00  --------------------------------------------------------  IN:    Lactated Ringers IV Bolus: 1000 mL    lactated ringers.: 1000 mL  Total IN: 2000 mL    OUT:    Accordian: 61 mL    Accordian: 58 mL    Bulb: 30 mL    Bulb: 45 mL    Indwelling Catheter - Urethral: 415 mL    Nasoenteral Tube: 200 mL  Total OUT: 809 mL    Total NET: 1191 mL      11-08 @ 07:01  -  11-09 @ 03:46  --------------------------------------------------------  IN:    Glucerna: 325 mL    lactated ringers.: 2500 mL  Total IN: 2825 mL    OUT:    Bulb: 40 mL    Bulb: 55 mL    Indwelling Catheter - Urethral: 880 mL    Nasoenteral Tube: 50 mL  Total OUT: 1025 mL    Total NET: 1800 mL  --------------------------------------------------------------------------------------    EXAM  NEUROLOGY  RASS:   	GCS:    Exam: Normal, NAD, alert, oriented x 3, no focal deficits.    HEENT  Exam: Normocephalic, atraumatic.  EOMI    RESPIRATORY  Exam: Lungs clear to auscultation, Normal expansion/effort.    CARDIOVASCULAR  Exam: S1, S2.  Regular rate and rhythm.    GI/NUTRITION  Exam: Abdomen soft, mildly tender, Non-distended.  Wound:   clean, dry, intact  Current Diet:  NPO/TF     VASCULAR  Exam: Extremities warm, pink, well-perfused.    MUSCULOSKELETAL  Exam: All extremities moving spontaneously without limitations.    SKIN:  Exam: Good skin turgor, no skin breakdown.    METABOLIC/FLUIDS/ELECTROLYTES  lactated ringers. 1000 milliLiter(s) IV Continuous <Continuous>      HEMATOLOGIC  [x] DVT Prophylaxis: enoxaparin Injectable 40 milliGRAM(s) SubCutaneous daily    Transfusions:	[] PRBC	[] Platelets		[] FFP	[] Cryoprecipitate    INFECTIOUS DISEASE  Antimicrobials/Immunologic Medications:    Day #  	of    ***    Tubes/Lines/Drains  ***  [x] Peripheral IV  [] Central Venous Line     	[] R	[] L	[] IJ	[] Fem	[] SC	Date Placed:   [x] Arterial Line		[] R	[x] L	[] Fem	[x] Rad	[] Ax	Date Placed: 11/7/2017  [] PICC:         	[] Midline		[] Mediport  [x] Urinary Catheter		Date Placed: 11/7/2017    LABS  --------------------------------------------------------------------------------------  CBC (11-09 @ 02:30)                              9.0<L>                         12.26<H>  )----------------(  138<L>     --    % Neutrophils, --    % Lymphocytes, ANC: --                                  27.1<L>  CBC (11-08 @ 02:46)                              9.6<L>                         16.77<H>  )----------------(  169        --    % Neutrophils, --    % Lymphocytes, ANC: --                                  29.6<L>    BMP (11-09 @ 02:30)             --      |  --      |  --    		Ca++ 1.10    Ca --                 ---------------------------------( --    		Mg --                 --      |  --      |  --    			Ph --      BMP (11-08 @ 02:46)             139     |  100     |  14    		Ca++ --      Ca 8.5                ---------------------------------( 190<H>		Mg 1.8                3.9     |  25      |  0.81  			Ph 4.5       LFTs (11-08 @ 02:46)      TPro 5.7<L> / Alb 3.7 / TBili 1.2 / DBili -- / <H> / <H> / AlkPhos 55  LFTs (11-07 @ 20:50)      TPro 5.9<L> / Alb 3.5 / TBili 1.4<H> / DBili -- / <H> / <H> / AlkPhos 65    Coags (11-08 @ 09:22)  aPTT 28.7 / INR 1.26<H> / PT 14.2<H>  Coags (11-07 @ 20:50)  aPTT 25.7<L> / INR 1.22<H> / PT 13.7<H>      ABG (11-08 @ 02:46)      /  /  /  /  / %     Lactate: 2.5<H>   ABG (11-07 @ 21:35)      /  /  /  /  / %     Lactate: 3.0<H>     ASSESSMENT:    71F h/o HTN, HLD, GERD, asthma, R breast Ca s/p lumpectomy/chemo/rad, pancreatic cancer s/p chemo (Feb 2017) & radiation (March 2017), s/p right VATS, wedge resection for benign neoplasm in August 2017, now s/p Whipple & Jejunostomy Tube.     PLAN:  Neurologic: c/w PCEA, restart Zoloft & Trazodone    Respiratory: c/w Symbicort, Proventil PRN, OOB, incentive spirometry    Cardiovascular: restart home dose of Losartan & HCTZ     Gastrointestinal/Nutrition: NPO/ TF tube feeds- 2 archie HN for 24 hours- 30 cc/ hour  Jejunostomy tube in this place     Renal/Genitourinary: IVL, Brown in place, monitor I&O    Hematologic: SQH for DVT prophylaxis, change to lovenox when epidural removed    Infectious Disease: no acute issues    Endocrine: ISS    Tubes/Lines/Drains: Brown, 2 SHAYY drains, Dionisio tube, NGT, L rad A-line, 18G EJ PIV    Disposition: SICU, transfer to floor    --------------------------------------------------------------------------------------    Critical Care Diagnoses: hemodynamic monitoring

## 2017-11-09 NOTE — PROGRESS NOTE ADULT - SUBJECTIVE AND OBJECTIVE BOX
Anesthesia Pain Management Service- Attending Addendum    SUBJECTIVE: Pt doing well with PCEA without problems reported.    Therapy:	  [ ] IV PCA	   [ X] Epidural           [ ] s/p Spinal Opoid              [ ] Postpartum infusion	  [ ] Patient controlled regional anesthesia (PCRA)    [ ] prn Analgesics    Allergies    No Known Drug Allergies  SteriStrips (Blisters)    Intolerances      MEDICATIONS  (STANDING):  buDESOnide 160 MICROgram(s)/formoterol 4.5 MICROgram(s) Inhaler 2 Puff(s) Inhalation two times a day  dextrose 5% + sodium chloride 0.45% with potassium chloride 20 mEq/L 1000 milliLiter(s) (50 mL/Hr) IV Continuous <Continuous>  heparin  Injectable 5000 Unit(s) SubCutaneous every 8 hours  HYDROmorphone (10 MICROgram(s)/mL) + BUpivacaine 0.0625% in 0.9% Sodium Chloride PCEA 250 milliLiter(s) Epidural PCA Continuous  insulin lispro (HumaLOG) corrective regimen sliding scale   SubCutaneous three times a day before meals  pantoprazole  Injectable 40 milliGRAM(s) IV Push daily  potassium phosphate IVPB 15 milliMole(s) IV Intermittent once  sertraline 50 milliGRAM(s) Oral daily  traZODone 100 milliGRAM(s) Oral at bedtime    MEDICATIONS  (PRN):  ALBUTerol    90 MICROgram(s) HFA Inhaler 2 Puff(s) Inhalation every 6 hours PRN Shortness of Breath and/or Wheezing  butorphanol Injectable 0.25 milliGRAM(s) IV Push every 6 hours PRN Pruritus  HYDROmorphone  Injectable 0.5 milliGRAM(s) IV Push every 3 hours PRN Severe Pain unrelieved by PCEA  HYDROmorphone (10 MICROgram(s)/mL) + BUpivacaine 0.0625% in 0.9% Sodium Chloride PCEA Rescue Clinician  Bolus 5 milliLiter(s) Epidural every 30 minutes PRN for Pain Score greater than 6  naloxone Injectable 0.1 milliGRAM(s) IV Push every 3 minutes PRN For ANY of the following changes in patient status:  A. RR LESS THAN 10 breaths per minute, B. Oxygen saturation LESS THAN 90%, C. Sedation score of 6  ondansetron Injectable 4 milliGRAM(s) IV Push every 6 hours PRN Nausea  promethazine IVPB 6.25 milliGRAM(s) IV Intermittent once PRN Nausea and/or Vomiting      OBJECTIVE:   [X] No new signs     [ ] Other:    Side Effects:  [X ] None			[ ] Other:    Assessment of Catheter Site:		[ X] Intact		[ ] Other:    ASSESSMENT/PLAN  [ X] Continue current therapy    [ ] Therapy changed to:    [ ] IV PCA       [ ] Epidural     [ ] prn Analgesics     Comments: Continue current PCEA settings.

## 2017-11-09 NOTE — CONSULT NOTE ADULT - PROBLEM SELECTOR RECOMMENDATION 4
no intervention needed  outpatient follow up with PCP
Patient with remote history of right breast cancer. Exam normal, yearly mammograms.

## 2017-11-09 NOTE — PROGRESS NOTE ADULT - SUBJECTIVE AND OBJECTIVE BOX
D Team Surgery    Pt transferred to surgical floor from SICU today.  Pt doing well on tube feeds Twocal @30 mL/hr.  NGT continued to LCS. Pain well controlled with PCEA.  Pt denies N/V, F/C, ABD pain, flatus and/or BM    T(C): 36.9 (11-09-17 @ 13:06), Max: 37.5 (11-09-17 @ 00:00)  HR: 80 (11-09-17 @ 13:06) (67 - 80)  BP: 150/65 (11-09-17 @ 13:06) (124/47 - 164/59)  ABP: 168/69 (11-09-17 @ 07:00) (116/52 - 168/69)  ABP(mean): 105 (11-09-17 @ 07:00) (74 - 107)  RR: 20 (11-09-17 @ 13:06) (16 - 24)  SpO2: 93% (11-09-17 @ 13:06) (93% - 99%)  Wt(kg): --  CVP(mm Hg): --      11-08 @ 07:01  -  11-09 @ 07:00  --------------------------------------------------------  IN:    Glucerna: 525 mL    lactated ringers.: 3000 mL  Total IN: 3525 mL    OUT:    Bulb: 65 mL    Bulb: 100 mL    Indwelling Catheter - Urethral: 1130 mL    Nasoenteral Tube: 50 mL  Total OUT: 1345 mL    Total NET: 2180 mL      11-09 @ 07:01  -  11-09 @ 15:41  --------------------------------------------------------  IN:    Glucerna: 200 mL    lactated ringers.: 150 mL    lactated ringers.: 250 mL    TwoCal HN: 50 mL  Total IN: 650 mL    OUT:    Bulb: 20 mL    Bulb: 100 mL    Indwelling Catheter - Urethral: 310 mL    Nasoenteral Tube: 200 mL  Total OUT: 630 mL    Total NET: 20 mL    Physical Exam:     General: Pt in NAD, lying comfortably in bed. NGT in place  Lungs:  ABD:  EXT:      71y F POD 2 s/p Jejunostomy tube placement, Whipple procedure, diagnostic lap, liver biopsy, doing well transferred to the floor today from SICU     Plan  - SQH DVT ppx  - PCEA for pain control  - Keep brown in for UOP monitoring  - Monitor SHAYY output  -Continue TF at 30mL/hr with LR @ 50mL/hr D Team Surgery    Pt transferred to surgical floor from SICU today.  Pt doing well on tube feeds Twocal @30 mL/hr.  NGT continued to LCS. Pain well controlled with PCEA.  Pt denies N/V, F/C, ABD pain, flatus and/or BM    T(C): 36.9 (11-09-17 @ 13:06), Max: 37.5 (11-09-17 @ 00:00)  HR: 80 (11-09-17 @ 13:06) (67 - 80)  BP: 150/65 (11-09-17 @ 13:06) (124/47 - 164/59)  ABP: 168/69 (11-09-17 @ 07:00) (116/52 - 168/69)  ABP(mean): 105 (11-09-17 @ 07:00) (74 - 107)  RR: 20 (11-09-17 @ 13:06) (16 - 24)  SpO2: 93% (11-09-17 @ 13:06) (93% - 99%)  Wt(kg): --  CVP(mm Hg): --      11-08 @ 07:01  -  11-09 @ 07:00  --------------------------------------------------------  IN:    Glucerna: 525 mL    lactated ringers.: 3000 mL  Total IN: 3525 mL    OUT:    Bulb: 65 mL    Bulb: 100 mL    Indwelling Catheter - Urethral: 1130 mL    Nasoenteral Tube: 50 mL  Total OUT: 1345 mL    Total NET: 2180 mL      11-09 @ 07:01  -  11-09 @ 15:41  --------------------------------------------------------  IN:    Glucerna: 200 mL    lactated ringers.: 150 mL    lactated ringers.: 250 mL    TwoCal HN: 50 mL  Total IN: 650 mL    OUT:    Bulb: 20 mL    Bulb: 100 mL    Indwelling Catheter - Urethral: 310 mL    Nasoenteral Tube: 200 mL  Total OUT: 630 mL    Total NET: 20 mL    Physical Exam:     General: Pt in NAD, lying comfortably in bed. NGT in place  ABD: soft, incision C/D/I J tube in place.  L drain with serosanguinous drainage, R drain with serous>sanguinous drainage  EXT: warm, B/L LE     71y F POD 2 s/p Jejunostomy tube placement, Whipple procedure, diagnostic lap, liver biopsy, doing well transferred to the floor today from SICU     Plan  - SQH DVT ppx  - PCEA for pain control  - Keep brown in for UOP monitoring  - Monitor SHAYY output  -Continue TF at 30mL/hr with LR @ 50mL/hr

## 2017-11-10 DIAGNOSIS — D64.9 ANEMIA, UNSPECIFIED: ICD-10-CM

## 2017-11-10 LAB
APTT BLD: 25.7 SEC — LOW (ref 27.5–37.4)
BUN SERPL-MCNC: 9 MG/DL — SIGNIFICANT CHANGE UP (ref 7–23)
CALCIUM SERPL-MCNC: 8.4 MG/DL — SIGNIFICANT CHANGE UP (ref 8.4–10.5)
CHLORIDE SERPL-SCNC: 103 MMOL/L — SIGNIFICANT CHANGE UP (ref 98–107)
CO2 SERPL-SCNC: 29 MMOL/L — SIGNIFICANT CHANGE UP (ref 22–31)
CREAT SERPL-MCNC: 0.5 MG/DL — SIGNIFICANT CHANGE UP (ref 0.5–1.3)
GLUCOSE BLDC GLUCOMTR-MCNC: 149 MG/DL — HIGH (ref 70–99)
GLUCOSE BLDC GLUCOMTR-MCNC: 169 MG/DL — HIGH (ref 70–99)
GLUCOSE BLDC GLUCOMTR-MCNC: 178 MG/DL — HIGH (ref 70–99)
GLUCOSE BLDC GLUCOMTR-MCNC: 181 MG/DL — HIGH (ref 70–99)
GLUCOSE BLDC GLUCOMTR-MCNC: 184 MG/DL — HIGH (ref 70–99)
GLUCOSE SERPL-MCNC: 187 MG/DL — HIGH (ref 70–99)
HCT VFR BLD CALC: 27.2 % — LOW (ref 34.5–45)
HGB BLD-MCNC: 9.1 G/DL — LOW (ref 11.5–15.5)
INR BLD: 1.08 — SIGNIFICANT CHANGE UP (ref 0.88–1.17)
MAGNESIUM SERPL-MCNC: 2 MG/DL — SIGNIFICANT CHANGE UP (ref 1.6–2.6)
MCHC RBC-ENTMCNC: 30.4 PG — SIGNIFICANT CHANGE UP (ref 27–34)
MCHC RBC-ENTMCNC: 33.5 % — SIGNIFICANT CHANGE UP (ref 32–36)
MCV RBC AUTO: 91 FL — SIGNIFICANT CHANGE UP (ref 80–100)
NRBC # FLD: 0 — SIGNIFICANT CHANGE UP
PHOSPHATE SERPL-MCNC: 1.8 MG/DL — LOW (ref 2.5–4.5)
PLATELET # BLD AUTO: 138 K/UL — LOW (ref 150–400)
PMV BLD: 11.2 FL — SIGNIFICANT CHANGE UP (ref 7–13)
POTASSIUM SERPL-MCNC: 3.6 MMOL/L — SIGNIFICANT CHANGE UP (ref 3.5–5.3)
POTASSIUM SERPL-SCNC: 3.6 MMOL/L — SIGNIFICANT CHANGE UP (ref 3.5–5.3)
PROTHROM AB SERPL-ACNC: 12.1 SEC — SIGNIFICANT CHANGE UP (ref 9.8–13.1)
RBC # BLD: 2.99 M/UL — LOW (ref 3.8–5.2)
RBC # FLD: 13.9 % — SIGNIFICANT CHANGE UP (ref 10.3–14.5)
SODIUM SERPL-SCNC: 141 MMOL/L — SIGNIFICANT CHANGE UP (ref 135–145)
WBC # BLD: 7.89 K/UL — SIGNIFICANT CHANGE UP (ref 3.8–10.5)
WBC # FLD AUTO: 7.89 K/UL — SIGNIFICANT CHANGE UP (ref 3.8–10.5)

## 2017-11-10 RX ORDER — POTASSIUM PHOSPHATE, MONOBASIC POTASSIUM PHOSPHATE, DIBASIC 236; 224 MG/ML; MG/ML
15 INJECTION, SOLUTION INTRAVENOUS ONCE
Qty: 0 | Refills: 0 | Status: COMPLETED | OUTPATIENT
Start: 2017-11-10 | End: 2017-11-10

## 2017-11-10 RX ORDER — DEXTROSE MONOHYDRATE, SODIUM CHLORIDE, AND POTASSIUM CHLORIDE 50; .745; 4.5 G/1000ML; G/1000ML; G/1000ML
1000 INJECTION, SOLUTION INTRAVENOUS
Qty: 0 | Refills: 0 | Status: DISCONTINUED | OUTPATIENT
Start: 2017-11-10 | End: 2017-11-11

## 2017-11-10 RX ORDER — POTASSIUM CHLORIDE 20 MEQ
10 PACKET (EA) ORAL
Qty: 0 | Refills: 0 | Status: COMPLETED | OUTPATIENT
Start: 2017-11-10 | End: 2017-11-10

## 2017-11-10 RX ORDER — INSULIN LISPRO 100/ML
VIAL (ML) SUBCUTANEOUS
Qty: 0 | Refills: 0 | Status: DISCONTINUED | OUTPATIENT
Start: 2017-11-10 | End: 2017-11-13

## 2017-11-10 RX ADMIN — SERTRALINE 50 MILLIGRAM(S): 25 TABLET, FILM COATED ORAL at 13:08

## 2017-11-10 RX ADMIN — HEPARIN SODIUM 5000 UNIT(S): 5000 INJECTION INTRAVENOUS; SUBCUTANEOUS at 05:18

## 2017-11-10 RX ADMIN — Medication 1: at 06:57

## 2017-11-10 RX ADMIN — PANTOPRAZOLE SODIUM 40 MILLIGRAM(S): 20 TABLET, DELAYED RELEASE ORAL at 13:08

## 2017-11-10 RX ADMIN — BUDESONIDE AND FORMOTEROL FUMARATE DIHYDRATE 2 PUFF(S): 160; 4.5 AEROSOL RESPIRATORY (INHALATION) at 09:05

## 2017-11-10 RX ADMIN — DEXTROSE MONOHYDRATE, SODIUM CHLORIDE, AND POTASSIUM CHLORIDE 50 MILLILITER(S): 50; .745; 4.5 INJECTION, SOLUTION INTRAVENOUS at 09:44

## 2017-11-10 RX ADMIN — BUDESONIDE AND FORMOTEROL FUMARATE DIHYDRATE 2 PUFF(S): 160; 4.5 AEROSOL RESPIRATORY (INHALATION) at 21:46

## 2017-11-10 RX ADMIN — HEPARIN SODIUM 5000 UNIT(S): 5000 INJECTION INTRAVENOUS; SUBCUTANEOUS at 13:09

## 2017-11-10 RX ADMIN — Medication 2: at 17:30

## 2017-11-10 RX ADMIN — Medication 100 MILLIEQUIVALENT(S): at 09:44

## 2017-11-10 RX ADMIN — HEPARIN SODIUM 5000 UNIT(S): 5000 INJECTION INTRAVENOUS; SUBCUTANEOUS at 22:45

## 2017-11-10 RX ADMIN — POTASSIUM PHOSPHATE, MONOBASIC POTASSIUM PHOSPHATE, DIBASIC 62.5 MILLIMOLE(S): 236; 224 INJECTION, SOLUTION INTRAVENOUS at 13:09

## 2017-11-10 RX ADMIN — ONDANSETRON 4 MILLIGRAM(S): 8 TABLET, FILM COATED ORAL at 06:00

## 2017-11-10 RX ADMIN — Medication 100 MILLIEQUIVALENT(S): at 10:57

## 2017-11-10 RX ADMIN — Medication 1: at 00:10

## 2017-11-10 RX ADMIN — ONDANSETRON 4 MILLIGRAM(S): 8 TABLET, FILM COATED ORAL at 18:10

## 2017-11-10 NOTE — PROGRESS NOTE ADULT - SUBJECTIVE AND OBJECTIVE BOX
Anesthesia Pain Management Service- Attending Addendum    SUBJECTIVE: Pt doing well with PCEA without problems reported.    Therapy:	  [ ] IV PCA	   [ X] Epidural           [ ] s/p Spinal Opoid              [ ] Postpartum infusion	  [ ] Patient controlled regional anesthesia (PCRA)    [ ] prn Analgesics    Allergies    No Known Drug Allergies  SteriStrips (Blisters)    Intolerances      MEDICATIONS  (STANDING):  buDESOnide 160 MICROgram(s)/formoterol 4.5 MICROgram(s) Inhaler 2 Puff(s) Inhalation two times a day  dextrose 5% + sodium chloride 0.45% with potassium chloride 20 mEq/L 1000 milliLiter(s) (50 mL/Hr) IV Continuous <Continuous>  heparin  Injectable 5000 Unit(s) SubCutaneous every 8 hours  HYDROmorphone (10 MICROgram(s)/mL) + BUpivacaine 0.0625% in 0.9% Sodium Chloride PCEA 250 milliLiter(s) Epidural PCA Continuous  insulin lispro (HumaLOG) corrective regimen sliding scale   SubCutaneous three times a day before meals  pantoprazole  Injectable 40 milliGRAM(s) IV Push daily  sertraline 50 milliGRAM(s) Oral daily  traZODone 100 milliGRAM(s) Oral at bedtime    MEDICATIONS  (PRN):  ALBUTerol    90 MICROgram(s) HFA Inhaler 2 Puff(s) Inhalation every 6 hours PRN Shortness of Breath and/or Wheezing  butorphanol Injectable 0.25 milliGRAM(s) IV Push every 6 hours PRN Pruritus  HYDROmorphone  Injectable 0.5 milliGRAM(s) IV Push every 3 hours PRN Severe Pain unrelieved by PCEA  HYDROmorphone (10 MICROgram(s)/mL) + BUpivacaine 0.0625% in 0.9% Sodium Chloride PCEA Rescue Clinician  Bolus 5 milliLiter(s) Epidural every 30 minutes PRN for Pain Score greater than 6  naloxone Injectable 0.1 milliGRAM(s) IV Push every 3 minutes PRN For ANY of the following changes in patient status:  A. RR LESS THAN 10 breaths per minute, B. Oxygen saturation LESS THAN 90%, C. Sedation score of 6  ondansetron Injectable 4 milliGRAM(s) IV Push every 6 hours PRN Nausea  promethazine IVPB 6.25 milliGRAM(s) IV Intermittent once PRN Nausea and/or Vomiting      OBJECTIVE:   [X] No new signs     [ ] Other:    Side Effects:  [X ] None			[ ] Other:    Assessment of Catheter Site:		[ X] Intact		[ ] Other:    ASSESSMENT/PLAN  [ X] Continue current therapy    [ ] Therapy changed to:    [ ] IV PCA       [ ] Epidural     [ ] prn Analgesics     Comments: Continue current PCEA settings.

## 2017-11-10 NOTE — PROGRESS NOTE ADULT - SUBJECTIVE AND OBJECTIVE BOX
Surgery Team D (Surgery Oncology) Progress Note:    Hospital Day: 4  Post-Operative Day: 3 s/p J-tube placement, Whipple procedure, Dx lap, liver biopsy    Subjective:  No acute overnight events. This AM the patient was examined at bedside, spitting up mucous and complaining of feeling nauseated.  NGT intact.  Had 2 "large" bms today (+/+), will advance TF to goal.    Objective:  T(C): 36.9 (11-10-17 @ 12:30), Max: 37.2 (11-09-17 @ 20:16)  HR: 75 (11-10-17 @ 12:30) (68 - 77)  BP: 139/65 (11-10-17 @ 12:30) (139/65 - 168/74)  RR: 18 (11-10-17 @ 12:30) (18 - 24)  SpO2: 94% (11-10-17 @ 12:30) (94% - 98%)    Labs:                      9.1    7.89  )-----------( 138      ( 10 Nov 2017 06:00 )             27.2       11-10    141  |  103  |  9   ----------------------------<  187<H>  3.6   |  29  |  0.50    Ca    8.4      10 Nov 2017 06:00  Phos  1.8     11-10  Mg     2.0     11-10    TPro  5.5<L>  /  Alb  3.1<L>  /  TBili  0.9  /  DBili  x   /  AST  202<H>  /  ALT  231<H>  /  AlkPhos  63  11-09      I&O's Detail    09 Nov 2017 07:01  -  10 Nov 2017 07:00  --------------------------------------------------------  IN:    Glucerna: 200 mL    lactated ringers.: 250 mL    lactated ringers.: 1100 mL    TwoCal HN: 560 mL  Total IN: 2110 mL    OUT:    Bulb: 60 mL    Bulb: 300 mL    Indwelling Catheter - Urethral: 1050 mL    Nasoenteral Tube: 425 mL  Total OUT: 1835 mL    Total NET: 275 mL      10 Nov 2017 07:01  -  10 Nov 2017 16:05  --------------------------------------------------------  IN:  Total IN: 0 mL    OUT:    Bulb: 30 mL    Bulb: 90 mL  Total OUT: 120 mL    Total NET: -120 mL    Focused Physical Exam:  General: NAD  Respiratory: Nonlabored breathing  Abdomen: soft, nt nd    Medications:  ALBUTerol    90 MICROgram(s) HFA Inhaler 2 Puff(s) Inhalation every 6 hours PRN  buDESOnide 160 MICROgram(s)/formoterol 4.5 MICROgram(s) Inhaler 2 Puff(s) Inhalation two times a day  butorphanol Injectable 0.25 milliGRAM(s) IV Push every 6 hours PRN  dextrose 5% + sodium chloride 0.45% with potassium chloride 20 mEq/L 1000 milliLiter(s) IV Continuous <Continuous>  heparin  Injectable 5000 Unit(s) SubCutaneous every 8 hours  HYDROmorphone  Injectable 0.5 milliGRAM(s) IV Push every 3 hours PRN  HYDROmorphone (10 MICROgram(s)/mL) + BUpivacaine 0.0625% in 0.9% Sodium Chloride PCEA 250 milliLiter(s) Epidural PCA Continuous  HYDROmorphone (10 MICROgram(s)/mL) + BUpivacaine 0.0625% in 0.9% Sodium Chloride PCEA Rescue Clinician  Bolus 5 milliLiter(s) Epidural every 30 minutes PRN  insulin lispro (HumaLOG) corrective regimen sliding scale   SubCutaneous three times a day before meals  naloxone Injectable 0.1 milliGRAM(s) IV Push every 3 minutes PRN  ondansetron Injectable 4 milliGRAM(s) IV Push every 6 hours PRN  pantoprazole  Injectable 40 milliGRAM(s) IV Push daily  promethazine IVPB 6.25 milliGRAM(s) IV Intermittent once PRN  sertraline 50 milliGRAM(s) Oral daily  traZODone 100 milliGRAM(s) Oral at bedtime

## 2017-11-10 NOTE — PROGRESS NOTE ADULT - SUBJECTIVE AND OBJECTIVE BOX
Patient seen this afternoon, still on NGT suction ,recovering from recent whipples for pancreatic adenoca.    GENERAL: NAD, well-developed appears comfortable  NGT in place  HEAD:  Atraumatic, Normocephalic  EYES: EOMI, PERRLA, conjunctiva and sclera clear  NECK: Supple, No JVD  CHEST/LUNG: decreased breath sounds at bases   HEART: S1S2; No rubs, or gallops, no murmurs  ABDOMEN: tender  no rebound sutures noted with drains   EXTREMITIES:  Peripheral Pulses, No clubbing or cyanosis, no edema  PSYCH: AO x 3,   NEUROLOGY: Alert, no focal motor or sensory deficits  SKIN: No rashes or lesions      Vital Signs Last 24 Hrs  T(C): 36.9 (10 Nov 2017 16:50), Max: 37.1 (10 Nov 2017 00:00)  T(F): 98.5 (10 Nov 2017 16:50), Max: 98.7 (10 Nov 2017 00:00)  HR: 74 (10 Nov 2017 16:50) (68 - 75)  BP: 151/70 (10 Nov 2017 16:50) (139/65 - 168/74)  BP(mean): --  RR: 18 (10 Nov 2017 16:50) (18 - 20)  SpO2: 93% (10 Nov 2017 16:50) (93% - 98%)                          9.1    7.89  )-----------( 138      ( 10 Nov 2017 06:00 )             27.2       11-10    141  |  103  |  9   ----------------------------<  187<H>  3.6   |  29  |  0.50    Ca    8.4      10 Nov 2017 06:00  Phos  1.8     11-10  Mg     2.0     11-10    TPro  5.5<L>  /  Alb  3.1<L>  /  TBili  0.9  /  DBili  x   /  AST  202<H>  /  ALT  231<H>  /  AlkPhos  63  11-09          PT/INR - ( 10 Nov 2017 06:00 )   PT: 12.1 SEC;   INR: 1.08          PTT - ( 10 Nov 2017 06:00 )  PTT:25.7 SEC    diagnosis           Vital Signs Last 24 Hrs  T(C): 36.9 (10 Nov 2017 16:50), Max: 37.1 (10 Nov 2017 00:00)  T(F): 98.5 (10 Nov 2017 16:50), Max: 98.7 (10 Nov 2017 00:00)  HR: 74 (10 Nov 2017 16:50) (68 - 75)  BP: 151/70 (10 Nov 2017 16:50) (139/65 - 168/74)  BP(mean): --  RR: 18 (10 Nov 2017 16:50) (18 - 20)  SpO2: 93% (10 Nov 2017 16:50) (93% - 98%)                          9.1    7.89  )-----------( 138      ( 10 Nov 2017 06:00 )             27.2       11-10    141  |  103  |  9   ----------------------------<  187<H>  3.6   |  29  |  0.50    Ca    8.4      10 Nov 2017 06:00  Phos  1.8     11-10  Mg     2.0     11-10    TPro  5.5<L>  /  Alb  3.1<L>  /  TBili  0.9  /  DBili  x   /  AST  202<H>  /  ALT  231<H>  /  AlkPhos  63  11-09          PT/INR - ( 10 Nov 2017 06:00 )   PT: 12.1 SEC;   INR: 1.08          PTT - ( 10 Nov 2017 06:00 )  PTT:25.7 SEC    diagnosis

## 2017-11-10 NOTE — PROGRESS NOTE ADULT - SUBJECTIVE AND OBJECTIVE BOX
Patient is a 71y old  Female who presents with a chief complaint of "pancreatic cancer"    SUBJECTIVE / OVERNIGHT EVENTS: No nausea, vomiting or diarrhea, no fever or chills, no dizziness or chest pain, no dysuria or hematuria, no joint pain or swelling  resting comfortably    MEDICATIONS  (STANDING):  buDESOnide 160 MICROgram(s)/formoterol 4.5 MICROgram(s) Inhaler 2 Puff(s) Inhalation two times a day  dextrose 5% + sodium chloride 0.45% with potassium chloride 20 mEq/L 1000 milliLiter(s) (50 mL/Hr) IV Continuous <Continuous>  heparin  Injectable 5000 Unit(s) SubCutaneous every 8 hours  HYDROmorphone (10 MICROgram(s)/mL) + BUpivacaine 0.0625% in 0.9% Sodium Chloride PCEA 250 milliLiter(s) Epidural PCA Continuous  insulin lispro (HumaLOG) corrective regimen sliding scale   SubCutaneous three times a day before meals  pantoprazole  Injectable 40 milliGRAM(s) IV Push daily  potassium phosphate IVPB 15 milliMole(s) IV Intermittent once  sertraline 50 milliGRAM(s) Oral daily  traZODone 100 milliGRAM(s) Oral at bedtime    MEDICATIONS  (PRN):  ALBUTerol    90 MICROgram(s) HFA Inhaler 2 Puff(s) Inhalation every 6 hours PRN Shortness of Breath and/or Wheezing  butorphanol Injectable 0.25 milliGRAM(s) IV Push every 6 hours PRN Pruritus  HYDROmorphone  Injectable 0.5 milliGRAM(s) IV Push every 3 hours PRN Severe Pain unrelieved by PCEA  HYDROmorphone (10 MICROgram(s)/mL) + BUpivacaine 0.0625% in 0.9% Sodium Chloride PCEA Rescue Clinician  Bolus 5 milliLiter(s) Epidural every 30 minutes PRN for Pain Score greater than 6  naloxone Injectable 0.1 milliGRAM(s) IV Push every 3 minutes PRN For ANY of the following changes in patient status:  A. RR LESS THAN 10 breaths per minute, B. Oxygen saturation LESS THAN 90%, C. Sedation score of 6  ondansetron Injectable 4 milliGRAM(s) IV Push every 6 hours PRN Nausea  promethazine IVPB 6.25 milliGRAM(s) IV Intermittent once PRN Nausea and/or Vomiting        CAPILLARY BLOOD GLUCOSE      POCT Blood Glucose.: 184 mg/dL (10 Nov 2017 06:24)  POCT Blood Glucose.: 169 mg/dL (10 Nov 2017 00:08)  POCT Blood Glucose.: 160 mg/dL (09 Nov 2017 18:17)  POCT Blood Glucose.: 149 mg/dL (09 Nov 2017 11:27)    I&O's Summary    09 Nov 2017 07:01  -  10 Nov 2017 07:00  --------------------------------------------------------  IN: 2110 mL / OUT: 1835 mL / NET: 275 mL    10 Nov 2017 07:01  -  10 Nov 2017 11:17  --------------------------------------------------------  IN: 0 mL / OUT: 50 mL / NET: -50 mL    GENERAL: NAD, well-developed appears comfortable  NGT in place  HEAD:  Atraumatic, Normocephalic  EYES: EOMI, PERRLA, conjunctiva and sclera clear  NECK: Supple, No JVD  CHEST/LUNG: decreased breath sounds at bases   HEART: S1S2; No rubs, or gallops, no murmurs  ABDOMEN: tender  no rebound sutures noted with drains   EXTREMITIES:  Peripheral Pulses, No clubbing or cyanosis, no edema  PSYCH: AO x 3,   NEUROLOGY: Alert, no focal motor or sensory deficits  SKIN: No rashes or lesions    LABS:                        9.1    7.89  )-----------( 138      ( 10 Nov 2017 06:00 )             27.2     11-10    141  |  103  |  9   ----------------------------<  187<H>  3.6   |  29  |  0.50    Ca    8.4      10 Nov 2017 06:00  Phos  1.8     11-10  Mg     2.0     11-10    TPro  5.5<L>  /  Alb  3.1<L>  /  TBili  0.9  /  DBili  x   /  AST  202<H>  /  ALT  231<H>  /  AlkPhos  63  11-09    PT/INR - ( 10 Nov 2017 06:00 )   PT: 12.1 SEC;   INR: 1.08          PTT - ( 10 Nov 2017 06:00 )  PTT:25.7 SEC            Consultant(s) Notes Reviewed:      Care Discussed with Consultants/Other Providers:    Contact Number, Dr Peck [phone]

## 2017-11-10 NOTE — PROGRESS NOTE ADULT - ASSESSMENT
Assessment and Recommendation:   · Assessment		  71 year old lady with history of pancreatic adenocarcinoma diagnosed 2016 s/p neoadjuvant chemotherapy followed by RT. Had wedge resection of lung which was negative for metastasis. Had whipples procedure with negative margins on frozen. Recovering well from her surgery.On NGT suction.      Problem/Recommendation - 1:  Problem: Malignant neoplasm of pancreas, unspecified location of malignancy. Recommendation: Recovering well post whipples.Will follow the pathology staging to decide about adjuvant chemotherapy. Surgery followup.    Problem/Recommendation - 2:  ·  Problem: Persistent asthma, unspecified asthma severity, unspecified whether complicated.  Recommendation: Continue current medications. At present stable without any evidence of worsening.     Problem/Recommendation - 3:  ·  Problem: Hypertension, unspecified type.  Recommendation: Care per medicine.     Problem/Recommendation - 4:Problem: Malignant neoplasm of right breast in male, estrogen receptor negative, unspecified site of breast.  Recommendation: Patient with remote history of right breast cancer. Exam normal, yearly mammograms.

## 2017-11-10 NOTE — PROGRESS NOTE ADULT - SUBJECTIVE AND OBJECTIVE BOX
Surg Onc    + BM  AVSS  WBC nl BS increased  TF at 30    Exam    abd soft, NT/ND, incision clean, JPs serosang, NGT bilious    A/P    POD 3  Stable  NPO/NGT  Cont TF at 30  OOB/IS  Glucose control  Replete PO4

## 2017-11-10 NOTE — PROGRESS NOTE ADULT - SUBJECTIVE AND OBJECTIVE BOX
Day _4_ of Anesthesia Pain Management Service    Allergies  No Known Drug Allergies  SteriStrips (Blisters)    SUBJECTIVE: "I'm doing ok. I did sit in the chair this morning."    Pain Scale Score	At rest: _4/10_ 	With Activity: ___ 	[  ] Refer to charted pain scores    THERAPY:  [X] Epidural Bupivacaine 0.0625% and Hydromorphone  		[X] 10 micrograms/mL	[ ] 5 micrograms/mL  [ ] Epidural Bupivacaine 0.0625% and Fentanyl - 2 micrograms/mL  [ ] Epidural Ropivacaine 0.1% plain – 1 mg/mL  [ ] Patient Controlled Regional Anesthesia (PCRA) Ropivacaine  		[ ] 0.2%			[ ] 0.1%    Demand dose _3mL_ lockout _15min_ (minutes) Continuous Rate _5mL_ Total: _1487ml_ Daily      MEDICATIONS  (STANDING):  buDESOnide 160 MICROgram(s)/formoterol 4.5 MICROgram(s) Inhaler 2 Puff(s) Inhalation two times a day  dextrose 5% + sodium chloride 0.45% with potassium chloride 20 mEq/L 1000 milliLiter(s) (50 mL/Hr) IV Continuous <Continuous>  heparin  Injectable 5000 Unit(s) SubCutaneous every 8 hours  HYDROmorphone (10 MICROgram(s)/mL) + BUpivacaine 0.0625% in 0.9% Sodium Chloride PCEA 250 milliLiter(s) Epidural PCA Continuous  insulin lispro (HumaLOG) corrective regimen sliding scale   SubCutaneous three times a day before meals  pantoprazole  Injectable 40 milliGRAM(s) IV Push daily  potassium phosphate IVPB 15 milliMole(s) IV Intermittent once  sertraline 50 milliGRAM(s) Oral daily  traZODone 100 milliGRAM(s) Oral at bedtime    MEDICATIONS  (PRN):  ALBUTerol    90 MICROgram(s) HFA Inhaler 2 Puff(s) Inhalation every 6 hours PRN Shortness of Breath and/or Wheezing  butorphanol Injectable 0.25 milliGRAM(s) IV Push every 6 hours PRN Pruritus  HYDROmorphone  Injectable 0.5 milliGRAM(s) IV Push every 3 hours PRN Severe Pain unrelieved by PCEA  HYDROmorphone (10 MICROgram(s)/mL) + BUpivacaine 0.0625% in 0.9% Sodium Chloride PCEA Rescue Clinician  Bolus 5 milliLiter(s) Epidural every 30 minutes PRN for Pain Score greater than 6  naloxone Injectable 0.1 milliGRAM(s) IV Push every 3 minutes PRN For ANY of the following changes in patient status:  A. RR LESS THAN 10 breaths per minute, B. Oxygen saturation LESS THAN 90%, C. Sedation score of 6  ondansetron Injectable 4 milliGRAM(s) IV Push every 6 hours PRN Nausea  promethazine IVPB 6.25 milliGRAM(s) IV Intermittent once PRN Nausea and/or Vomiting      OBJECTIVE: Patient A&Ox3, NAD, supine in bed.    Assessment of Catheter Site:	[ ] Left	[ ] Right  [X] Epidural 	[ ] Femoral	      [ ] Saphenous   [ ] Supraclavicular   [ ] Other:    [X] Dressing intact	[X] Site non-tender	[X] Site without erythema, discharge, edema  [X] Epidural tubing and connection checked	[X] Gross neurological exam within normal limits  [ ] Catheter removed – tip intact		    [X ] Temperatue:  _36.9_     Sedation Score:	[X] Alert	[ ] Drowsy	[ ] Arousable	[ ] Asleep	[ ] Unresponsive    Side Effects:	[X] None	[ ] Nausea	[ ] Vomiting	[ ] Pruritus  		[ ] Weakness		[ ] Numbness	[ ] Other:    PT/INR - ( 10 Nov 2017 06:00 )   PT: 12.1 SEC;   INR: 1.08     PTT - ( 10 Nov 2017 06:00 )  PTT:25.7 SEC                          9.1    7.89  )-----------( 138      ( 10 Nov 2017 06:00 )             27.2       11-10    141  |  103  |  9   ----------------------------<  187<H>  3.6   |  29  |  0.50    Ca    8.4      10 Nov 2017 06:00  Phos  1.8     11-10  Mg     2.0     11-10    TPro  5.5<L>  /  Alb  3.1<L>  /  TBili  0.9  /  DBili  x   /  AST  202<H>  /  ALT  231<H>  /  AlkPhos  63  11-09      ASSESSMENT/ PLAN:    Therapy to  be:	[X] Continue   [ ] Discontinued   [ ] Change to prn Analgesics    Documentation and Verification of current medications:  [X] Done	[ ] Not done, not eligible  [ ] Not done, reason not given    COMMENTS:  Remains NPO with NG Tube  Dressing reinforced.  No systemic anticoagulant sign placed above bed.

## 2017-11-10 NOTE — PROGRESS NOTE ADULT - ASSESSMENT
71y F POD 3 s/p Jejunostomy tube placement, Whipple procedure, diagnostic lap, liver biopsy, doing well; avss, +/+, abd soft, NT/ND, incision clean, JPs serosang.    -Advance tube feeds to goal  -Keep NGT  -OOB/IS

## 2017-11-11 LAB
APTT BLD: 23.5 SEC — LOW (ref 27.5–37.4)
BUN SERPL-MCNC: 9 MG/DL — SIGNIFICANT CHANGE UP (ref 7–23)
CALCIUM SERPL-MCNC: 8.3 MG/DL — LOW (ref 8.4–10.5)
CHLORIDE SERPL-SCNC: 101 MMOL/L — SIGNIFICANT CHANGE UP (ref 98–107)
CO2 SERPL-SCNC: 30 MMOL/L — SIGNIFICANT CHANGE UP (ref 22–31)
CREAT SERPL-MCNC: 0.49 MG/DL — LOW (ref 0.5–1.3)
GLUCOSE BLDC GLUCOMTR-MCNC: 141 MG/DL — HIGH (ref 70–99)
GLUCOSE BLDC GLUCOMTR-MCNC: 143 MG/DL — HIGH (ref 70–99)
GLUCOSE BLDC GLUCOMTR-MCNC: 146 MG/DL — HIGH (ref 70–99)
GLUCOSE BLDC GLUCOMTR-MCNC: 149 MG/DL — HIGH (ref 70–99)
GLUCOSE BLDC GLUCOMTR-MCNC: 160 MG/DL — HIGH (ref 70–99)
GLUCOSE SERPL-MCNC: 148 MG/DL — HIGH (ref 70–99)
HCT VFR BLD CALC: 28.3 % — LOW (ref 34.5–45)
HGB BLD-MCNC: 9.1 G/DL — LOW (ref 11.5–15.5)
INR BLD: 1.11 — SIGNIFICANT CHANGE UP (ref 0.88–1.17)
MAGNESIUM SERPL-MCNC: 1.9 MG/DL — SIGNIFICANT CHANGE UP (ref 1.6–2.6)
MCHC RBC-ENTMCNC: 29.6 PG — SIGNIFICANT CHANGE UP (ref 27–34)
MCHC RBC-ENTMCNC: 32.2 % — SIGNIFICANT CHANGE UP (ref 32–36)
MCV RBC AUTO: 92.2 FL — SIGNIFICANT CHANGE UP (ref 80–100)
NRBC # FLD: 0 — SIGNIFICANT CHANGE UP
PHOSPHATE SERPL-MCNC: 2.1 MG/DL — LOW (ref 2.5–4.5)
PLATELET # BLD AUTO: 169 K/UL — SIGNIFICANT CHANGE UP (ref 150–400)
PMV BLD: 10.6 FL — SIGNIFICANT CHANGE UP (ref 7–13)
POTASSIUM SERPL-MCNC: 3.6 MMOL/L — SIGNIFICANT CHANGE UP (ref 3.5–5.3)
POTASSIUM SERPL-SCNC: 3.6 MMOL/L — SIGNIFICANT CHANGE UP (ref 3.5–5.3)
PROTHROM AB SERPL-ACNC: 12.5 SEC — SIGNIFICANT CHANGE UP (ref 9.8–13.1)
RBC # BLD: 3.07 M/UL — LOW (ref 3.8–5.2)
RBC # FLD: 13.9 % — SIGNIFICANT CHANGE UP (ref 10.3–14.5)
SODIUM SERPL-SCNC: 140 MMOL/L — SIGNIFICANT CHANGE UP (ref 135–145)
WBC # BLD: 8.17 K/UL — SIGNIFICANT CHANGE UP (ref 3.8–10.5)
WBC # FLD AUTO: 8.17 K/UL — SIGNIFICANT CHANGE UP (ref 3.8–10.5)

## 2017-11-11 PROCEDURE — 71010: CPT | Mod: 26

## 2017-11-11 RX ORDER — OXYCODONE HYDROCHLORIDE 5 MG/1
5 TABLET ORAL EVERY 4 HOURS
Qty: 0 | Refills: 0 | Status: DISCONTINUED | OUTPATIENT
Start: 2017-11-11 | End: 2017-11-16

## 2017-11-11 RX ORDER — PANTOPRAZOLE SODIUM 20 MG/1
40 TABLET, DELAYED RELEASE ORAL
Qty: 0 | Refills: 0 | Status: DISCONTINUED | OUTPATIENT
Start: 2017-11-11 | End: 2017-11-16

## 2017-11-11 RX ORDER — SERTRALINE 25 MG/1
75 TABLET, FILM COATED ORAL AT BEDTIME
Qty: 0 | Refills: 0 | Status: DISCONTINUED | OUTPATIENT
Start: 2017-11-11 | End: 2017-11-16

## 2017-11-11 RX ORDER — SODIUM CHLORIDE 9 MG/ML
3 INJECTION INTRAMUSCULAR; INTRAVENOUS; SUBCUTANEOUS EVERY 8 HOURS
Qty: 0 | Refills: 0 | Status: DISCONTINUED | OUTPATIENT
Start: 2017-11-11 | End: 2017-11-14

## 2017-11-11 RX ORDER — ACETAMINOPHEN 500 MG
650 TABLET ORAL EVERY 6 HOURS
Qty: 0 | Refills: 0 | Status: DISCONTINUED | OUTPATIENT
Start: 2017-11-11 | End: 2017-11-16

## 2017-11-11 RX ORDER — ONDANSETRON 8 MG/1
4 TABLET, FILM COATED ORAL ONCE
Qty: 0 | Refills: 0 | Status: COMPLETED | OUTPATIENT
Start: 2017-11-11 | End: 2017-11-11

## 2017-11-11 RX ORDER — NALOXONE HYDROCHLORIDE 4 MG/.1ML
0.1 SPRAY NASAL
Qty: 0 | Refills: 0 | Status: DISCONTINUED | OUTPATIENT
Start: 2017-11-11 | End: 2017-11-11

## 2017-11-11 RX ORDER — MAGNESIUM SULFATE 500 MG/ML
1 VIAL (ML) INJECTION ONCE
Qty: 0 | Refills: 0 | Status: COMPLETED | OUTPATIENT
Start: 2017-11-11 | End: 2017-11-11

## 2017-11-11 RX ORDER — ACETAMINOPHEN 500 MG
650 TABLET ORAL EVERY 6 HOURS
Qty: 0 | Refills: 0 | Status: DISCONTINUED | OUTPATIENT
Start: 2017-11-11 | End: 2017-11-11

## 2017-11-11 RX ORDER — OXYCODONE HYDROCHLORIDE 5 MG/1
5 TABLET ORAL ONCE
Qty: 0 | Refills: 0 | Status: DISCONTINUED | OUTPATIENT
Start: 2017-11-11 | End: 2017-11-11

## 2017-11-11 RX ORDER — POTASSIUM PHOSPHATE, MONOBASIC POTASSIUM PHOSPHATE, DIBASIC 236; 224 MG/ML; MG/ML
15 INJECTION, SOLUTION INTRAVENOUS ONCE
Qty: 0 | Refills: 0 | Status: COMPLETED | OUTPATIENT
Start: 2017-11-11 | End: 2017-11-11

## 2017-11-11 RX ORDER — ENOXAPARIN SODIUM 100 MG/ML
40 INJECTION SUBCUTANEOUS DAILY
Qty: 0 | Refills: 0 | Status: DISCONTINUED | OUTPATIENT
Start: 2017-11-11 | End: 2017-11-16

## 2017-11-11 RX ORDER — HYDROMORPHONE HYDROCHLORIDE 2 MG/ML
30 INJECTION INTRAMUSCULAR; INTRAVENOUS; SUBCUTANEOUS
Qty: 0 | Refills: 0 | Status: DISCONTINUED | OUTPATIENT
Start: 2017-11-11 | End: 2017-11-11

## 2017-11-11 RX ORDER — HYDROMORPHONE HYDROCHLORIDE 2 MG/ML
0.5 INJECTION INTRAMUSCULAR; INTRAVENOUS; SUBCUTANEOUS
Qty: 0 | Refills: 0 | Status: DISCONTINUED | OUTPATIENT
Start: 2017-11-11 | End: 2017-11-11

## 2017-11-11 RX ORDER — TRAZODONE HCL 50 MG
100 TABLET ORAL AT BEDTIME
Qty: 0 | Refills: 0 | Status: DISCONTINUED | OUTPATIENT
Start: 2017-11-11 | End: 2017-11-16

## 2017-11-11 RX ORDER — ONDANSETRON 8 MG/1
4 TABLET, FILM COATED ORAL EVERY 6 HOURS
Qty: 0 | Refills: 0 | Status: DISCONTINUED | OUTPATIENT
Start: 2017-11-11 | End: 2017-11-11

## 2017-11-11 RX ADMIN — BUDESONIDE AND FORMOTEROL FUMARATE DIHYDRATE 2 PUFF(S): 160; 4.5 AEROSOL RESPIRATORY (INHALATION) at 11:53

## 2017-11-11 RX ADMIN — SODIUM CHLORIDE 3 MILLILITER(S): 9 INJECTION INTRAMUSCULAR; INTRAVENOUS; SUBCUTANEOUS at 13:12

## 2017-11-11 RX ADMIN — OXYCODONE HYDROCHLORIDE 5 MILLIGRAM(S): 5 TABLET ORAL at 20:30

## 2017-11-11 RX ADMIN — Medication 100 MILLIGRAM(S): at 21:31

## 2017-11-11 RX ADMIN — Medication 100 GRAM(S): at 15:32

## 2017-11-11 RX ADMIN — BUDESONIDE AND FORMOTEROL FUMARATE DIHYDRATE 2 PUFF(S): 160; 4.5 AEROSOL RESPIRATORY (INHALATION) at 21:31

## 2017-11-11 RX ADMIN — SERTRALINE 75 MILLIGRAM(S): 25 TABLET, FILM COATED ORAL at 21:31

## 2017-11-11 RX ADMIN — OXYCODONE HYDROCHLORIDE 5 MILLIGRAM(S): 5 TABLET ORAL at 19:47

## 2017-11-11 RX ADMIN — ONDANSETRON 4 MILLIGRAM(S): 8 TABLET, FILM COATED ORAL at 15:32

## 2017-11-11 RX ADMIN — SODIUM CHLORIDE 3 MILLILITER(S): 9 INJECTION INTRAMUSCULAR; INTRAVENOUS; SUBCUTANEOUS at 21:29

## 2017-11-11 RX ADMIN — ENOXAPARIN SODIUM 40 MILLIGRAM(S): 100 INJECTION SUBCUTANEOUS at 11:53

## 2017-11-11 RX ADMIN — OXYCODONE HYDROCHLORIDE 5 MILLIGRAM(S): 5 TABLET ORAL at 21:32

## 2017-11-11 RX ADMIN — POTASSIUM PHOSPHATE, MONOBASIC POTASSIUM PHOSPHATE, DIBASIC 62.5 MILLIMOLE(S): 236; 224 INJECTION, SOLUTION INTRAVENOUS at 13:17

## 2017-11-11 RX ADMIN — OXYCODONE HYDROCHLORIDE 5 MILLIGRAM(S): 5 TABLET ORAL at 22:30

## 2017-11-11 RX ADMIN — HEPARIN SODIUM 5000 UNIT(S): 5000 INJECTION INTRAVENOUS; SUBCUTANEOUS at 06:00

## 2017-11-11 RX ADMIN — Medication 2: at 18:04

## 2017-11-11 NOTE — PROGRESS NOTE ADULT - SUBJECTIVE AND OBJECTIVE BOX
Patient is a 71y old  Female who presents with a chief complaint of "pancreatic cancer" (25 Oct 2017 13:19)      SUBJECTIVE / OVERNIGHT EVENTS: No nausea, vomiting or diarrhea, no fever or chills, no dizziness or chest pain, no dysuria or hematuria, no joint pain or swelling  states feels a little better  was in bedside chair earlier now back in bed    MEDICATIONS  (STANDING):  buDESOnide 160 MICROgram(s)/formoterol 4.5 MICROgram(s) Inhaler 2 Puff(s) Inhalation two times a day  enoxaparin Injectable 40 milliGRAM(s) SubCutaneous daily  insulin lispro (HumaLOG) corrective regimen sliding scale   SubCutaneous three times a day before meals  pantoprazole    Tablet 40 milliGRAM(s) Oral before breakfast  sertraline 75 milliGRAM(s) Oral at bedtime  sodium chloride 0.9% lock flush 3 milliLiter(s) IV Push every 8 hours  traZODone 100 milliGRAM(s) Oral at bedtime    MEDICATIONS  (PRN):  acetaminophen   Tablet. 650 milliGRAM(s) Oral every 6 hours PRN Mild Pain (1 - 3)  ALBUTerol    90 MICROgram(s) HFA Inhaler 2 Puff(s) Inhalation every 6 hours PRN Shortness of Breath and/or Wheezing  oxyCODONE    IR 5 milliGRAM(s) Oral every 4 hours PRN Moderate Pain (4 - 6)        CAPILLARY BLOOD GLUCOSE      POCT Blood Glucose.: 143 mg/dL (11 Nov 2017 08:44)  POCT Blood Glucose.: 146 mg/dL (11 Nov 2017 06:04)  POCT Blood Glucose.: 141 mg/dL (11 Nov 2017 00:23)  POCT Blood Glucose.: 149 mg/dL (10 Nov 2017 22:04)  POCT Blood Glucose.: 178 mg/dL (10 Nov 2017 16:56)  POCT Blood Glucose.: 181 mg/dL (10 Nov 2017 12:04)    I&O's Summary    10 Nov 2017 07:01  -  11 Nov 2017 07:00  --------------------------------------------------------  IN: 1000 mL / OUT: 1595 mL / NET: -595 mL    11 Nov 2017 07:01  -  11 Nov 2017 10:45  --------------------------------------------------------  IN: 150 mL / OUT: 700 mL / NET: -550 mL    GENERAL: NAD, well-developed appears comfortable  NGT in place  HEAD:  Atraumatic, Normocephalic  EYES: EOMI, PERRLA, conjunctiva and sclera clear  NECK: Supple, No JVD  CHEST/LUNG: decreased breath sounds at bases   HEART: S1S2; No rubs, or gallops, no murmurs  ABDOMEN: tender  no rebound sutures noted with drains   EXTREMITIES:  Peripheral Pulses, No clubbing or cyanosis, no edema  PSYCH: AO x 3,   NEUROLOGY: Alert, no focal motor or sensory deficits  SKIN: No rashes or lesions    LABS:                        9.1    8.17  )-----------( 169      ( 11 Nov 2017 06:00 )             28.3     11-11    140  |  101  |  9   ----------------------------<  148<H>  3.6   |  30  |  0.49<L>    Ca    8.3<L>      11 Nov 2017 06:00  Phos  2.1     11-11  Mg     1.9     11-11      PT/INR - ( 11 Nov 2017 06:00 )   PT: 12.5 SEC;   INR: 1.11          PTT - ( 11 Nov 2017 06:00 )  PTT:23.5 SEC            Consultant(s) Notes Reviewed:      Care Discussed with Consultants/Other Providers:    Contact Number, Dr Peck [phone]

## 2017-11-11 NOTE — PROGRESS NOTE ADULT - SUBJECTIVE AND OBJECTIVE BOX
Surgery Team D (Surgery Oncology) Progress Note:    Hospital Day: 5  Post-Operative Day 4; s/p J-tube placement, Whipple procedure, Dx lap, liver biopsy    Subjective:  Overnight, the patient's feeding tube clogged.  At bedside this AM, the NGT was removed by the team and the feeding tube was fixed.  Patient was examined at bedside; continues to pitting up mucous and complaining of feeling nauseated.  NGT intact.  Had 2 "large" bms today (+/+), will advance TF to goal.    Objective:  T(C): 36.7 (11-11-17 @ 08:35), Max: 37.1 (11-10-17 @ 21:56)  HR: 59 (11-11-17 @ 08:35) (59 - 75)  BP: 150/69 (11-11-17 @ 08:35) (139/65 - 169/76)  RR: 18 (11-11-17 @ 08:35) (18 - 18)  SpO2: 96% (11-11-17 @ 08:35) (92% - 96%)    Labs:                      9.1    8.17  )-----------( 169      ( 11 Nov 2017 06:00 )             28.3       11-11    140  |  101  |  9   ----------------------------<  148<H>  3.6   |  30  |  0.49<L>    Ca    8.3<L>      11 Nov 2017 06:00  Phos  2.1     11-11  Mg     1.9     11-11        I&O's Detail    10 Nov 2017 07:01  -  11 Nov 2017 07:00  --------------------------------------------------------  IN:    dextrose 5% + sodium chloride 0.45% with potassium chloride 20 mEq/L: 600 mL    IV PiggyBack: 200 mL    TwoCal HN: 200 mL  Total IN: 1000 mL    OUT:    Bulb: 70 mL    Bulb: 145 mL    Indwelling Catheter - Urethral: 1130 mL    Nasoenteral Tube: 100 mL    Voided: 150 mL  Total OUT: 1595 mL    Total NET: -595 mL      11 Nov 2017 07:01  -  11 Nov 2017 10:09  --------------------------------------------------------  IN:  Total IN: 0 mL    OUT:    Bulb: 20 mL    Bulb: 30 mL    Indwelling Catheter - Urethral: 400 mL  Total OUT: 450 mL    Total NET: -450 mL    Focused Physical Exam:  General: NAD  Respiratory: Nonlabored breathing  Abdomen: soft, nt nd    Medications:  acetaminophen   Tablet. 650 milliGRAM(s) Oral every 6 hours PRN  ALBUTerol    90 MICROgram(s) HFA Inhaler 2 Puff(s) Inhalation every 6 hours PRN  buDESOnide 160 MICROgram(s)/formoterol 4.5 MICROgram(s) Inhaler 2 Puff(s) Inhalation two times a day  enoxaparin Injectable 40 milliGRAM(s) SubCutaneous daily  insulin lispro (HumaLOG) corrective regimen sliding scale   SubCutaneous three times a day before meals  oxyCODONE    IR 5 milliGRAM(s) Oral every 4 hours PRN  pantoprazole    Tablet 40 milliGRAM(s) Oral before breakfast  sertraline 75 milliGRAM(s) Oral at bedtime  sodium chloride 0.9% lock flush 3 milliLiter(s) IV Push every 8 hours  traZODone 100 milliGRAM(s) Oral at bedtime

## 2017-11-11 NOTE — PROGRESS NOTE ADULT - SUBJECTIVE AND OBJECTIVE BOX
Anesthesia Pain Management Service: Day _4_ of Epidural    SUBJECTIVE: Patient doing well with PCEA and no problems.  Pain Scale Score:   Refer to charted pain scores    THERAPY:  [x ] Epidural Bupivacaine 0.0625% and Hydromorphone  		[ ] 10 micrograms/mL	[ ] 5 micrograms/mL  [ ] Epidural Bupivacaine 0.0625% and Fentanyl - 2 micrograms/mL  [ ] Epidural Ropivacaine 0.1% plain – 1 mg/mL  [ ] Patient Controlled Regional Anesthesia (PCRA) Ropivacaine  		[ ] 0.2%			[ ] 0.1%    Demand dose __3_ lockout __15_ (minutes) Continuous Rate _8__    MEDICATIONS  (STANDING):  buDESOnide 160 MICROgram(s)/formoterol 4.5 MICROgram(s) Inhaler 2 Puff(s) Inhalation two times a day  enoxaparin Injectable 40 milliGRAM(s) SubCutaneous daily  insulin lispro (HumaLOG) corrective regimen sliding scale   SubCutaneous three times a day before meals  magnesium sulfate  IVPB 1 Gram(s) IV Intermittent once  pantoprazole    Tablet 40 milliGRAM(s) Oral before breakfast  sertraline 75 milliGRAM(s) Oral at bedtime  sodium chloride 0.9% lock flush 3 milliLiter(s) IV Push every 8 hours  traZODone 100 milliGRAM(s) Oral at bedtime    MEDICATIONS  (PRN):  acetaminophen   Tablet. 650 milliGRAM(s) Oral every 6 hours PRN Mild Pain (1 - 3)  ALBUTerol    90 MICROgram(s) HFA Inhaler 2 Puff(s) Inhalation every 6 hours PRN Shortness of Breath and/or Wheezing  oxyCODONE    IR 5 milliGRAM(s) Oral every 4 hours PRN Moderate Pain (4 - 6)      OBJECTIVE:    Assessment of Catheter Site:	[ ] Left	[ ] Right  [x ] Epidural 	[ ] Femoral	      [ ] Saphenous   [ ] Supraclavicular   [ ] Other:    [x ] Dressing intact	[x ] Site non-tender	[ x] Site without erythema, discharge, edema  [x ] Epidural tubing and connection checked	[x] Gross neurological exam within normal limits  [x ] Catheter removed – tip intact		[x ] Afebrile	[ ] Febrile: ___    PT/INR - ( 11 Nov 2017 06:00 )   PT: 12.5 SEC;   INR: 1.11          PTT - ( 11 Nov 2017 06:00 )  PTT:23.5 SEC                      9.1    8.17  )-----------( 169      ( 11 Nov 2017 06:00 )             28.3     Vital Signs Last 24 Hrs  T(C): 36.6 (11-11-17 @ 11:39), Max: 37.1 (11-10-17 @ 21:56)  T(F): 97.8 (11-11-17 @ 11:39), Max: 98.8 (11-10-17 @ 21:56)  HR: 84 (11-11-17 @ 11:39) (59 - 84)  BP: 145/64 (11-11-17 @ 11:39) (145/64 - 169/76)  BP(mean): --  RR: 18 (11-11-17 @ 11:39) (18 - 18)  SpO2: 95% (11-11-17 @ 11:39) (92% - 96%)      Sedation Score:	[x ] Alert	[ ] Drowsy	[ ] Arousable	[ ] Asleep	[ ] Unresponsive    Side Effects:	[x ] None	[ ] Nausea	[ ] Vomiting	[ ] Pruritus  		[ ] Weakness		[ ] Numbness	[ ] Other:    ASSESSMENT/ PLAN:    Therapy to  be:	[ ] Continue   [ x] Discontinued   [ x] Change to prn Analgesics    Documentation and Verification of current medications:  [ X ] Done	[ ] Not done, not eligible, reason:    Comments: Epidural discontinued, transitioned to IV PCA with hydromorphone

## 2017-11-11 NOTE — PROGRESS NOTE ADULT - SUBJECTIVE AND OBJECTIVE BOX
Patient seen this afternoon, NGT removed. Mild abdominal discomfort.  ROS:  Mil dabdominal discomfort  No Shortness of breath    Physical Exam:  GENERAL: NAD, well-developed appears comfortable  NGT removed  HEAD:  Atraumatic, Normocephalic  EYES: EOMI, PERRLA, conjunctiva and sclera clear  NECK: Supple, No JVD  CHEST/LUNG: decreased breath sounds at bases   HEART: S1S2; No rubs, or gallops, no murmurs  ABDOMEN: tender  no rebound sutures noted with drains   EXTREMITIES:  Peripheral Pulses, No clubbing or cyanosis, no edema  PSYCH: AO x 3,   NEUROLOGY: Alert, no focal motor or sensory deficits  SKIN: No rashes or lesions    Vital Signs Last 24 Hrs  T(C): 36.6 (11 Nov 2017 11:39), Max: 37.1 (10 Nov 2017 21:56)  T(F): 97.8 (11 Nov 2017 11:39), Max: 98.8 (10 Nov 2017 21:56)  HR: 84 (11 Nov 2017 11:39) (59 - 84)  BP: 145/64 (11 Nov 2017 11:39) (145/64 - 169/76)  BP(mean): --  RR: 18 (11 Nov 2017 11:39) (18 - 18)  SpO2: 95% (11 Nov 2017 11:39) (92% - 96%)                          9.1    8.17  )-----------( 169      ( 11 Nov 2017 06:00 )             28.3       11-11    140  |  101  |  9   ----------------------------<  148<H>  3.6   |  30  |  0.49<L>    Ca    8.3<L>      11 Nov 2017 06:00  Phos  2.1     11-11  Mg     1.9     11-11            PT/INR - ( 11 Nov 2017 06:00 )   PT: 12.5 SEC;   INR: 1.11          PTT - ( 11 Nov 2017 06:00 )  PTT:23.5 SEC    diagnosis

## 2017-11-11 NOTE — PROGRESS NOTE ADULT - ASSESSMENT
71y F POD 4 s/p Jejunostomy tube placement, Whipple procedure, diagnostic lap, liver biopsy, doing well; avss, +/+, abd soft, NT/ND, incision clean, JPs serosang.    -Advance tube feeds to goal (glucerna); monitor fsg  -OOB/IS

## 2017-11-11 NOTE — PROGRESS NOTE ADULT - ASSESSMENT
Assessment and Recommendation:   · Assessment		  71 year old lady with history of pancreatic adenocarcinoma diagnosed 2016 s/p neoadjuvant chemotherapy followed by RT. Had wedge resection of lung which was negative for metastasis. Had whipples procedure with negative margins on frozen. Recovering well from her surgery.On NGT suction.      Problem/Recommendation - 1:  Problem: Malignant neoplasm of pancreas, unspecified location of malignancy. Recommendation: Recovering well post whipples.Will follow the pathology staging to decide about adjuvant chemotherapy. Surgery followup. NGT removed.    Problem/Recommendation - 2:  ·  Problem: Persistent asthma, unspecified asthma severity, unspecified whether complicated.  Recommendation: Continue current medications. At present stable without any evidence of worsening.     Problem/Recommendation - 3:  ·  Problem: Hypertension, unspecified type.  Recommendation: Care per medicine.     Problem/Recommendation - 4:Problem: Malignant neoplasm of right breast in male, estrogen receptor negative, unspecified site of breast.  Recommendation: Patient with remote history of right breast cancer. Exam normal, yearly mammograms.

## 2017-11-12 DIAGNOSIS — R73.9 HYPERGLYCEMIA, UNSPECIFIED: ICD-10-CM

## 2017-11-12 LAB
APTT BLD: 23.3 SEC — LOW (ref 27.5–37.4)
BUN SERPL-MCNC: 8 MG/DL — SIGNIFICANT CHANGE UP (ref 7–23)
CALCIUM SERPL-MCNC: 7.9 MG/DL — LOW (ref 8.4–10.5)
CHLORIDE SERPL-SCNC: 101 MMOL/L — SIGNIFICANT CHANGE UP (ref 98–107)
CO2 SERPL-SCNC: 29 MMOL/L — SIGNIFICANT CHANGE UP (ref 22–31)
CREAT SERPL-MCNC: 0.44 MG/DL — LOW (ref 0.5–1.3)
GLUCOSE BLDC GLUCOMTR-MCNC: 126 MG/DL — HIGH (ref 70–99)
GLUCOSE BLDC GLUCOMTR-MCNC: 147 MG/DL — HIGH (ref 70–99)
GLUCOSE SERPL-MCNC: 136 MG/DL — HIGH (ref 70–99)
HCT VFR BLD CALC: 26.8 % — LOW (ref 34.5–45)
HGB BLD-MCNC: 8.9 G/DL — LOW (ref 11.5–15.5)
INR BLD: 1.07 — SIGNIFICANT CHANGE UP (ref 0.88–1.17)
MAGNESIUM SERPL-MCNC: 2.2 MG/DL — SIGNIFICANT CHANGE UP (ref 1.6–2.6)
MCHC RBC-ENTMCNC: 30.1 PG — SIGNIFICANT CHANGE UP (ref 27–34)
MCHC RBC-ENTMCNC: 33.2 % — SIGNIFICANT CHANGE UP (ref 32–36)
MCV RBC AUTO: 90.5 FL — SIGNIFICANT CHANGE UP (ref 80–100)
NRBC # FLD: 0.02 — SIGNIFICANT CHANGE UP
PHOSPHATE SERPL-MCNC: 3 MG/DL — SIGNIFICANT CHANGE UP (ref 2.5–4.5)
PLATELET # BLD AUTO: 192 K/UL — SIGNIFICANT CHANGE UP (ref 150–400)
PMV BLD: 11.1 FL — SIGNIFICANT CHANGE UP (ref 7–13)
POTASSIUM SERPL-MCNC: 3.6 MMOL/L — SIGNIFICANT CHANGE UP (ref 3.5–5.3)
POTASSIUM SERPL-SCNC: 3.6 MMOL/L — SIGNIFICANT CHANGE UP (ref 3.5–5.3)
PROTHROM AB SERPL-ACNC: 12 SEC — SIGNIFICANT CHANGE UP (ref 9.8–13.1)
RBC # BLD: 2.96 M/UL — LOW (ref 3.8–5.2)
RBC # FLD: 14.2 % — SIGNIFICANT CHANGE UP (ref 10.3–14.5)
SODIUM SERPL-SCNC: 139 MMOL/L — SIGNIFICANT CHANGE UP (ref 135–145)
WBC # BLD: 7.94 K/UL — SIGNIFICANT CHANGE UP (ref 3.8–10.5)
WBC # FLD AUTO: 7.94 K/UL — SIGNIFICANT CHANGE UP (ref 3.8–10.5)

## 2017-11-12 RX ORDER — ATORVASTATIN CALCIUM 80 MG/1
10 TABLET, FILM COATED ORAL AT BEDTIME
Qty: 0 | Refills: 0 | Status: DISCONTINUED | OUTPATIENT
Start: 2017-11-12 | End: 2017-11-16

## 2017-11-12 RX ORDER — HYDROCHLOROTHIAZIDE 25 MG
12.5 TABLET ORAL DAILY
Qty: 0 | Refills: 0 | Status: DISCONTINUED | OUTPATIENT
Start: 2017-11-12 | End: 2017-11-13

## 2017-11-12 RX ORDER — POTASSIUM CHLORIDE 20 MEQ
20 PACKET (EA) ORAL
Qty: 0 | Refills: 0 | Status: COMPLETED | OUTPATIENT
Start: 2017-11-12 | End: 2017-11-12

## 2017-11-12 RX ORDER — SIMETHICONE 80 MG/1
80 TABLET, CHEWABLE ORAL ONCE
Qty: 0 | Refills: 0 | Status: COMPLETED | OUTPATIENT
Start: 2017-11-12 | End: 2017-11-12

## 2017-11-12 RX ORDER — LOSARTAN POTASSIUM 100 MG/1
100 TABLET, FILM COATED ORAL DAILY
Qty: 0 | Refills: 0 | Status: DISCONTINUED | OUTPATIENT
Start: 2017-11-12 | End: 2017-11-16

## 2017-11-12 RX ORDER — HYDROCHLOROTHIAZIDE 25 MG
12.5 TABLET ORAL DAILY
Qty: 0 | Refills: 0 | Status: DISCONTINUED | OUTPATIENT
Start: 2017-11-12 | End: 2017-11-12

## 2017-11-12 RX ORDER — LOSARTAN POTASSIUM 100 MG/1
100 TABLET, FILM COATED ORAL DAILY
Qty: 0 | Refills: 0 | Status: DISCONTINUED | OUTPATIENT
Start: 2017-11-12 | End: 2017-11-12

## 2017-11-12 RX ORDER — OXYCODONE HYDROCHLORIDE 5 MG/1
10 TABLET ORAL EVERY 4 HOURS
Qty: 0 | Refills: 0 | Status: DISCONTINUED | OUTPATIENT
Start: 2017-11-12 | End: 2017-11-16

## 2017-11-12 RX ADMIN — OXYCODONE HYDROCHLORIDE 10 MILLIGRAM(S): 5 TABLET ORAL at 09:02

## 2017-11-12 RX ADMIN — SIMETHICONE 80 MILLIGRAM(S): 80 TABLET, CHEWABLE ORAL at 20:54

## 2017-11-12 RX ADMIN — SODIUM CHLORIDE 3 MILLILITER(S): 9 INJECTION INTRAMUSCULAR; INTRAVENOUS; SUBCUTANEOUS at 05:00

## 2017-11-12 RX ADMIN — BUDESONIDE AND FORMOTEROL FUMARATE DIHYDRATE 2 PUFF(S): 160; 4.5 AEROSOL RESPIRATORY (INHALATION) at 09:00

## 2017-11-12 RX ADMIN — OXYCODONE HYDROCHLORIDE 10 MILLIGRAM(S): 5 TABLET ORAL at 14:08

## 2017-11-12 RX ADMIN — OXYCODONE HYDROCHLORIDE 10 MILLIGRAM(S): 5 TABLET ORAL at 19:04

## 2017-11-12 RX ADMIN — SODIUM CHLORIDE 3 MILLILITER(S): 9 INJECTION INTRAMUSCULAR; INTRAVENOUS; SUBCUTANEOUS at 13:21

## 2017-11-12 RX ADMIN — PANTOPRAZOLE SODIUM 40 MILLIGRAM(S): 20 TABLET, DELAYED RELEASE ORAL at 05:01

## 2017-11-12 RX ADMIN — OXYCODONE HYDROCHLORIDE 10 MILLIGRAM(S): 5 TABLET ORAL at 13:16

## 2017-11-12 RX ADMIN — Medication 20 MILLIEQUIVALENT(S): at 13:32

## 2017-11-12 RX ADMIN — BUDESONIDE AND FORMOTEROL FUMARATE DIHYDRATE 2 PUFF(S): 160; 4.5 AEROSOL RESPIRATORY (INHALATION) at 21:01

## 2017-11-12 RX ADMIN — ENOXAPARIN SODIUM 40 MILLIGRAM(S): 100 INJECTION SUBCUTANEOUS at 12:29

## 2017-11-12 RX ADMIN — Medication 20 MILLIEQUIVALENT(S): at 16:11

## 2017-11-12 RX ADMIN — OXYCODONE HYDROCHLORIDE 10 MILLIGRAM(S): 5 TABLET ORAL at 02:07

## 2017-11-12 RX ADMIN — ATORVASTATIN CALCIUM 10 MILLIGRAM(S): 80 TABLET, FILM COATED ORAL at 21:00

## 2017-11-12 RX ADMIN — OXYCODONE HYDROCHLORIDE 10 MILLIGRAM(S): 5 TABLET ORAL at 19:46

## 2017-11-12 RX ADMIN — LOSARTAN POTASSIUM 100 MILLIGRAM(S): 100 TABLET, FILM COATED ORAL at 13:32

## 2017-11-12 RX ADMIN — OXYCODONE HYDROCHLORIDE 10 MILLIGRAM(S): 5 TABLET ORAL at 02:55

## 2017-11-12 RX ADMIN — OXYCODONE HYDROCHLORIDE 10 MILLIGRAM(S): 5 TABLET ORAL at 09:52

## 2017-11-12 RX ADMIN — SODIUM CHLORIDE 3 MILLILITER(S): 9 INJECTION INTRAMUSCULAR; INTRAVENOUS; SUBCUTANEOUS at 21:01

## 2017-11-12 RX ADMIN — Medication 12.5 MILLIGRAM(S): at 13:28

## 2017-11-12 RX ADMIN — SERTRALINE 75 MILLIGRAM(S): 25 TABLET, FILM COATED ORAL at 21:00

## 2017-11-12 RX ADMIN — Medication 100 MILLIGRAM(S): at 21:01

## 2017-11-12 NOTE — PROGRESS NOTE ADULT - SUBJECTIVE AND OBJECTIVE BOX
D TEAM SURGERY DAILY PROGRESS NOTE:    S: Patient seen and examined. She was hypertensive overnight, so her home dose of hydrochlorothiazide and losartan were added. She says she is feeling much better. She is having a clear liquid diet with tube feeds. She is positive for flatus and bowel movements.    O:  Vital Signs Last 24 Hrs  T(C): 36.8 (12 Nov 2017 12:07), Max: 37 (11 Nov 2017 19:46)  T(F): 98.3 (12 Nov 2017 12:07), Max: 98.6 (11 Nov 2017 19:46)  HR: 65 (12 Nov 2017 12:07) (60 - 73)  BP: 165/74 (12 Nov 2017 12:07) (151/63 - 180/80)  BP(mean): --  RR: 19 (12 Nov 2017 12:07) (18 - 19)  SpO2: 99% (12 Nov 2017 12:07) (96% - 99%)    I&O's Detail    11 Nov 2017 07:01  -  12 Nov 2017 07:00  --------------------------------------------------------  IN:    Glucerna: 1100 mL    IV PiggyBack: 350 mL    Oral Fluid: 200 mL  Total IN: 1650 mL    OUT:    Bulb: 237 mL    Bulb: 98.5 mL    Indwelling Catheter - Urethral: 1600 mL    Voided: 600 mL  Total OUT: 2535.5 mL    Total NET: -885.5 mL      12 Nov 2017 07:01  -  12 Nov 2017 12:17  --------------------------------------------------------  IN:    Free Water: 150 mL    Glucerna: 300 mL    Oral Fluid: 200 mL  Total IN: 650 mL    OUT:    Bulb: 50 mL    Bulb: 90 mL    Voided: 800 mL  Total OUT: 940 mL    Total NET: -290 mL    MEDICATIONS  (STANDING):  atorvastatin 10 milliGRAM(s) Oral at bedtime  buDESOnide 160 MICROgram(s)/formoterol 4.5 MICROgram(s) Inhaler 2 Puff(s) Inhalation two times a day  enoxaparin Injectable 40 milliGRAM(s) SubCutaneous daily  hydrochlorothiazide 12.5 milliGRAM(s) Oral daily  insulin lispro (HumaLOG) corrective regimen sliding scale   SubCutaneous three times a day before meals  losartan 100 milliGRAM(s) Oral daily  pantoprazole    Tablet 40 milliGRAM(s) Oral before breakfast  potassium chloride    Tablet ER 20 milliEquivalent(s) Oral every 2 hours  sertraline 75 milliGRAM(s) Oral at bedtime  sodium chloride 0.9% lock flush 3 milliLiter(s) IV Push every 8 hours  traZODone 100 milliGRAM(s) Oral at bedtime    MEDICATIONS  (PRN):  acetaminophen   Tablet. 650 milliGRAM(s) Oral every 6 hours PRN Mild Pain (1 - 3)  ALBUTerol    90 MICROgram(s) HFA Inhaler 2 Puff(s) Inhalation every 6 hours PRN Shortness of Breath and/or Wheezing  oxyCODONE    IR 10 milliGRAM(s) Oral every 4 hours PRN Severe Pain (7 - 10)  oxyCODONE    IR 5 milliGRAM(s) Oral every 4 hours PRN Moderate Pain (4 - 6)                        8.9    7.94  )-----------( 192      ( 12 Nov 2017 06:10 )             26.8     11-12    139  |  101  |  8   ----------------------------<  136<H>  3.6   |  29  |  0.44<L>    Ca    7.9<L>      12 Nov 2017 06:10  Phos  3.0     11-12  Mg     2.2     11-12    Physical Exam:  Gen: Laying in bed, NAD, alert and oriented.   Resp: Unlabored breathing  Abd: softly distended, non-tender, midline incision well-approximated with staples, R SHAYY serous, L SHAYY serous, J tube in place, Glucerna running    Lines:   IV: patent, in place.

## 2017-11-12 NOTE — PROGRESS NOTE ADULT - SUBJECTIVE AND OBJECTIVE BOX
Patient is a 71y old  Female who presents with a chief complaint of "pancreatic cancer" (25 Oct 2017 13:19)      SUBJECTIVE / OVERNIGHT EVENTS: No nausea, vomiting or diarrhea, no fever or chills, no dizziness or chest pain, no dysuria or hematuria, no joint pain or swelling    MEDICATIONS  (STANDING):  atorvastatin 10 milliGRAM(s) Oral at bedtime  buDESOnide 160 MICROgram(s)/formoterol 4.5 MICROgram(s) Inhaler 2 Puff(s) Inhalation two times a day  enoxaparin Injectable 40 milliGRAM(s) SubCutaneous daily  hydrochlorothiazide 12.5 milliGRAM(s) Oral daily  insulin lispro (HumaLOG) corrective regimen sliding scale   SubCutaneous three times a day before meals  losartan 100 milliGRAM(s) Oral daily  pantoprazole    Tablet 40 milliGRAM(s) Oral before breakfast  potassium chloride    Tablet ER 20 milliEquivalent(s) Oral every 2 hours  sertraline 75 milliGRAM(s) Oral at bedtime  sodium chloride 0.9% lock flush 3 milliLiter(s) IV Push every 8 hours  traZODone 100 milliGRAM(s) Oral at bedtime    MEDICATIONS  (PRN):  acetaminophen   Tablet. 650 milliGRAM(s) Oral every 6 hours PRN Mild Pain (1 - 3)  ALBUTerol    90 MICROgram(s) HFA Inhaler 2 Puff(s) Inhalation every 6 hours PRN Shortness of Breath and/or Wheezing  oxyCODONE    IR 10 milliGRAM(s) Oral every 4 hours PRN Severe Pain (7 - 10)  oxyCODONE    IR 5 milliGRAM(s) Oral every 4 hours PRN Moderate Pain (4 - 6)        CAPILLARY BLOOD GLUCOSE  157 (11 Nov 2017 21:38)      POCT Blood Glucose.: 147 mg/dL (12 Nov 2017 12:20)  POCT Blood Glucose.: 126 mg/dL (12 Nov 2017 08:19)  POCT Blood Glucose.: 160 mg/dL (11 Nov 2017 18:01)    I&O's Summary    11 Nov 2017 07:01  -  12 Nov 2017 07:00  --------------------------------------------------------  IN: 1650 mL / OUT: 2535.5 mL / NET: -885.5 mL    12 Nov 2017 07:01  -  12 Nov 2017 12:22  --------------------------------------------------------  IN: 650 mL / OUT: 940 mL / NET: -290 mL    GENERAL: NAD, well-developed appears comfortable  HEAD:  Atraumatic, Normocephalic  EYES: EOMI, PERRLA, conjunctiva and sclera clear  NECK: Supple, No JVD  CHEST/LUNG: decreased breath sounds at bases   HEART: S1S2; No rubs, or gallops, no murmurs  ABDOMEN: tenderness less  no rebound, sutures noted with drains   EXTREMITIES:  Peripheral Pulses, No clubbing or cyanosis, no edema  PSYCH: AO x 3,   NEUROLOGY: Alert, no focal motor or sensory deficits  SKIN: No rashes or lesions      LABS:                        8.9    7.94  )-----------( 192      ( 12 Nov 2017 06:10 )             26.8     11-12    139  |  101  |  8   ----------------------------<  136<H>  3.6   |  29  |  0.44<L>    Ca    7.9<L>      12 Nov 2017 06:10  Phos  3.0     11-12  Mg     2.2     11-12      PT/INR - ( 12 Nov 2017 06:10 )   PT: 12.0 SEC;   INR: 1.07          PTT - ( 12 Nov 2017 06:10 )  PTT:23.3 SEC            Consultant(s) Notes Reviewed:      Care Discussed with Consultants/Other Providers:    Contact Number, Dr Peck [phone]

## 2017-11-12 NOTE — PROGRESS NOTE ADULT - ASSESSMENT
A: 71y F POD 5 s/p Jejunostomy tube placement, Whipple procedure, diagnostic lap, liver biopsy, doing well.    P:  - Lovenox DVT ppx  - PO pain control  - Monitor SHAYY output  - CLD and tube feeds  - Patient seen and examined with Dr. Peres

## 2017-11-13 LAB
BUN SERPL-MCNC: 9 MG/DL — SIGNIFICANT CHANGE UP (ref 7–23)
CALCIUM SERPL-MCNC: 8.3 MG/DL — LOW (ref 8.4–10.5)
CHLORIDE SERPL-SCNC: 100 MMOL/L — SIGNIFICANT CHANGE UP (ref 98–107)
CO2 SERPL-SCNC: 30 MMOL/L — SIGNIFICANT CHANGE UP (ref 22–31)
CREAT SERPL-MCNC: 0.53 MG/DL — SIGNIFICANT CHANGE UP (ref 0.5–1.3)
GLUCOSE BLDC GLUCOMTR-MCNC: 122 MG/DL — HIGH (ref 70–99)
GLUCOSE BLDC GLUCOMTR-MCNC: 135 MG/DL — HIGH (ref 70–99)
GLUCOSE BLDC GLUCOMTR-MCNC: 155 MG/DL — HIGH (ref 70–99)
GLUCOSE SERPL-MCNC: 159 MG/DL — HIGH (ref 70–99)
HCT VFR BLD CALC: 29.3 % — LOW (ref 34.5–45)
HGB BLD-MCNC: 9.6 G/DL — LOW (ref 11.5–15.5)
MAGNESIUM SERPL-MCNC: 2.2 MG/DL — SIGNIFICANT CHANGE UP (ref 1.6–2.6)
MCHC RBC-ENTMCNC: 29.9 PG — SIGNIFICANT CHANGE UP (ref 27–34)
MCHC RBC-ENTMCNC: 32.8 % — SIGNIFICANT CHANGE UP (ref 32–36)
MCV RBC AUTO: 91.3 FL — SIGNIFICANT CHANGE UP (ref 80–100)
NRBC # FLD: 0.03 — SIGNIFICANT CHANGE UP
PHOSPHATE SERPL-MCNC: 3.3 MG/DL — SIGNIFICANT CHANGE UP (ref 2.5–4.5)
PLATELET # BLD AUTO: 218 K/UL — SIGNIFICANT CHANGE UP (ref 150–400)
PMV BLD: 10.7 FL — SIGNIFICANT CHANGE UP (ref 7–13)
POTASSIUM SERPL-MCNC: 4 MMOL/L — SIGNIFICANT CHANGE UP (ref 3.5–5.3)
POTASSIUM SERPL-SCNC: 4 MMOL/L — SIGNIFICANT CHANGE UP (ref 3.5–5.3)
RBC # BLD: 3.21 M/UL — LOW (ref 3.8–5.2)
RBC # FLD: 14.2 % — SIGNIFICANT CHANGE UP (ref 10.3–14.5)
SODIUM SERPL-SCNC: 140 MMOL/L — SIGNIFICANT CHANGE UP (ref 135–145)
WBC # BLD: 11.45 K/UL — HIGH (ref 3.8–10.5)
WBC # FLD AUTO: 11.45 K/UL — HIGH (ref 3.8–10.5)

## 2017-11-13 RX ORDER — INSULIN LISPRO 100/ML
VIAL (ML) SUBCUTANEOUS EVERY 6 HOURS
Qty: 0 | Refills: 0 | Status: DISCONTINUED | OUTPATIENT
Start: 2017-11-13 | End: 2017-11-16

## 2017-11-13 RX ORDER — HYDROCHLOROTHIAZIDE 25 MG
25 TABLET ORAL DAILY
Qty: 0 | Refills: 0 | Status: DISCONTINUED | OUTPATIENT
Start: 2017-11-13 | End: 2017-11-16

## 2017-11-13 RX ORDER — SODIUM CHLORIDE 9 MG/ML
1000 INJECTION, SOLUTION INTRAVENOUS
Qty: 0 | Refills: 0 | Status: DISCONTINUED | OUTPATIENT
Start: 2017-11-13 | End: 2017-11-14

## 2017-11-13 RX ORDER — SENNA PLUS 8.6 MG/1
2 TABLET ORAL AT BEDTIME
Qty: 0 | Refills: 0 | Status: DISCONTINUED | OUTPATIENT
Start: 2017-11-13 | End: 2017-11-16

## 2017-11-13 RX ORDER — ACETAMINOPHEN 500 MG
1000 TABLET ORAL ONCE
Qty: 0 | Refills: 0 | Status: COMPLETED | OUTPATIENT
Start: 2017-11-13 | End: 2017-11-13

## 2017-11-13 RX ORDER — METOCLOPRAMIDE HCL 10 MG
5 TABLET ORAL EVERY 6 HOURS
Qty: 0 | Refills: 0 | Status: DISCONTINUED | OUTPATIENT
Start: 2017-11-13 | End: 2017-11-16

## 2017-11-13 RX ORDER — METOCLOPRAMIDE HCL 10 MG
5 TABLET ORAL EVERY 6 HOURS
Qty: 0 | Refills: 0 | Status: DISCONTINUED | OUTPATIENT
Start: 2017-11-13 | End: 2017-11-13

## 2017-11-13 RX ORDER — KETOROLAC TROMETHAMINE 30 MG/ML
10 SYRINGE (ML) INJECTION EVERY 6 HOURS
Qty: 0 | Refills: 0 | Status: DISCONTINUED | OUTPATIENT
Start: 2017-11-13 | End: 2017-11-16

## 2017-11-13 RX ORDER — ONDANSETRON 8 MG/1
4 TABLET, FILM COATED ORAL ONCE
Qty: 0 | Refills: 0 | Status: COMPLETED | OUTPATIENT
Start: 2017-11-13 | End: 2017-11-13

## 2017-11-13 RX ADMIN — BUDESONIDE AND FORMOTEROL FUMARATE DIHYDRATE 2 PUFF(S): 160; 4.5 AEROSOL RESPIRATORY (INHALATION) at 21:37

## 2017-11-13 RX ADMIN — OXYCODONE HYDROCHLORIDE 10 MILLIGRAM(S): 5 TABLET ORAL at 21:37

## 2017-11-13 RX ADMIN — Medication 2: at 08:31

## 2017-11-13 RX ADMIN — OXYCODONE HYDROCHLORIDE 10 MILLIGRAM(S): 5 TABLET ORAL at 06:42

## 2017-11-13 RX ADMIN — SODIUM CHLORIDE 3 MILLILITER(S): 9 INJECTION INTRAMUSCULAR; INTRAVENOUS; SUBCUTANEOUS at 14:15

## 2017-11-13 RX ADMIN — OXYCODONE HYDROCHLORIDE 10 MILLIGRAM(S): 5 TABLET ORAL at 22:30

## 2017-11-13 RX ADMIN — LOSARTAN POTASSIUM 100 MILLIGRAM(S): 100 TABLET, FILM COATED ORAL at 06:39

## 2017-11-13 RX ADMIN — BUDESONIDE AND FORMOTEROL FUMARATE DIHYDRATE 2 PUFF(S): 160; 4.5 AEROSOL RESPIRATORY (INHALATION) at 08:07

## 2017-11-13 RX ADMIN — SENNA PLUS 2 TABLET(S): 8.6 TABLET ORAL at 21:37

## 2017-11-13 RX ADMIN — Medication 12.5 MILLIGRAM(S): at 06:39

## 2017-11-13 RX ADMIN — Medication 100 MILLIGRAM(S): at 21:37

## 2017-11-13 RX ADMIN — OXYCODONE HYDROCHLORIDE 10 MILLIGRAM(S): 5 TABLET ORAL at 18:06

## 2017-11-13 RX ADMIN — SERTRALINE 75 MILLIGRAM(S): 25 TABLET, FILM COATED ORAL at 21:37

## 2017-11-13 RX ADMIN — ENOXAPARIN SODIUM 40 MILLIGRAM(S): 100 INJECTION SUBCUTANEOUS at 11:35

## 2017-11-13 RX ADMIN — Medication 400 MILLIGRAM(S): at 05:00

## 2017-11-13 RX ADMIN — ONDANSETRON 4 MILLIGRAM(S): 8 TABLET, FILM COATED ORAL at 05:00

## 2017-11-13 RX ADMIN — Medication 5 MILLIGRAM(S): at 17:52

## 2017-11-13 RX ADMIN — SODIUM CHLORIDE 3 MILLILITER(S): 9 INJECTION INTRAMUSCULAR; INTRAVENOUS; SUBCUTANEOUS at 06:40

## 2017-11-13 RX ADMIN — OXYCODONE HYDROCHLORIDE 10 MILLIGRAM(S): 5 TABLET ORAL at 16:45

## 2017-11-13 RX ADMIN — Medication 5 MILLIGRAM(S): at 11:35

## 2017-11-13 RX ADMIN — ATORVASTATIN CALCIUM 10 MILLIGRAM(S): 80 TABLET, FILM COATED ORAL at 21:37

## 2017-11-13 RX ADMIN — Medication 1000 MILLIGRAM(S): at 05:45

## 2017-11-13 RX ADMIN — OXYCODONE HYDROCHLORIDE 10 MILLIGRAM(S): 5 TABLET ORAL at 07:49

## 2017-11-13 RX ADMIN — Medication 25 MILLIGRAM(S): at 14:11

## 2017-11-13 RX ADMIN — SODIUM CHLORIDE 3 MILLILITER(S): 9 INJECTION INTRAMUSCULAR; INTRAVENOUS; SUBCUTANEOUS at 21:37

## 2017-11-13 RX ADMIN — PANTOPRAZOLE SODIUM 40 MILLIGRAM(S): 20 TABLET, DELAYED RELEASE ORAL at 06:39

## 2017-11-13 NOTE — PROGRESS NOTE ADULT - ASSESSMENT
A: 71y F POD 6 s/p Jejunostomy tube placement, Whipple procedure, diagnostic lap, liver biopsy, doing well.    P:  - Lovenox DVT ppx  - PO pain control  - Monitor SHAYY output  - CLD and tube feeds

## 2017-11-13 NOTE — PROGRESS NOTE ADULT - PROBLEM SELECTOR PLAN 3
s/p Whipple  will defer to surgery  tube insertion sites clean  mild leukocytosis   no evidence of infection  will continue to trend

## 2017-11-13 NOTE — PROGRESS NOTE ADULT - SUBJECTIVE AND OBJECTIVE BOX
D TEAM SURGERY DAILY PROGRESS NOTE:    S: Patient seen and examined. Overnight, she had some pain in her RMQ near her drain site that was not tender to palpation and was likely due to gas. She was given simethicone and passed flatus. She felt better. She is on a clear liquid diet and tube feeds, but is not yet ready to be advanced despite having bowel movements. She is spitting up, but has not had emesis.    O:  Vital Signs Last 24 Hrs  T(C): 36.8 (13 Nov 2017 00:52), Max: 37.2 (12 Nov 2017 19:43)  T(F): 98.3 (13 Nov 2017 00:52), Max: 99 (12 Nov 2017 19:43)  HR: 61 (13 Nov 2017 00:52) (60 - 65)  BP: 157/66 (13 Nov 2017 00:52) (151/63 - 168/83)  BP(mean): --  RR: 18 (13 Nov 2017 00:52) (18 - 19)  SpO2: 96% (13 Nov 2017 00:52) (96% - 100%)    I&O's Detail    11 Nov 2017 07:01  -  12 Nov 2017 07:00  --------------------------------------------------------  IN:    Glucerna: 1100 mL    IV PiggyBack: 350 mL    Oral Fluid: 200 mL  Total IN: 1650 mL    OUT:    Bulb: 237 mL    Bulb: 98.5 mL    Indwelling Catheter - Urethral: 1600 mL    Voided: 600 mL  Total OUT: 2535.5 mL    Total NET: -885.5 mL      12 Nov 2017 07:01  -  13 Nov 2017 03:04  --------------------------------------------------------  IN:    Free Water: 250 mL    Glucerna: 900 mL    Oral Fluid: 200 mL  Total IN: 1350 mL    OUT:    Bulb: 305 mL    Bulb: 117.5 mL    Voided: 2250 mL  Total OUT: 2672.5 mL    Total NET: -1322.5 mL    MEDICATIONS  (STANDING):  atorvastatin 10 milliGRAM(s) Oral at bedtime  buDESOnide 160 MICROgram(s)/formoterol 4.5 MICROgram(s) Inhaler 2 Puff(s) Inhalation two times a day  enoxaparin Injectable 40 milliGRAM(s) SubCutaneous daily  hydrochlorothiazide 12.5 milliGRAM(s) Oral daily  insulin lispro (HumaLOG) corrective regimen sliding scale   SubCutaneous three times a day before meals  losartan 100 milliGRAM(s) Oral daily  pantoprazole    Tablet 40 milliGRAM(s) Oral before breakfast  sertraline 75 milliGRAM(s) Oral at bedtime  sodium chloride 0.9% lock flush 3 milliLiter(s) IV Push every 8 hours  traZODone 100 milliGRAM(s) Oral at bedtime    MEDICATIONS  (PRN):  acetaminophen   Tablet. 650 milliGRAM(s) Oral every 6 hours PRN Mild Pain (1 - 3)  ALBUTerol    90 MICROgram(s) HFA Inhaler 2 Puff(s) Inhalation every 6 hours PRN Shortness of Breath and/or Wheezing  oxyCODONE    IR 10 milliGRAM(s) Oral every 4 hours PRN Severe Pain (7 - 10)  oxyCODONE    IR 5 milliGRAM(s) Oral every 4 hours PRN Moderate Pain (4 - 6)                   8.9    7.94  )-----------( 192      ( 12 Nov 2017 06:10 )             26.8     11-12    139  |  101  |  8   ----------------------------<  136<H>  3.6   |  29  |  0.44<L>    Ca    7.9<L>      12 Nov 2017 06:10  Phos  3.0     11-12  Mg     2.2     11-12    Physical Exam:  Gen: Laying in bed, NAD, alert and oriented.   Resp: Unlabored breathing  Abd: softly distended, non-tender, midline incision well-approximated with staples, R SHAYY serous, L SHAYY serous, J tube in place, Glucerna running    Lines:   IV: patent, in place.

## 2017-11-13 NOTE — PROGRESS NOTE ADULT - SUBJECTIVE AND OBJECTIVE BOX
Patient is a 71y old  Female who presents with a chief complaint of "pancreatic cancer" (25 Oct 2017 13:19)      SUBJECTIVE / OVERNIGHT EVENTS: No nausea, vomiting or diarrhea, no fever or chills, no dizziness or chest pain, no dysuria or hematuria, no joint pain or swelling    sitting in chair, in good spirits    MEDICATIONS  (STANDING):  atorvastatin 10 milliGRAM(s) Oral at bedtime  buDESOnide 160 MICROgram(s)/formoterol 4.5 MICROgram(s) Inhaler 2 Puff(s) Inhalation two times a day  enoxaparin Injectable 40 milliGRAM(s) SubCutaneous daily  hydrochlorothiazide 12.5 milliGRAM(s) Oral daily  insulin lispro (HumaLOG) corrective regimen sliding scale   SubCutaneous three times a day before meals  losartan 100 milliGRAM(s) Oral daily  metoclopramide Injectable 5 milliGRAM(s) IV Push every 6 hours  pantoprazole    Tablet 40 milliGRAM(s) Oral before breakfast  sertraline 75 milliGRAM(s) Oral at bedtime  sodium chloride 0.9% lock flush 3 milliLiter(s) IV Push every 8 hours  traZODone 100 milliGRAM(s) Oral at bedtime    MEDICATIONS  (PRN):  acetaminophen   Tablet. 650 milliGRAM(s) Oral every 6 hours PRN Mild Pain (1 - 3)  ALBUTerol    90 MICROgram(s) HFA Inhaler 2 Puff(s) Inhalation every 6 hours PRN Shortness of Breath and/or Wheezing  oxyCODONE    IR 10 milliGRAM(s) Oral every 4 hours PRN Severe Pain (7 - 10)  oxyCODONE    IR 5 milliGRAM(s) Oral every 4 hours PRN Moderate Pain (4 - 6)        CAPILLARY BLOOD GLUCOSE  155 (13 Nov 2017 08:16)  147 (12 Nov 2017 22:52)  147 (12 Nov 2017 16:59)      POCT Blood Glucose.: 155 mg/dL (13 Nov 2017 08:10)  POCT Blood Glucose.: 147 mg/dL (12 Nov 2017 22:41)  POCT Blood Glucose.: 147 mg/dL (12 Nov 2017 16:36)  POCT Blood Glucose.: 147 mg/dL (12 Nov 2017 12:20)    I&O's Summary    12 Nov 2017 07:01  -  13 Nov 2017 07:00  --------------------------------------------------------  IN: 1600 mL / OUT: 3157.5 mL / NET: -1557.5 mL    13 Nov 2017 07:01  -  13 Nov 2017 11:30  --------------------------------------------------------  IN: 50 mL / OUT: 540 mL / NET: -490 mL    GENERAL: NAD, well-developed appears comfortable  HEAD:  Atraumatic, Normocephalic  EYES: EOMI, PERRLA, conjunctiva and sclera clear  NECK: Supple, No JVD  CHEST/LUNG: decreased breath sounds at bases   HEART: S1S2; No rubs, or gallops, no murmurs  ABDOMEN: tenderness less  no rebound, sutures noted with drains   EXTREMITIES:  Peripheral Pulses, No clubbing or cyanosis, no edema  PSYCH: AO x 3,   NEUROLOGY: Alert, no focal motor or sensory deficits  SKIN: No rashes or lesions    LABS:                        9.6    11.45 )-----------( 218      ( 13 Nov 2017 05:53 )             29.3     11-13    140  |  100  |  9   ----------------------------<  159<H>  4.0   |  30  |  0.53    Ca    8.3<L>      13 Nov 2017 05:53  Phos  3.3     11-13  Mg     2.2     11-13      PT/INR - ( 12 Nov 2017 06:10 )   PT: 12.0 SEC;   INR: 1.07          PTT - ( 12 Nov 2017 06:10 )  PTT:23.3 SEC            Consultant(s) Notes Reviewed:      Care Discussed with Consultants/Other Providers:    Contact Number, Dr Peck [phone]

## 2017-11-13 NOTE — PROVIDER CONTACT NOTE (OTHER) - RECOMMENDATIONS
ambulated pt in hallway for relief of pain from gas (?). pt attempted to use BR for pain relief as well.

## 2017-11-13 NOTE — PROGRESS NOTE ADULT - ASSESSMENT
· Assessment		  71 year old lady with history of pancreatic adenocarcinoma diagnosed 2016 s/p neoadjuvant chemotherapy followed by RT. Had wedge resection of lung which was negative for metastasis. Had whipples procedure with negative margins on frozen. Recovering well from her surgery.On NGT suction.      Problem/Recommendation - 1:  Problem: Malignant neoplasm of pancreas, unspecified location of malignancy. Recommendation: Recovering well post whipples.Will follow the pathology staging .Oupt followup post discharge.Surgery followup. NGT removed.    Problem/Recommendation - 2:  ·  Problem: Persistent asthma, unspecified asthma severity, unspecified whether complicated.  Recommendation: Continue current medications. At present stable without any evidence of worsening.     Problem/Recommendation - 3:  ·  Problem: Hypertension, unspecified type.  Recommendation: Care per medicine.     Problem/Recommendation - 4:Problem: Malignant neoplasm of right breast in male, estrogen receptor negative, unspecified site of breast.  Recommendation: Patient with remote history of right breast cancer. Exam normal, yearly mammograms.

## 2017-11-13 NOTE — PROGRESS NOTE ADULT - SUBJECTIVE AND OBJECTIVE BOX
HPI:  70 y/o female with history of pancreatic cancer presents to [facility] today for presurgical evaluation.  She was diagnosed with pancreatic cancer in 2016 and was treated with chemotherapy (completed in) and Radiation (completed in 3/2017).  She is s/p right VATS, wedge resection for benign neoplasm in August 2017.  She had laparoscopic laparotomy whipple on 11/7/17. (25 Oct 2017 13:19)Recovering well.     Pt is seen and examined  pt is awake and lying in bed/out of bed to chair  pt seems comfortable and denies any complaints at this time    ROS:  Negative except for:    MEDICATIONS  (STANDING):  atorvastatin 10 milliGRAM(s) Oral at bedtime  buDESOnide 160 MICROgram(s)/formoterol 4.5 MICROgram(s) Inhaler 2 Puff(s) Inhalation two times a day  enoxaparin Injectable 40 milliGRAM(s) SubCutaneous daily  hydrochlorothiazide 25 milliGRAM(s) Oral daily  insulin lispro (HumaLOG) corrective regimen sliding scale   SubCutaneous three times a day before meals  losartan 100 milliGRAM(s) Oral daily  metoclopramide Injectable 5 milliGRAM(s) IV Push every 6 hours  pantoprazole    Tablet 40 milliGRAM(s) Oral before breakfast  sertraline 75 milliGRAM(s) Oral at bedtime  sodium chloride 0.9% lock flush 3 milliLiter(s) IV Push every 8 hours  traZODone 100 milliGRAM(s) Oral at bedtime    MEDICATIONS  (PRN):  acetaminophen   Tablet. 650 milliGRAM(s) Oral every 6 hours PRN Mild Pain (1 - 3)  ALBUTerol    90 MICROgram(s) HFA Inhaler 2 Puff(s) Inhalation every 6 hours PRN Shortness of Breath and/or Wheezing  oxyCODONE    IR 10 milliGRAM(s) Oral every 4 hours PRN Severe Pain (7 - 10)  oxyCODONE    IR 5 milliGRAM(s) Oral every 4 hours PRN Moderate Pain (4 - 6)      Allergies    No Known Drug Allergies  SteriStrips (Blisters)    Intolerances        Vital Signs Last 24 Hrs  T(C): 36.7 (13 Nov 2017 14:08), Max: 37.5 (13 Nov 2017 04:42)  T(F): 98 (13 Nov 2017 14:08), Max: 99.5 (13 Nov 2017 04:42)  HR: 68 (13 Nov 2017 14:08) (61 - 75)  BP: 132/63 (13 Nov 2017 14:08) (132/63 - 171/81)  BP(mean): --  RR: 18 (13 Nov 2017 14:08) (18 - 20)  SpO2: 95% (13 Nov 2017 14:08) (95% - 100%)    PHYSICAL EXAM  General: adult in NAD  HEENT: clear oropharynx, anicteric sclera, pink conjunctiva  Neck: supple  CV: normal S1/S2 with no murmur rubs or gallops  Lungs: positive air movement b/l ant lungs,clear to auscultation, no wheezes, no rales  Abdomen: soft non-tender non-distended, no hepatosplenomegaly  Ext: no clubbing cyanosis or edema  Skin: no rashes and no petechiae  Neuro: alert and oriented X 4, no focal deficits  LABS:                          9.6    11.45 )-----------( 218      ( 13 Nov 2017 05:53 )             29.3         Mean Cell Volume : 91.3 fL  Mean Cell Hemoglobin : 29.9 pg  Mean Cell Hemoglobin Concentration : 32.8 %  Auto Neutrophil # : x  Auto Lymphocyte # : x  Auto Monocyte # : x  Auto Eosinophil # : x  Auto Basophil # : x  Auto Neutrophil % : x  Auto Lymphocyte % : x  Auto Monocyte % : x  Auto Eosinophil % : x  Auto Basophil % : x    Serial CBC's  11-13 @ 05:53  Hct-29.3 / Hgb-9.6 / Plat-218 / RBC-3.21 / WBC-11.45          Serial CBC's  11-12 @ 06:10  Hct-26.8 / Hgb-8.9 / Plat-192 / RBC-2.96 / WBC-7.94          Serial CBC's  11-11 @ 06:00  Hct-28.3 / Hgb-9.1 / Plat-169 / RBC-3.07 / WBC-8.17          Serial CBC's  11-10 @ 06:00  Hct-27.2 / Hgb-9.1 / Plat-138 / RBC-2.99 / WBC-7.89            11-13    140  |  100  |  9   ----------------------------<  159<H>  4.0   |  30  |  0.53    Ca    8.3<L>      13 Nov 2017 05:53  Phos  3.3     11-13  Mg     2.2     11-13        PT/INR - ( 12 Nov 2017 06:10 )   PT: 12.0 SEC;   INR: 1.07          PTT - ( 12 Nov 2017 06:10 )  PTT:23.3 SEC              BLOOD SMEAR INTERPRETATION:       RADIOLOGY & ADDITIONAL STUDIES:

## 2017-11-14 DIAGNOSIS — D72.829 ELEVATED WHITE BLOOD CELL COUNT, UNSPECIFIED: ICD-10-CM

## 2017-11-14 LAB
AMYLASE FLD-CCNC: 2 U/L — SIGNIFICANT CHANGE UP
AMYLASE FLD-CCNC: 8 U/L — SIGNIFICANT CHANGE UP
BUN SERPL-MCNC: 11 MG/DL — SIGNIFICANT CHANGE UP (ref 7–23)
CALCIUM SERPL-MCNC: 8.5 MG/DL — SIGNIFICANT CHANGE UP (ref 8.4–10.5)
CHLORIDE SERPL-SCNC: 98 MMOL/L — SIGNIFICANT CHANGE UP (ref 98–107)
CO2 SERPL-SCNC: 29 MMOL/L — SIGNIFICANT CHANGE UP (ref 22–31)
CREAT SERPL-MCNC: 0.57 MG/DL — SIGNIFICANT CHANGE UP (ref 0.5–1.3)
GLUCOSE BLDC GLUCOMTR-MCNC: 116 MG/DL — HIGH (ref 70–99)
GLUCOSE BLDC GLUCOMTR-MCNC: 153 MG/DL — HIGH (ref 70–99)
GLUCOSE BLDC GLUCOMTR-MCNC: 157 MG/DL — HIGH (ref 70–99)
GLUCOSE BLDC GLUCOMTR-MCNC: 175 MG/DL — HIGH (ref 70–99)
GLUCOSE SERPL-MCNC: 159 MG/DL — HIGH (ref 70–99)
HCT VFR BLD CALC: 29.4 % — LOW (ref 34.5–45)
HGB BLD-MCNC: 9.7 G/DL — LOW (ref 11.5–15.5)
MAGNESIUM SERPL-MCNC: 2.2 MG/DL — SIGNIFICANT CHANGE UP (ref 1.6–2.6)
MCHC RBC-ENTMCNC: 30.3 PG — SIGNIFICANT CHANGE UP (ref 27–34)
MCHC RBC-ENTMCNC: 33 % — SIGNIFICANT CHANGE UP (ref 32–36)
MCV RBC AUTO: 91.9 FL — SIGNIFICANT CHANGE UP (ref 80–100)
NRBC # FLD: 0 — SIGNIFICANT CHANGE UP
PHOSPHATE SERPL-MCNC: 4.3 MG/DL — SIGNIFICANT CHANGE UP (ref 2.5–4.5)
PLATELET # BLD AUTO: 234 K/UL — SIGNIFICANT CHANGE UP (ref 150–400)
PMV BLD: 10.5 FL — SIGNIFICANT CHANGE UP (ref 7–13)
POTASSIUM SERPL-MCNC: 4.2 MMOL/L — SIGNIFICANT CHANGE UP (ref 3.5–5.3)
POTASSIUM SERPL-SCNC: 4.2 MMOL/L — SIGNIFICANT CHANGE UP (ref 3.5–5.3)
RBC # BLD: 3.2 M/UL — LOW (ref 3.8–5.2)
RBC # FLD: 14.6 % — HIGH (ref 10.3–14.5)
SODIUM SERPL-SCNC: 138 MMOL/L — SIGNIFICANT CHANGE UP (ref 135–145)
WBC # BLD: 14.15 K/UL — HIGH (ref 3.8–10.5)
WBC # FLD AUTO: 14.15 K/UL — HIGH (ref 3.8–10.5)

## 2017-11-14 RX ORDER — ACYCLOVIR 50 MG/G
1 OINTMENT TOPICAL
Qty: 0 | Refills: 0 | Status: DISCONTINUED | OUTPATIENT
Start: 2017-11-14 | End: 2017-11-14

## 2017-11-14 RX ORDER — ACYCLOVIR 50 MG/G
1 OINTMENT TOPICAL
Qty: 0 | Refills: 0 | Status: DISCONTINUED | OUTPATIENT
Start: 2017-11-14 | End: 2017-11-16

## 2017-11-14 RX ADMIN — Medication 5 MILLIGRAM(S): at 00:03

## 2017-11-14 RX ADMIN — Medication: at 01:39

## 2017-11-14 RX ADMIN — Medication 10 MILLIGRAM(S): at 00:50

## 2017-11-14 RX ADMIN — ACYCLOVIR 1 APPLICATION(S): 50 OINTMENT TOPICAL at 20:04

## 2017-11-14 RX ADMIN — OXYCODONE HYDROCHLORIDE 10 MILLIGRAM(S): 5 TABLET ORAL at 10:23

## 2017-11-14 RX ADMIN — BUDESONIDE AND FORMOTEROL FUMARATE DIHYDRATE 2 PUFF(S): 160; 4.5 AEROSOL RESPIRATORY (INHALATION) at 21:25

## 2017-11-14 RX ADMIN — OXYCODONE HYDROCHLORIDE 10 MILLIGRAM(S): 5 TABLET ORAL at 19:27

## 2017-11-14 RX ADMIN — Medication 100 MILLIGRAM(S): at 21:24

## 2017-11-14 RX ADMIN — ENOXAPARIN SODIUM 40 MILLIGRAM(S): 100 INJECTION SUBCUTANEOUS at 11:28

## 2017-11-14 RX ADMIN — ATORVASTATIN CALCIUM 10 MILLIGRAM(S): 80 TABLET, FILM COATED ORAL at 21:24

## 2017-11-14 RX ADMIN — Medication 2: at 06:31

## 2017-11-14 RX ADMIN — Medication 10 MILLIGRAM(S): at 12:13

## 2017-11-14 RX ADMIN — SODIUM CHLORIDE 3 MILLILITER(S): 9 INJECTION INTRAMUSCULAR; INTRAVENOUS; SUBCUTANEOUS at 05:12

## 2017-11-14 RX ADMIN — ACYCLOVIR 1 APPLICATION(S): 50 OINTMENT TOPICAL at 17:00

## 2017-11-14 RX ADMIN — Medication 25 MILLIGRAM(S): at 05:13

## 2017-11-14 RX ADMIN — BUDESONIDE AND FORMOTEROL FUMARATE DIHYDRATE 2 PUFF(S): 160; 4.5 AEROSOL RESPIRATORY (INHALATION) at 10:21

## 2017-11-14 RX ADMIN — OXYCODONE HYDROCHLORIDE 10 MILLIGRAM(S): 5 TABLET ORAL at 11:12

## 2017-11-14 RX ADMIN — Medication 2: at 12:27

## 2017-11-14 RX ADMIN — Medication 5 MILLIGRAM(S): at 05:13

## 2017-11-14 RX ADMIN — OXYCODONE HYDROCHLORIDE 5 MILLIGRAM(S): 5 TABLET ORAL at 20:04

## 2017-11-14 RX ADMIN — SENNA PLUS 2 TABLET(S): 8.6 TABLET ORAL at 21:24

## 2017-11-14 RX ADMIN — SODIUM CHLORIDE 50 MILLILITER(S): 9 INJECTION, SOLUTION INTRAVENOUS at 07:58

## 2017-11-14 RX ADMIN — SERTRALINE 75 MILLIGRAM(S): 25 TABLET, FILM COATED ORAL at 21:25

## 2017-11-14 RX ADMIN — OXYCODONE HYDROCHLORIDE 5 MILLIGRAM(S): 5 TABLET ORAL at 21:04

## 2017-11-14 RX ADMIN — LOSARTAN POTASSIUM 100 MILLIGRAM(S): 100 TABLET, FILM COATED ORAL at 05:13

## 2017-11-14 RX ADMIN — Medication 10 MILLIGRAM(S): at 11:28

## 2017-11-14 RX ADMIN — Medication 5 MILLIGRAM(S): at 11:28

## 2017-11-14 RX ADMIN — OXYCODONE HYDROCHLORIDE 10 MILLIGRAM(S): 5 TABLET ORAL at 18:45

## 2017-11-14 RX ADMIN — PANTOPRAZOLE SODIUM 40 MILLIGRAM(S): 20 TABLET, DELAYED RELEASE ORAL at 05:13

## 2017-11-14 RX ADMIN — Medication 10 MILLIGRAM(S): at 00:02

## 2017-11-14 NOTE — PROGRESS NOTE ADULT - SUBJECTIVE AND OBJECTIVE BOX
Surgery Team D (Surgery Oncology) Progress Note:    Hospital Day: 9  Post-Operative Day: 8 s/p J tube placement, whipple + liver biopsy    Subjective:  No acute overnight events.  Patient examined at bedside this AM.  Tolerating reg diet, +/-, denies n/v.  No complaints at this time.    Objective:  T(C): 37 (11-15-17 @ 08:01), Max: 37 (11-15-17 @ 08:01)  HR: 79 (11-15-17 @ 08:01) (69 - 84)  BP: 150/63 (11-15-17 @ 08:01) (127/53 - 151/63)  RR: 18 (11-15-17 @ 08:01) (18 - 18)  SpO2: 98% (11-15-17 @ 08:01) (92% - 98%)    Labs:                      9.7    13.57 )-----------( 280      ( 15 Nov 2017 06:08 )             29.8       11-15    142  |  97<L>  |  10  ----------------------------<  162<H>  3.8   |  30  |  0.61    Ca    8.2<L>      15 Nov 2017 06:08  Phos  3.5     11-15  Mg     2.1     11-15        I&O's Detail    14 Nov 2017 07:01  -  15 Nov 2017 07:00  --------------------------------------------------------  IN:    Free Water: 30 mL    Glucerna: 600 mL  Total IN: 630 mL    OUT:    Bulb: 10 mL    Bulb: 155 mL    Voided: 1700 mL  Total OUT: 1865 mL    Total NET: -1235 mL      15 Nov 2017 07:01  -  15 Nov 2017 09:13  --------------------------------------------------------  IN:  Total IN: 0 mL    OUT:    Voided: 400 mL  Total OUT: 400 mL    Total NET: -400 mL    Focused Physical Exam:  General: NAD  Respiratory: Nonlabored breathing  Abdomen: softly distended, nt, midline incision w staples intact, b/l homar drains serous, Glucerna running through j-tube    Medications:  acetaminophen   Tablet. 650 milliGRAM(s) Oral every 6 hours PRN  acyclovir Topical 5% Ointment 1 Application(s) Topical five times a day  ALBUTerol    90 MICROgram(s) HFA Inhaler 2 Puff(s) Inhalation every 6 hours PRN  atorvastatin 10 milliGRAM(s) Oral at bedtime  buDESOnide 160 MICROgram(s)/formoterol 4.5 MICROgram(s) Inhaler 2 Puff(s) Inhalation two times a day  enoxaparin Injectable 40 milliGRAM(s) SubCutaneous daily  hydrochlorothiazide 25 milliGRAM(s) Oral daily  insulin lispro (HumaLOG) corrective regimen sliding scale   SubCutaneous every 6 hours  ketorolac 10 milliGRAM(s) Oral every 6 hours PRN  losartan 100 milliGRAM(s) Oral daily  metoclopramide Injectable 5 milliGRAM(s) IV Push every 6 hours  oxyCODONE    IR 10 milliGRAM(s) Oral every 4 hours PRN  oxyCODONE    IR 5 milliGRAM(s) Oral every 4 hours PRN  pantoprazole    Tablet 40 milliGRAM(s) Oral before breakfast  potassium chloride    Tablet ER 20 milliEquivalent(s) Oral once  senna 2 Tablet(s) Oral at bedtime  sertraline 75 milliGRAM(s) Oral at bedtime  traZODone 100 milliGRAM(s) Oral at bedtime

## 2017-11-14 NOTE — PROGRESS NOTE ADULT - ASSESSMENT
· Assessment		  71 year old lady with history of pancreatic adenocarcinoma diagnosed 2016 s/p neoadjuvant chemotherapy followed by RT. Had wedge resection of lung which was negative for metastasis. Had whipples procedure with negative margins on frozen. Recovering well from her surgery.On NGT suction.      Problem/Recommendation - 1:  Problem: Malignant neoplasm of pancreas, unspecified location of malignancy. Recommendation: Recovering well post whipples.Will follow the pathology staging .Oupt followup post discharge.Surgery followup. NGT removed.    Problem/Recommendation - 2:  ·  Problem: Persistent asthma, unspecified asthma severity, unspecified whether complicated.  Recommendation: Continue current medications. At present stable without any evidence of worsening.     Problem/Recommendation - 3:  ·  Problem: Hypertension, unspecified type.  Recommendation: Care per medicine.     Problem/Recommendation - 4:Problem: Malignant neoplasm of right breast ,estrogen receptor negative, unspecified site of breast.  Recommendation: Patient with remote history of right breast cancer. Exam normal, yearly mammograms.

## 2017-11-14 NOTE — PROGRESS NOTE ADULT - SUBJECTIVE AND OBJECTIVE BOX
SURGICAL ONCOLOGY PROGRESS NOTE    Feeling better    Vital Signs Last 24 Hrs  T(C): 36.7 (14 Nov 2017 11:52), Max: 37.1 (14 Nov 2017 04:45)  T(F): 98 (14 Nov 2017 11:52), Max: 98.8 (14 Nov 2017 04:45)  HR: 79 (14 Nov 2017 11:52) (70 - 79)  BP: 135/57 (14 Nov 2017 11:52) (132/54 - 154/68)  BP(mean): --  RR: 18 (14 Nov 2017 11:52) (18 - 18)  SpO2: 95% (14 Nov 2017 11:52) (94% - 96%)  I&O's Detail    13 Nov 2017 07:01  -  14 Nov 2017 07:00  --------------------------------------------------------  IN:    dextrose 5% + sodium chloride 0.45%.: 450 mL    Free Water: 70 mL    Glucerna: 600 mL  Total IN: 1120 mL    OUT:    Bulb: 17.5 mL    Bulb: 350 mL    Emesis: 75 mL    Voided: 2300 mL  Total OUT: 2742.5 mL    Total NET: -1622.5 mL      14 Nov 2017 07:01  -  14 Nov 2017 16:26  --------------------------------------------------------  IN:  Total IN: 0 mL    OUT:    Bulb: 65 mL    Bulb: 5 mL    Voided: 1300 mL  Total OUT: 1370 mL    Total NET: -1370 mL          PE:    A&A  NAD    soft, NT, ND, No peritoneal signs    inc  cdi    Left hugh serous    Right Hugh slightly murky.                          9.7    14.15 )-----------( 234      ( 14 Nov 2017 05:30 )             29.4     11-14    138  |  98  |  11  ----------------------------<  159<H>  4.2   |  29  |  0.57    Ca    8.5      14 Nov 2017 05:30  Phos  4.3     11-14  Mg     2.2     11-14

## 2017-11-14 NOTE — PROGRESS NOTE ADULT - SUBJECTIVE AND OBJECTIVE BOX
HPI:  70 y/o female with history of pancreatic cancer presents to [facility] today for presurgical evaluation.  She was diagnosed with pancreatic cancer in 2016 and was treated with chemotherapy (completed in) and Radiation (completed in 3/2017).  She is s/p right VATS, wedge resection for benign neoplasm in August 2017.  She had laparoscopic laparotomy whipple on 11/7/17. (25 Oct 2017 13:19)     Pt is seen and examined  pt is awake and lying in bed/out of bed to chair  pt seems comfortable ,abdominal pain controlled.    ROS:  Negative except for:    MEDICATIONS  (STANDING):  acyclovir Topical 5% Ointment 1 Application(s) Topical five times a day  atorvastatin 10 milliGRAM(s) Oral at bedtime  buDESOnide 160 MICROgram(s)/formoterol 4.5 MICROgram(s) Inhaler 2 Puff(s) Inhalation two times a day  enoxaparin Injectable 40 milliGRAM(s) SubCutaneous daily  hydrochlorothiazide 25 milliGRAM(s) Oral daily  insulin lispro (HumaLOG) corrective regimen sliding scale   SubCutaneous every 6 hours  losartan 100 milliGRAM(s) Oral daily  metoclopramide Injectable 5 milliGRAM(s) IV Push every 6 hours  pantoprazole    Tablet 40 milliGRAM(s) Oral before breakfast  senna 2 Tablet(s) Oral at bedtime  sertraline 75 milliGRAM(s) Oral at bedtime  traZODone 100 milliGRAM(s) Oral at bedtime    MEDICATIONS  (PRN):  acetaminophen   Tablet. 650 milliGRAM(s) Oral every 6 hours PRN Mild Pain (1 - 3)  ALBUTerol    90 MICROgram(s) HFA Inhaler 2 Puff(s) Inhalation every 6 hours PRN Shortness of Breath and/or Wheezing  ketorolac 10 milliGRAM(s) Oral every 6 hours PRN breakthrough  oxyCODONE    IR 10 milliGRAM(s) Oral every 4 hours PRN Severe Pain (7 - 10)  oxyCODONE    IR 5 milliGRAM(s) Oral every 4 hours PRN Moderate Pain (4 - 6)      Allergies    No Known Drug Allergies  SteriStrips (Blisters)    Intolerances        Vital Signs Last 24 Hrs  T(C): 36.9 (14 Nov 2017 19:27), Max: 37.1 (14 Nov 2017 04:45)  T(F): 98.4 (14 Nov 2017 19:27), Max: 98.8 (14 Nov 2017 04:45)  HR: 71 (14 Nov 2017 19:27) (69 - 79)  BP: 151/63 (14 Nov 2017 19:27) (127/53 - 151/63)  BP(mean): --  RR: 18 (14 Nov 2017 19:27) (18 - 18)  SpO2: 96% (14 Nov 2017 19:27) (94% - 96%)    PHYSICAL EXAM  General: adult in NAD  HEENT: clear oropharynx, anicteric sclera, pink conjunctiva  Neck: supple  CV: normal S1/S2 with no murmur rubs or gallops  Lungs: positive air movement b/l ant lungs,clear to auscultation, no wheezes, no rales  Abdomen: soft non-tender non-distended, no hepatosplenomegaly  Ext: no clubbing cyanosis or edema  Skin: no rashes and no petechiae  Neuro: alert and oriented X 4, no focal deficits  LABS:                          9.7    14.15 )-----------( 234      ( 14 Nov 2017 05:30 )             29.4         Mean Cell Volume : 91.9 fL  Mean Cell Hemoglobin : 30.3 pg  Mean Cell Hemoglobin Concentration : 33.0 %  Auto Neutrophil # : x  Auto Lymphocyte # : x  Auto Monocyte # : x  Auto Eosinophil # : x  Auto Basophil # : x  Auto Neutrophil % : x  Auto Lymphocyte % : x  Auto Monocyte % : x  Auto Eosinophil % : x  Auto Basophil % : x    Serial CBC's  11-14 @ 05:30  Hct-29.4 / Hgb-9.7 / Plat-234 / RBC-3.20 / WBC-14.15          Serial CBC's  11-13 @ 05:53  Hct-29.3 / Hgb-9.6 / Plat-218 / RBC-3.21 / WBC-11.45          Serial CBC's  11-12 @ 06:10  Hct-26.8 / Hgb-8.9 / Plat-192 / RBC-2.96 / WBC-7.94          Serial CBC's  11-11 @ 06:00  Hct-28.3 / Hgb-9.1 / Plat-169 / RBC-3.07 / WBC-8.17            11-14    138  |  98  |  11  ----------------------------<  159<H>  4.2   |  29  |  0.57    Ca    8.5      14 Nov 2017 05:30  Phos  4.3     11-14  Mg     2.2     11-14                      BLOOD SMEAR INTERPRETATION:       RADIOLOGY & ADDITIONAL STUDIES:

## 2017-11-14 NOTE — PROGRESS NOTE ADULT - SUBJECTIVE AND OBJECTIVE BOX
Patient is a 71y old  Female who presents with a chief complaint of "pancreatic cancer" (25 Oct 2017 13:19)      SUBJECTIVE / OVERNIGHT EVENTS: No nausea, vomiting or diarrhea, no fever or chills, no dizziness or chest pain, no dysuria or hematuria, no joint pain or swelling.     MEDICATIONS  (STANDING):  atorvastatin 10 milliGRAM(s) Oral at bedtime  buDESOnide 160 MICROgram(s)/formoterol 4.5 MICROgram(s) Inhaler 2 Puff(s) Inhalation two times a day  dextrose 5% + sodium chloride 0.45%. 1000 milliLiter(s) (50 mL/Hr) IV Continuous <Continuous>  enoxaparin Injectable 40 milliGRAM(s) SubCutaneous daily  hydrochlorothiazide 25 milliGRAM(s) Oral daily  insulin lispro (HumaLOG) corrective regimen sliding scale   SubCutaneous every 6 hours  losartan 100 milliGRAM(s) Oral daily  metoclopramide Injectable 5 milliGRAM(s) IV Push every 6 hours  pantoprazole    Tablet 40 milliGRAM(s) Oral before breakfast  senna 2 Tablet(s) Oral at bedtime  sertraline 75 milliGRAM(s) Oral at bedtime  traZODone 100 milliGRAM(s) Oral at bedtime    MEDICATIONS  (PRN):  acetaminophen   Tablet. 650 milliGRAM(s) Oral every 6 hours PRN Mild Pain (1 - 3)  ALBUTerol    90 MICROgram(s) HFA Inhaler 2 Puff(s) Inhalation every 6 hours PRN Shortness of Breath and/or Wheezing  ketorolac 10 milliGRAM(s) Oral every 6 hours PRN breakthrough  oxyCODONE    IR 10 milliGRAM(s) Oral every 4 hours PRN Severe Pain (7 - 10)  oxyCODONE    IR 5 milliGRAM(s) Oral every 4 hours PRN Moderate Pain (4 - 6)        CAPILLARY BLOOD GLUCOSE  175 (14 Nov 2017 11:52)      POCT Blood Glucose.: 175 mg/dL (14 Nov 2017 11:52)  POCT Blood Glucose.: 153 mg/dL (14 Nov 2017 06:16)  POCT Blood Glucose.: 157 mg/dL (14 Nov 2017 00:57)  POCT Blood Glucose.: 122 mg/dL (13 Nov 2017 16:40)    I&O's Summary    13 Nov 2017 07:01  -  14 Nov 2017 07:00  --------------------------------------------------------  IN: 1120 mL / OUT: 2742.5 mL / NET: -1622.5 mL    14 Nov 2017 07:01  -  14 Nov 2017 12:46  --------------------------------------------------------  IN: 0 mL / OUT: 970 mL / NET: -970 mL    GENERAL: NAD, well-developed appears comfortable  HEAD:  Atraumatic, Normocephalic  EYES: EOMI, PERRLA, conjunctiva and sclera clear  NECK: Supple, No JVD  CHEST/LUNG: decreased breath sounds at bases   HEART: S1S2; No rubs, or gallops, no murmurs  ABDOMEN: tenderness less  no rebound, still has 2 drains  EXTREMITIES:  Peripheral Pulses, No clubbing or cyanosis, no edema  PSYCH: AO x 3,   NEUROLOGY: Alert, no focal motor or sensory deficits  SKIN: No rashes or lesions    LABS:                        9.7    14.15 )-----------( 234      ( 14 Nov 2017 05:30 )             29.4     11-14    138  |  98  |  11  ----------------------------<  159<H>  4.2   |  29  |  0.57    Ca    8.5      14 Nov 2017 05:30  Phos  4.3     11-14  Mg     2.2     11-14                  Consultant(s) Notes Reviewed:      Care Discussed with Consultants/Other Providers:    Contact Number, Dr Peck [phone]

## 2017-11-14 NOTE — PROGRESS NOTE ADULT - ASSESSMENT
71y F POD 7 s/p Jejunostomy tube placement, Whipple procedure, diagnostic lap, liver biopsy, doing well.    - Lovenox DVT ppx  - PO pain control  - Monitor SHAYY output; amylase levels tested from samples of both drains revealing 2 in L and 8 in R; no significant value  - Continue CLD and tube feeds; tolerating w/ +/-; monitor GI fn.  Likely d/c tomorrow

## 2017-11-15 LAB
BASOPHILS # BLD AUTO: 0.04 K/UL — SIGNIFICANT CHANGE UP (ref 0–0.2)
BASOPHILS NFR BLD AUTO: 0.3 % — SIGNIFICANT CHANGE UP (ref 0–2)
BUN SERPL-MCNC: 10 MG/DL — SIGNIFICANT CHANGE UP (ref 7–23)
CALCIUM SERPL-MCNC: 8.2 MG/DL — LOW (ref 8.4–10.5)
CHLORIDE SERPL-SCNC: 97 MMOL/L — LOW (ref 98–107)
CO2 SERPL-SCNC: 30 MMOL/L — SIGNIFICANT CHANGE UP (ref 22–31)
CREAT SERPL-MCNC: 0.61 MG/DL — SIGNIFICANT CHANGE UP (ref 0.5–1.3)
EOSINOPHIL # BLD AUTO: 1.93 K/UL — HIGH (ref 0–0.5)
EOSINOPHIL NFR BLD AUTO: 14.2 % — HIGH (ref 0–6)
GLUCOSE BLDC GLUCOMTR-MCNC: 104 MG/DL — HIGH (ref 70–99)
GLUCOSE BLDC GLUCOMTR-MCNC: 138 MG/DL — HIGH (ref 70–99)
GLUCOSE BLDC GLUCOMTR-MCNC: 155 MG/DL — HIGH (ref 70–99)
GLUCOSE BLDC GLUCOMTR-MCNC: 169 MG/DL — HIGH (ref 70–99)
GLUCOSE SERPL-MCNC: 162 MG/DL — HIGH (ref 70–99)
HCT VFR BLD CALC: 29.8 % — LOW (ref 34.5–45)
HGB BLD-MCNC: 9.7 G/DL — LOW (ref 11.5–15.5)
IMM GRANULOCYTES # BLD AUTO: 0.09 # — SIGNIFICANT CHANGE UP
IMM GRANULOCYTES NFR BLD AUTO: 0.7 % — SIGNIFICANT CHANGE UP (ref 0–1.5)
LYMPHOCYTES # BLD AUTO: 1.39 K/UL — SIGNIFICANT CHANGE UP (ref 1–3.3)
LYMPHOCYTES # BLD AUTO: 10.2 % — LOW (ref 13–44)
MAGNESIUM SERPL-MCNC: 2.1 MG/DL — SIGNIFICANT CHANGE UP (ref 1.6–2.6)
MCHC RBC-ENTMCNC: 29.8 PG — SIGNIFICANT CHANGE UP (ref 27–34)
MCHC RBC-ENTMCNC: 32.6 % — SIGNIFICANT CHANGE UP (ref 32–36)
MCV RBC AUTO: 91.7 FL — SIGNIFICANT CHANGE UP (ref 80–100)
MONOCYTES # BLD AUTO: 1.41 K/UL — HIGH (ref 0–0.9)
MONOCYTES NFR BLD AUTO: 10.4 % — SIGNIFICANT CHANGE UP (ref 2–14)
NEUTROPHILS # BLD AUTO: 8.71 K/UL — HIGH (ref 1.8–7.4)
NEUTROPHILS NFR BLD AUTO: 64.2 % — SIGNIFICANT CHANGE UP (ref 43–77)
NRBC # FLD: 0 — SIGNIFICANT CHANGE UP
PHOSPHATE SERPL-MCNC: 3.5 MG/DL — SIGNIFICANT CHANGE UP (ref 2.5–4.5)
PLATELET # BLD AUTO: 280 K/UL — SIGNIFICANT CHANGE UP (ref 150–400)
PMV BLD: 10.4 FL — SIGNIFICANT CHANGE UP (ref 7–13)
POTASSIUM SERPL-MCNC: 3.8 MMOL/L — SIGNIFICANT CHANGE UP (ref 3.5–5.3)
POTASSIUM SERPL-SCNC: 3.8 MMOL/L — SIGNIFICANT CHANGE UP (ref 3.5–5.3)
RBC # BLD: 3.25 M/UL — LOW (ref 3.8–5.2)
RBC # FLD: 14.6 % — HIGH (ref 10.3–14.5)
SODIUM SERPL-SCNC: 142 MMOL/L — SIGNIFICANT CHANGE UP (ref 135–145)
WBC # BLD: 13.57 K/UL — HIGH (ref 3.8–10.5)
WBC # FLD AUTO: 13.57 K/UL — HIGH (ref 3.8–10.5)

## 2017-11-15 RX ORDER — POTASSIUM CHLORIDE 20 MEQ
20 PACKET (EA) ORAL ONCE
Qty: 0 | Refills: 0 | Status: COMPLETED | OUTPATIENT
Start: 2017-11-15 | End: 2017-11-15

## 2017-11-15 RX ORDER — DOCUSATE SODIUM 100 MG
100 CAPSULE ORAL THREE TIMES A DAY
Qty: 0 | Refills: 0 | Status: DISCONTINUED | OUTPATIENT
Start: 2017-11-15 | End: 2017-11-16

## 2017-11-15 RX ORDER — SIMETHICONE 80 MG/1
80 TABLET, CHEWABLE ORAL DAILY
Qty: 0 | Refills: 0 | Status: DISCONTINUED | OUTPATIENT
Start: 2017-11-15 | End: 2017-11-16

## 2017-11-15 RX ORDER — ERYTHROMYCIN ETHYLSUCCINATE 400 MG
500 TABLET ORAL THREE TIMES A DAY
Qty: 0 | Refills: 0 | Status: DISCONTINUED | OUTPATIENT
Start: 2017-11-15 | End: 2017-11-16

## 2017-11-15 RX ADMIN — ENOXAPARIN SODIUM 40 MILLIGRAM(S): 100 INJECTION SUBCUTANEOUS at 12:57

## 2017-11-15 RX ADMIN — ACYCLOVIR 1 APPLICATION(S): 50 OINTMENT TOPICAL at 17:35

## 2017-11-15 RX ADMIN — Medication 500 MILLIGRAM(S): at 21:30

## 2017-11-15 RX ADMIN — ATORVASTATIN CALCIUM 10 MILLIGRAM(S): 80 TABLET, FILM COATED ORAL at 21:28

## 2017-11-15 RX ADMIN — Medication 5 MILLIGRAM(S): at 12:57

## 2017-11-15 RX ADMIN — SENNA PLUS 2 TABLET(S): 8.6 TABLET ORAL at 21:28

## 2017-11-15 RX ADMIN — Medication 100 MILLIGRAM(S): at 21:28

## 2017-11-15 RX ADMIN — Medication 20 MILLIEQUIVALENT(S): at 12:57

## 2017-11-15 RX ADMIN — Medication 5 MILLIGRAM(S): at 17:35

## 2017-11-15 RX ADMIN — SERTRALINE 75 MILLIGRAM(S): 25 TABLET, FILM COATED ORAL at 21:28

## 2017-11-15 RX ADMIN — Medication 5 MILLIGRAM(S): at 05:47

## 2017-11-15 RX ADMIN — Medication 10 MILLIGRAM(S): at 18:18

## 2017-11-15 RX ADMIN — Medication 25 MILLIGRAM(S): at 05:48

## 2017-11-15 RX ADMIN — Medication 650 MILLIGRAM(S): at 19:03

## 2017-11-15 RX ADMIN — BUDESONIDE AND FORMOTEROL FUMARATE DIHYDRATE 2 PUFF(S): 160; 4.5 AEROSOL RESPIRATORY (INHALATION) at 21:29

## 2017-11-15 RX ADMIN — OXYCODONE HYDROCHLORIDE 10 MILLIGRAM(S): 5 TABLET ORAL at 09:01

## 2017-11-15 RX ADMIN — ACYCLOVIR 1 APPLICATION(S): 50 OINTMENT TOPICAL at 21:29

## 2017-11-15 RX ADMIN — Medication 10 MILLIGRAM(S): at 11:14

## 2017-11-15 RX ADMIN — SIMETHICONE 80 MILLIGRAM(S): 80 TABLET, CHEWABLE ORAL at 12:57

## 2017-11-15 RX ADMIN — ACYCLOVIR 1 APPLICATION(S): 50 OINTMENT TOPICAL at 12:58

## 2017-11-15 RX ADMIN — Medication 10 MILLIGRAM(S): at 12:12

## 2017-11-15 RX ADMIN — BUDESONIDE AND FORMOTEROL FUMARATE DIHYDRATE 2 PUFF(S): 160; 4.5 AEROSOL RESPIRATORY (INHALATION) at 08:04

## 2017-11-15 RX ADMIN — Medication 10 MILLIGRAM(S): at 17:35

## 2017-11-15 RX ADMIN — ACYCLOVIR 1 APPLICATION(S): 50 OINTMENT TOPICAL at 00:44

## 2017-11-15 RX ADMIN — OXYCODONE HYDROCHLORIDE 10 MILLIGRAM(S): 5 TABLET ORAL at 00:54

## 2017-11-15 RX ADMIN — Medication 2: at 12:58

## 2017-11-15 RX ADMIN — PANTOPRAZOLE SODIUM 40 MILLIGRAM(S): 20 TABLET, DELAYED RELEASE ORAL at 05:46

## 2017-11-15 RX ADMIN — Medication 100 MILLIGRAM(S): at 13:05

## 2017-11-15 RX ADMIN — Medication 2: at 05:46

## 2017-11-15 RX ADMIN — OXYCODONE HYDROCHLORIDE 10 MILLIGRAM(S): 5 TABLET ORAL at 01:54

## 2017-11-15 RX ADMIN — Medication 5 MILLIGRAM(S): at 00:45

## 2017-11-15 RX ADMIN — ACYCLOVIR 1 APPLICATION(S): 50 OINTMENT TOPICAL at 08:04

## 2017-11-15 RX ADMIN — OXYCODONE HYDROCHLORIDE 10 MILLIGRAM(S): 5 TABLET ORAL at 08:04

## 2017-11-15 RX ADMIN — Medication 500 MILLIGRAM(S): at 13:05

## 2017-11-15 RX ADMIN — LOSARTAN POTASSIUM 100 MILLIGRAM(S): 100 TABLET, FILM COATED ORAL at 05:46

## 2017-11-15 NOTE — PROVIDER CONTACT NOTE (OTHER) - ACTION/TREATMENT ORDERED:
Continue to monitor.  No further vomitting noted.
Pending orders.
administer zofran. IV tylenol x 1 dose. give am BP meds. recheck vitals. cont to monitor.
pt evaluated by Dr. Guzmán. PO torodol to be administered. will cont to monitor.

## 2017-11-15 NOTE — PROGRESS NOTE ADULT - ASSESSMENT
· Assessment		  71 year old lady with history of pancreatic adenocarcinoma diagnosed 2016 s/p neoadjuvant chemotherapy followed by RT. Had wedge resection of lung which was negative for metastasis. Had whipples procedure with negative margins on frozen. Recovering well from her surgery.On NGT suction.      Problem/Recommendation - 1:  Problem: Malignant neoplasm of pancreas, unspecified location of malignancy. Recommendation: Recovering well post whipples.Will follow the pathology staging .Oupt followup post discharge.Surgery followup. NGT removed.  11/15: stable: await path    Problem/Recommendation - 2:  ·  Problem: Persistent asthma, unspecified asthma severity, unspecified whether complicated.  Recommendation: Continue current medications. At present stable without any evidence of worsening.  11/15: stable: not wheezing      Problem/Recommendation - 3:  ·  Problem: Hypertension, unspecified type.  Recommendation: Care per medicine.     Problem/Recommendation - 4:Problem: Malignant neoplasm of right breast ,estrogen receptor negative, unspecified site of breast.  Recommendation: Patient with remote history of right breast cancer. Exam normal, yearly mammograms.

## 2017-11-15 NOTE — PROGRESS NOTE ADULT - SUBJECTIVE AND OBJECTIVE BOX
HPI:  72 y/o female with history of pancreatic cancer presents to [facility] today for presurgical evaluation.  She was diagnosed with pancreatic cancer in 2016 and was treated with chemotherapy (completed in) and Radiation (completed in 3/2017).  She is s/p right VATS, wedge resection for benign neoplasm in August 2017.  She is scheduled for diagnostic laparoscopic laparotomy whipple on 11/7/17. (25 Oct 2017 13:19)       pt is awake and lying in bed/out of bed to chair  pt seems comfortable and denies any complaints at this time    doing ok  no events overnight  mild abd discomfort    ROS:  Negative except for: mild abd discomfort    MEDICATIONS  (STANDING):  acyclovir Topical 5% Ointment 1 Application(s) Topical five times a day  atorvastatin 10 milliGRAM(s) Oral at bedtime  buDESOnide 160 MICROgram(s)/formoterol 4.5 MICROgram(s) Inhaler 2 Puff(s) Inhalation two times a day  docusate sodium 100 milliGRAM(s) Oral three times a day  enoxaparin Injectable 40 milliGRAM(s) SubCutaneous daily  erythromycin     base Tablet 500 milliGRAM(s) Oral three times a day  hydrochlorothiazide 25 milliGRAM(s) Oral daily  insulin lispro (HumaLOG) corrective regimen sliding scale   SubCutaneous every 6 hours  losartan 100 milliGRAM(s) Oral daily  metoclopramide Injectable 5 milliGRAM(s) IV Push every 6 hours  pantoprazole    Tablet 40 milliGRAM(s) Oral before breakfast  senna 2 Tablet(s) Oral at bedtime  sertraline 75 milliGRAM(s) Oral at bedtime  simethicone 80 milliGRAM(s) Chew daily  traZODone 100 milliGRAM(s) Oral at bedtime    MEDICATIONS  (PRN):  acetaminophen   Tablet. 650 milliGRAM(s) Oral every 6 hours PRN Mild Pain (1 - 3)  ALBUTerol    90 MICROgram(s) HFA Inhaler 2 Puff(s) Inhalation every 6 hours PRN Shortness of Breath and/or Wheezing  ketorolac 10 milliGRAM(s) Oral every 6 hours PRN breakthrough  oxyCODONE    IR 10 milliGRAM(s) Oral every 4 hours PRN Severe Pain (7 - 10)  oxyCODONE    IR 5 milliGRAM(s) Oral every 4 hours PRN Moderate Pain (4 - 6)      Allergies    No Known Drug Allergies  SteriStrips (Blisters)    Intolerances        Vital Signs Last 24 Hrs  T(C): 36.7 (15 Nov 2017 17:24), Max: 37 (15 Nov 2017 08:01)  T(F): 98 (15 Nov 2017 17:24), Max: 98.6 (15 Nov 2017 08:01)  HR: 81 (15 Nov 2017 17:24) (70 - 114)  BP: 147/65 (15 Nov 2017 17:24) (141/66 - 151/63)  BP(mean): --  RR: 18 (15 Nov 2017 17:24) (18 - 18)  SpO2: 98% (15 Nov 2017 17:24) (92% - 98%)    PHYSICAL EXAM  General: adult in NAD  HEENT: clear oropharynx, anicteric sclera, pink conjunctiva  Neck: supple  CV: normal S1/S2 with no murmur rubs or gallops  Lungs: positive air movement b/l ant lungs, clear to auscultation, no wheezes, no rales  Abdomen: mild tenderness in jg umbilical region  Ext: no clubbing cyanosis or edema  Skin: no rashes and no petechiae  Neuro: alert and oriented X 4, no focal deficits  LABS:                          9.7    13.57 )-----------( 280      ( 15 Nov 2017 06:08 )             29.8         Mean Cell Volume : 91.7 fL  Mean Cell Hemoglobin : 29.8 pg  Mean Cell Hemoglobin Concentration : 32.6 %  Auto Neutrophil # : 8.71 K/uL  Auto Lymphocyte # : 1.39 K/uL  Auto Monocyte # : 1.41 K/uL  Auto Eosinophil # : 1.93 K/uL  Auto Basophil # : 0.04 K/uL  Auto Neutrophil % : 64.2 %  Auto Lymphocyte % : 10.2 %  Auto Monocyte % : 10.4 %  Auto Eosinophil % : 14.2 %  Auto Basophil % : 0.3 %    Serial CBC's  11-15 @ 06:08  Hct-29.8 / Hgb-9.7 / Plat-280 / RBC-3.25 / WBC-13.57          Serial CBC's  11-14 @ 05:30  Hct-29.4 / Hgb-9.7 / Plat-234 / RBC-3.20 / WBC-14.15          Serial CBC's  11-13 @ 05:53  Hct-29.3 / Hgb-9.6 / Plat-218 / RBC-3.21 / WBC-11.45          Serial CBC's  11-12 @ 06:10  Hct-26.8 / Hgb-8.9 / Plat-192 / RBC-2.96 / WBC-7.94            11-15    142  |  97<L>  |  10  ----------------------------<  162<H>  3.8   |  30  |  0.61    Ca    8.2<L>      15 Nov 2017 06:08  Phos  3.5     11-15  Mg     2.1     11-15            < from: Xray Chest 1 View AP- PORTABLE-Urgent (11.11.17 @ 12:33) >        INTERPRETATION:  TIME OF EXAM: November 11, 2017 at 12:21 PM    CLINICAL INFORMATION: Dyspnea    TECHNIQUE:   Portable chest    INTERPRETATION:     Left-sided Mediport in place. Elevation of the right hemidiaphragm   although subpulmonic effusion could account for this change. Lungs are   clear. Heart appears mildly enlarged but stable in size. Trace left   effusion.      COMPARISON:  September 7, 2017.      IMPRESSION:  Clear lungs with either elevated right hemidiaphragm or   subpulmonic right-sided effusion.    < end of copied text >            BLOOD SMEAR INTERPRETATION:       RADIOLOGY & ADDITIONAL STUDIES:

## 2017-11-15 NOTE — PROGRESS NOTE ADULT - SUBJECTIVE AND OBJECTIVE BOX
SURGICAL ONCOLOGY PROGRESS NOTE    Feels slightly bloated.  No emesis    Vital Signs Last 24 Hrs  T(C): 36.7 (15 Nov 2017 17:24), Max: 37 (15 Nov 2017 08:01)  T(F): 98 (15 Nov 2017 17:24), Max: 98.6 (15 Nov 2017 08:01)  HR: 81 (15 Nov 2017 17:24) (70 - 114)  BP: 147/65 (15 Nov 2017 17:24) (141/66 - 150/63)  BP(mean): --  RR: 18 (15 Nov 2017 17:24) (18 - 18)  SpO2: 98% (15 Nov 2017 17:24) (92% - 98%)  I&O's Detail    14 Nov 2017 07:01  -  15 Nov 2017 07:00  --------------------------------------------------------  IN:    Free Water: 30 mL    Glucerna: 600 mL  Total IN: 630 mL    OUT:    Bulb: 10 mL    Bulb: 155 mL    Voided: 1700 mL  Total OUT: 1865 mL    Total NET: -1235 mL      15 Nov 2017 07:01  -  15 Nov 2017 19:33  --------------------------------------------------------  IN:  Total IN: 0 mL    OUT:    Bulb: 60 mL    Bulb: 33 mL    Voided: 1200 mL  Total OUT: 1293 mL    Total NET: -1293 mL          PE:    A&A  NAD    soft, NT, ND, No peritoneal signs    inc  cdi                          9.7    13.57 )-----------( 280      ( 15 Nov 2017 06:08 )             29.8     11-15    142  |  97<L>  |  10  ----------------------------<  162<H>  3.8   |  30  |  0.61    Ca    8.2<L>      15 Nov 2017 06:08  Phos  3.5     11-15  Mg     2.1     11-15

## 2017-11-15 NOTE — PROVIDER CONTACT NOTE (OTHER) - RECOMMENDATIONS
Come and assess pt. Avoided giving oxycodone again during 12 hr shift, pt stated she hasn't had a BM in 5 days, which I discussed with Dr. Raman earlier.

## 2017-11-15 NOTE — PROVIDER CONTACT NOTE (OTHER) - ASSESSMENT
Pt assisted back into bed.  D team surgery team notified and came to see pt. ([facility]).  New orders received for Tube feedings.
BP elevated. pt crying. pt c/o nausea.
Pt's abdomen is soft to touch, hypoactive sounds, but endorses passing flatus. Tolerated glucerna, refusing meals.
pt with grimace, c/o pain to abdominal incision site. denies any nausea at this time.

## 2017-11-15 NOTE — PROGRESS NOTE ADULT - SUBJECTIVE AND OBJECTIVE BOX
Patient is a 71y old  Female who presents with a chief complaint of "pancreatic cancer" (25 Oct 2017 13:19)      SUBJECTIVE / OVERNIGHT EVENTS: No nausea, vomiting or diarrhea, no fever or chills, no dizziness or chest pain, no dysuria or hematuria, no joint pain or swelling    continues to improve    MEDICATIONS  (STANDING):  acyclovir Topical 5% Ointment 1 Application(s) Topical five times a day  atorvastatin 10 milliGRAM(s) Oral at bedtime  buDESOnide 160 MICROgram(s)/formoterol 4.5 MICROgram(s) Inhaler 2 Puff(s) Inhalation two times a day  docusate sodium 100 milliGRAM(s) Oral three times a day  enoxaparin Injectable 40 milliGRAM(s) SubCutaneous daily  erythromycin     base Tablet 500 milliGRAM(s) Oral three times a day  hydrochlorothiazide 25 milliGRAM(s) Oral daily  insulin lispro (HumaLOG) corrective regimen sliding scale   SubCutaneous every 6 hours  losartan 100 milliGRAM(s) Oral daily  metoclopramide Injectable 5 milliGRAM(s) IV Push every 6 hours  pantoprazole    Tablet 40 milliGRAM(s) Oral before breakfast  potassium chloride    Tablet ER 20 milliEquivalent(s) Oral once  senna 2 Tablet(s) Oral at bedtime  sertraline 75 milliGRAM(s) Oral at bedtime  simethicone 80 milliGRAM(s) Chew daily  traZODone 100 milliGRAM(s) Oral at bedtime    MEDICATIONS  (PRN):  acetaminophen   Tablet. 650 milliGRAM(s) Oral every 6 hours PRN Mild Pain (1 - 3)  ALBUTerol    90 MICROgram(s) HFA Inhaler 2 Puff(s) Inhalation every 6 hours PRN Shortness of Breath and/or Wheezing  ketorolac 10 milliGRAM(s) Oral every 6 hours PRN breakthrough  oxyCODONE    IR 10 milliGRAM(s) Oral every 4 hours PRN Severe Pain (7 - 10)  oxyCODONE    IR 5 milliGRAM(s) Oral every 4 hours PRN Moderate Pain (4 - 6)        CAPILLARY BLOOD GLUCOSE  155 (15 Nov 2017 05:40)  175 (14 Nov 2017 11:52)      POCT Blood Glucose.: 155 mg/dL (15 Nov 2017 05:40)  POCT Blood Glucose.: 138 mg/dL (15 Nov 2017 00:38)  POCT Blood Glucose.: 116 mg/dL (14 Nov 2017 16:47)  POCT Blood Glucose.: 175 mg/dL (14 Nov 2017 11:52)    I&O's Summary    14 Nov 2017 07:01  -  15 Nov 2017 07:00  --------------------------------------------------------  IN: 630 mL / OUT: 1865 mL / NET: -1235 mL    15 Nov 2017 07:01  -  15 Nov 2017 11:24  --------------------------------------------------------  IN: 0 mL / OUT: 400 mL / NET: -400 mL    GENERAL: NAD, well-developed appears comfortable  HEAD:  Atraumatic, Normocephalic  EYES: EOMI, PERRLA, conjunctiva and sclera clear  NECK: Supple, No JVD  CHEST/LUNG: decreased breath sounds at bases   HEART: S1S2; No rubs, or gallops, no murmurs  ABDOMEN: no tenderness, BS present  no rebound  EXTREMITIES:  Peripheral Pulses, No clubbing or cyanosis, no edema  PSYCH: AO x 3,   NEUROLOGY: Alert, no focal motor or sensory deficits  SKIN: No rashes or lesions    LABS:                        9.7    13.57 )-----------( 280      ( 15 Nov 2017 06:08 )             29.8     11-15    142  |  97<L>  |  10  ----------------------------<  162<H>  3.8   |  30  |  0.61    Ca    8.2<L>      15 Nov 2017 06:08  Phos  3.5     11-15  Mg     2.1     11-15                  Consultant(s) Notes Reviewed:      Care Discussed with Consultants/Other Providers:    Contact Number, Dr Peck [phone]

## 2017-11-15 NOTE — PROGRESS NOTE ADULT - PROBLEM SELECTOR PLAN 2
improved   jose armando continue HCTZ at 25 po qd  Losartan at 100 mg po qd  will continue to monitor

## 2017-11-16 ENCOUNTER — TRANSCRIPTION ENCOUNTER (OUTPATIENT)
Age: 72
End: 2017-11-16

## 2017-11-16 VITALS
SYSTOLIC BLOOD PRESSURE: 135 MMHG | TEMPERATURE: 98 F | DIASTOLIC BLOOD PRESSURE: 63 MMHG | OXYGEN SATURATION: 99 % | RESPIRATION RATE: 19 BRPM | HEART RATE: 97 BPM

## 2017-11-16 LAB
BUN SERPL-MCNC: 12 MG/DL — SIGNIFICANT CHANGE UP (ref 7–23)
CALCIUM SERPL-MCNC: 8.3 MG/DL — LOW (ref 8.4–10.5)
CHLORIDE SERPL-SCNC: 100 MMOL/L — SIGNIFICANT CHANGE UP (ref 98–107)
CO2 SERPL-SCNC: 28 MMOL/L — SIGNIFICANT CHANGE UP (ref 22–31)
CREAT SERPL-MCNC: 0.58 MG/DL — SIGNIFICANT CHANGE UP (ref 0.5–1.3)
GLUCOSE BLDC GLUCOMTR-MCNC: 152 MG/DL — HIGH (ref 70–99)
GLUCOSE BLDC GLUCOMTR-MCNC: 154 MG/DL — HIGH (ref 70–99)
GLUCOSE BLDC GLUCOMTR-MCNC: 174 MG/DL — HIGH (ref 70–99)
GLUCOSE SERPL-MCNC: 167 MG/DL — HIGH (ref 70–99)
HCT VFR BLD CALC: 29.1 % — LOW (ref 34.5–45)
HGB BLD-MCNC: 9.3 G/DL — LOW (ref 11.5–15.5)
MAGNESIUM SERPL-MCNC: 2.2 MG/DL — SIGNIFICANT CHANGE UP (ref 1.6–2.6)
MCHC RBC-ENTMCNC: 28.9 PG — SIGNIFICANT CHANGE UP (ref 27–34)
MCHC RBC-ENTMCNC: 32 % — SIGNIFICANT CHANGE UP (ref 32–36)
MCV RBC AUTO: 90.4 FL — SIGNIFICANT CHANGE UP (ref 80–100)
NRBC # FLD: 0 — SIGNIFICANT CHANGE UP
PHOSPHATE SERPL-MCNC: 3.2 MG/DL — SIGNIFICANT CHANGE UP (ref 2.5–4.5)
PLATELET # BLD AUTO: 347 K/UL — SIGNIFICANT CHANGE UP (ref 150–400)
PMV BLD: 10.3 FL — SIGNIFICANT CHANGE UP (ref 7–13)
POTASSIUM SERPL-MCNC: 3.6 MMOL/L — SIGNIFICANT CHANGE UP (ref 3.5–5.3)
POTASSIUM SERPL-SCNC: 3.6 MMOL/L — SIGNIFICANT CHANGE UP (ref 3.5–5.3)
RBC # BLD: 3.22 M/UL — LOW (ref 3.8–5.2)
RBC # FLD: 14.6 % — HIGH (ref 10.3–14.5)
SODIUM SERPL-SCNC: 140 MMOL/L — SIGNIFICANT CHANGE UP (ref 135–145)
WBC # BLD: 11.67 K/UL — HIGH (ref 3.8–10.5)
WBC # FLD AUTO: 11.67 K/UL — HIGH (ref 3.8–10.5)

## 2017-11-16 PROCEDURE — 99238 HOSP IP/OBS DSCHRG MGMT 30/<: CPT | Mod: 24

## 2017-11-16 RX ORDER — INSULIN LISPRO 100/ML
0 VIAL (ML) SUBCUTANEOUS
Qty: 0 | Refills: 0 | COMMUNITY
Start: 2017-11-16

## 2017-11-16 RX ORDER — PANTOPRAZOLE SODIUM 20 MG/1
1 TABLET, DELAYED RELEASE ORAL
Qty: 0 | Refills: 0 | COMMUNITY
Start: 2017-11-16

## 2017-11-16 RX ORDER — FAMOTIDINE 10 MG/ML
1 INJECTION INTRAVENOUS
Qty: 0 | Refills: 0 | COMMUNITY

## 2017-11-16 RX ORDER — LOSARTAN POTASSIUM 100 MG/1
1 TABLET, FILM COATED ORAL
Qty: 0 | Refills: 0 | COMMUNITY
Start: 2017-11-16

## 2017-11-16 RX ORDER — ACYCLOVIR 50 MG/G
1 OINTMENT TOPICAL
Qty: 0 | Refills: 0 | COMMUNITY
Start: 2017-11-16

## 2017-11-16 RX ORDER — PANTOPRAZOLE SODIUM 20 MG/1
1 TABLET, DELAYED RELEASE ORAL
Qty: 0 | Refills: 0 | COMMUNITY

## 2017-11-16 RX ORDER — OXYCODONE HYDROCHLORIDE 5 MG/1
1 TABLET ORAL
Qty: 0 | Refills: 0 | COMMUNITY
Start: 2017-11-16

## 2017-11-16 RX ORDER — ENOXAPARIN SODIUM 100 MG/ML
40 INJECTION SUBCUTANEOUS
Qty: 0 | Refills: 0 | COMMUNITY
Start: 2017-11-16

## 2017-11-16 RX ORDER — SERTRALINE 25 MG/1
1.5 TABLET, FILM COATED ORAL
Qty: 0 | Refills: 0 | COMMUNITY

## 2017-11-16 RX ORDER — ERYTHROMYCIN ETHYLSUCCINATE 400 MG
1 TABLET ORAL
Qty: 0 | Refills: 0 | COMMUNITY
Start: 2017-11-16

## 2017-11-16 RX ORDER — ALBUTEROL 90 UG/1
2 AEROSOL, METERED ORAL
Qty: 0 | Refills: 0 | COMMUNITY

## 2017-11-16 RX ORDER — ALBUTEROL 90 UG/1
2 AEROSOL, METERED ORAL
Qty: 0 | Refills: 0 | COMMUNITY
Start: 2017-11-16

## 2017-11-16 RX ORDER — SERTRALINE 25 MG/1
3 TABLET, FILM COATED ORAL
Qty: 0 | Refills: 0 | COMMUNITY
Start: 2017-11-16

## 2017-11-16 RX ORDER — POTASSIUM CHLORIDE 20 MEQ
40 PACKET (EA) ORAL ONCE
Qty: 0 | Refills: 0 | Status: COMPLETED | OUTPATIENT
Start: 2017-11-16 | End: 2017-11-16

## 2017-11-16 RX ORDER — TRAZODONE HCL 50 MG
1 TABLET ORAL
Qty: 0 | Refills: 0 | COMMUNITY
Start: 2017-11-16

## 2017-11-16 RX ORDER — SIMETHICONE 80 MG/1
1 TABLET, CHEWABLE ORAL
Qty: 0 | Refills: 0 | COMMUNITY
Start: 2017-11-16

## 2017-11-16 RX ORDER — SENNA PLUS 8.6 MG/1
2 TABLET ORAL
Qty: 0 | Refills: 0 | COMMUNITY
Start: 2017-11-16

## 2017-11-16 RX ORDER — TRAZODONE HCL 50 MG
1 TABLET ORAL
Qty: 0 | Refills: 0 | COMMUNITY

## 2017-11-16 RX ADMIN — OXYCODONE HYDROCHLORIDE 10 MILLIGRAM(S): 5 TABLET ORAL at 07:18

## 2017-11-16 RX ADMIN — BUDESONIDE AND FORMOTEROL FUMARATE DIHYDRATE 2 PUFF(S): 160; 4.5 AEROSOL RESPIRATORY (INHALATION) at 08:18

## 2017-11-16 RX ADMIN — OXYCODONE HYDROCHLORIDE 10 MILLIGRAM(S): 5 TABLET ORAL at 14:08

## 2017-11-16 RX ADMIN — Medication 2: at 01:07

## 2017-11-16 RX ADMIN — Medication 100 MILLIGRAM(S): at 06:49

## 2017-11-16 RX ADMIN — OXYCODONE HYDROCHLORIDE 10 MILLIGRAM(S): 5 TABLET ORAL at 01:07

## 2017-11-16 RX ADMIN — OXYCODONE HYDROCHLORIDE 10 MILLIGRAM(S): 5 TABLET ORAL at 07:50

## 2017-11-16 RX ADMIN — ACYCLOVIR 1 APPLICATION(S): 50 OINTMENT TOPICAL at 01:17

## 2017-11-16 RX ADMIN — Medication 25 MILLIGRAM(S): at 06:51

## 2017-11-16 RX ADMIN — Medication 500 MILLIGRAM(S): at 06:50

## 2017-11-16 RX ADMIN — Medication 5 MILLIGRAM(S): at 11:47

## 2017-11-16 RX ADMIN — Medication 2: at 13:10

## 2017-11-16 RX ADMIN — Medication: at 06:54

## 2017-11-16 RX ADMIN — ACYCLOVIR 1 APPLICATION(S): 50 OINTMENT TOPICAL at 15:30

## 2017-11-16 RX ADMIN — OXYCODONE HYDROCHLORIDE 10 MILLIGRAM(S): 5 TABLET ORAL at 01:35

## 2017-11-16 RX ADMIN — Medication 100 MILLIGRAM(S): at 13:11

## 2017-11-16 RX ADMIN — PANTOPRAZOLE SODIUM 40 MILLIGRAM(S): 20 TABLET, DELAYED RELEASE ORAL at 06:51

## 2017-11-16 RX ADMIN — Medication 5 MILLIGRAM(S): at 01:08

## 2017-11-16 RX ADMIN — SIMETHICONE 80 MILLIGRAM(S): 80 TABLET, CHEWABLE ORAL at 11:47

## 2017-11-16 RX ADMIN — ENOXAPARIN SODIUM 40 MILLIGRAM(S): 100 INJECTION SUBCUTANEOUS at 11:47

## 2017-11-16 RX ADMIN — Medication 40 MILLIEQUIVALENT(S): at 11:47

## 2017-11-16 RX ADMIN — LOSARTAN POTASSIUM 100 MILLIGRAM(S): 100 TABLET, FILM COATED ORAL at 06:50

## 2017-11-16 RX ADMIN — ACYCLOVIR 1 APPLICATION(S): 50 OINTMENT TOPICAL at 11:48

## 2017-11-16 RX ADMIN — ACYCLOVIR 1 APPLICATION(S): 50 OINTMENT TOPICAL at 08:18

## 2017-11-16 RX ADMIN — Medication 500 MILLIGRAM(S): at 13:12

## 2017-11-16 RX ADMIN — Medication 5 MILLIGRAM(S): at 06:49

## 2017-11-16 RX ADMIN — OXYCODONE HYDROCHLORIDE 10 MILLIGRAM(S): 5 TABLET ORAL at 13:17

## 2017-11-16 NOTE — PROGRESS NOTE ADULT - ATTENDING COMMENTS
DC left hugh drain today.  cont diet as rubi.  Add e-mycin
check right Dionisio amylase and observe  check left Dionisio amylase and remove drain  advance diet as rubi  TFs at night
71F h/o HTN, HLD, GERD, asthma, R breast Ca s/p lumpectomy/chemo/rad, pancreatic cancer s/p chemo (Feb 2017) & radiation (March 2017), s/p right VATS, wedge resection for benign neoplasm in August 2017, now s/p Whipple & Jejunostomy Tube.     PLAN:  Neurologic: c/w PCEA, restart Zoloft & Trazodone    Respiratory: c/w Symbicort, Proventil PRN, OOB, incentive spirometry    Cardiovascular: restart home dose of Losartan & HCTZ     Gastrointestinal/Nutrition: NPO/ TF tube feeds- 2 archie HN for 24 hours- 30 cc/ hour  Jejunostomy tube in this place     Renal/Genitourinary: IVL, Stoner in place, monitor I&O    Hematologic: SQH for DVT prophylaxis, change to lovenox when epidural removed    Infectious Disease: no acute issues    Endocrine: ISS    Tubes/Lines/Drains: Stoner, 2 SHAYY drains, Dionisio tube, NGT, L rad A-line, 18G EJ PIV    Disposition: SICU, transfer to floor      The patient is a critical care patient with hemodynamic and metabolic instability in SICU.  I have personally interviewed and examined this patient, reviewed labs and x-rays, discussed with other consultants, House staff and PA's.  I spent  45   minutes  in total providing critical care for the diagnoses, assessment and plan above.  These diagnoses are unrelated to the surgical procedure noted above.  I met with family     min to get further history and make care decisions for this patient who is unable to participate due to altered mental status.  Time involved in performance of separately billable procedures was not counted toward my critical care time.  There is no overlap.
DC planning to rehab.  Cont right Dionisio drain  F/u in 2 weeks
US of portal vein today.  Update pt re operative findings.
Check c diff if loose BMs persist  Clears as rubi.  Adv as rubi when pt agrees  Reglan  TFs at night
71F h/o HTN, HLD, GERD, asthma, R breast Ca s/p lumpectomy/chemo/rad, pancreatic cancer s/p chemo (Feb 2017) & radiation (March 2017), s/p right VATS, wedge resection for benign neoplasm in August 2017, now s/p Whipple & Jejunostomy Tube.     PLAN:  Neurologic: c/w PCEA, restart Zoloft & Trazodone    Respiratory: c/w Symbicort, Proventil PRN, OOB    Cardiovascular: continue to hold Losartan & HCTZ tomorrow.  monitor hemodynamic status, appears to be adequately resuscitated at this time    Gastrointestinal/Nutrition: NPO, Jejunostomy tube in this place -   -J-tube study  -Liver US for portal vein patency    Renal/Genitourinary: c/w IVF, Stoner in place, monitor I&O    Hematologic: SQH for DVT prophylaxis    Infectious Disease: no acute issues    Endocrine: ISS    Tubes/Lines/Drains: Stoner, 2 SHAYY drains, Dionisio tube, NGT, L rad A-line, 18G EJ PIV    Disposition: SICU    --------------------------------------------------------------------------------------    Critical Care Diagnoses: hemodynamic monitoring      The patient is a critical care patient with hemodynamic and metabolic instability in SICU.  I have personally interviewed and examined this patient, reviewed labs and x-rays, discussed with other consultants, House staff and PA's.  I spent  45   minutes  in total providing critical care for the diagnoses, assessment and plan above.  These diagnoses are unrelated to the surgical procedure noted above.  I met with family     min to get further history and make care decisions for this patient who is unable to participate due to altered mental status.  Time involved in performance of separately billable procedures was not counted toward my critical care time.  There is no overlap.
discussed with patient in detail, all questions answered
discussed with patient in detail, all questions answered  eventual discharge to subacute rehab when cleared by all services

## 2017-11-16 NOTE — PROGRESS NOTE ADULT - SUBJECTIVE AND OBJECTIVE BOX
HPI:  70 y/o female with history of pancreatic cancer presents to [facility] today for presurgical evaluation.  She was diagnosed with pancreatic cancer in 2016 and was treated with chemotherapy (completed in) and Radiation (completed in 3/2017).  She is s/p right VATS, wedge resection for benign neoplasm in August 2017.  She is scheduled for diagnostic laparoscopic laparotomy whipple on 11/7/17. (25 Oct 2017 13:19)     Pt is seen and examined  pt is awake and lying in bed/out of bed to chair  pt seems comfortable and denies any complaints at this time    ROS:  Negative except for:    MEDICATIONS  (STANDING):  acyclovir Topical 5% Ointment 1 Application(s) Topical five times a day  atorvastatin 10 milliGRAM(s) Oral at bedtime  buDESOnide 160 MICROgram(s)/formoterol 4.5 MICROgram(s) Inhaler 2 Puff(s) Inhalation two times a day  docusate sodium 100 milliGRAM(s) Oral three times a day  enoxaparin Injectable 40 milliGRAM(s) SubCutaneous daily  erythromycin     base Tablet 500 milliGRAM(s) Oral three times a day  hydrochlorothiazide 25 milliGRAM(s) Oral daily  insulin lispro (HumaLOG) corrective regimen sliding scale   SubCutaneous every 6 hours  losartan 100 milliGRAM(s) Oral daily  metoclopramide Injectable 5 milliGRAM(s) IV Push every 6 hours  pantoprazole    Tablet 40 milliGRAM(s) Oral before breakfast  senna 2 Tablet(s) Oral at bedtime  sertraline 75 milliGRAM(s) Oral at bedtime  simethicone 80 milliGRAM(s) Chew daily  traZODone 100 milliGRAM(s) Oral at bedtime    MEDICATIONS  (PRN):  acetaminophen   Tablet. 650 milliGRAM(s) Oral every 6 hours PRN Mild Pain (1 - 3)  ALBUTerol    90 MICROgram(s) HFA Inhaler 2 Puff(s) Inhalation every 6 hours PRN Shortness of Breath and/or Wheezing  ketorolac 10 milliGRAM(s) Oral every 6 hours PRN breakthrough  oxyCODONE    IR 10 milliGRAM(s) Oral every 4 hours PRN Severe Pain (7 - 10)  oxyCODONE    IR 5 milliGRAM(s) Oral every 4 hours PRN Moderate Pain (4 - 6)      Allergies    No Known Drug Allergies  SteriStrips (Blisters)    Intolerances        Vital Signs Last 24 Hrs  T(C): 36.6 (16 Nov 2017 12:39), Max: 36.8 (16 Nov 2017 00:54)  T(F): 97.8 (16 Nov 2017 12:39), Max: 98.3 (16 Nov 2017 00:54)  HR: 97 (16 Nov 2017 12:39) (82 - 97)  BP: 135/63 (16 Nov 2017 12:39) (135/63 - 156/68)  BP(mean): --  RR: 19 (16 Nov 2017 12:39) (16 - 19)  SpO2: 99% (16 Nov 2017 12:39) (95% - 99%)    PHYSICAL EXAM  General: adult in NAD  HEENT: clear oropharynx, anicteric sclera, pink conjunctiva  Neck: supple  CV: normal S1/S2 with no murmur rubs or gallops  Lungs: positive air movement b/l ant lungs,clear to auscultation, no wheezes, no rales  Abdomen: soft non-tender non-distended, no hepatosplenomegaly  Ext: no clubbing cyanosis or edema  Skin: no rashes and no petechiae  Neuro: alert and oriented X 4, no focal deficits  LABS:                          9.3    11.67 )-----------( 347      ( 16 Nov 2017 05:45 )             29.1         Mean Cell Volume : 90.4 fL  Mean Cell Hemoglobin : 28.9 pg  Mean Cell Hemoglobin Concentration : 32.0 %  Auto Neutrophil # : x  Auto Lymphocyte # : x  Auto Monocyte # : x  Auto Eosinophil # : x  Auto Basophil # : x  Auto Neutrophil % : x  Auto Lymphocyte % : x  Auto Monocyte % : x  Auto Eosinophil % : x  Auto Basophil % : x    Serial CBC's  11-16 @ 05:45  Hct-29.1 / Hgb-9.3 / Plat-347 / RBC-3.22 / WBC-11.67          Serial CBC's  11-15 @ 06:08  Hct-29.8 / Hgb-9.7 / Plat-280 / RBC-3.25 / WBC-13.57          Serial CBC's  11-14 @ 05:30  Hct-29.4 / Hgb-9.7 / Plat-234 / RBC-3.20 / WBC-14.15          Serial CBC's  11-13 @ 05:53  Hct-29.3 / Hgb-9.6 / Plat-218 / RBC-3.21 / WBC-11.45            11-16    140  |  100  |  12  ----------------------------<  167<H>  3.6   |  28  |  0.58    Ca    8.3<L>      16 Nov 2017 05:45  Phos  3.2     11-16  Mg     2.2     11-16                      BLOOD SMEAR INTERPRETATION:       RADIOLOGY & ADDITIONAL STUDIES:

## 2017-11-16 NOTE — PROGRESS NOTE ADULT - PROVIDER SPECIALTY LIST ADULT
Anesthesia
Anesthesia
Heme/Onc
Internal Medicine
Pain Medicine
SICU
Surgery
Pain Medicine
Surgery
SICU

## 2017-11-16 NOTE — PROGRESS NOTE ADULT - ASSESSMENT
A: 71y F POD 9 s/p Jejunostomy tube placement, Whipple procedure, diagnostic lap, liver biopsy, doing well.    P:  - Lovenox DVT ppx- continue for 2 weeks at rehab.  - PO pain control  - Monitor SHAYY output  - Erythromycin for gastroparesis  - LRD and tube feeds  - Rehab planning

## 2017-11-16 NOTE — DISCHARGE NOTE ADULT - MEDICATION SUMMARY - MEDICATIONS TO STOP TAKING
I will STOP taking the medications listed below when I get home from the hospital:    famotidine 40 mg oral tablet  -- 1 tab(s) by mouth 2 times a day    acetaminophen-oxycodone 325 mg-5 mg oral tablet  -- 2 tab(s) by mouth every 4 hours, As needed, Severe Pain (7 - 10) MDD:10

## 2017-11-16 NOTE — PROGRESS NOTE ADULT - ASSESSMENT
· Assessment		  71 year old lady with history of pancreatic adenocarcinoma diagnosed 2016 s/p neoadjuvant chemotherapy followed by RT. Had wedge resection of lung which was negative for metastasis. Had whipples procedure with negative margins on frozen. Recovering well from her surgery.On NGT suction.      Problem/Recommendation - 1:  Problem: Malignant neoplasm of pancreas, unspecified location of malignancy. Recommendation: Recovering well post whipples.Will follow the pathology staging .Oupt followup post discharge. Surgery followup. NGT removed.  11/15: stable: await path  11/16 Feeling better, being discharged to rehab. Will follow as oupt.    Problem/Recommendation - 2:  ·  Problem: Persistent asthma, unspecified asthma severity, unspecified whether complicated.  Recommendation: Continue current medications. At present stable without any evidence of worsening.11/15: stable: not wheezing      Problem/Recommendation - 3:  ·  Problem: Hypertension, unspecified type.  Recommendation: Care per medicine.     Problem/Recommendation - 4:Problem: Malignant neoplasm of right breast ,estrogen receptor negative, unspecified site of breast.  Recommendation: Patient with remote history of right breast cancer. Exam normal, yearly mammograms.       Attending Attestation:   I was physically present for the key portions of the evaluation and management (E/M) service provided.  I agree with the above history, physical, and plan which I have reviewed and edited where appropriate.

## 2017-11-16 NOTE — DISCHARGE NOTE ADULT - CARE PROVIDER_API CALL
Madan Titus), Surgery  [address] [place] [address]  Phone: [phone]  Fax: [phone]    Sunny Mitchell ([facility]), Hematology; HospicePalliative Medicine; Internal Medicine; Medical Oncology  [address] [place] [address]  Phone: [phone]  Fax: [phone]

## 2017-11-16 NOTE — PROGRESS NOTE ADULT - PROBLEM SELECTOR PLAN 1
leukocytosis resolving  no evidence of infection  no antibiotics needed  will monitor   no intervention  afebrile

## 2017-11-16 NOTE — DISCHARGE NOTE ADULT - CONDITIONS AT DISCHARGE
Pt is alert and orientedx4. Denies pain at this time. Midline incision is c/d/i. SHAYY in place. IV removed. Tolerated meals. Passing flatus.

## 2017-11-16 NOTE — DISCHARGE NOTE ADULT - MEDICATION SUMMARY - MEDICATIONS TO TAKE
I will START or STAY ON the medications listed below when I get home from the hospital:    Tylenol 325 mg oral capsule  -- 2 tab(s) by mouth every 6 hours, As Needed for mild pain.   -- Indication: For Pain med    oxyCODONE 5 mg oral tablet  -- 1 tab(s) by mouth every 4 hours, As needed, Moderate Pain (4 - 6)  -- Indication: For Pain med    enoxaparin  -- 40 milligram(s) subcutaneous once a day x 14 days  -- Indication: For VTE ppx    sertraline 25 mg oral tablet  -- 3 tab(s) by mouth once a day (at bedtime)  -- Indication: For Psych med    traZODone 100 mg oral tablet  -- 1 tab(s) by mouth once a day (at bedtime)  -- Indication: For Psych med    insulin lispro 100 units/mL subcutaneous solution  --  subcutaneous every 6 hours; 2 Unit(s) if Glucose 151 - 200  4 Unit(s) if Glucose 201 - 250  6 Unit(s) if Glucose 251 - 300  8 Unit(s) if Glucose 301 - 350  10 Unit(s) if Glucose 351 - 400  12 Unit(s) if Glucose Greater Than 400  -- Indication: For DM med    metoclopramide 5 mg/mL injectable solution  -- 5 milligram(s) intravenous every 6 hours for gastroparesis  -- Indication: For gastroparesis    lovastatin 40 mg oral tablet  -- 1 tab(s) by mouth once a day (at bedtime)  -- Indication: For cardiovascular med    losartan-hydrochlorothiazide 100mg-12.5mg oral tablet  -- 1 tab(s) by mouth once a day in am  -- Indication: For cardiovascular med    Advair Diskus 250 mcg-50 mcg inhalation powder  -- 1 puff(s) inhaled 2 times a day  -- Indication: For resp med    albuterol 90 mcg/inh inhalation aerosol  -- 2 puff(s) inhaled every 6 hours, As needed, Shortness of Breath and/or Wheezing  -- Indication: For resp med    acyclovir 5% topical ointment  -- 1 application on skin 5 times a day  -- Indication: For Antiviral     docusate sodium 100 mg oral capsule  -- 1 cap(s) by mouth 3 times a day  -- Indication: For stool softener    senna oral tablet  -- 2 tab(s) by mouth once a day (at bedtime)  -- Indication: For stool softener    erythromycin 500 mg oral tablet  -- 1 tab(s) by mouth 3 times a day  -- Indication: For gastroparesis    simethicone 80 mg oral tablet, chewable  -- 1 tab(s) by mouth once a day  -- Indication: For GI med    pantoprazole 40 mg oral delayed release tablet  -- 1 tab(s) by mouth once a day (before a meal)  -- Indication: For GI med    oxybutynin 5 mg oral tablet  -- 1 tab(s) by mouth once a day (at bedtime)  -- Indication: For  med    Vitamin D3 2000 intl units oral capsule  -- 1 cap(s) by mouth once a day  -- Indication: For supplement I will START or STAY ON the medications listed below when I get home from the hospital:    Tylenol 325 mg oral capsule  -- 2 tab(s) by mouth every 6 hours, As Needed for mild pain.   -- Indication: For Pain med    oxyCODONE 5 mg oral tablet  -- 1 tab(s) by mouth every 4 hours, As needed, Moderate Pain (4 - 6)  -- Indication: For Pain med    losartan 100 mg oral tablet  -- 1 tab(s) by mouth once a day  -- Indication: For BP med    enoxaparin  -- 40 milligram(s) subcutaneous once a day x 14 days  -- Indication: For VTE ppx    sertraline 25 mg oral tablet  -- 3 tab(s) by mouth once a day (at bedtime)  -- Indication: For Psych med    traZODone 100 mg oral tablet  -- 1 tab(s) by mouth once a day (at bedtime)  -- Indication: For Psych med    insulin lispro 100 units/mL subcutaneous solution  --  subcutaneous every 6 hours; 2 Unit(s) if Glucose 151 - 200  4 Unit(s) if Glucose 201 - 250  6 Unit(s) if Glucose 251 - 300  8 Unit(s) if Glucose 301 - 350  10 Unit(s) if Glucose 351 - 400  12 Unit(s) if Glucose Greater Than 400  -- Indication: For DM med    metoclopramide 5 mg/mL injectable solution  -- 5 milligram(s) intravenous every 6 hours for gastroparesis  -- Indication: For gastroparesis    lovastatin 40 mg oral tablet  -- 1 tab(s) by mouth once a day (at bedtime)  -- Indication: For cardiovascular med    albuterol 90 mcg/inh inhalation aerosol  -- 2 puff(s) inhaled every 6 hours, As needed, Shortness of Breath and/or Wheezing  -- Indication: For resp med    Advair Diskus 250 mcg-50 mcg inhalation powder  -- 1 puff(s) inhaled 2 times a day  -- Indication: For resp med    acyclovir 5% topical ointment  -- 1 application on skin 5 times a day  -- Indication: For Antiviral     hydroCHLOROthiazide 25 mg oral tablet  -- 1 tab(s) by mouth once a day  -- Indication: For BP med    senna oral tablet  -- 2 tab(s) by mouth once a day (at bedtime)  -- Indication: For stool softener    docusate sodium 100 mg oral capsule  -- 1 cap(s) by mouth 3 times a day  -- Indication: For stool softener    erythromycin 500 mg oral tablet  -- 1 tab(s) by mouth 3 times a day  -- Indication: For gastroparesis    simethicone 80 mg oral tablet, chewable  -- 1 tab(s) by mouth once a day  -- Indication: For GI med    pantoprazole 40 mg oral delayed release tablet  -- 1 tab(s) by mouth once a day (before a meal)  -- Indication: For GI med    oxybutynin 5 mg oral tablet  -- 1 tab(s) by mouth once a day (at bedtime)  -- Indication: For  med    Vitamin D3 2000 intl units oral capsule  -- 1 cap(s) by mouth once a day  -- Indication: For supplement

## 2017-11-16 NOTE — PROGRESS NOTE ADULT - SUBJECTIVE AND OBJECTIVE BOX
D TEAM SURGERY DAILY PROGRESS NOTE:    S: Patient seen and examined. Yesterday, Erythromycin was added for gastroparesis. Today she denies any nausea and has "a little pain." She is passing flatus and has had a bowel movement.    O:  Vital Signs Last 24 Hrs  T(C): 36.5 (16 Nov 2017 07:33), Max: 36.8 (15 Nov 2017 11:41)  T(F): 97.7 (16 Nov 2017 07:33), Max: 98.3 (16 Nov 2017 00:54)  HR: 87 (16 Nov 2017 07:33) (60 - 114)  BP: 154/70 (16 Nov 2017 07:33) (141/66 - 156/68)  BP(mean): --  RR: 16 (16 Nov 2017 07:33) (16 - 18)  SpO2: 96% (16 Nov 2017 07:33) (95% - 98%)    I&O's Detail    15 Nov 2017 07:01  -  16 Nov 2017 07:00  --------------------------------------------------------  IN:    Free Water: 120 mL    Glucerna: 600 mL    Oral Fluid: 410 mL  Total IN: 1130 mL    OUT:    Bulb: 60 mL    Bulb: 35.5 mL    Voided: 1400 mL  Total OUT: 1495.5 mL    Total NET: -365.5 mL    MEDICATIONS  (STANDING):  acyclovir Topical 5% Ointment 1 Application(s) Topical five times a day  atorvastatin 10 milliGRAM(s) Oral at bedtime  buDESOnide 160 MICROgram(s)/formoterol 4.5 MICROgram(s) Inhaler 2 Puff(s) Inhalation two times a day  docusate sodium 100 milliGRAM(s) Oral three times a day  enoxaparin Injectable 40 milliGRAM(s) SubCutaneous daily  erythromycin     base Tablet 500 milliGRAM(s) Oral three times a day  hydrochlorothiazide 25 milliGRAM(s) Oral daily  insulin lispro (HumaLOG) corrective regimen sliding scale   SubCutaneous every 6 hours  losartan 100 milliGRAM(s) Oral daily  metoclopramide Injectable 5 milliGRAM(s) IV Push every 6 hours  pantoprazole    Tablet 40 milliGRAM(s) Oral before breakfast  potassium chloride    Tablet ER 40 milliEquivalent(s) Oral once  senna 2 Tablet(s) Oral at bedtime  sertraline 75 milliGRAM(s) Oral at bedtime  simethicone 80 milliGRAM(s) Chew daily  traZODone 100 milliGRAM(s) Oral at bedtime    MEDICATIONS  (PRN):  acetaminophen   Tablet. 650 milliGRAM(s) Oral every 6 hours PRN Mild Pain (1 - 3)  ALBUTerol    90 MICROgram(s) HFA Inhaler 2 Puff(s) Inhalation every 6 hours PRN Shortness of Breath and/or Wheezing  ketorolac 10 milliGRAM(s) Oral every 6 hours PRN breakthrough  oxyCODONE    IR 10 milliGRAM(s) Oral every 4 hours PRN Severe Pain (7 - 10)  oxyCODONE    IR 5 milliGRAM(s) Oral every 4 hours PRN Moderate Pain (4 - 6)                        9.3    11.67 )-----------( 347      ( 16 Nov 2017 05:45 )             29.1     11-16    140  |  100  |  12  ----------------------------<  167<H>  3.6   |  28  |  0.58    Ca    8.3<L>      16 Nov 2017 05:45  Phos  3.2     11-16  Mg     2.2     11-16    Physical Exam:  Gen: Laying in bed, NAD, alert and oriented.   Resp: Unlabored breathing  Abd: softly distended, non-tender, midline incision well-approximated with staples, R SHAYY serous, L SHAYY removed, J tube in place, Glucerna running    Lines:   IV: patent, in place.

## 2017-11-16 NOTE — DISCHARGE NOTE ADULT - HOSPITAL COURSE
72 y/o female diagnosed with pancreatic cancer in 2016 and was treated with chemotherapy and radiation (completed in 3/2017).  She is s/p right VATS, wedge resection for benign neoplasm in August 2017.      11/7 pt same day admission for scheduled diagnostic laparoscopy, Whipple, jejunostomy tube placement, liver biopsy.  Pt tolerated procedure well. Post operatively pt transferred to the SICU extubated for close hemodynamic monitoring. Overnight pt became hypotensive and tachycardic for which pt resuscitated with 2L bolus and albumin x2, and BP and HR improved.    11/8 pt underwent an US because there was dissection around the portal vein during the whipple and showed a patent portal vein. J tube study performed and J-tube visualized in the right lower quadrant with contrast opacifying the jejunum. Tube feeds started    11/9 Heme/Onc consulted and plan follow the pathology staging to decide about adjuvant chemotherapy. Medicine consulted and optimized pt post operatively.    11/10 pt with return bowel function so tube feeds increased to goal.    11/11 pt started on clear diet in addition to receiving tube feeds and advanced to a regular diet on 11/14. Plan to discharge on regular diet and tube feeds, glucerna 1.2 @ 50cc/hr x12hrs. Reglan and erythromycin were started for gastroparesis.    PT evaluated pt and recommend pt be discharged to rehab.    Per Attending pt now stable for discharge to rehab. Pt pain well controlled and tolerating regular diet+ nocturnal tube feeds. Pt to be discharged on 2 week course prophylactic Lovenox. Pt to follow up with Dr Titus as an outpatient, instructed to call to schedule appointment. Pt to follow up with Heme/Onc as an outpatient, instructed to call to schedule appointment.

## 2017-11-16 NOTE — DISCHARGE NOTE ADULT - INSTRUCTIONS
Any temperature greater than 100.4, severe nausea or vomiting please call your surgeon. Any severe abdominal pain please call your surgeon. Continue to ambulate as tolerated and use Incentive spirometer.

## 2017-11-16 NOTE — DISCHARGE NOTE ADULT - PLAN OF CARE
s/p whipple and j-tube placement WOUND CARE:  please keep incision clean and dry, pat dry when wet. Staples to be removed at follow up appointment. You are being discharged with a drain in place please empty, measure, and record outputs. Please bring record of measured outputs to follow up appointment   BATHING: Please do not submerge wound underwater. You may shower and/or sponge bathe.  ACTIVITY: No heavy lifting or straining. Otherwise, you may return to your usual level of physical activity. If you are taking narcotic pain medication (such as Percocet) DO NOT drive a car, operate machinery or make important decisions.  DIET: Regular diet and nocturnal tube feeds, glucerna 1.2, 50cc/hr x12 hrs  NOTIFY YOUR SURGEON IF: You have any bleeding that does not stop, any pus draining from your wound(s), any fever (over 100.4 F) or chills, persistent nausea/vomiting, persistent diarrhea, or if your pain is not controlled on your discharge pain medications.  FOLLOW-UP: Please follow up with your primary care physician in one week regarding your hospitalization  Please follow up with Dr Titus in 2 weeks, please call  [phone] to schedule appointment  Please follow up with Oncologist Dr Mitchell, please call [phone] to schedule appointment please cont home meds and follow up with your PMD in 1 week please follow up with your PMD in 1 week please have yearly mammograms performed WOUND CARE:  please keep incision clean and dry, pat dry when wet. Staples to be removed at follow up appointment. You are being discharged with a drain in place please empty, measure, and record outputs. Please bring record of measured outputs to follow up appointment   BATHING: Please do not submerge wound underwater. You may shower and/or sponge bathe.  ACTIVITY: No heavy lifting or straining. Otherwise, you may return to your usual level of physical activity. If you are taking narcotic pain medication (such as Percocet) DO NOT drive a car, operate machinery or make important decisions.  DIET: Regular diet and nocturnal tube feeds, glucerna 1.2, 50cc/hr x12 hrs (6p-6a)  NOTIFY YOUR SURGEON IF: You have any bleeding that does not stop, any pus draining from your wound(s), any fever (over 100.4 F) or chills, persistent nausea/vomiting, persistent diarrhea, or if your pain is not controlled on your discharge pain medications.  FOLLOW-UP: Please follow up with your primary care physician in one week regarding your hospitalization  Please follow up with Dr Titus in 2 weeks, please call  [phone] to schedule appointment  Please follow up with Oncologist Dr Mitchell in 1-2 wks, please call [phone] to schedule appointment  You are being discharged with a new medication Lovenox to be taken for 2 weeks to prevent blood clots while in the hospital the dose of your home medications were changed, please continue current dose and follow up with your PMD in 1 week

## 2017-11-16 NOTE — PROGRESS NOTE ADULT - SUBJECTIVE AND OBJECTIVE BOX
Patient is a 71y old  Female who presents with a chief complaint of "pancreatic cancer" (25 Oct 2017 13:19)      SUBJECTIVE / OVERNIGHT EVENTS: No nausea, vomiting or diarrhea, no fever or chills, no dizziness or chest pain, no dysuria or hematuria, no joint pain or swelling    MEDICATIONS  (STANDING):  acyclovir Topical 5% Ointment 1 Application(s) Topical five times a day  atorvastatin 10 milliGRAM(s) Oral at bedtime  buDESOnide 160 MICROgram(s)/formoterol 4.5 MICROgram(s) Inhaler 2 Puff(s) Inhalation two times a day  docusate sodium 100 milliGRAM(s) Oral three times a day  enoxaparin Injectable 40 milliGRAM(s) SubCutaneous daily  erythromycin     base Tablet 500 milliGRAM(s) Oral three times a day  hydrochlorothiazide 25 milliGRAM(s) Oral daily  insulin lispro (HumaLOG) corrective regimen sliding scale   SubCutaneous every 6 hours  losartan 100 milliGRAM(s) Oral daily  metoclopramide Injectable 5 milliGRAM(s) IV Push every 6 hours  pantoprazole    Tablet 40 milliGRAM(s) Oral before breakfast  potassium chloride    Tablet ER 40 milliEquivalent(s) Oral once  senna 2 Tablet(s) Oral at bedtime  sertraline 75 milliGRAM(s) Oral at bedtime  simethicone 80 milliGRAM(s) Chew daily  traZODone 100 milliGRAM(s) Oral at bedtime    MEDICATIONS  (PRN):  acetaminophen   Tablet. 650 milliGRAM(s) Oral every 6 hours PRN Mild Pain (1 - 3)  ALBUTerol    90 MICROgram(s) HFA Inhaler 2 Puff(s) Inhalation every 6 hours PRN Shortness of Breath and/or Wheezing  ketorolac 10 milliGRAM(s) Oral every 6 hours PRN breakthrough  oxyCODONE    IR 10 milliGRAM(s) Oral every 4 hours PRN Severe Pain (7 - 10)  oxyCODONE    IR 5 milliGRAM(s) Oral every 4 hours PRN Moderate Pain (4 - 6)        CAPILLARY BLOOD GLUCOSE  169 (15 Nov 2017 11:41)      POCT Blood Glucose.: 174 mg/dL (16 Nov 2017 06:52)  POCT Blood Glucose.: 152 mg/dL (16 Nov 2017 00:38)  POCT Blood Glucose.: 104 mg/dL (15 Nov 2017 17:40)  POCT Blood Glucose.: 169 mg/dL (15 Nov 2017 11:41)    I&O's Summary    15 Nov 2017 07:01  -  16 Nov 2017 07:00  --------------------------------------------------------  IN: 1130 mL / OUT: 1495.5 mL / NET: -365.5 mL    GENERAL: NAD, well-developed appears comfortable  HEAD:  Atraumatic, Normocephalic  EYES: EOMI, PERRLA, conjunctiva and sclera clear  NECK: Supple, No JVD  CHEST/LUNG: decreased breath sounds at bases   HEART: S1S2; No rubs, or gallops, no murmurs  ABDOMEN: no tenderness, BS present  no rebound  EXTREMITIES:  Peripheral Pulses, No clubbing or cyanosis, no edema  PSYCH: AO x 3,   NEUROLOGY: Alert, no focal motor or sensory deficits  SKIN: No rashes or lesions    LABS:                        9.3    11.67 )-----------( 347      ( 16 Nov 2017 05:45 )             29.1     11-16    140  |  100  |  12  ----------------------------<  167<H>  3.6   |  28  |  0.58    Ca    8.3<L>      16 Nov 2017 05:45  Phos  3.2     11-16  Mg     2.2     11-16    Consultant notes reviewed: surgery     Contact Number, Dr Peck [phone]

## 2017-11-16 NOTE — DISCHARGE NOTE ADULT - MEDICATION SUMMARY - MEDICATIONS TO CHANGE
I will SWITCH the dose or number of times a day I take the medications listed below when I get home from the hospital:  None I will SWITCH the dose or number of times a day I take the medications listed below when I get home from the hospital:    losartan-hydrochlorothiazide 100mg-12.5mg oral tablet  -- 1 tab(s) by mouth once a day in am

## 2017-11-16 NOTE — DISCHARGE NOTE ADULT - PATIENT PORTAL LINK FT
“You can access the Follow[facility] Patient Portal, offered by [facility], by registering with the following website: [email or web address]”

## 2017-11-22 LAB — SURGICAL PATHOLOGY STUDY: SIGNIFICANT CHANGE UP

## 2017-11-30 ENCOUNTER — APPOINTMENT (OUTPATIENT)
Dept: SURGICAL ONCOLOGY | Facility: CLINIC | Age: 72
End: 2017-11-30
Payer: MEDICARE

## 2017-11-30 VITALS
RESPIRATION RATE: 15 BRPM | HEIGHT: 64 IN | SYSTOLIC BLOOD PRESSURE: 109 MMHG | DIASTOLIC BLOOD PRESSURE: 69 MMHG | WEIGHT: 171 LBS | OXYGEN SATURATION: 94 % | BODY MASS INDEX: 29.19 KG/M2 | HEART RATE: 85 BPM | TEMPERATURE: 97.9 F

## 2017-11-30 PROCEDURE — 99024 POSTOP FOLLOW-UP VISIT: CPT

## 2017-12-26 ENCOUNTER — APPOINTMENT (OUTPATIENT)
Dept: SURGICAL ONCOLOGY | Facility: CLINIC | Age: 72
End: 2017-12-26
Payer: MEDICARE

## 2017-12-26 VITALS
BODY MASS INDEX: 28 KG/M2 | WEIGHT: 164 LBS | RESPIRATION RATE: 14 BRPM | DIASTOLIC BLOOD PRESSURE: 77 MMHG | OXYGEN SATURATION: 95 % | HEIGHT: 64 IN | TEMPERATURE: 97.6 F | SYSTOLIC BLOOD PRESSURE: 118 MMHG | HEART RATE: 94 BPM

## 2017-12-26 PROCEDURE — 99024 POSTOP FOLLOW-UP VISIT: CPT

## 2018-01-22 ENCOUNTER — APPOINTMENT (OUTPATIENT)
Dept: SURGICAL ONCOLOGY | Facility: CLINIC | Age: 73
End: 2018-01-22
Payer: MEDICARE

## 2018-01-22 VITALS — WEIGHT: 168 LBS | TEMPERATURE: 98 F | BODY MASS INDEX: 28.84 KG/M2

## 2018-01-22 VITALS
OXYGEN SATURATION: 98 % | RESPIRATION RATE: 14 BRPM | DIASTOLIC BLOOD PRESSURE: 73 MMHG | WEIGHT: 164 LBS | SYSTOLIC BLOOD PRESSURE: 133 MMHG | HEART RATE: 70 BPM | HEIGHT: 64 IN | BODY MASS INDEX: 28 KG/M2

## 2018-01-22 PROCEDURE — 99024 POSTOP FOLLOW-UP VISIT: CPT

## 2018-01-22 RX ORDER — LOSARTAN POTASSIUM AND HYDROCHLOROTHIAZIDE 12.5; 1 MG/1; MG/1
100-12.5 TABLET ORAL
Qty: 90 | Refills: 0 | Status: ACTIVE | COMMUNITY
Start: 2017-07-03

## 2018-01-22 RX ORDER — OXYCODONE 5 MG/1
5 TABLET ORAL
Qty: 28 | Refills: 0 | Status: ACTIVE | COMMUNITY
Start: 2017-12-20

## 2018-01-22 RX ORDER — METOCLOPRAMIDE 10 MG/1
10 TABLET ORAL
Qty: 90 | Refills: 0 | Status: ACTIVE | COMMUNITY
Start: 2017-12-20

## 2018-01-22 RX ORDER — SERTRALINE 25 MG/1
25 TABLET, FILM COATED ORAL
Qty: 90 | Refills: 0 | Status: ACTIVE | COMMUNITY
Start: 2017-12-20

## 2018-01-22 RX ORDER — ALBUTEROL SULFATE 90 UG/1
108 (90 BASE) AEROSOL, METERED RESPIRATORY (INHALATION)
Qty: 18 | Refills: 0 | Status: ACTIVE | COMMUNITY
Start: 2017-12-20

## 2018-01-22 RX ORDER — FAMOTIDINE 20 MG/1
20 TABLET, FILM COATED ORAL
Qty: 60 | Refills: 0 | Status: ACTIVE | COMMUNITY
Start: 2017-09-03

## 2018-01-22 RX ORDER — DRONABINOL 5 MG/1
5 CAPSULE ORAL
Qty: 60 | Refills: 2 | Status: ACTIVE | COMMUNITY
Start: 2018-01-22 | End: 1900-01-01

## 2018-01-22 RX ORDER — ACYCLOVIR 50 MG/G
5 OINTMENT TOPICAL
Qty: 30 | Refills: 0 | Status: ACTIVE | COMMUNITY
Start: 2017-12-20

## 2018-01-22 RX ORDER — ONDANSETRON 4 MG/1
4 TABLET ORAL
Qty: 30 | Refills: 0 | Status: ACTIVE | COMMUNITY
Start: 2017-12-20

## 2018-01-22 RX ORDER — SERTRALINE HYDROCHLORIDE 50 MG/1
50 TABLET, FILM COATED ORAL
Qty: 135 | Refills: 0 | Status: ACTIVE | COMMUNITY
Start: 2017-08-01

## 2018-01-22 RX ORDER — TRAZODONE HYDROCHLORIDE 100 MG/1
100 TABLET ORAL
Qty: 30 | Refills: 0 | Status: ACTIVE | COMMUNITY
Start: 2017-12-20

## 2018-01-22 RX ORDER — GABAPENTIN 100 MG/1
100 CAPSULE ORAL
Qty: 60 | Refills: 0 | Status: ACTIVE | COMMUNITY
Start: 2017-08-17

## 2018-01-22 RX ORDER — OXYCODONE AND ACETAMINOPHEN 5; 325 MG/1; MG/1
5-325 TABLET ORAL
Qty: 60 | Refills: 0 | Status: ACTIVE | COMMUNITY
Start: 2017-09-07

## 2018-01-22 RX ORDER — PANTOPRAZOLE 40 MG/1
40 TABLET, DELAYED RELEASE ORAL
Qty: 30 | Refills: 0 | Status: ACTIVE | COMMUNITY
Start: 2017-08-17

## 2018-01-22 RX ORDER — ERYTHROMYCIN 250 MG/1
250 CAPSULE, DELAYED RELEASE PELLETS ORAL
Qty: 180 | Refills: 0 | Status: ACTIVE | COMMUNITY
Start: 2017-12-21

## 2018-01-22 RX ORDER — OXYBUTYNIN CHLORIDE 5 MG/1
5 TABLET, EXTENDED RELEASE ORAL
Qty: 30 | Refills: 0 | Status: ACTIVE | COMMUNITY
Start: 2017-07-03

## 2018-02-26 ENCOUNTER — APPOINTMENT (OUTPATIENT)
Dept: SURGICAL ONCOLOGY | Facility: CLINIC | Age: 73
End: 2018-02-26
Payer: MEDICARE

## 2018-02-26 VITALS
HEART RATE: 90 BPM | HEIGHT: 64 IN | DIASTOLIC BLOOD PRESSURE: 83 MMHG | WEIGHT: 176.01 LBS | OXYGEN SATURATION: 96 % | BODY MASS INDEX: 30.05 KG/M2 | RESPIRATION RATE: 16 BRPM | SYSTOLIC BLOOD PRESSURE: 138 MMHG

## 2018-02-26 DIAGNOSIS — C25.9 MALIGNANT NEOPLASM OF PANCREAS, UNSPECIFIED: ICD-10-CM

## 2018-02-26 PROCEDURE — 99214 OFFICE O/P EST MOD 30 MIN: CPT

## 2018-02-26 RX ORDER — ONDANSETRON 8 MG/1
8 TABLET ORAL EVERY 8 HOURS
Qty: 30 | Refills: 2 | Status: DISCONTINUED | COMMUNITY
Start: 2017-03-22 | End: 2018-02-26

## 2018-03-06 ENCOUNTER — INPATIENT (INPATIENT)
Facility: HOSPITAL | Age: 73
LOS: 16 days | Discharge: NURSING, REHAB OR LONG-TERM CARE | End: 2018-03-23
Attending: INTERNAL MEDICINE | Admitting: INTERNAL MEDICINE
Payer: MEDICARE

## 2018-03-06 VITALS
DIASTOLIC BLOOD PRESSURE: 81 MMHG | OXYGEN SATURATION: 100 % | TEMPERATURE: 98 F | RESPIRATION RATE: 16 BRPM | HEART RATE: 83 BPM | SYSTOLIC BLOOD PRESSURE: 139 MMHG

## 2018-03-06 DIAGNOSIS — Z98.89 OTHER SPECIFIED POSTPROCEDURAL STATES: Chronic | ICD-10-CM

## 2018-03-06 DIAGNOSIS — Z98.890 OTHER SPECIFIED POSTPROCEDURAL STATES: Chronic | ICD-10-CM

## 2018-03-06 DIAGNOSIS — Z95.828 PRESENCE OF OTHER VASCULAR IMPLANTS AND GRAFTS: Chronic | ICD-10-CM

## 2018-03-06 LAB
ALBUMIN SERPL ELPH-MCNC: 2.3 G/DL — LOW (ref 3.3–5)
ALP SERPL-CCNC: 143 U/L — HIGH (ref 40–120)
ALT FLD-CCNC: 49 U/L — HIGH (ref 4–33)
APTT BLD: 40.2 SEC — HIGH (ref 27.5–37.4)
AST SERPL-CCNC: 82 U/L — HIGH (ref 4–32)
BASE EXCESS BLDV CALC-SCNC: 2.9 MMOL/L — SIGNIFICANT CHANGE UP
BASOPHILS # BLD AUTO: 0.04 K/UL — SIGNIFICANT CHANGE UP (ref 0–0.2)
BASOPHILS NFR BLD AUTO: 0.6 % — SIGNIFICANT CHANGE UP (ref 0–2)
BILIRUB SERPL-MCNC: 3 MG/DL — HIGH (ref 0.2–1.2)
BLOOD GAS VENOUS - CREATININE: 0.55 MG/DL — SIGNIFICANT CHANGE UP (ref 0.5–1.3)
BUN SERPL-MCNC: 10 MG/DL — SIGNIFICANT CHANGE UP (ref 7–23)
CALCIUM SERPL-MCNC: 8.2 MG/DL — LOW (ref 8.4–10.5)
CHLORIDE BLDV-SCNC: 109 MMOL/L — HIGH (ref 96–108)
CHLORIDE SERPL-SCNC: 106 MMOL/L — SIGNIFICANT CHANGE UP (ref 98–107)
CO2 SERPL-SCNC: 25 MMOL/L — SIGNIFICANT CHANGE UP (ref 22–31)
CREAT SERPL-MCNC: 0.69 MG/DL — SIGNIFICANT CHANGE UP (ref 0.5–1.3)
EOSINOPHIL # BLD AUTO: 0.08 K/UL — SIGNIFICANT CHANGE UP (ref 0–0.5)
EOSINOPHIL NFR BLD AUTO: 1.1 % — SIGNIFICANT CHANGE UP (ref 0–6)
GAS PNL BLDV: 139 MMOL/L — SIGNIFICANT CHANGE UP (ref 136–146)
GLUCOSE BLDV-MCNC: 94 — SIGNIFICANT CHANGE UP (ref 70–99)
GLUCOSE SERPL-MCNC: 100 MG/DL — HIGH (ref 70–99)
HCO3 BLDV-SCNC: 26 MMOL/L — SIGNIFICANT CHANGE UP (ref 20–27)
HCT VFR BLD CALC: 34.7 % — SIGNIFICANT CHANGE UP (ref 34.5–45)
HCT VFR BLDV CALC: 36.9 % — SIGNIFICANT CHANGE UP (ref 34.5–45)
HGB BLD-MCNC: 12.1 G/DL — SIGNIFICANT CHANGE UP (ref 11.5–15.5)
HGB BLDV-MCNC: 12 G/DL — SIGNIFICANT CHANGE UP (ref 11.5–15.5)
IMM GRANULOCYTES # BLD AUTO: 0.01 # — SIGNIFICANT CHANGE UP
IMM GRANULOCYTES NFR BLD AUTO: 0.1 % — SIGNIFICANT CHANGE UP (ref 0–1.5)
INR BLD: 1.45 — HIGH (ref 0.88–1.17)
LACTATE BLDV-MCNC: 2.6 MMOL/L — HIGH (ref 0.5–2)
LIDOCAIN IGE QN: 6.9 U/L — LOW (ref 7–60)
LYMPHOCYTES # BLD AUTO: 1.06 K/UL — SIGNIFICANT CHANGE UP (ref 1–3.3)
LYMPHOCYTES # BLD AUTO: 15.2 % — SIGNIFICANT CHANGE UP (ref 13–44)
MCHC RBC-ENTMCNC: 30.7 PG — SIGNIFICANT CHANGE UP (ref 27–34)
MCHC RBC-ENTMCNC: 34.9 % — SIGNIFICANT CHANGE UP (ref 32–36)
MCV RBC AUTO: 88.1 FL — SIGNIFICANT CHANGE UP (ref 80–100)
MONOCYTES # BLD AUTO: 0.65 K/UL — SIGNIFICANT CHANGE UP (ref 0–0.9)
MONOCYTES NFR BLD AUTO: 9.3 % — SIGNIFICANT CHANGE UP (ref 2–14)
NEUTROPHILS # BLD AUTO: 5.12 K/UL — SIGNIFICANT CHANGE UP (ref 1.8–7.4)
NEUTROPHILS NFR BLD AUTO: 73.7 % — SIGNIFICANT CHANGE UP (ref 43–77)
NRBC # FLD: 0 — SIGNIFICANT CHANGE UP
PCO2 BLDV: 38 MMHG — LOW (ref 41–51)
PH BLDV: 7.46 PH — HIGH (ref 7.32–7.43)
PLATELET # BLD AUTO: 252 K/UL — SIGNIFICANT CHANGE UP (ref 150–400)
PMV BLD: 11.1 FL — SIGNIFICANT CHANGE UP (ref 7–13)
PO2 BLDV: 32 MMHG — LOW (ref 35–40)
POTASSIUM BLDV-SCNC: 3 MMOL/L — LOW (ref 3.4–4.5)
POTASSIUM SERPL-MCNC: 3.7 MMOL/L — SIGNIFICANT CHANGE UP (ref 3.5–5.3)
POTASSIUM SERPL-SCNC: 3.7 MMOL/L — SIGNIFICANT CHANGE UP (ref 3.5–5.3)
PROT SERPL-MCNC: 6.5 G/DL — SIGNIFICANT CHANGE UP (ref 6–8.3)
PROTHROM AB SERPL-ACNC: 16.2 SEC — HIGH (ref 9.8–13.1)
RBC # BLD: 3.94 M/UL — SIGNIFICANT CHANGE UP (ref 3.8–5.2)
RBC # FLD: 20.3 % — HIGH (ref 10.3–14.5)
SAO2 % BLDV: 58.2 % — LOW (ref 60–85)
SODIUM SERPL-SCNC: 142 MMOL/L — SIGNIFICANT CHANGE UP (ref 135–145)
WBC # BLD: 6.96 K/UL — SIGNIFICANT CHANGE UP (ref 3.8–10.5)
WBC # FLD AUTO: 6.96 K/UL — SIGNIFICANT CHANGE UP (ref 3.8–10.5)

## 2018-03-06 NOTE — ED PROVIDER NOTE - ATTENDING CONTRIBUTION TO CARE
INDIRA Attending Note - Dr. Marino  72F w/ PMH of pancreatic cancer s/p chemo and resection (Nov 2017, Dr. Titus) sent in for evaluation of worsening abdominal pain.  Per son, patient was referred for outpatient abdominal US which showed "extra fluid" in her abdomen and she was told to come to the ED.   PE: pt is alert and oriented but appears uncomfortable, perrl, ent normal, membranes are moist, neck supple. no lymphadenopathy or thyroid enlargement, No JVD.  Chest clear to P&A, Heart- reg rhythm without murmur, rubs or gallops, radial pulses equal bilaterally.  Abd is distended and firm with diffuse dyscomfort but no point tenderness, Bowel sounds are active. no mass or organomegaly. : No CVA tenderness. Neuro:  Pt alert and oriented x 3. Perrl    Distal neurosensory is intact. Motor function is 5/5 strength bilaterally.  No focal deficits. Extremities:  1+ edema.  Skin: warm and dry.  Plan: Labs, including CBC, CMP, renal evaluation with Creatinine and BUN, CT scan    Impression: Anasarca,  colitis and increased metastatic disease.

## 2018-03-06 NOTE — ED ADULT NURSE NOTE - OBJECTIVE STATEMENT
pt w/ hx of pancreatic cancer received to the ed came in c/o upper abd pain rated as 5/10. pt is a&ox4 and ambulatory with walker at baseline. Pt currently unable to ambulate. Pt skin is intact, with distended abd, tender to palpation in bilateral upper quadrants, rigid to touch. Pt bilateral lower legs +3 edema. pt respirations are even and unlabored. Pt reports last bm was this morning. Pt reports difficulty walking ever since swelling to legs began. Pt reports having pancreatic surgery a few months ago. Pt denies cp, sob, fever or any other symptoms. Pt son at bedside. 20 gauge placed in rt anterior hand, labs drawn and sent. Will continue to monitor.

## 2018-03-06 NOTE — ED PROVIDER NOTE - MEDICAL DECISION MAKING DETAILS
72F w/ PMH of pancreatic cancer s/p chemo and resection (Nov 2017, Dr. Titus) sent in for evaluation of worsening abdominal pain.  Per son, patient was referred for outpatient abdominal US which showed "extra fluid" in her abdomen and she was told to come to the ED. Plan labs ,including CBC, lipase, U/A CT scan

## 2018-03-06 NOTE — ED ADULT TRIAGE NOTE - CHIEF COMPLAINT QUOTE
pt states that she had surgery for pancreatic cancer in November, now has abd pain and bloating x few months, was seen by PMD last week and had tests that showed fluid in abd per family. Also c/o swelling to her legs PMH HTN

## 2018-03-06 NOTE — ED PROVIDER NOTE - PMH
Asthma  denies any recent hospitalizations/intubations  Breast cancer  Dx 1996 s/p RT and lumpectomy  Colitis  due to chemo 1/2017  Depression    Hiatal hernia with GERD  no changes  HTN (hypertension)    Hyperlipidemia    Insomnia    Malignant neoplasm of pancreas, unspecified location of malignancy  started chemo 10/2016, has mediport - left chest  Pancreatic cancer    Solitary pulmonary nodule

## 2018-03-07 DIAGNOSIS — C25.9 MALIGNANT NEOPLASM OF PANCREAS, UNSPECIFIED: Chronic | ICD-10-CM

## 2018-03-07 DIAGNOSIS — I10 ESSENTIAL (PRIMARY) HYPERTENSION: ICD-10-CM

## 2018-03-07 DIAGNOSIS — R60.0 LOCALIZED EDEMA: ICD-10-CM

## 2018-03-07 DIAGNOSIS — K52.9 NONINFECTIVE GASTROENTERITIS AND COLITIS, UNSPECIFIED: ICD-10-CM

## 2018-03-07 DIAGNOSIS — F32.9 MAJOR DEPRESSIVE DISORDER, SINGLE EPISODE, UNSPECIFIED: ICD-10-CM

## 2018-03-07 DIAGNOSIS — R18.8 OTHER ASCITES: ICD-10-CM

## 2018-03-07 DIAGNOSIS — C25.9 MALIGNANT NEOPLASM OF PANCREAS, UNSPECIFIED: ICD-10-CM

## 2018-03-07 DIAGNOSIS — R74.0 NONSPECIFIC ELEVATION OF LEVELS OF TRANSAMINASE AND LACTIC ACID DEHYDROGENASE [LDH]: ICD-10-CM

## 2018-03-07 DIAGNOSIS — Z29.9 ENCOUNTER FOR PROPHYLACTIC MEASURES, UNSPECIFIED: ICD-10-CM

## 2018-03-07 DIAGNOSIS — J45.909 UNSPECIFIED ASTHMA, UNCOMPLICATED: ICD-10-CM

## 2018-03-07 DIAGNOSIS — E78.5 HYPERLIPIDEMIA, UNSPECIFIED: ICD-10-CM

## 2018-03-07 LAB
APPEARANCE UR: CLEAR — SIGNIFICANT CHANGE UP
BILIRUB UR-MCNC: SIGNIFICANT CHANGE UP
BLOOD UR QL VISUAL: NEGATIVE — SIGNIFICANT CHANGE UP
COLOR SPEC: YELLOW — SIGNIFICANT CHANGE UP
GLUCOSE UR-MCNC: NEGATIVE — SIGNIFICANT CHANGE UP
KETONES UR-MCNC: NEGATIVE — SIGNIFICANT CHANGE UP
LEUKOCYTE ESTERASE UR-ACNC: NEGATIVE — SIGNIFICANT CHANGE UP
MUCOUS THREADS # UR AUTO: SIGNIFICANT CHANGE UP
NITRITE UR-MCNC: NEGATIVE — SIGNIFICANT CHANGE UP
NON-SQ EPI CELLS # UR AUTO: 1 — SIGNIFICANT CHANGE UP
PH UR: 7.5 — SIGNIFICANT CHANGE UP (ref 4.6–8)
PROT UR-MCNC: 30 MG/DL — HIGH
RBC CASTS # UR COMP ASSIST: HIGH (ref 0–?)
SP GR SPEC: > 1.04 — HIGH (ref 1–1.04)
SQUAMOUS # UR AUTO: SIGNIFICANT CHANGE UP
UROBILINOGEN FLD QL: 4 MG/DL — HIGH
WBC UR QL: SIGNIFICANT CHANGE UP (ref 0–?)

## 2018-03-07 PROCEDURE — 74177 CT ABD & PELVIS W/CONTRAST: CPT | Mod: 26

## 2018-03-07 PROCEDURE — 93970 EXTREMITY STUDY: CPT | Mod: 26

## 2018-03-07 PROCEDURE — 99223 1ST HOSP IP/OBS HIGH 75: CPT

## 2018-03-07 RX ORDER — ALBUTEROL 90 UG/1
2 AEROSOL, METERED ORAL EVERY 6 HOURS
Qty: 0 | Refills: 0 | Status: DISCONTINUED | OUTPATIENT
Start: 2018-03-07 | End: 2018-03-23

## 2018-03-07 RX ORDER — SERTRALINE 25 MG/1
75 TABLET, FILM COATED ORAL DAILY
Qty: 0 | Refills: 0 | Status: DISCONTINUED | OUTPATIENT
Start: 2018-03-07 | End: 2018-03-23

## 2018-03-07 RX ORDER — METRONIDAZOLE 500 MG
500 TABLET ORAL ONCE
Qty: 0 | Refills: 0 | Status: COMPLETED | OUTPATIENT
Start: 2018-03-07 | End: 2018-03-07

## 2018-03-07 RX ORDER — BUDESONIDE AND FORMOTEROL FUMARATE DIHYDRATE 160; 4.5 UG/1; UG/1
2 AEROSOL RESPIRATORY (INHALATION)
Qty: 0 | Refills: 0 | Status: DISCONTINUED | OUTPATIENT
Start: 2018-03-07 | End: 2018-03-23

## 2018-03-07 RX ORDER — TRAZODONE HCL 50 MG
100 TABLET ORAL AT BEDTIME
Qty: 0 | Refills: 0 | Status: DISCONTINUED | OUTPATIENT
Start: 2018-03-07 | End: 2018-03-23

## 2018-03-07 RX ORDER — ATORVASTATIN CALCIUM 80 MG/1
10 TABLET, FILM COATED ORAL AT BEDTIME
Qty: 0 | Refills: 0 | Status: DISCONTINUED | OUTPATIENT
Start: 2018-03-07 | End: 2018-03-23

## 2018-03-07 RX ORDER — LOSARTAN POTASSIUM 100 MG/1
100 TABLET, FILM COATED ORAL DAILY
Qty: 0 | Refills: 0 | Status: DISCONTINUED | OUTPATIENT
Start: 2018-03-07 | End: 2018-03-23

## 2018-03-07 RX ORDER — ACETAMINOPHEN 500 MG
650 TABLET ORAL EVERY 6 HOURS
Qty: 0 | Refills: 0 | Status: DISCONTINUED | OUTPATIENT
Start: 2018-03-07 | End: 2018-03-23

## 2018-03-07 RX ORDER — METRONIDAZOLE 500 MG
TABLET ORAL
Qty: 0 | Refills: 0 | Status: DISCONTINUED | OUTPATIENT
Start: 2018-03-07 | End: 2018-03-12

## 2018-03-07 RX ORDER — ENOXAPARIN SODIUM 100 MG/ML
40 INJECTION SUBCUTANEOUS EVERY 24 HOURS
Qty: 0 | Refills: 0 | Status: DISCONTINUED | OUTPATIENT
Start: 2018-03-07 | End: 2018-03-14

## 2018-03-07 RX ORDER — METRONIDAZOLE 500 MG
500 TABLET ORAL EVERY 8 HOURS
Qty: 0 | Refills: 0 | Status: DISCONTINUED | OUTPATIENT
Start: 2018-03-07 | End: 2018-03-12

## 2018-03-07 RX ORDER — ONDANSETRON 8 MG/1
4 TABLET, FILM COATED ORAL EVERY 6 HOURS
Qty: 0 | Refills: 0 | Status: DISCONTINUED | OUTPATIENT
Start: 2018-03-07 | End: 2018-03-23

## 2018-03-07 RX ORDER — OXYBUTYNIN CHLORIDE 5 MG
1 TABLET ORAL
Qty: 0 | Refills: 0 | COMMUNITY

## 2018-03-07 RX ORDER — METOCLOPRAMIDE HCL 10 MG
5 TABLET ORAL
Qty: 0 | Refills: 0 | COMMUNITY

## 2018-03-07 RX ORDER — CIPROFLOXACIN LACTATE 400MG/40ML
400 VIAL (ML) INTRAVENOUS EVERY 12 HOURS
Qty: 0 | Refills: 0 | Status: DISCONTINUED | OUTPATIENT
Start: 2018-03-07 | End: 2018-03-12

## 2018-03-07 RX ORDER — OXYBUTYNIN CHLORIDE 5 MG
5 TABLET ORAL AT BEDTIME
Qty: 0 | Refills: 0 | Status: DISCONTINUED | OUTPATIENT
Start: 2018-03-07 | End: 2018-03-23

## 2018-03-07 RX ORDER — ACETAMINOPHEN 500 MG
2 TABLET ORAL
Qty: 0 | Refills: 0 | COMMUNITY

## 2018-03-07 RX ORDER — FUROSEMIDE 40 MG
40 TABLET ORAL DAILY
Qty: 0 | Refills: 0 | Status: DISCONTINUED | OUTPATIENT
Start: 2018-03-07 | End: 2018-03-23

## 2018-03-07 RX ORDER — PANTOPRAZOLE SODIUM 20 MG/1
40 TABLET, DELAYED RELEASE ORAL
Qty: 0 | Refills: 0 | Status: DISCONTINUED | OUTPATIENT
Start: 2018-03-07 | End: 2018-03-23

## 2018-03-07 RX ORDER — ACETAMINOPHEN 500 MG
650 TABLET ORAL ONCE
Qty: 0 | Refills: 0 | Status: COMPLETED | OUTPATIENT
Start: 2018-03-07 | End: 2018-03-07

## 2018-03-07 RX ORDER — DRONABINOL 2.5 MG
1 CAPSULE ORAL
Qty: 0 | Refills: 0 | COMMUNITY

## 2018-03-07 RX ADMIN — Medication 100 MILLIGRAM(S): at 14:57

## 2018-03-07 RX ADMIN — Medication 200 MILLIGRAM(S): at 17:20

## 2018-03-07 RX ADMIN — ENOXAPARIN SODIUM 40 MILLIGRAM(S): 100 INJECTION SUBCUTANEOUS at 14:57

## 2018-03-07 RX ADMIN — ATORVASTATIN CALCIUM 10 MILLIGRAM(S): 80 TABLET, FILM COATED ORAL at 21:54

## 2018-03-07 RX ADMIN — SERTRALINE 75 MILLIGRAM(S): 25 TABLET, FILM COATED ORAL at 20:16

## 2018-03-07 RX ADMIN — Medication 100 MILLIGRAM(S): at 21:54

## 2018-03-07 RX ADMIN — Medication 650 MILLIGRAM(S): at 06:00

## 2018-03-07 RX ADMIN — BUDESONIDE AND FORMOTEROL FUMARATE DIHYDRATE 2 PUFF(S): 160; 4.5 AEROSOL RESPIRATORY (INHALATION) at 21:54

## 2018-03-07 RX ADMIN — Medication 200 MILLIGRAM(S): at 03:04

## 2018-03-07 RX ADMIN — Medication 100 MILLIGRAM(S): at 04:00

## 2018-03-07 RX ADMIN — Medication 650 MILLIGRAM(S): at 05:00

## 2018-03-07 RX ADMIN — Medication 5 MILLIGRAM(S): at 21:54

## 2018-03-07 NOTE — H&P ADULT - NSHPSOCIALHISTORY_GEN_ALL_CORE
Social History:    Marital Status:  (   )    ( X ) Single    (   )    (  )   Occupation: Retired  Lives with: (  ) alone  ( X ) children - lives with 2 sons   (  ) spouse   (  ) parents  (  ) other    Substance Use (street drugs): ( X ) never used  (  ) other:  Tobacco Usage:  ( X ) never smoked   (   ) former smoker   (   ) current smoker  (     ) pack year  (        ) last cigarette date  Alcohol Usage: Denies Social History:    Marital Status:  (   )    ( X ) Single    (   )    (  )   Occupation: Retired  Lives with: (  ) alone  ( X ) children - lives with 2 sons   (  ) spouse   (  ) parents  (  ) other    Substance Use (street drugs): ( X ) never used  (  ) other:  Tobacco Usage:  ( X ) never smoked   (   ) former smoker   (   ) current smoker  (     ) pack year  (        ) last cigarette date  Alcohol Usage: Denies    Pt states she ambulates with a walker but is still minimally ambulatory at home

## 2018-03-07 NOTE — H&P ADULT - PSH
Carcinoma of pancreas  s/p whipple  H/O abdominal hysterectomy    H/O umbilical hernia repair  with mesh  History of lumpectomy  right (1996)  History of lung surgery  right VATS, wedge resection (August 2017) benign neoplasm  Port-a-cath in place    Status post right breast lumpectomy  malignant treated with radiation

## 2018-03-07 NOTE — H&P ADULT - PROBLEM SELECTOR PLAN 2
- Pt with likely colitis of cecum and ascending colon though only minimally symptomatic with pain, no diarrhea, no fevers/chills  - Will c/w cipro/flagyl and monitor  - Tylenol for pain control for now, zofran for nausea

## 2018-03-07 NOTE — H&P ADULT - FAMILY HISTORY
Sibling  Still living? Unknown  Family history of brain cancer, Age at diagnosis: Age Unknown  Family history of kidney cancer, Age at diagnosis: Age Unknown     Mother  Still living? Unknown  Family history of ovarian cancer, Age at diagnosis: Age Unknown

## 2018-03-07 NOTE — H&P ADULT - PROBLEM SELECTOR PLAN 10
- Lovenox for DVT ppx - Lovenox for DVT ppx  - Pt unsure of all of her medications, will have [facility] Pharmacists help complete her medication reconciliation

## 2018-03-07 NOTE — H&P ADULT - PROBLEM SELECTOR PLAN 4
- Suspect this is 2/2 mass effect from patient's large ascites causing decreased lymph/venous return  - Will check b/l venous duplex to eval for possible DVT but low suspicion - Suspect this is 2/2 mass effect from patient's large ascites causing decreased lymph/venous return  - Will check b/l venous duplex to eval for possible DVT but low suspicion  - Suspect patient has OA and that is not worsening in setting of her ascites and LE edema causing her b/l knee pain, tylenol for pain control for now

## 2018-03-07 NOTE — H&P ADULT - HISTORY OF PRESENT ILLNESS
This is a 72F with history of Pancreatic cancer s/p chemo and surgery (whipple in Nov 2017), R Breast cancer s/p RT and R lumpectomy, Pulm nodule s/p R VATS (Bx showing necrotizing granulomas), HTN, HLD, Asthma, and Depression who presents to the hospital with complaints of worsening abdominal distention for the past 4 weeks. Said that she had last followed up with Dr. Titus near the end of February and at that had been sent for some imaging tests which the patient said she completed. She had not heard any results from the doctor and was noting her distention worsening which concerned her and she therefore called Dr. Titus who recommended that she come to the hospital for evaluation. She said that she has some associated epigastric/umbilical pain with the distention, also said that it feels like fluid is moving around inside her stomach. She also has noted worsening b/l LE swelling and as a result has noted some increasing difficulty in her walking and in her b/l knee pain. She also endorsed a change in her urine over this time with it become more red in color. She denies any nausea, vomiting, diarrhea, fevers, chills, or changes in her appetite. No other complaints.    In the ED, her initial vitals were T 98.3, P 83, /81, R 16, O2 sat 100% RA. Her lab work here was most significant for coagulopathy and transaminitis. She had a CT A/P that showed large volume ascites, new hepatic mets, possible colitis of the cecum and ascending colon, and a loculated pleural effusion on the L side. She was started on cipro/flagyl and admitted to medicine.

## 2018-03-07 NOTE — H&P ADULT - PROBLEM SELECTOR PLAN 1
- Given patient's history and findings on CT A/P there is a concern that her ascites likely is present due to metastatic pancreatic cancer but would need a diagnostic tap to fully differentiate the possible causes of her ascites  - At this time she is minimally symptomatic other than noticing the abdominal distention  - Will place patient on trial of lasix diuresis and monitor  - Asked for surgery [place] f/u on recs

## 2018-03-07 NOTE — CONSULT NOTE ADULT - SUBJECTIVE AND OBJECTIVE BOX
[facility] SURGICAL ONCOLOGY CONSULTATION NOTE  CONSULTATION OF DR. ARCHIE LAGOS       History of Present Illness:   Samia is a very pleasant 72 year-old female with a medical history significant for Whipple procedure with portal vein resection and repair and feeding jejunostomy on 11/7/17 for pancreatic cancer.  Samia presents with decreased oral intake that worsened over the last few days.  Samia has been having worsening abdominal distention for the past 4 weeks. Said that she had last followed up with Dr. Lagos near the end of February. She called Dr. Lagos regarding the distension who recommended that she come to the hospital for evaluation. She said that she has some associated epigastric/umbilical pain with the distention, also said that it feels like fluid is moving around inside her stomach. She also has noted worsening b/l LE swelling and as a result has noted some increasing difficulty in her walking and in her b/l knee pain. She also endorsed a change in her urine over this time with it become more red in color. She denies any nausea, vomiting, diarrhea, fevers, chills, or changes in her appetite. No other complaints.    Of note, final pathology was residual microscopic foci of invasive ductal adenocarcinoma, with 29 negative examined lymph nodes (T1N0). There was also incidental 0.7 cm neuroendocrine tumor involving the pancreatic head. Pathology of the liver and gallbladder was benign. All margins were uninvolved by carcinoma.      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  Insomnia  Hiatal hernia with GERD: no changes  Colitis: due to chemo 1/2017  Malignant neoplasm of pancreas, unspecified location of malignancy: started chemo 10/2016, has mediport - left chest, now s/p whipple 11/2017  Asthma: denies any recent hospitalizations/intubations  Depression  Hyperlipidemia  HTN (hypertension)  Carcinoma of pancreas: s/p whipple  History of lung surgery: right VATS, wedge resection (August 2017) benign neoplasm  History of lumpectomy: right (1996)  Port-a-cath in place  Status post right breast lumpectomy: malignant treated with radiation  H/O abdominal hysterectomy  H/O umbilical hernia repair: with mesh    FAMILY HISTORY:  Family history of kidney cancer (Sibling): brother  Family history of brain cancer (Sibling): sister  Family history of ovarian cancer (Mother): mother    SOCIAL HISTORY: not smoker     MEDICATIONS  (STANDING):  atorvastatin 10 milliGRAM(s) Oral at bedtime  buDESOnide 160 MICROgram(s)/formoterol 4.5 MICROgram(s) Inhaler 2 Puff(s) Inhalation two times a day  ciprofloxacin   IVPB 400 milliGRAM(s) IV Intermittent every 12 hours  enoxaparin Injectable 40 milliGRAM(s) SubCutaneous every 24 hours  furosemide    Tablet 40 milliGRAM(s) Oral daily  losartan 100 milliGRAM(s) Oral daily  metroNIDAZOLE  IVPB      metroNIDAZOLE  IVPB 500 milliGRAM(s) IV Intermittent every 8 hours  oxybutynin 5 milliGRAM(s) Oral at bedtime  pantoprazole    Tablet 40 milliGRAM(s) Oral before breakfast  sertraline 75 milliGRAM(s) Oral daily    MEDICATIONS  (PRN):  acetaminophen   Tablet. 650 milliGRAM(s) Oral every 6 hours PRN Mild to moderate pain  ALBUTerol    90 MICROgram(s) HFA Inhaler 2 Puff(s) Inhalation every 6 hours PRN Shortness of Breath and/or Wheezing  ondansetron Injectable 4 milliGRAM(s) IV Push every 6 hours PRN Nausea and/or Vomiting  traZODone 100 milliGRAM(s) Oral at bedtime PRN Insomnia    Allergies: No Known Drug Allergies  SteriStrips (Blisters)    Vital Signs Last 24 Hrs  T(C): 36.8 (07 Mar 2018 06:54), Max: 36.8 (06 Mar 2018 17:48)  T(F): 98.2 (07 Mar 2018 06:54), Max: 98.3 (06 Mar 2018 17:48)  HR: 95 (07 Mar 2018 06:54) (80 - 95)  BP: 137/92 (07 Mar 2018 06:54) (137/92 - 159/83)  BP(mean): --  RR: 16 (07 Mar 2018 06:54) (16 - 18)  SpO2: 95% (07 Mar 2018 06:54) (95% - 100%)  Daily     Daily     General: NAD  Neurology: A&Ox3  Respiratory: CTA B/L  CV: RRR, S1S2, no murmur  Abdominal: Soft, moderately distended, not tender ot palpation, incision well healed.   MSK: mild edema, + peripheral pulses                          12.1   6.96  )-----------( 252      ( 06 Mar 2018 20:40 )             34.7     03-06    142  |  106  |  10  ----------------------------<  100<H>  3.7   |  25  |  0.69    Ca    8.2<L>      06 Mar 2018 20:40    TPro  6.5  /  Alb  2.3<L>  /  TBili  3.0<H>  /  DBili  x   /  AST  82<H>  /  ALT  49<H>  /  AlkPhos  143<H>  03-06    PT/INR - ( 06 Mar 2018 20:40 )   PT: 16.2 SEC;   INR: 1.45          PTT - ( 06 Mar 2018 20:40 )  PTT:40.2 SEC      IMAGING STUDIES:  CT abdomen/pelvis:   LOWER CHEST: Right basilar linear subsegmental atelectasis with adjacent   pleural parenchymal calcification, likely sequela of prior asbestosis   exposure. Loculated left pleural effusion with atelectasis of the left   lower lobe.    LIVER: Marked hepatic steatosis. Abnormal enhancement along the course of   the intrahepatic portal vein concerning for metastases.  BILE DUCTS: Choledochojejunostomy.  GALLBLADDER: Surgically absent.  SPLEEN: Within normal limits.  PANCREAS: Status post Whipple. The distal pancreatic body and tail   appears atrophied.  ADRENALS: Within normal limits.  KIDNEYS/URETERS: Within normal limits.    BLADDER: Within normal limits.  REPRODUCTIVE ORGANS: Status post hysterectomy. No adnexal masses.    BOWEL: Status post Whipple. Mural wall thickening and hyperemia of cecum   and ascending colonic loops, difficult to evaluate secondary to   inadequate distention. Correlate clinically to assess for colitis. No   bowel obstruction.   PERITONEUM: Volume ascites.  VESSELS:  There is narrowing of the intrahepatic IVC without gross   filling defect. Unchanged encasement of the portal confluence.  RETROPERITONEUM: No lymphadenopathy.    ABDOMINAL WALL: Anasarca  BONES: Degenerative changes of the spine blastic lesion noted within the   L4 vertebral body, concerning for osseous metastasis. Consider   nonemergent correlation with bone scan if indicated.    IMPRESSION:   1. New large volume ascites.  2. Status post Whipple. Abnormal enhancement along the course of the   intrahepatic portal vein concerning for metastatic disease. New narrowing   of the intrahepatic IVC without gross filling defect.Unchanged encasement   of the portal confluence.  3. Mural wall thickening and hyperemia of cecum and ascending colonic   loops, difficult to evaluate secondary to inadequate distention.   Correlate clinically to assess for colitis. No bowel obstruction.   4. Loculated left pleural effusion with associated atelectasis of the   left lower lobe.  5. Anasarca.

## 2018-03-07 NOTE — H&P ADULT - ASSESSMENT
This is a 72F with history as above who presents to the hospital with worsening abdominal distention found to have new ascites with likely metastatic pancreatic cancer. This is a 72F with history as above who presents to the hospital with worsening abdominal distention found to have new ascites likely 2/2 metastatic pancreatic cancer.

## 2018-03-07 NOTE — H&P ADULT - NSHPREVIEWOFSYSTEMS_GEN_ALL_CORE
REVIEW OF SYSTEMS:    CONSTITUTIONAL: No weakness, fevers or chills  EYES/ENT: No visual changes;  No dysphagia  NECK: No pain or stiffness  RESPIRATORY: No cough, wheezing, hemoptysis; No shortness of breath  CARDIOVASCULAR: No chest pain or palpitations; No lower extremity edema  GASTROINTESTINAL: Worsening abdominal distension, +epigastric/umbilical pain, no nausea/vomiting, no diarrhea/constipation  BACK: No back pain  EXT: b/l LE swelling, b/l knee pain  GENITOURINARY: +change in urine color; No dysuria, frequency or hematuria  NEUROLOGICAL: No numbness or weakness  SKIN: No itching, burning, rashes, or lesions   All other review of systems is negative unless indicated above.

## 2018-03-07 NOTE — H&P ADULT - NSHPPHYSICALEXAM_GEN_ALL_CORE
Vital Signs Last 24 Hrs  T(C): 36.8 (07 Mar 2018 06:54), Max: 36.8 (06 Mar 2018 17:48)  T(F): 98.2 (07 Mar 2018 06:54), Max: 98.3 (06 Mar 2018 17:48)  HR: 95 (07 Mar 2018 06:54) (80 - 95)  BP: 137/92 (07 Mar 2018 06:54) (137/92 - 159/83)  BP(mean): --  RR: 16 (07 Mar 2018 06:54) (16 - 18)  SpO2: 95% (07 Mar 2018 06:54) (95% - 100%)    GENERAL: No acute distress, well-developed  HEAD:  Atraumatic, Normocephalic  ENT: EOMI, PERRLA, conjunctiva and sclera clear, Neck supple, No JVD, moist mucosa  CHEST/LUNG: Clear to auscultation bilaterally; No wheeze, equal breath sounds bilaterally   BACK: No spinal tenderness  HEART: Regular rate and rhythm; No murmurs, rubs, or gallops  ABDOMEN: Soft, mildly tender to palpation especially in the epigastrium, ++Distention with +shifting dulleness; +BS  EXTREMITIES: 1+ pitting edema upto b/l knees, no pain; +knee crepitus b/l knees  PSYCH: Nl behavior, nl affect  NEUROLOGY: AAOx3, non-focal  SKIN: Normal color, No rashes or lesions

## 2018-03-07 NOTE — H&P ADULT - NSHPLABSRESULTS_GEN_ALL_CORE
LABS and ADDITIONAL STUDIES:                        12.1   6.96  )-----------( 252      ( 06 Mar 2018 20:40 )             34.7     03-06    142  |  106  |  10  ----------------------------<  100<H>  3.7   |  25  |  0.69    Ca    8.2<L>      06 Mar 2018 20:40    TPro  6.5  /  Alb  2.3<L>  /  TBili  3.0<H>  /  DBili  x   /  AST  82<H>  /  ALT  49<H>  /  AlkPhos  143<H>  03-06    LIVER FUNCTIONS - ( 06 Mar 2018 20:40 )  Alb: 2.3 g/dL / Pro: 6.5 g/dL / ALK PHOS: 143 u/L / ALT: 49 u/L / AST: 82 u/L / GGT: x           PT/INR - ( 06 Mar 2018 20:40 )   PT: 16.2 SEC;   INR: 1.45     PTT - ( 06 Mar 2018 20:40 )  PTT:40.2 SEC    Lactate, VBG - 2.6    < from: CT Abdomen and Pelvis w/ IV Cont (03.07.18 @ 01:07) >  FINDINGS:    LOWER CHEST: Right basilar linear subsegmental atelectasis with adjacent pleural parenchymal calcification, likely sequela of prior asbestosis exposure. Loculated left pleural effusion with atelectasis of the left lower lobe.    LIVER: Marked hepatic steatosis. Abnormal enhancement along the course of the intrahepatic portal vein concerning for metastases.  BILE DUCTS: Choledochojejunostomy.  GALLBLADDER: Surgically absent.  SPLEEN: Within normal limits.  PANCREAS: Status post Whipple. The distal pancreatic body and tail appears atrophied.  ADRENALS: Within normal limits.  KIDNEYS/URETERS: Within normal limits.    BLADDER: Within normal limits.  REPRODUCTIVE ORGANS: Status post hysterectomy. No adnexal masses.    BOWEL: Status post Whipple. Mural wall thickening and hyperemia of cecum and ascending colonic loops, difficult to evaluate secondary to inadequate distention. Correlate clinically to assess for colitis. No bowel obstruction.   PERITONEUM: Volume ascites.  VESSELS:  There is narrowing of the intrahepatic IVC without gross filling defect. Unchanged encasement of the portal confluence.  RETROPERITONEUM: No lymphadenopathy.    ABDOMINAL WALL: Anasarca  BONES: Degenerative changes of the spine blastic lesion noted within the L4 vertebral body, concerning for osseous metastasis. Consider nonemergent correlation with bone scan if indicated.    IMPRESSION:   1. New large volume ascites.  2. Status post Whipple. Abnormal enhancement along the course of the intrahepatic portal vein concerning for metastatic disease.New narrowing of the intrahepatic IVC without gross filling defect. Unchanged encasement of the portal confluence.  3. Mural wall thickening and hyperemia of cecum and ascending colonic loops, difficult to evaluate secondary to inadequate distention. Correlate clinically to assess for colitis. No bowel obstruction.   4. Loculated left pleural effusion with associated atelectasis of the left lower lobe.  5. Anasarca.  < end of copied text >

## 2018-03-07 NOTE — H&P ADULT - PMH
Asthma  denies any recent hospitalizations/intubations  Breast cancer  Dx 1996 s/p RT and lumpectomy  Colitis  due to chemo 1/2017  Depression    Hiatal hernia with GERD  no changes  HTN (hypertension)    Hyperlipidemia    Insomnia    Malignant neoplasm of pancreas, unspecified location of malignancy  started chemo 10/2016, has mediport - left chest, now s/p whipple 11/2017  Pancreatic cancer    Solitary pulmonary nodule  bx shows necrotizing granuloma

## 2018-03-07 NOTE — H&P ADULT - PROBLEM SELECTOR PLAN 3
- Likely in setting of her metastatic cancer noted on CT, seems to have some degree of synthetic dysfunction given her coagulopathy and worsening albumin level  - Will monitor for now, can consider hepatology eval if transaminitis is worsening significantly  - Pt with elevated bilirubin in serum, thus suspect her c/o reddening urine might be due to elevated bilirubin in her urine, will check a UA

## 2018-03-08 ENCOUNTER — APPOINTMENT (OUTPATIENT)
Dept: SURGICAL ONCOLOGY | Facility: CLINIC | Age: 73
End: 2018-03-08

## 2018-03-08 ENCOUNTER — RESULT REVIEW (OUTPATIENT)
Age: 73
End: 2018-03-08

## 2018-03-08 DIAGNOSIS — C50.919 MALIGNANT NEOPLASM OF UNSPECIFIED SITE OF UNSPECIFIED FEMALE BREAST: ICD-10-CM

## 2018-03-08 LAB
ALBUMIN FLD-MCNC: 0.3 G/DL — SIGNIFICANT CHANGE UP
ALBUMIN SERPL ELPH-MCNC: 2.1 G/DL — LOW (ref 3.3–5)
ALP SERPL-CCNC: 136 U/L — HIGH (ref 40–120)
ALT FLD-CCNC: 52 U/L — HIGH (ref 4–33)
AST SERPL-CCNC: 121 U/L — HIGH (ref 4–32)
BASOPHILS # BLD AUTO: 0.01 K/UL — SIGNIFICANT CHANGE UP (ref 0–0.2)
BASOPHILS NFR BLD AUTO: 0.2 % — SIGNIFICANT CHANGE UP (ref 0–2)
BILIRUB DIRECT SERPL-MCNC: 1.3 MG/DL — HIGH (ref 0.1–0.2)
BILIRUB SERPL-MCNC: 2.3 MG/DL — HIGH (ref 0.2–1.2)
BODY FLUID TYPE: SIGNIFICANT CHANGE UP
BUN SERPL-MCNC: 10 MG/DL — SIGNIFICANT CHANGE UP (ref 7–23)
CALCIUM SERPL-MCNC: 7.7 MG/DL — LOW (ref 8.4–10.5)
CHLORIDE SERPL-SCNC: 102 MMOL/L — SIGNIFICANT CHANGE UP (ref 98–107)
CLARITY SPEC: CLEAR — SIGNIFICANT CHANGE UP
CO2 SERPL-SCNC: 25 MMOL/L — SIGNIFICANT CHANGE UP (ref 22–31)
COLOR FLD: YELLOW — SIGNIFICANT CHANGE UP
CREAT SERPL-MCNC: 0.75 MG/DL — SIGNIFICANT CHANGE UP (ref 0.5–1.3)
EOSINOPHIL # BLD AUTO: 0 K/UL — SIGNIFICANT CHANGE UP (ref 0–0.5)
EOSINOPHIL NFR BLD AUTO: 0 % — SIGNIFICANT CHANGE UP (ref 0–6)
GLUCOSE FLD-MCNC: 141 MG/DL — SIGNIFICANT CHANGE UP
GLUCOSE SERPL-MCNC: 141 MG/DL — HIGH (ref 70–99)
GRAM STN FLD: SIGNIFICANT CHANGE UP
HCT VFR BLD CALC: 31.5 % — LOW (ref 34.5–45)
HGB BLD-MCNC: 10.8 G/DL — LOW (ref 11.5–15.5)
IMM GRANULOCYTES # BLD AUTO: 0.03 # — SIGNIFICANT CHANGE UP
IMM GRANULOCYTES NFR BLD AUTO: 0.5 % — SIGNIFICANT CHANGE UP (ref 0–1.5)
LDH SERPL L TO P-CCNC: 43 U/L — SIGNIFICANT CHANGE UP
LYMPHOCYTES # BLD AUTO: 0.78 K/UL — LOW (ref 1–3.3)
LYMPHOCYTES # BLD AUTO: 12.9 % — LOW (ref 13–44)
LYMPHOCYTES NFR FLD: 75 % — SIGNIFICANT CHANGE UP
MACROPHAGES # FLD: 7 % — SIGNIFICANT CHANGE UP
MAGNESIUM SERPL-MCNC: 1.6 MG/DL — SIGNIFICANT CHANGE UP (ref 1.6–2.6)
MCHC RBC-ENTMCNC: 30.2 PG — SIGNIFICANT CHANGE UP (ref 27–34)
MCHC RBC-ENTMCNC: 34.3 % — SIGNIFICANT CHANGE UP (ref 32–36)
MCV RBC AUTO: 88 FL — SIGNIFICANT CHANGE UP (ref 80–100)
MESOTHL CELL # FLD: 1 % — SIGNIFICANT CHANGE UP
MONOCYTES # BLD AUTO: 0.94 K/UL — HIGH (ref 0–0.9)
MONOCYTES # FLD: 17 % — SIGNIFICANT CHANGE UP
MONOCYTES NFR BLD AUTO: 15.6 % — HIGH (ref 2–14)
NEUTROPHILS # BLD AUTO: 4.27 K/UL — SIGNIFICANT CHANGE UP (ref 1.8–7.4)
NEUTROPHILS NFR BLD AUTO: 70.8 % — SIGNIFICANT CHANGE UP (ref 43–77)
NRBC # FLD: 0 — SIGNIFICANT CHANGE UP
PHOSPHATE SERPL-MCNC: 3.3 MG/DL — SIGNIFICANT CHANGE UP (ref 2.5–4.5)
PLATELET # BLD AUTO: 226 K/UL — SIGNIFICANT CHANGE UP (ref 150–400)
PMV BLD: 11 FL — SIGNIFICANT CHANGE UP (ref 7–13)
POTASSIUM SERPL-MCNC: 3.1 MMOL/L — LOW (ref 3.5–5.3)
POTASSIUM SERPL-SCNC: 3.1 MMOL/L — LOW (ref 3.5–5.3)
PROT FLD-MCNC: 0.8 G/DL — SIGNIFICANT CHANGE UP
PROT SERPL-MCNC: 5.8 G/DL — LOW (ref 6–8.3)
RBC # BLD: 3.58 M/UL — LOW (ref 3.8–5.2)
RBC # FLD: 20.2 % — HIGH (ref 10.3–14.5)
RCV VOL RI: 40 CELL/UL — HIGH (ref 0–5)
SODIUM SERPL-SCNC: 140 MMOL/L — SIGNIFICANT CHANGE UP (ref 135–145)
SPECIMEN SOURCE: SIGNIFICANT CHANGE UP
TOTAL CELLS COUNTED, BODY FLUID: 100 CELLS — SIGNIFICANT CHANGE UP
TOTAL NUCLEATED CELL COUNT, BODY FLUID: 39 CELL/UL — HIGH (ref 0–5)
WBC # BLD: 6.03 K/UL — SIGNIFICANT CHANGE UP (ref 3.8–10.5)
WBC # FLD AUTO: 6.03 K/UL — SIGNIFICANT CHANGE UP (ref 3.8–10.5)

## 2018-03-08 PROCEDURE — 99232 SBSQ HOSP IP/OBS MODERATE 35: CPT

## 2018-03-08 PROCEDURE — 49083 ABD PARACENTESIS W/IMAGING: CPT | Mod: GC

## 2018-03-08 PROCEDURE — 88112 CYTOPATH CELL ENHANCE TECH: CPT | Mod: 26

## 2018-03-08 RX ORDER — POTASSIUM CHLORIDE 20 MEQ
10 PACKET (EA) ORAL
Qty: 0 | Refills: 0 | Status: COMPLETED | OUTPATIENT
Start: 2018-03-08 | End: 2018-03-08

## 2018-03-08 RX ORDER — MAGNESIUM HYDROXIDE 400 MG/1
30 TABLET, CHEWABLE ORAL DAILY
Qty: 0 | Refills: 0 | Status: DISCONTINUED | OUTPATIENT
Start: 2018-03-08 | End: 2018-03-23

## 2018-03-08 RX ORDER — SODIUM CHLORIDE 9 MG/ML
1000 INJECTION INTRAMUSCULAR; INTRAVENOUS; SUBCUTANEOUS
Qty: 0 | Refills: 0 | Status: DISCONTINUED | OUTPATIENT
Start: 2018-03-08 | End: 2018-03-08

## 2018-03-08 RX ORDER — KETOROLAC TROMETHAMINE 30 MG/ML
15 SYRINGE (ML) INJECTION ONCE
Qty: 0 | Refills: 0 | Status: DISCONTINUED | OUTPATIENT
Start: 2018-03-08 | End: 2018-03-08

## 2018-03-08 RX ORDER — LIDOCAINE HCL 20 MG/ML
20 VIAL (ML) INJECTION ONCE
Qty: 0 | Refills: 0 | Status: COMPLETED | OUTPATIENT
Start: 2018-03-08 | End: 2018-03-08

## 2018-03-08 RX ORDER — ALBUMIN HUMAN 25 %
50 VIAL (ML) INTRAVENOUS EVERY 6 HOURS
Qty: 0 | Refills: 0 | Status: COMPLETED | OUTPATIENT
Start: 2018-03-08 | End: 2018-03-08

## 2018-03-08 RX ORDER — POTASSIUM CHLORIDE 20 MEQ
40 PACKET (EA) ORAL EVERY 4 HOURS
Qty: 0 | Refills: 0 | Status: COMPLETED | OUTPATIENT
Start: 2018-03-08 | End: 2018-03-08

## 2018-03-08 RX ADMIN — SERTRALINE 75 MILLIGRAM(S): 25 TABLET, FILM COATED ORAL at 20:02

## 2018-03-08 RX ADMIN — Medication 40 MILLIEQUIVALENT(S): at 14:11

## 2018-03-08 RX ADMIN — Medication 40 MILLIGRAM(S): at 05:00

## 2018-03-08 RX ADMIN — PANTOPRAZOLE SODIUM 40 MILLIGRAM(S): 20 TABLET, DELAYED RELEASE ORAL at 05:00

## 2018-03-08 RX ADMIN — SODIUM CHLORIDE 50 MILLILITER(S): 9 INJECTION INTRAMUSCULAR; INTRAVENOUS; SUBCUTANEOUS at 11:21

## 2018-03-08 RX ADMIN — Medication 650 MILLIGRAM(S): at 03:03

## 2018-03-08 RX ADMIN — Medication 5 MILLIGRAM(S): at 23:15

## 2018-03-08 RX ADMIN — Medication 650 MILLIGRAM(S): at 20:02

## 2018-03-08 RX ADMIN — Medication 650 MILLIGRAM(S): at 20:45

## 2018-03-08 RX ADMIN — Medication 20 MILLILITER(S): at 17:10

## 2018-03-08 RX ADMIN — Medication 200 MILLIGRAM(S): at 17:11

## 2018-03-08 RX ADMIN — ATORVASTATIN CALCIUM 10 MILLIGRAM(S): 80 TABLET, FILM COATED ORAL at 23:16

## 2018-03-08 RX ADMIN — Medication 40 MILLIEQUIVALENT(S): at 17:11

## 2018-03-08 RX ADMIN — Medication 100 MILLIEQUIVALENT(S): at 10:28

## 2018-03-08 RX ADMIN — Medication 200 MILLIGRAM(S): at 05:00

## 2018-03-08 RX ADMIN — Medication 100 MILLIGRAM(S): at 06:12

## 2018-03-08 RX ADMIN — LOSARTAN POTASSIUM 100 MILLIGRAM(S): 100 TABLET, FILM COATED ORAL at 05:00

## 2018-03-08 RX ADMIN — BUDESONIDE AND FORMOTEROL FUMARATE DIHYDRATE 2 PUFF(S): 160; 4.5 AEROSOL RESPIRATORY (INHALATION) at 20:02

## 2018-03-08 RX ADMIN — Medication 650 MILLIGRAM(S): at 04:03

## 2018-03-08 RX ADMIN — Medication 650 MILLIGRAM(S): at 11:22

## 2018-03-08 RX ADMIN — Medication 40 MILLIEQUIVALENT(S): at 22:00

## 2018-03-08 RX ADMIN — Medication 50 MILLILITER(S): at 14:12

## 2018-03-08 RX ADMIN — Medication 100 MILLIGRAM(S): at 22:00

## 2018-03-08 RX ADMIN — Medication 50 MILLILITER(S): at 20:03

## 2018-03-08 RX ADMIN — Medication 15 MILLIGRAM(S): at 23:04

## 2018-03-08 RX ADMIN — Medication 650 MILLIGRAM(S): at 10:27

## 2018-03-08 RX ADMIN — BUDESONIDE AND FORMOTEROL FUMARATE DIHYDRATE 2 PUFF(S): 160; 4.5 AEROSOL RESPIRATORY (INHALATION) at 11:20

## 2018-03-08 RX ADMIN — Medication 100 MILLIGRAM(S): at 15:44

## 2018-03-08 RX ADMIN — Medication 15 MILLIGRAM(S): at 23:25

## 2018-03-08 NOTE — CONSULT NOTE ADULT - SUBJECTIVE AND OBJECTIVE BOX
Patient is a 72y old  Female who presents with a chief complaint of Abdominal Distention (07 Mar 2018 07:34)      HPI:  This is a 72F with history of Pancreatic cancer s/p chemo and surgery (whipple in Nov 2017), R Breast cancer s/p RT and R lumpectomy, Pulm nodule s/p R VATS (Bx showing necrotizing granulomas), HTN, HLD, Asthma, and Depression who presents to the hospital with complaints of worsening abdominal distention for the past 4 weeks. Said that she had last followed up with Dr. Titus near the end of February and at that had been sent for some imaging tests which the patient said she completed. She had not heard any results from the doctor and was noting her distention worsening which concerned her and she therefore called Dr. Titus who recommended that she come to the hospital for evaluation. She said that she has some associated epigastric/umbilical pain with the distention, also said that it feels like fluid is moving around inside her stomach. She also has noted worsening b/l LE swelling and as a result has noted some increasing difficulty in her walking and in her b/l knee pain. She also endorsed a change in her urine over this time with it become more red in color. She denies any nausea, vomiting, diarrhea, fevers, chills, or changes in her appetite. No other complaints.    In the ED, her initial vitals were T 98.3, P 83, /81, R 16, O2 sat 100% RA. Her lab work here was most significant for coagulopathy and transaminitis. She had a CT A/P that showed large volume ascites, new hepatic mets, possible colitis of the cecum and ascending colon, and a loculated pleural effusion on the L side. She was started on cipro/flagyl and admitted to medicine. (07 Mar 2018 07:34)       ROS:  Negative except for:    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  Insomnia  Hiatal hernia with GERD: no changes  Colitis: due to chemo 1/2017  Malignant neoplasm of pancreas, unspecified location of malignancy: started chemo 10/2016, has mediport - left chest, now s/p whipple 11/2017  Asthma: denies any recent hospitalizations/intubations  Depression  Hyperlipidemia  HTN (hypertension)  Carcinoma of pancreas: s/p whipple  History of lung surgery: right VATS, wedge resection (August 2017) benign neoplasm  History of lumpectomy: right (1996)  Port-a-cath in place  Status post right breast lumpectomy: malignant treated with radiation  H/O abdominal hysterectomy  H/O umbilical hernia repair: with mesh      SOCIAL HISTORY:    FAMILY HISTORY:  Family history of kidney cancer (Sibling): brother  Family history of brain cancer (Sibling): sister  Family history of ovarian cancer (Mother): mother      MEDICATIONS  (STANDING):  albumin human 25% IVPB 50 milliLiter(s) IV Intermittent every 6 hours  atorvastatin 10 milliGRAM(s) Oral at bedtime  buDESOnide 160 MICROgram(s)/formoterol 4.5 MICROgram(s) Inhaler 2 Puff(s) Inhalation two times a day  ciprofloxacin   IVPB 400 milliGRAM(s) IV Intermittent every 12 hours  enoxaparin Injectable 40 milliGRAM(s) SubCutaneous every 24 hours  furosemide    Tablet 40 milliGRAM(s) Oral daily  losartan 100 milliGRAM(s) Oral daily  metroNIDAZOLE  IVPB      metroNIDAZOLE  IVPB 500 milliGRAM(s) IV Intermittent every 8 hours  oxybutynin 5 milliGRAM(s) Oral at bedtime  pantoprazole    Tablet 40 milliGRAM(s) Oral before breakfast  potassium chloride    Tablet ER 40 milliEquivalent(s) Oral every 4 hours  sertraline 75 milliGRAM(s) Oral daily  sodium chloride 0.9%. 1000 milliLiter(s) (50 mL/Hr) IV Continuous <Continuous>    MEDICATIONS  (PRN):  acetaminophen   Tablet. 650 milliGRAM(s) Oral every 6 hours PRN Mild to moderate pain  ALBUTerol    90 MICROgram(s) HFA Inhaler 2 Puff(s) Inhalation every 6 hours PRN Shortness of Breath and/or Wheezing  magnesium hydroxide Suspension 30 milliLiter(s) Oral daily PRN Constipation  ondansetron Injectable 4 milliGRAM(s) IV Push every 6 hours PRN Nausea and/or Vomiting  traZODone 100 milliGRAM(s) Oral at bedtime PRN Insomnia      Allergies    No Known Drug Allergies  SteriStrips (Blisters)    Intolerances        Vital Signs Last 24 Hrs  T(C): 36.7 (08 Mar 2018 14:07), Max: 37 (07 Mar 2018 21:09)  T(F): 98 (08 Mar 2018 14:07), Max: 98.6 (07 Mar 2018 21:09)  HR: 80 (08 Mar 2018 14:07) (80 - 93)  BP: 123/68 (08 Mar 2018 14:07) (118/73 - 123/68)  BP(mean): --  RR: 18 (08 Mar 2018 14:07) (17 - 18)  SpO2: 95% (08 Mar 2018 14:07) (94% - 97%)    PHYSICAL EXAM  General: adult in NAD  HEENT: clear oropharynx, anicteric sclera, pink conjunctiva  Neck: supple  CV: normal S1/S2 with no murmur rubs or gallops  Lungs: positive air movement b/l ant lungs,clear to auscultation, no wheezes, no rales  Abdomen: soft non-tender non-distended, no hepatosplenomegaly  Ext: no clubbing cyanosis or edema  Skin: no rashes and no petechiae  Neuro: alert and oriented X 4, no focal deficits      LABS:                          10.8   6.03  )-----------( 226      ( 08 Mar 2018 05:31 )             31.5         Mean Cell Volume : 88.0 fL  Mean Cell Hemoglobin : 30.2 pg  Mean Cell Hemoglobin Concentration : 34.3 %  Auto Neutrophil # : 4.27 K/uL  Auto Lymphocyte # : 0.78 K/uL  Auto Monocyte # : 0.94 K/uL  Auto Eosinophil # : 0.00 K/uL  Auto Basophil # : 0.01 K/uL  Auto Neutrophil % : 70.8 %  Auto Lymphocyte % : 12.9 %  Auto Monocyte % : 15.6 %  Auto Eosinophil % : 0.0 %  Auto Basophil % : 0.2 %      Serial CBC's  03-08 @ 05:31  Hct-31.5 / Hgb-10.8 / Plat-226 / RBC-3.58 / WBC-6.03  Serial CBC's  03-06 @ 20:40  Hct-34.7 / Hgb-12.1 / Plat-252 / RBC-3.94 / WBC-6.96      03-08    140  |  102  |  10  ----------------------------<  141<H>  3.1<L>   |  25  |  0.75    Ca    7.7<L>      08 Mar 2018 05:31  Phos  3.3     03-08  Mg     1.6     03-08    TPro  5.8<L>  /  Alb  2.1<L>  /  TBili  2.3<H>  /  DBili  1.3<H>  /  AST  121<H>  /  ALT  52<H>  /  AlkPhos  136<H>  03-08      PT/INR - ( 06 Mar 2018 20:40 )   PT: 16.2 SEC;   INR: 1.45          PTT - ( 06 Mar 2018 20:40 )  PTT:40.2 SEC                BLOOD SMEAR INTERPRETATION:       RADIOLOGY & ADDITIONAL STUDIES:

## 2018-03-08 NOTE — CONSULT NOTE ADULT - SUBJECTIVE AND OBJECTIVE BOX
Patient is a 72y old  Female who presents with a chief complaint of Abdominal Distention (07 Mar 2018 07:34)      HPI:  This is a 72F with history of Pancreatic cancer s/p chemo and surgery (whipple in Nov 2017), R Breast cancer s/p RT and R lumpectomy, Pulm nodule s/p R VATS (Bx showing necrotizing granulomas), HTN, HLD, Asthma, and Depression who presents to the hospital with complaints of worsening abdominal distention for the past 4 weeks. Said that she had last followed up with Dr. Titus near the end of February and at that had been sent for some imaging tests which the patient said she completed. She had not heard any results from the doctor and was noting her distention worsening which concerned her and she therefore called Dr. Titus who recommended that she come to the hospital for evaluation. She said that she has some associated epigastric/umbilical pain with the distention, also said that it feels like fluid is moving around inside her stomach. She also has noted worsening b/l LE swelling and as a result has noted some increasing difficulty in her walking and in her b/l knee pain. She also endorsed a change in her urine over this time with it become more red in color. She denies any nausea, vomiting, diarrhea, fevers, chills, or changes in her appetite. No other complaints.    In the ED, her initial vitals were T 98.3, P 83, /81, R 16, O2 sat 100% RA. Her lab work here was most significant for coagulopathy and transaminitis. She had a CT A/P that showed large volume ascites, new hepatic mets, possible colitis of the cecum and ascending colon, and a loculated pleural effusion on the L side. She was started on cipro/flagyl and admitted to medicine. (07 Mar 2018 07:34)       ROS:  Negative except for:Abdominal distension     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  Insomnia  Hiatal hernia with GERD: no changes  Colitis: due to chemo 1/2017  Malignant neoplasm of pancreas, unspecified location of malignancy: started chemo 10/2016, has mediport - left chest, now s/p whipple 11/2017  Asthma: denies any recent hospitalizations/intubations  Depression  Hyperlipidemia  HTN (hypertension)  Carcinoma of pancreas: s/p whipple  History of lung surgery: right VATS, wedge resection (August 2017) benign neoplasm  History of lumpectomy: right (1996)  Port-a-cath in place  Status post right breast lumpectomy: malignant treated with radiation  H/O abdominal hysterectomy  H/O umbilical hernia repair: with mesh      SOCIAL HISTORY:    FAMILY HISTORY:  Family history of kidney cancer (Sibling): brother  Family history of brain cancer (Sibling): sister  Family history of ovarian cancer (Mother): mother      MEDICATIONS  (STANDING):  atorvastatin 10 milliGRAM(s) Oral at bedtime  buDESOnide 160 MICROgram(s)/formoterol 4.5 MICROgram(s) Inhaler 2 Puff(s) Inhalation two times a day  ciprofloxacin   IVPB 400 milliGRAM(s) IV Intermittent every 12 hours  enoxaparin Injectable 40 milliGRAM(s) SubCutaneous every 24 hours  furosemide    Tablet 40 milliGRAM(s) Oral daily  losartan 100 milliGRAM(s) Oral daily  metroNIDAZOLE  IVPB      metroNIDAZOLE  IVPB 500 milliGRAM(s) IV Intermittent every 8 hours  oxybutynin 5 milliGRAM(s) Oral at bedtime  pantoprazole    Tablet 40 milliGRAM(s) Oral before breakfast  potassium chloride    Tablet ER 40 milliEquivalent(s) Oral every 4 hours  sertraline 75 milliGRAM(s) Oral daily  sodium chloride 0.9%. 1000 milliLiter(s) (50 mL/Hr) IV Continuous <Continuous>    MEDICATIONS  (PRN):  acetaminophen   Tablet. 650 milliGRAM(s) Oral every 6 hours PRN Mild to moderate pain  ALBUTerol    90 MICROgram(s) HFA Inhaler 2 Puff(s) Inhalation every 6 hours PRN Shortness of Breath and/or Wheezing  magnesium hydroxide Suspension 30 milliLiter(s) Oral daily PRN Constipation  ondansetron Injectable 4 milliGRAM(s) IV Push every 6 hours PRN Nausea and/or Vomiting  traZODone 100 milliGRAM(s) Oral at bedtime PRN Insomnia      Allergies    No Known Drug Allergies  SteriStrips (Blisters)    Intolerances        Vital Signs Last 24 Hrs  T(C): 37.1 (08 Mar 2018 21:23), Max: 37.1 (08 Mar 2018 21:23)  T(F): 98.7 (08 Mar 2018 21:23), Max: 98.7 (08 Mar 2018 21:23)  HR: 73 (08 Mar 2018 21:23) (73 - 91)  BP: 127/75 (08 Mar 2018 21:23) (123/68 - 127/75)  BP(mean): --  RR: 17 (08 Mar 2018 21:23) (17 - 18)  SpO2: 94% (08 Mar 2018 21:23) (94% - 97%)    PHYSICAL EXAM  General: adult in NAD  HEENT: clear oropharynx, anicteric sclera, pink conjunctiva  Neck: supple  CV: normal S1/S2 with no murmur rubs or gallops  Lungs: positive air movement b/l ant lungs,clear to auscultation, no wheezes, no rales  Abdomen: soft non-tender ,ascitis present  Ext: 2+ edema  Skin: no rashes and no petechiae  Neuro: alert and oriented X 4, no focal deficits      LABS:                          10.8   6.03  )-----------( 226      ( 08 Mar 2018 05:31 )             31.5         Mean Cell Volume : 88.0 fL  Mean Cell Hemoglobin : 30.2 pg  Mean Cell Hemoglobin Concentration : 34.3 %  Auto Neutrophil # : 4.27 K/uL  Auto Lymphocyte # : 0.78 K/uL  Auto Monocyte # : 0.94 K/uL  Auto Eosinophil # : 0.00 K/uL  Auto Basophil # : 0.01 K/uL  Auto Neutrophil % : 70.8 %  Auto Lymphocyte % : 12.9 %  Auto Monocyte % : 15.6 %  Auto Eosinophil % : 0.0 %  Auto Basophil % : 0.2 %      Serial CBC's  03-08 @ 05:31  Hct-31.5 / Hgb-10.8 / Plat-226 / RBC-3.58 / WBC-6.03  Serial CBC's  03-06 @ 20:40  Hct-34.7 / Hgb-12.1 / Plat-252 / RBC-3.94 / WBC-6.96      03-08    140  |  102  |  10  ----------------------------<  141<H>  3.1<L>   |  25  |  0.75    Ca    7.7<L>      08 Mar 2018 05:31  Phos  3.3     03-08  Mg     1.6     03-08    TPro  5.8<L>  /  Alb  2.1<L>  /  TBili  2.3<H>  /  DBili  1.3<H>  /  AST  121<H>  /  ALT  52<H>  /  AlkPhos  136<H>  03-08    < from: CT Abdomen and Pelvis w/ IV Cont (03.07.18 @ 01:07) >  T ABDOMEN AND PELVIS IC        PROCEDURE DATE:  Mar  7 2018         INTERPRETATION:  CLINICAL INFORMATION: Abdominal pain and distention   history of pancreatic CA    < end of copied text >  < from: CT Abdomen and Pelvis w/ IV Cont (03.07.18 @ 01:07) >  IMPRESSION:   1. New large volume ascites.  2. Status post Whipple. Abnormal enhancement along the course of the   intrahepatic portal vein concerning for metastatic disease.New narrowing   of the intrahepatic IVC without gross filling defect.Unchanged encasement   of the portal confluence.  3. Mural wall thickening and hyperemia of cecum and ascending colonic   loops, difficult to evaluate secondary to inadequate distention.   Correlate clinically to assess for colitis. No bowel obstruction.   4. Loculated left pleural effusion with associated atelectasis of the   left lower lobe.  5. Anasarca.      < end of copied text >                        BLOOD SMEAR INTERPRETATION:       RADIOLOGY & ADDITIONAL STUDIES:

## 2018-03-08 NOTE — PROGRESS NOTE ADULT - ASSESSMENT
This is a 72F with history as above who presents to the hospital with worsening abdominal distention found to have new ascites likely 2/2 metastatic pancreatic cancer.

## 2018-03-08 NOTE — PROCEDURE NOTE - ADDITIONAL PROCEDURE DETAILS
4L of john fluid removed w/ arrow 7 Danish catheter from LLQ. samples given to primary team. Recommend albumin to be given for hypoalbuminemia s/p large volume paracentesis 4L of john fluid removed w/ arrow 8 Greek catheter from LLQ. samples given to primary team. Recommend albumin to be given for hypoalbuminemia s/p large volume paracentesis

## 2018-03-08 NOTE — PROCEDURE NOTE - NSPROCDETAILS_GEN_ALL_CORE
location identified, sterile technique used, catheter introduced, fluid drained/sterile dressing applied/ultrasound utilization/7 Paraguayan catheter 8 Bengali catheter/sterile dressing applied/ultrasound utilization/location identified, sterile technique used, catheter introduced, fluid drained

## 2018-03-08 NOTE — PROGRESS NOTE ADULT - ASSESSMENT
Assessment: Samia is a very pleasant 72 year-old female with a medical history significant for Whipple procedure with portal vein resection and repair and feeding jejunostomy on 11/7/17 for pancreatic cancer presenting with abdominal distension, found to have new large volume ascites and abnormal enhancement along the course of the intrahepatic portal vein concerning for metastatic disease.     Plant:     - We recommend paracentesis and analysis of the peritoneal fluid.   - Medical oncology evaluation   - No surgical intervention warranted at this time.   - Discussed with Dr. Madan Titus  - Full support in place.       John Roca   Surgical Oncology   D Team   Pager: [phone]

## 2018-03-09 LAB
ALBUMIN SERPL ELPH-MCNC: 2.3 G/DL — LOW (ref 3.3–5)
ALP SERPL-CCNC: 117 U/L — SIGNIFICANT CHANGE UP (ref 40–120)
ALT FLD-CCNC: 47 U/L — HIGH (ref 4–33)
AST SERPL-CCNC: 109 U/L — HIGH (ref 4–32)
BILIRUB SERPL-MCNC: 1.9 MG/DL — HIGH (ref 0.2–1.2)
BUN SERPL-MCNC: 8 MG/DL — SIGNIFICANT CHANGE UP (ref 7–23)
CALCIUM SERPL-MCNC: 7.6 MG/DL — LOW (ref 8.4–10.5)
CANCER AG19-9 SERPL-ACNC: 64.3 U/ML — HIGH
CHLORIDE SERPL-SCNC: 103 MMOL/L — SIGNIFICANT CHANGE UP (ref 98–107)
CO2 SERPL-SCNC: 27 MMOL/L — SIGNIFICANT CHANGE UP (ref 22–31)
CREAT SERPL-MCNC: 0.67 MG/DL — SIGNIFICANT CHANGE UP (ref 0.5–1.3)
CULTURE - ACID FAST SMEAR CONCENTRATED: SIGNIFICANT CHANGE UP
GLUCOSE SERPL-MCNC: 102 MG/DL — HIGH (ref 70–99)
HCT VFR BLD CALC: 28 % — LOW (ref 34.5–45)
HGB BLD-MCNC: 9.9 G/DL — LOW (ref 11.5–15.5)
MCHC RBC-ENTMCNC: 30.9 PG — SIGNIFICANT CHANGE UP (ref 27–34)
MCHC RBC-ENTMCNC: 35.4 % — SIGNIFICANT CHANGE UP (ref 32–36)
MCV RBC AUTO: 87.5 FL — SIGNIFICANT CHANGE UP (ref 80–100)
NRBC # FLD: 0 — SIGNIFICANT CHANGE UP
PLATELET # BLD AUTO: 176 K/UL — SIGNIFICANT CHANGE UP (ref 150–400)
PMV BLD: 10.4 FL — SIGNIFICANT CHANGE UP (ref 7–13)
POTASSIUM SERPL-MCNC: 3.8 MMOL/L — SIGNIFICANT CHANGE UP (ref 3.5–5.3)
POTASSIUM SERPL-SCNC: 3.8 MMOL/L — SIGNIFICANT CHANGE UP (ref 3.5–5.3)
PROT SERPL-MCNC: 5.4 G/DL — LOW (ref 6–8.3)
RBC # BLD: 3.2 M/UL — LOW (ref 3.8–5.2)
RBC # FLD: 20.4 % — HIGH (ref 10.3–14.5)
SODIUM SERPL-SCNC: 139 MMOL/L — SIGNIFICANT CHANGE UP (ref 135–145)
SPECIMEN SOURCE: SIGNIFICANT CHANGE UP
WBC # BLD: 3.87 K/UL — SIGNIFICANT CHANGE UP (ref 3.8–10.5)
WBC # FLD AUTO: 3.87 K/UL — SIGNIFICANT CHANGE UP (ref 3.8–10.5)

## 2018-03-09 RX ADMIN — ATORVASTATIN CALCIUM 10 MILLIGRAM(S): 80 TABLET, FILM COATED ORAL at 22:16

## 2018-03-09 RX ADMIN — BUDESONIDE AND FORMOTEROL FUMARATE DIHYDRATE 2 PUFF(S): 160; 4.5 AEROSOL RESPIRATORY (INHALATION) at 08:46

## 2018-03-09 RX ADMIN — Medication 40 MILLIGRAM(S): at 05:07

## 2018-03-09 RX ADMIN — Medication 650 MILLIGRAM(S): at 19:15

## 2018-03-09 RX ADMIN — Medication 100 MILLIGRAM(S): at 06:39

## 2018-03-09 RX ADMIN — Medication 650 MILLIGRAM(S): at 05:37

## 2018-03-09 RX ADMIN — Medication 200 MILLIGRAM(S): at 17:49

## 2018-03-09 RX ADMIN — Medication 100 MILLIGRAM(S): at 15:56

## 2018-03-09 RX ADMIN — Medication 650 MILLIGRAM(S): at 06:30

## 2018-03-09 RX ADMIN — ENOXAPARIN SODIUM 40 MILLIGRAM(S): 100 INJECTION SUBCUTANEOUS at 15:55

## 2018-03-09 RX ADMIN — Medication 100 MILLIGRAM(S): at 22:16

## 2018-03-09 RX ADMIN — PANTOPRAZOLE SODIUM 40 MILLIGRAM(S): 20 TABLET, DELAYED RELEASE ORAL at 05:07

## 2018-03-09 RX ADMIN — SERTRALINE 75 MILLIGRAM(S): 25 TABLET, FILM COATED ORAL at 19:16

## 2018-03-09 RX ADMIN — BUDESONIDE AND FORMOTEROL FUMARATE DIHYDRATE 2 PUFF(S): 160; 4.5 AEROSOL RESPIRATORY (INHALATION) at 22:17

## 2018-03-09 RX ADMIN — Medication 650 MILLIGRAM(S): at 20:00

## 2018-03-09 RX ADMIN — LOSARTAN POTASSIUM 100 MILLIGRAM(S): 100 TABLET, FILM COATED ORAL at 05:07

## 2018-03-09 RX ADMIN — Medication 5 MILLIGRAM(S): at 22:16

## 2018-03-09 RX ADMIN — Medication 200 MILLIGRAM(S): at 05:07

## 2018-03-09 NOTE — PROGRESS NOTE ADULT - ASSESSMENT
Patient with history of Pancreatic adenocarcinoma s/p moustapha adjuvant chemotherapy followed by radiation therapy and definitive surgery in Nov 2017.Now presenting with ascitis,with CT findings consistent with :New large volume ascites.   Abnormal enhancement along the course of the   intrahepatic portal vein concerning for metastatic disease.New narrowing   of the intrahepatic IVC without gross filling defect.Unchanged encasement   of the portal confluence. Mural wall thickening and hyperemia of cecum and ascending colonic   loops, difficult to evaluate secondary to inadequate distention.   Correlate clinically to assess for colitis. No bowel obstruction.  Loculated left pleural effusion with associated atelectasis of the   left lower lobe. Anasarca.  These findings are concerning for recurrent pancreatic carcinoma. Patient had her ascitic tap done today ,will follow the results.Tumor marker ordered with am labs.Will follow the results.Seen by surgical oncology team.      Problem/Recommendation - 1:  Problem: Other ascites. Recommendation: s/p paracentesis,will follow the results. not impressive for recurrence.    Problem/Recommendation - 2:  ·  Problem: Malignant neoplasm of pancreas, unspecified location of malignancy.  Recommendation: Likley recurrence.Follow the cytology of ascitic tap,tumor marker not impressive. Will decide about further management depending on the etiology.    Problem/Recommendation - 3:  ·  Problem: Breast cancer.  Recommendation: No signs of recurrence on physical exam.     Problem/Recommendation - 4:  ·  Problem: Transaminitis.  Recommendation: Folllow the trend.?cause.     Problem/Recommendation - 5:  ·  Problem: Mild asthma without complication, unspecified whether persistent.     Problem/Recommendation - 6:  Problem: Depression, unspecified depression type.

## 2018-03-09 NOTE — PHYSICAL THERAPY INITIAL EVALUATION ADULT - PATIENT PROFILE REVIEW, REHAB EVAL
PT orders received-->no formal activity order. Consult with CRISELDA HENDRICKS-->pt OK to participate in PT evaluation and ambulate with PT./yes

## 2018-03-09 NOTE — PHYSICAL THERAPY INITIAL EVALUATION ADULT - PERTINENT HX OF CURRENT PROBLEM, REHAB EVAL
Patient is a 72 year old female admitted to [facility] on 3/7 with worsening abdominal distention for the past 4 weeks. PMH includes: Pancreatic cancer s/p chemo and surgery (whipple in Nov 2017), R Breast cancer s/p RT and R lumpectomy, Pulm nodule s/p R VATS (Bx showing necrotizing granulomas), HTN, HLD, Asthma, and Depression.

## 2018-03-09 NOTE — PHYSICAL THERAPY INITIAL EVALUATION ADULT - GAIT DEVIATIONS NOTED, PT EVAL
decreased step length/decreased weight-shifting ability/decreased galen/decreased velocity of limb motion/decreased stride length/increased time in double stance

## 2018-03-09 NOTE — PHYSICAL THERAPY INITIAL EVALUATION ADULT - ADDITIONAL COMMENTS
Patient reports she lives with her 2 sons in a private house, 4-5 steps to enter and 1 flight within, +handrail. Prior to admission, patient required varying levels of assistance with ADLs and was able to ambulate minimally with a rolling walker.    Patient was left semi-supine in bed, all lines/tubes intact and call thapa within reach, CRISELDA kelly

## 2018-03-10 LAB
ALBUMIN SERPL ELPH-MCNC: 2.1 G/DL — LOW (ref 3.3–5)
ALP SERPL-CCNC: 139 U/L — HIGH (ref 40–120)
ALT FLD-CCNC: 52 U/L — HIGH (ref 4–33)
AST SERPL-CCNC: 146 U/L — HIGH (ref 4–32)
BILIRUB SERPL-MCNC: 1.9 MG/DL — HIGH (ref 0.2–1.2)
BUN SERPL-MCNC: 7 MG/DL — SIGNIFICANT CHANGE UP (ref 7–23)
CALCIUM SERPL-MCNC: 7.3 MG/DL — LOW (ref 8.4–10.5)
CHLORIDE SERPL-SCNC: 105 MMOL/L — SIGNIFICANT CHANGE UP (ref 98–107)
CO2 SERPL-SCNC: 26 MMOL/L — SIGNIFICANT CHANGE UP (ref 22–31)
CREAT SERPL-MCNC: 0.61 MG/DL — SIGNIFICANT CHANGE UP (ref 0.5–1.3)
GLUCOSE SERPL-MCNC: 87 MG/DL — SIGNIFICANT CHANGE UP (ref 70–99)
HCT VFR BLD CALC: 29.8 % — LOW (ref 34.5–45)
HGB BLD-MCNC: 10.4 G/DL — LOW (ref 11.5–15.5)
MAGNESIUM SERPL-MCNC: 1.5 MG/DL — LOW (ref 1.6–2.6)
MCHC RBC-ENTMCNC: 30.3 PG — SIGNIFICANT CHANGE UP (ref 27–34)
MCHC RBC-ENTMCNC: 34.9 % — SIGNIFICANT CHANGE UP (ref 32–36)
MCV RBC AUTO: 86.9 FL — SIGNIFICANT CHANGE UP (ref 80–100)
NRBC # FLD: 0 — SIGNIFICANT CHANGE UP
PHOSPHATE SERPL-MCNC: 2.7 MG/DL — SIGNIFICANT CHANGE UP (ref 2.5–4.5)
PLATELET # BLD AUTO: 201 K/UL — SIGNIFICANT CHANGE UP (ref 150–400)
PMV BLD: 11.1 FL — SIGNIFICANT CHANGE UP (ref 7–13)
POTASSIUM SERPL-MCNC: 3.1 MMOL/L — LOW (ref 3.5–5.3)
POTASSIUM SERPL-SCNC: 3.1 MMOL/L — LOW (ref 3.5–5.3)
PROT SERPL-MCNC: 5.3 G/DL — LOW (ref 6–8.3)
RBC # BLD: 3.43 M/UL — LOW (ref 3.8–5.2)
RBC # FLD: 20.4 % — HIGH (ref 10.3–14.5)
SODIUM SERPL-SCNC: 141 MMOL/L — SIGNIFICANT CHANGE UP (ref 135–145)
WBC # BLD: 4.57 K/UL — SIGNIFICANT CHANGE UP (ref 3.8–10.5)
WBC # FLD AUTO: 4.57 K/UL — SIGNIFICANT CHANGE UP (ref 3.8–10.5)

## 2018-03-10 RX ORDER — MAGNESIUM SULFATE 500 MG/ML
1 VIAL (ML) INJECTION ONCE
Qty: 0 | Refills: 0 | Status: COMPLETED | OUTPATIENT
Start: 2018-03-10 | End: 2018-03-10

## 2018-03-10 RX ORDER — POTASSIUM CHLORIDE 20 MEQ
40 PACKET (EA) ORAL ONCE
Qty: 0 | Refills: 0 | Status: COMPLETED | OUTPATIENT
Start: 2018-03-10 | End: 2018-03-10

## 2018-03-10 RX ADMIN — Medication 100 MILLIGRAM(S): at 14:53

## 2018-03-10 RX ADMIN — BUDESONIDE AND FORMOTEROL FUMARATE DIHYDRATE 2 PUFF(S): 160; 4.5 AEROSOL RESPIRATORY (INHALATION) at 08:22

## 2018-03-10 RX ADMIN — Medication 650 MILLIGRAM(S): at 06:00

## 2018-03-10 RX ADMIN — Medication 650 MILLIGRAM(S): at 05:08

## 2018-03-10 RX ADMIN — Medication 200 MILLIGRAM(S): at 05:08

## 2018-03-10 RX ADMIN — Medication 100 GRAM(S): at 09:39

## 2018-03-10 RX ADMIN — BUDESONIDE AND FORMOTEROL FUMARATE DIHYDRATE 2 PUFF(S): 160; 4.5 AEROSOL RESPIRATORY (INHALATION) at 21:24

## 2018-03-10 RX ADMIN — ATORVASTATIN CALCIUM 10 MILLIGRAM(S): 80 TABLET, FILM COATED ORAL at 21:22

## 2018-03-10 RX ADMIN — Medication 200 MILLIGRAM(S): at 18:33

## 2018-03-10 RX ADMIN — Medication 5 MILLIGRAM(S): at 21:22

## 2018-03-10 RX ADMIN — Medication 40 MILLIEQUIVALENT(S): at 14:53

## 2018-03-10 RX ADMIN — LOSARTAN POTASSIUM 100 MILLIGRAM(S): 100 TABLET, FILM COATED ORAL at 05:08

## 2018-03-10 RX ADMIN — PANTOPRAZOLE SODIUM 40 MILLIGRAM(S): 20 TABLET, DELAYED RELEASE ORAL at 05:08

## 2018-03-10 RX ADMIN — Medication 100 MILLIGRAM(S): at 06:31

## 2018-03-10 RX ADMIN — Medication 40 MILLIGRAM(S): at 05:08

## 2018-03-10 RX ADMIN — Medication 100 MILLIGRAM(S): at 21:22

## 2018-03-10 RX ADMIN — SERTRALINE 75 MILLIGRAM(S): 25 TABLET, FILM COATED ORAL at 21:22

## 2018-03-10 RX ADMIN — ENOXAPARIN SODIUM 40 MILLIGRAM(S): 100 INJECTION SUBCUTANEOUS at 14:53

## 2018-03-11 LAB
ALBUMIN SERPL ELPH-MCNC: 2 G/DL — LOW (ref 3.3–5)
ALP SERPL-CCNC: 162 U/L — HIGH (ref 40–120)
ALT FLD-CCNC: 54 U/L — HIGH (ref 4–33)
AST SERPL-CCNC: 148 U/L — HIGH (ref 4–32)
BILIRUB SERPL-MCNC: 1.8 MG/DL — HIGH (ref 0.2–1.2)
BUN SERPL-MCNC: 6 MG/DL — LOW (ref 7–23)
CALCIUM SERPL-MCNC: 7.2 MG/DL — LOW (ref 8.4–10.5)
CHLORIDE SERPL-SCNC: 103 MMOL/L — SIGNIFICANT CHANGE UP (ref 98–107)
CO2 SERPL-SCNC: 23 MMOL/L — SIGNIFICANT CHANGE UP (ref 22–31)
CREAT SERPL-MCNC: 0.62 MG/DL — SIGNIFICANT CHANGE UP (ref 0.5–1.3)
GLUCOSE SERPL-MCNC: 92 MG/DL — SIGNIFICANT CHANGE UP (ref 70–99)
HCT VFR BLD CALC: 31.3 % — LOW (ref 34.5–45)
HGB BLD-MCNC: 11.1 G/DL — LOW (ref 11.5–15.5)
MAGNESIUM SERPL-MCNC: 1.6 MG/DL — SIGNIFICANT CHANGE UP (ref 1.6–2.6)
MCHC RBC-ENTMCNC: 30.7 PG — SIGNIFICANT CHANGE UP (ref 27–34)
MCHC RBC-ENTMCNC: 35.5 % — SIGNIFICANT CHANGE UP (ref 32–36)
MCV RBC AUTO: 86.5 FL — SIGNIFICANT CHANGE UP (ref 80–100)
NRBC # FLD: 0.02 — SIGNIFICANT CHANGE UP
PHOSPHATE SERPL-MCNC: 2.3 MG/DL — LOW (ref 2.5–4.5)
PLATELET # BLD AUTO: 207 K/UL — SIGNIFICANT CHANGE UP (ref 150–400)
PMV BLD: 10.7 FL — SIGNIFICANT CHANGE UP (ref 7–13)
POTASSIUM SERPL-MCNC: 3.5 MMOL/L — SIGNIFICANT CHANGE UP (ref 3.5–5.3)
POTASSIUM SERPL-SCNC: 3.5 MMOL/L — SIGNIFICANT CHANGE UP (ref 3.5–5.3)
PROT SERPL-MCNC: 5.4 G/DL — LOW (ref 6–8.3)
RBC # BLD: 3.62 M/UL — LOW (ref 3.8–5.2)
RBC # FLD: 20.1 % — HIGH (ref 10.3–14.5)
SODIUM SERPL-SCNC: 138 MMOL/L — SIGNIFICANT CHANGE UP (ref 135–145)
WBC # BLD: 4.79 K/UL — SIGNIFICANT CHANGE UP (ref 3.8–10.5)
WBC # FLD AUTO: 4.79 K/UL — SIGNIFICANT CHANGE UP (ref 3.8–10.5)

## 2018-03-11 RX ADMIN — ALBUTEROL 2 PUFF(S): 90 AEROSOL, METERED ORAL at 12:14

## 2018-03-11 RX ADMIN — Medication 100 MILLIGRAM(S): at 13:43

## 2018-03-11 RX ADMIN — Medication 650 MILLIGRAM(S): at 21:08

## 2018-03-11 RX ADMIN — SERTRALINE 75 MILLIGRAM(S): 25 TABLET, FILM COATED ORAL at 19:41

## 2018-03-11 RX ADMIN — BUDESONIDE AND FORMOTEROL FUMARATE DIHYDRATE 2 PUFF(S): 160; 4.5 AEROSOL RESPIRATORY (INHALATION) at 21:08

## 2018-03-11 RX ADMIN — BUDESONIDE AND FORMOTEROL FUMARATE DIHYDRATE 2 PUFF(S): 160; 4.5 AEROSOL RESPIRATORY (INHALATION) at 09:52

## 2018-03-11 RX ADMIN — Medication 5 MILLIGRAM(S): at 21:08

## 2018-03-11 RX ADMIN — Medication 200 MILLIGRAM(S): at 06:16

## 2018-03-11 RX ADMIN — Medication 200 MILLIGRAM(S): at 17:44

## 2018-03-11 RX ADMIN — ATORVASTATIN CALCIUM 10 MILLIGRAM(S): 80 TABLET, FILM COATED ORAL at 21:08

## 2018-03-11 RX ADMIN — Medication 650 MILLIGRAM(S): at 03:20

## 2018-03-11 RX ADMIN — Medication 650 MILLIGRAM(S): at 21:48

## 2018-03-11 RX ADMIN — Medication 650 MILLIGRAM(S): at 04:04

## 2018-03-11 RX ADMIN — Medication 100 MILLIGRAM(S): at 21:08

## 2018-03-11 RX ADMIN — PANTOPRAZOLE SODIUM 40 MILLIGRAM(S): 20 TABLET, DELAYED RELEASE ORAL at 05:00

## 2018-03-11 RX ADMIN — LOSARTAN POTASSIUM 100 MILLIGRAM(S): 100 TABLET, FILM COATED ORAL at 05:00

## 2018-03-11 RX ADMIN — Medication 100 MILLIGRAM(S): at 05:00

## 2018-03-11 RX ADMIN — ENOXAPARIN SODIUM 40 MILLIGRAM(S): 100 INJECTION SUBCUTANEOUS at 14:09

## 2018-03-11 RX ADMIN — Medication 40 MILLIGRAM(S): at 05:00

## 2018-03-12 LAB
ALBUMIN SERPL ELPH-MCNC: 2 G/DL — LOW (ref 3.3–5)
ALP SERPL-CCNC: 153 U/L — HIGH (ref 40–120)
ALT FLD-CCNC: 61 U/L — HIGH (ref 4–33)
AST SERPL-CCNC: 159 U/L — HIGH (ref 4–32)
BILIRUB SERPL-MCNC: 1.7 MG/DL — HIGH (ref 0.2–1.2)
BUN SERPL-MCNC: 7 MG/DL — SIGNIFICANT CHANGE UP (ref 7–23)
CALCIUM SERPL-MCNC: 7.3 MG/DL — LOW (ref 8.4–10.5)
CHLORIDE SERPL-SCNC: 102 MMOL/L — SIGNIFICANT CHANGE UP (ref 98–107)
CO2 SERPL-SCNC: 23 MMOL/L — SIGNIFICANT CHANGE UP (ref 22–31)
CREAT SERPL-MCNC: 0.68 MG/DL — SIGNIFICANT CHANGE UP (ref 0.5–1.3)
GLUCOSE SERPL-MCNC: 116 MG/DL — HIGH (ref 70–99)
HCT VFR BLD CALC: 31.4 % — LOW (ref 34.5–45)
HGB BLD-MCNC: 11 G/DL — LOW (ref 11.5–15.5)
MAGNESIUM SERPL-MCNC: 1.5 MG/DL — LOW (ref 1.6–2.6)
MCHC RBC-ENTMCNC: 30.3 PG — SIGNIFICANT CHANGE UP (ref 27–34)
MCHC RBC-ENTMCNC: 35 % — SIGNIFICANT CHANGE UP (ref 32–36)
MCV RBC AUTO: 86.5 FL — SIGNIFICANT CHANGE UP (ref 80–100)
NRBC # FLD: 0.03 — SIGNIFICANT CHANGE UP
PHOSPHATE SERPL-MCNC: 2.4 MG/DL — LOW (ref 2.5–4.5)
PLATELET # BLD AUTO: 233 K/UL — SIGNIFICANT CHANGE UP (ref 150–400)
PMV BLD: 12.1 FL — SIGNIFICANT CHANGE UP (ref 7–13)
POTASSIUM SERPL-MCNC: SIGNIFICANT CHANGE UP MMOL/L (ref 3.5–5.3)
POTASSIUM SERPL-SCNC: SIGNIFICANT CHANGE UP MMOL/L (ref 3.5–5.3)
PROT SERPL-MCNC: 5.7 G/DL — LOW (ref 6–8.3)
RBC # BLD: 3.63 M/UL — LOW (ref 3.8–5.2)
RBC # FLD: 19.6 % — HIGH (ref 10.3–14.5)
SODIUM SERPL-SCNC: 137 MMOL/L — SIGNIFICANT CHANGE UP (ref 135–145)
WBC # BLD: 5.26 K/UL — SIGNIFICANT CHANGE UP (ref 3.8–10.5)
WBC # FLD AUTO: 5.26 K/UL — SIGNIFICANT CHANGE UP (ref 3.8–10.5)

## 2018-03-12 PROCEDURE — 99232 SBSQ HOSP IP/OBS MODERATE 35: CPT

## 2018-03-12 RX ORDER — CIPROFLOXACIN LACTATE 400MG/40ML
500 VIAL (ML) INTRAVENOUS EVERY 12 HOURS
Qty: 0 | Refills: 0 | Status: COMPLETED | OUTPATIENT
Start: 2018-03-12 | End: 2018-03-16

## 2018-03-12 RX ORDER — METRONIDAZOLE 500 MG
500 TABLET ORAL
Qty: 0 | Refills: 0 | Status: DISCONTINUED | OUTPATIENT
Start: 2018-03-12 | End: 2018-03-12

## 2018-03-12 RX ORDER — METRONIDAZOLE 500 MG
500 TABLET ORAL EVERY 8 HOURS
Qty: 0 | Refills: 0 | Status: COMPLETED | OUTPATIENT
Start: 2018-03-12 | End: 2018-03-16

## 2018-03-12 RX ORDER — MAGNESIUM SULFATE 500 MG/ML
1 VIAL (ML) INJECTION ONCE
Qty: 0 | Refills: 0 | Status: COMPLETED | OUTPATIENT
Start: 2018-03-12 | End: 2018-03-12

## 2018-03-12 RX ADMIN — Medication 100 MILLIGRAM(S): at 05:38

## 2018-03-12 RX ADMIN — BUDESONIDE AND FORMOTEROL FUMARATE DIHYDRATE 2 PUFF(S): 160; 4.5 AEROSOL RESPIRATORY (INHALATION) at 21:50

## 2018-03-12 RX ADMIN — Medication 100 MILLIGRAM(S): at 13:27

## 2018-03-12 RX ADMIN — Medication 500 MILLIGRAM(S): at 23:30

## 2018-03-12 RX ADMIN — Medication 500 MILLIGRAM(S): at 18:15

## 2018-03-12 RX ADMIN — PANTOPRAZOLE SODIUM 40 MILLIGRAM(S): 20 TABLET, DELAYED RELEASE ORAL at 05:38

## 2018-03-12 RX ADMIN — BUDESONIDE AND FORMOTEROL FUMARATE DIHYDRATE 2 PUFF(S): 160; 4.5 AEROSOL RESPIRATORY (INHALATION) at 10:16

## 2018-03-12 RX ADMIN — ENOXAPARIN SODIUM 40 MILLIGRAM(S): 100 INJECTION SUBCUTANEOUS at 14:44

## 2018-03-12 RX ADMIN — Medication 40 MILLIGRAM(S): at 05:38

## 2018-03-12 RX ADMIN — Medication 650 MILLIGRAM(S): at 04:10

## 2018-03-12 RX ADMIN — Medication 62.5 MILLIMOLE(S): at 14:43

## 2018-03-12 RX ADMIN — Medication 650 MILLIGRAM(S): at 21:53

## 2018-03-12 RX ADMIN — Medication 650 MILLIGRAM(S): at 05:39

## 2018-03-12 RX ADMIN — Medication 5 MILLIGRAM(S): at 21:50

## 2018-03-12 RX ADMIN — Medication 650 MILLIGRAM(S): at 10:53

## 2018-03-12 RX ADMIN — Medication 200 MILLIGRAM(S): at 06:11

## 2018-03-12 RX ADMIN — Medication 650 MILLIGRAM(S): at 21:52

## 2018-03-12 RX ADMIN — LOSARTAN POTASSIUM 100 MILLIGRAM(S): 100 TABLET, FILM COATED ORAL at 05:38

## 2018-03-12 RX ADMIN — Medication 100 GRAM(S): at 10:16

## 2018-03-12 RX ADMIN — Medication 650 MILLIGRAM(S): at 10:23

## 2018-03-12 RX ADMIN — ATORVASTATIN CALCIUM 10 MILLIGRAM(S): 80 TABLET, FILM COATED ORAL at 21:50

## 2018-03-12 RX ADMIN — SERTRALINE 75 MILLIGRAM(S): 25 TABLET, FILM COATED ORAL at 18:17

## 2018-03-12 NOTE — PROGRESS NOTE ADULT - ASSESSMENT
Patient with history of Pancreatic adenocarcinoma s/p moustapha adjuvant chemotherapy followed by radiation therapy and definitive surgery in Nov 2017.Now presenting with ascitis,with CT findings consistent with :New large volume ascites.   Abnormal enhancement along the course of the   intrahepatic portal vein concerning for metastatic disease.New narrowing   of the intrahepatic IVC without gross filling defect.Unchanged encasement   of the portal confluence. Mural wall thickening and hyperemia of cecum and ascending colonic   loops, difficult to evaluate secondary to inadequate distention.   Correlate clinically to assess for colitis. No bowel obstruction.  Loculated left pleural effusion with associated atelectasis of the   left lower lobe. Anasarca.  These findings are concerning for recurrent pancreatic carcinoma. Patient had her ascitic tap done today ,will follow the results.Tumor marker ordered with am labs.Will follow the results.Seen by surgical oncology team.      Problem/Recommendation - 1:  Problem: Other ascites. Recommendation: s/p paracentesis,will follow the results. not impressive for recurrence.  3/12/18 Now with recurrence of ascitis.For repeat tap,should get pleurex if it reaccumulates.    Problem/Recommendation - 2:  ·  Problem: Malignant neoplasm of pancreas, unspecified location of malignancy.  Recommendation: Likley recurrence.Follow the cytology of ascitic tap,tumor marker not impressive. Will decide about further management depending on the etiology.  3/12/18 Ascitc fluid Cytology negative for malignant cells. Patient off chemotherapy post surgery due to her poor overall health and close observation was opted. It was decided that further chemo might be considered if there is any evidence of recurrence.  . Will need proper tissue biopsy or positive cytology from ascitic fluid to confirm recurrence. She might be a candidate for palliative chemo once diagnosis is confirmed. D/W Dr Titus,will need GI intervention.      Problem/Recommendation - 3:  ·  Problem: Breast cancer.  Recommendation: No signs of recurrence on physical exam.     Problem/Recommendation - 4:  ·  Problem: Transaminitis.  Recommendation: Folllow the trend.?cause.     Problem/Recommendation - 5:  ·  Problem: Mild asthma without complication, unspecified whether persistent.     Problem/Recommendation - 6:  Problem: Depression, unspecified depression type.

## 2018-03-12 NOTE — PACU DISCHARGE NOTE - THE ANESTHESIA ORDERS USED IN THE PACU ORDER SET WILL BE DISCONTINUED UPON TRANSFER OF THIS PATIENT
I spoke to the patient and she is calling [employer] so they can refax the clearance form over for Dr Abhi Bravo to look over and sign for her to have dental work so she can be put on the transplant list  Without the clearance she can't have the dental work and with out the dental work she can't be added to the transplant list  Statement Selected

## 2018-03-13 LAB
BUN SERPL-MCNC: 6 MG/DL — LOW (ref 7–23)
CALCIUM SERPL-MCNC: 7.1 MG/DL — LOW (ref 8.4–10.5)
CHLORIDE SERPL-SCNC: 102 MMOL/L — SIGNIFICANT CHANGE UP (ref 98–107)
CO2 SERPL-SCNC: 24 MMOL/L — SIGNIFICANT CHANGE UP (ref 22–31)
CREAT SERPL-MCNC: 0.58 MG/DL — SIGNIFICANT CHANGE UP (ref 0.5–1.3)
GLUCOSE SERPL-MCNC: 102 MG/DL — HIGH (ref 70–99)
HCT VFR BLD CALC: 30.9 % — LOW (ref 34.5–45)
HGB BLD-MCNC: 11.2 G/DL — LOW (ref 11.5–15.5)
INR BLD: 1.68 — HIGH (ref 0.88–1.17)
MAGNESIUM SERPL-MCNC: 1.6 MG/DL — SIGNIFICANT CHANGE UP (ref 1.6–2.6)
MCHC RBC-ENTMCNC: 31.3 PG — SIGNIFICANT CHANGE UP (ref 27–34)
MCHC RBC-ENTMCNC: 36.2 % — HIGH (ref 32–36)
MCV RBC AUTO: 86.3 FL — SIGNIFICANT CHANGE UP (ref 80–100)
NRBC # FLD: 0 — SIGNIFICANT CHANGE UP
PLATELET # BLD AUTO: 177 K/UL — SIGNIFICANT CHANGE UP (ref 150–400)
PMV BLD: 10.8 FL — SIGNIFICANT CHANGE UP (ref 7–13)
POTASSIUM SERPL-MCNC: 2.8 MMOL/L — CRITICAL LOW (ref 3.5–5.3)
POTASSIUM SERPL-SCNC: 2.8 MMOL/L — CRITICAL LOW (ref 3.5–5.3)
PROTHROM AB SERPL-ACNC: 18.8 SEC — HIGH (ref 9.8–13.1)
RBC # BLD: 3.58 M/UL — LOW (ref 3.8–5.2)
RBC # FLD: 19.1 % — HIGH (ref 10.3–14.5)
SODIUM SERPL-SCNC: 139 MMOL/L — SIGNIFICANT CHANGE UP (ref 135–145)
WBC # BLD: 6.87 K/UL — SIGNIFICANT CHANGE UP (ref 3.8–10.5)
WBC # FLD AUTO: 6.87 K/UL — SIGNIFICANT CHANGE UP (ref 3.8–10.5)

## 2018-03-13 RX ORDER — KETOROLAC TROMETHAMINE 30 MG/ML
15 SYRINGE (ML) INJECTION ONCE
Qty: 0 | Refills: 0 | Status: DISCONTINUED | OUTPATIENT
Start: 2018-03-13 | End: 2018-03-13

## 2018-03-13 RX ORDER — POTASSIUM CHLORIDE 20 MEQ
40 PACKET (EA) ORAL EVERY 4 HOURS
Qty: 0 | Refills: 0 | Status: COMPLETED | OUTPATIENT
Start: 2018-03-13 | End: 2018-03-13

## 2018-03-13 RX ADMIN — Medication 500 MILLIGRAM(S): at 17:59

## 2018-03-13 RX ADMIN — LOSARTAN POTASSIUM 100 MILLIGRAM(S): 100 TABLET, FILM COATED ORAL at 06:05

## 2018-03-13 RX ADMIN — Medication 40 MILLIEQUIVALENT(S): at 18:00

## 2018-03-13 RX ADMIN — Medication 15 MILLIGRAM(S): at 15:57

## 2018-03-13 RX ADMIN — Medication 650 MILLIGRAM(S): at 07:12

## 2018-03-13 RX ADMIN — BUDESONIDE AND FORMOTEROL FUMARATE DIHYDRATE 2 PUFF(S): 160; 4.5 AEROSOL RESPIRATORY (INHALATION) at 06:31

## 2018-03-13 RX ADMIN — BUDESONIDE AND FORMOTEROL FUMARATE DIHYDRATE 2 PUFF(S): 160; 4.5 AEROSOL RESPIRATORY (INHALATION) at 21:41

## 2018-03-13 RX ADMIN — Medication 500 MILLIGRAM(S): at 21:41

## 2018-03-13 RX ADMIN — Medication 40 MILLIEQUIVALENT(S): at 11:54

## 2018-03-13 RX ADMIN — Medication 5 MILLIGRAM(S): at 21:41

## 2018-03-13 RX ADMIN — Medication 500 MILLIGRAM(S): at 13:18

## 2018-03-13 RX ADMIN — Medication 500 MILLIGRAM(S): at 06:05

## 2018-03-13 RX ADMIN — PANTOPRAZOLE SODIUM 40 MILLIGRAM(S): 20 TABLET, DELAYED RELEASE ORAL at 06:05

## 2018-03-13 RX ADMIN — ATORVASTATIN CALCIUM 10 MILLIGRAM(S): 80 TABLET, FILM COATED ORAL at 21:41

## 2018-03-13 RX ADMIN — Medication 40 MILLIGRAM(S): at 06:05

## 2018-03-13 RX ADMIN — SERTRALINE 75 MILLIGRAM(S): 25 TABLET, FILM COATED ORAL at 21:41

## 2018-03-13 NOTE — CHART NOTE - NSCHARTNOTEFT_GEN_A_CORE
IPT consulted for therapeutic paracentesis.  Patient seen and examined at bedside.  Patient denied abdominal pain.  Said she is breathing well.  Abdomen is soft with masses palpated, no significant distention.  No indication for therapeutic paracentesis at this time, please reconsult as necessary.    - JO ANN Jenkins MD  EM/IM PGY-3

## 2018-03-13 NOTE — PROGRESS NOTE ADULT - ASSESSMENT
· Assessment		  Patient with history of Pancreatic adenocarcinoma s/p moustapha adjuvant chemotherapy followed by radiation therapy and definitive surgery in Nov 2017.Now presenting with ascitis,with CT findings consistent with :New large volume ascites.   Abnormal enhancement along the course of the   intrahepatic portal vein concerning for metastatic disease.New narrowing   of the intrahepatic IVC without gross filling defect.Unchanged encasement   of the portal confluence. Mural wall thickening and hyperemia of cecum and ascending colonic   loops, difficult to evaluate secondary to inadequate distention.   Correlate clinically to assess for colitis. No bowel obstruction.  Loculated left pleural effusion with associated atelectasis of the   left lower lobe. Anasarca.  These findings are concerning for recurrent pancreatic carcinoma. Patient had her ascitic tap done today ,will follow the results.Tumor marker ordered with am labs.Will follow the results.Seen by surgical oncology team.      Problem/Recommendation - 1:  Problem: Other ascites. Recommendation: s/p paracentesis,will follow the results. not impressive for recurrence.  3/12/18 Now with recurrence of ascitis.For repeat tap,should get pleurex if it reaccumulates.    Problem/Recommendation - 2:  ·  Problem: Malignant neoplasm of pancreas, unspecified location of malignancy.  Recommendation: Likley recurrence.Follow the cytology of ascitic tap,tumor marker not impressive. Will decide about further management depending on the etiology.  3/12/18 Ascitc fluid Cytology negative for malignant cells. Patient off chemotherapy post surgery due to her poor overall health and close observation was opted. It was decided that further chemo might be considered if there is any evidence of recurrence.  . Will need proper tissue biopsy or positive cytology from ascitic fluid to confirm recurrence. She might be a candidate for palliative chemo once diagnosis is confirmed. D/W Dr Titus,will need GI intervention.  3/13/18 Patient comfortable,no paracentesis performed. Awaiting GI input to evaluate for possible biopsy to confirm recurrecne.    Problem/Recommendation - 3:  ·  Problem: Breast cancer.  Recommendation: No signs of recurrence on physical exam.     Problem/Recommendation - 4:  ·  Problem: Transaminitis.  Recommendation: Folllow the trend.?cause.     Problem/Recommendation - 5:  ·  Problem: Mild asthma without complication, unspecified whether persistent.     Problem/Recommendation - 6:  Problem: Depression, unspecified depression type.

## 2018-03-14 LAB
BACTERIA FLD CULT: SIGNIFICANT CHANGE UP
BUN SERPL-MCNC: 6 MG/DL — LOW (ref 7–23)
CALCIUM SERPL-MCNC: 7.1 MG/DL — LOW (ref 8.4–10.5)
CHLORIDE SERPL-SCNC: 105 MMOL/L — SIGNIFICANT CHANGE UP (ref 98–107)
CO2 SERPL-SCNC: 24 MMOL/L — SIGNIFICANT CHANGE UP (ref 22–31)
CREAT SERPL-MCNC: 0.64 MG/DL — SIGNIFICANT CHANGE UP (ref 0.5–1.3)
GLUCOSE SERPL-MCNC: 106 MG/DL — HIGH (ref 70–99)
HCT VFR BLD CALC: 30.1 % — LOW (ref 34.5–45)
HGB BLD-MCNC: 10.9 G/DL — LOW (ref 11.5–15.5)
MAGNESIUM SERPL-MCNC: 1.6 MG/DL — SIGNIFICANT CHANGE UP (ref 1.6–2.6)
MCHC RBC-ENTMCNC: 31.9 PG — SIGNIFICANT CHANGE UP (ref 27–34)
MCHC RBC-ENTMCNC: 36.2 % — HIGH (ref 32–36)
MCV RBC AUTO: 88 FL — SIGNIFICANT CHANGE UP (ref 80–100)
NRBC # FLD: 0.02 — SIGNIFICANT CHANGE UP
PHOSPHATE SERPL-MCNC: 2.5 MG/DL — SIGNIFICANT CHANGE UP (ref 2.5–4.5)
PLATELET # BLD AUTO: 206 K/UL — SIGNIFICANT CHANGE UP (ref 150–400)
PMV BLD: 11.3 FL — SIGNIFICANT CHANGE UP (ref 7–13)
POTASSIUM SERPL-MCNC: 3.3 MMOL/L — LOW (ref 3.5–5.3)
POTASSIUM SERPL-SCNC: 3.3 MMOL/L — LOW (ref 3.5–5.3)
RBC # BLD: 3.42 M/UL — LOW (ref 3.8–5.2)
RBC # FLD: 19.7 % — HIGH (ref 10.3–14.5)
SODIUM SERPL-SCNC: 139 MMOL/L — SIGNIFICANT CHANGE UP (ref 135–145)
WBC # BLD: 7.22 K/UL — SIGNIFICANT CHANGE UP (ref 3.8–10.5)
WBC # FLD AUTO: 7.22 K/UL — SIGNIFICANT CHANGE UP (ref 3.8–10.5)

## 2018-03-14 PROCEDURE — 71045 X-RAY EXAM CHEST 1 VIEW: CPT | Mod: 26

## 2018-03-14 RX ORDER — FUROSEMIDE 40 MG
40 TABLET ORAL ONCE
Qty: 0 | Refills: 0 | Status: COMPLETED | OUTPATIENT
Start: 2018-03-14 | End: 2018-03-14

## 2018-03-14 RX ORDER — POTASSIUM CHLORIDE 20 MEQ
40 PACKET (EA) ORAL ONCE
Qty: 0 | Refills: 0 | Status: COMPLETED | OUTPATIENT
Start: 2018-03-14 | End: 2018-03-14

## 2018-03-14 RX ORDER — POTASSIUM CHLORIDE 20 MEQ
20 PACKET (EA) ORAL ONCE
Qty: 0 | Refills: 0 | Status: COMPLETED | OUTPATIENT
Start: 2018-03-14 | End: 2018-03-14

## 2018-03-14 RX ORDER — PHYTONADIONE (VIT K1) 5 MG
5 TABLET ORAL DAILY
Qty: 0 | Refills: 0 | Status: COMPLETED | OUTPATIENT
Start: 2018-03-14 | End: 2018-03-16

## 2018-03-14 RX ADMIN — ATORVASTATIN CALCIUM 10 MILLIGRAM(S): 80 TABLET, FILM COATED ORAL at 21:56

## 2018-03-14 RX ADMIN — BUDESONIDE AND FORMOTEROL FUMARATE DIHYDRATE 2 PUFF(S): 160; 4.5 AEROSOL RESPIRATORY (INHALATION) at 20:54

## 2018-03-14 RX ADMIN — Medication 40 MILLIGRAM(S): at 05:30

## 2018-03-14 RX ADMIN — Medication 40 MILLIGRAM(S): at 15:35

## 2018-03-14 RX ADMIN — PANTOPRAZOLE SODIUM 40 MILLIGRAM(S): 20 TABLET, DELAYED RELEASE ORAL at 05:30

## 2018-03-14 RX ADMIN — Medication 500 MILLIGRAM(S): at 17:09

## 2018-03-14 RX ADMIN — ENOXAPARIN SODIUM 40 MILLIGRAM(S): 100 INJECTION SUBCUTANEOUS at 14:18

## 2018-03-14 RX ADMIN — BUDESONIDE AND FORMOTEROL FUMARATE DIHYDRATE 2 PUFF(S): 160; 4.5 AEROSOL RESPIRATORY (INHALATION) at 10:12

## 2018-03-14 RX ADMIN — Medication 40 MILLIEQUIVALENT(S): at 10:12

## 2018-03-14 RX ADMIN — Medication 500 MILLIGRAM(S): at 21:56

## 2018-03-14 RX ADMIN — Medication 500 MILLIGRAM(S): at 05:30

## 2018-03-14 RX ADMIN — Medication 500 MILLIGRAM(S): at 14:18

## 2018-03-14 RX ADMIN — SERTRALINE 75 MILLIGRAM(S): 25 TABLET, FILM COATED ORAL at 20:54

## 2018-03-14 RX ADMIN — Medication 20 MILLIEQUIVALENT(S): at 17:16

## 2018-03-14 RX ADMIN — Medication 5 MILLIGRAM(S): at 21:56

## 2018-03-14 RX ADMIN — LOSARTAN POTASSIUM 100 MILLIGRAM(S): 100 TABLET, FILM COATED ORAL at 05:30

## 2018-03-14 RX ADMIN — Medication 5 MILLIGRAM(S): at 20:54

## 2018-03-14 NOTE — DIETITIAN INITIAL EVALUATION ADULT. - OTHER INFO
Pt seen for LOS,73 Y/O female admitted with the DX of Pancreatic CA, Breast CA, HLD, depression,HTN, h/x of whipple procedure. Pt reports less than  50% po related with SOB and abdominal discomfort.  Pt reports weight gain related with ascites and not able to recall dry weight at this time. LABS reviewed, Potassium supplementation noted. recommend to  add CSCHO with snacks  and DASH/TLC diet when po medically feasible. Pt currently NPO for procedure. Small frequent meals encouraged . Recommend to add  Glucerna shake 1 can po 2 x daily to increase caloric and nutritional  intake. RD remains available.

## 2018-03-14 NOTE — DIETITIAN INITIAL EVALUATION ADULT. - NS AS NUTRI INTERV MEALS SNACK
Composition of meals/snacks/Carbohydrate - modified diet/General/healthful diet/Energy - modified diet/Protein - modified diet

## 2018-03-14 NOTE — DIETITIAN INITIAL EVALUATION ADULT. - PROBLEM SELECTOR PLAN 4
- Suspect this is 2/2 mass effect from patient's large ascites causing decreased lymph/venous return  - Will check b/l venous duplex to eval for possible DVT but low suspicion  - Suspect patient has OA and that is not worsening in setting of her ascites and LE edema causing her b/l knee pain, tylenol for pain control for now

## 2018-03-14 NOTE — CHART NOTE - NSCHARTNOTEFT_GEN_A_CORE
pt with SOB pt seen and examined ; lasix 40mg IVP x1; f/u CXR ----------------------------  d/w Dr. Snowden  Called procedure team for paracentesis: unable to perform 2/2 lovenox already given will evaluate pt in am for paracentesis: lovenox discontinued  called GI plan eus with bx in am : vitamin k ordered: coags elevated

## 2018-03-14 NOTE — CONSULT NOTE ADULT - SUBJECTIVE AND OBJECTIVE BOX
Chief Complaint:  Patient is a 72y old  Female who presents with a chief complaint of Abdominal Distention (07 Mar 2018 07:34)      HPI: 73 yo woman with h/o pancreatic cancer s/p Whipple (2017) who has had LE edema and abd swelling since her surgery along with poor appetite. She saw Dr. Titus of surgery last week and was sent into the [facility] ED due to LE edema and abdominal distention as well as overall frailty. She denies fevers, chills, abd pain, dyspepsia, nausea, vomiting, dysphagia, odynophagia, melena, hematochezia, icterus, jaundice or pruritis. She has not been taking diuretics prior to this hospitalization.     While in the hospital she had a CT scan with abnormal findings described below and had a therapeutic paracentesis with removal of 4 L of ascites. This was (-) for SBP and (-) for malignant cells.      Allergies:  No Known Drug Allergies  SteriStrips (Blisters)      Home Medications:    Hospital Medications:  acetaminophen   Tablet. 650 milliGRAM(s) Oral every 6 hours PRN  ALBUTerol    90 MICROgram(s) HFA Inhaler 2 Puff(s) Inhalation every 6 hours PRN  atorvastatin 10 milliGRAM(s) Oral at bedtime  buDESOnide 160 MICROgram(s)/formoterol 4.5 MICROgram(s) Inhaler 2 Puff(s) Inhalation two times a day  ciprofloxacin     Tablet 500 milliGRAM(s) Oral every 12 hours  enoxaparin Injectable 40 milliGRAM(s) SubCutaneous every 24 hours  furosemide    Tablet 40 milliGRAM(s) Oral daily  furosemide   Injectable 40 milliGRAM(s) IV Push once  losartan 100 milliGRAM(s) Oral daily  magnesium hydroxide Suspension 30 milliLiter(s) Oral daily PRN  metroNIDAZOLE    Tablet 500 milliGRAM(s) Oral every 8 hours  ondansetron Injectable 4 milliGRAM(s) IV Push every 6 hours PRN  oxybutynin 5 milliGRAM(s) Oral at bedtime  pantoprazole    Tablet 40 milliGRAM(s) Oral before breakfast  sertraline 75 milliGRAM(s) Oral daily  traZODone 100 milliGRAM(s) Oral at bedtime PRN      PMHX/PSHX:  Pancreatic cancer  Breast cancer  Solitary pulmonary nodule  Insomnia  Hiatal hernia with GERD  Colitis  Malignant neoplasm of pancreas, unspecified location of malignancy  Malignant neoplasm of right female breast, unspecified site of breast  Asthma  Depression  Hyperlipidemia  HTN (hypertension)  Carcinoma of pancreas  History of lung surgery  History of lumpectomy  Port-a-cath in place  Status post right breast lumpectomy  H/O abdominal hysterectomy  H/O umbilical hernia repair      Family history:  Family history of kidney cancer (Sibling)  Family history of brain cancer (Sibling)  Family history of ovarian cancer (Mother)  No pertinent family history in first degree relatives      Social History:     ROS:     General:  No wt loss, fevers, chills, night sweats, fatigue   Eyes:  Good vision, no reported pain  ENT:  No sore throat, pain, runny nose  CV:  No chest pain, SOB, palpitations, orthopnea  Resp:  No cough, SOB, wheezing  GI:  See HPI  :  No pain, bleeding, incontinence, nocturia  Muscle:  No pain, weakness  Neuro:  No weakness, tingling, memory problems  Psych:  No fatigue, insomnia, mood problems, depression  Endocrine:  No cold/heat intolerance  Heme:  No petechiae, ecchymosis, easy bruising  Skin:  No rash, edema      PHYSICAL EXAM:     GENERAL: frail, dyspneic, has difficulty speaking in full sentences due to dyspnea  HEENT: sclera anicteric  CHEST: CTAB  HEART:  RRR, no MRG, no edema  ABDOMEN:  Soft, obese, well healed surgical scar, mildly distended, non-tender, no hepatosplenomegaly appreciated  EXTREMITIES:  no cyanosis, or edema  SKIN:  No rash  NEURO:  Alert, orientedx3     Vital Signs:  Vital Signs Last 24 Hrs  T(C): 36.4 (14 Mar 2018 12:21), Max: 36.9 (14 Mar 2018 05:29)  T(F): 97.6 (14 Mar 2018 12:21), Max: 98.5 (14 Mar 2018 05:29)  HR: 95 (14 Mar 2018 12:21) (82 - 95)  BP: 134/87 (14 Mar 2018 12:21) (114/68 - 134/87)  RR: 17 (14 Mar 2018 12:21) (17 - 17)  SpO2: 100% (14 Mar 2018 12:21) (98% - 100%)  Daily     Daily Weight in k (14 Mar 2018 14:53)    LABS:                        10.9   7.22  )-----------( 206      ( 14 Mar 2018 05:27 )             30.1     03-14    139  |  105  |  6<L>  ----------------------------<  106<H>  3.3<L>   |  24  |  0.64    Ca    7.1<L>      14 Mar 2018 05:27  Phos  2.5       Mg     1.6         Albumin, Serum: 2.0 g/dL (18 @ 06:15)      PT/INR - ( 13 Mar 2018 10:45 )   PT: 18.8 SEC;   INR: 1.68         Imaging:  < from: CT Abdomen and Pelvis w/ IV Cont (18 @ 01:07) >  IMPRESSION:   1. New large volume ascites.  2. Status post Whipple. Abnormal enhancement along the course of the   intrahepatic portal vein concerning for metastatic disease.New narrowing   of the intrahepatic IVC without gross filling defect.Unchanged encasement   of the portal confluence.  3. Mural wall thickening and hyperemia of cecum and ascending colonic   loops, difficult to evaluate secondary to inadequate distention.   Correlate clinically to assess for colitis. No bowel obstruction.   4. Loculated left pleural effusion with associated atelectasis of the   left lower lobe.  5. Anasarca.    < end of copied text >

## 2018-03-14 NOTE — DIETITIAN INITIAL EVALUATION ADULT. - PROBLEM SELECTOR PLAN 10
- Lovenox for DVT ppx  - Pt unsure of all of her medications, will have [facility] Pharmacists help complete her medication reconciliation

## 2018-03-15 ENCOUNTER — RESULT REVIEW (OUTPATIENT)
Age: 73
End: 2018-03-15

## 2018-03-15 LAB
APTT BLD: 31.8 SEC — SIGNIFICANT CHANGE UP (ref 27.5–37.4)
BUN SERPL-MCNC: 7 MG/DL — SIGNIFICANT CHANGE UP (ref 7–23)
CALCIUM SERPL-MCNC: 7.3 MG/DL — LOW (ref 8.4–10.5)
CHLORIDE SERPL-SCNC: 104 MMOL/L — SIGNIFICANT CHANGE UP (ref 98–107)
CO2 SERPL-SCNC: 23 MMOL/L — SIGNIFICANT CHANGE UP (ref 22–31)
CREAT SERPL-MCNC: 0.7 MG/DL — SIGNIFICANT CHANGE UP (ref 0.5–1.3)
GLUCOSE SERPL-MCNC: 86 MG/DL — SIGNIFICANT CHANGE UP (ref 70–99)
HCT VFR BLD CALC: 28.8 % — LOW (ref 34.5–45)
HGB BLD-MCNC: 10.6 G/DL — LOW (ref 11.5–15.5)
INR BLD: 1.63 — HIGH (ref 0.88–1.17)
MAGNESIUM SERPL-MCNC: 1.5 MG/DL — LOW (ref 1.6–2.6)
MCHC RBC-ENTMCNC: 32.4 PG — SIGNIFICANT CHANGE UP (ref 27–34)
MCHC RBC-ENTMCNC: 36.8 % — HIGH (ref 32–36)
MCV RBC AUTO: 88.1 FL — SIGNIFICANT CHANGE UP (ref 80–100)
NRBC # FLD: 0 — SIGNIFICANT CHANGE UP
PHOSPHATE SERPL-MCNC: 2.7 MG/DL — SIGNIFICANT CHANGE UP (ref 2.5–4.5)
PLATELET # BLD AUTO: 221 K/UL — SIGNIFICANT CHANGE UP (ref 150–400)
PMV BLD: 10.8 FL — SIGNIFICANT CHANGE UP (ref 7–13)
POTASSIUM SERPL-MCNC: 3 MMOL/L — LOW (ref 3.5–5.3)
POTASSIUM SERPL-SCNC: 3 MMOL/L — LOW (ref 3.5–5.3)
PROTHROM AB SERPL-ACNC: 18.9 SEC — HIGH (ref 9.8–13.1)
RBC # BLD: 3.27 M/UL — LOW (ref 3.8–5.2)
RBC # FLD: 19.2 % — HIGH (ref 10.3–14.5)
SODIUM SERPL-SCNC: 141 MMOL/L — SIGNIFICANT CHANGE UP (ref 135–145)
WBC # BLD: 7.34 K/UL — SIGNIFICANT CHANGE UP (ref 3.8–10.5)
WBC # FLD AUTO: 7.34 K/UL — SIGNIFICANT CHANGE UP (ref 3.8–10.5)

## 2018-03-15 PROCEDURE — 88341 IMHCHEM/IMCYTCHM EA ADD ANTB: CPT | Mod: 26

## 2018-03-15 PROCEDURE — 99222 1ST HOSP IP/OBS MODERATE 55: CPT | Mod: GC

## 2018-03-15 PROCEDURE — 88342 IMHCHEM/IMCYTCHM 1ST ANTB: CPT | Mod: 26

## 2018-03-15 PROCEDURE — 49083 ABD PARACENTESIS W/IMAGING: CPT | Mod: GC

## 2018-03-15 PROCEDURE — 88305 TISSUE EXAM BY PATHOLOGIST: CPT | Mod: 26

## 2018-03-15 PROCEDURE — 88112 CYTOPATH CELL ENHANCE TECH: CPT | Mod: 26

## 2018-03-15 RX ORDER — LIDOCAINE HCL 20 MG/ML
20 VIAL (ML) INJECTION ONCE
Qty: 0 | Refills: 0 | Status: DISCONTINUED | OUTPATIENT
Start: 2018-03-15 | End: 2018-03-15

## 2018-03-15 RX ORDER — POTASSIUM CHLORIDE 20 MEQ
10 PACKET (EA) ORAL
Qty: 0 | Refills: 0 | Status: COMPLETED | OUTPATIENT
Start: 2018-03-15 | End: 2018-03-15

## 2018-03-15 RX ORDER — SPIRONOLACTONE 25 MG/1
25 TABLET, FILM COATED ORAL DAILY
Qty: 0 | Refills: 0 | Status: DISCONTINUED | OUTPATIENT
Start: 2018-03-15 | End: 2018-03-23

## 2018-03-15 RX ORDER — MAGNESIUM SULFATE 500 MG/ML
1 VIAL (ML) INJECTION ONCE
Qty: 0 | Refills: 0 | Status: COMPLETED | OUTPATIENT
Start: 2018-03-15 | End: 2018-03-15

## 2018-03-15 RX ORDER — LIDOCAINE HCL 20 MG/ML
20 VIAL (ML) INJECTION ONCE
Qty: 0 | Refills: 0 | Status: DISCONTINUED | OUTPATIENT
Start: 2018-03-15 | End: 2018-03-22

## 2018-03-15 RX ORDER — POTASSIUM CHLORIDE 20 MEQ
20 PACKET (EA) ORAL
Qty: 0 | Refills: 0 | Status: COMPLETED | OUTPATIENT
Start: 2018-03-15 | End: 2018-03-15

## 2018-03-15 RX ADMIN — Medication 100 MILLIEQUIVALENT(S): at 10:27

## 2018-03-15 RX ADMIN — SERTRALINE 75 MILLIGRAM(S): 25 TABLET, FILM COATED ORAL at 21:41

## 2018-03-15 RX ADMIN — Medication 500 MILLIGRAM(S): at 05:33

## 2018-03-15 RX ADMIN — Medication 20 MILLIEQUIVALENT(S): at 10:26

## 2018-03-15 RX ADMIN — Medication 500 MILLIGRAM(S): at 13:19

## 2018-03-15 RX ADMIN — LOSARTAN POTASSIUM 100 MILLIGRAM(S): 100 TABLET, FILM COATED ORAL at 05:33

## 2018-03-15 RX ADMIN — Medication 100 MILLIEQUIVALENT(S): at 13:19

## 2018-03-15 RX ADMIN — Medication 100 GRAM(S): at 10:27

## 2018-03-15 RX ADMIN — Medication 500 MILLIGRAM(S): at 17:47

## 2018-03-15 RX ADMIN — PANTOPRAZOLE SODIUM 40 MILLIGRAM(S): 20 TABLET, DELAYED RELEASE ORAL at 05:33

## 2018-03-15 RX ADMIN — SPIRONOLACTONE 25 MILLIGRAM(S): 25 TABLET, FILM COATED ORAL at 17:46

## 2018-03-15 RX ADMIN — Medication 40 MILLIGRAM(S): at 05:33

## 2018-03-15 RX ADMIN — Medication 100 MILLIEQUIVALENT(S): at 12:22

## 2018-03-15 RX ADMIN — ATORVASTATIN CALCIUM 10 MILLIGRAM(S): 80 TABLET, FILM COATED ORAL at 21:41

## 2018-03-15 RX ADMIN — BUDESONIDE AND FORMOTEROL FUMARATE DIHYDRATE 2 PUFF(S): 160; 4.5 AEROSOL RESPIRATORY (INHALATION) at 21:41

## 2018-03-15 RX ADMIN — Medication 5 MILLIGRAM(S): at 21:41

## 2018-03-15 RX ADMIN — Medication 500 MILLIGRAM(S): at 21:41

## 2018-03-15 RX ADMIN — BUDESONIDE AND FORMOTEROL FUMARATE DIHYDRATE 2 PUFF(S): 160; 4.5 AEROSOL RESPIRATORY (INHALATION) at 10:27

## 2018-03-15 RX ADMIN — Medication 20 MILLIEQUIVALENT(S): at 12:23

## 2018-03-15 RX ADMIN — Medication 5 MILLIGRAM(S): at 10:28

## 2018-03-15 NOTE — PROGRESS NOTE ADULT - ASSESSMENT
73 yo woman with h/o pancreatic cancer s/p Whipple (11/2017) now with LE edema, ascites, poor appetite and weight loss with finding of possible metastatic spread along the intrahepatic portal vein. After review of the imaging with senior radiology staff, it appears that EUS will not be helpful in this particular case due to her post-Whipple anatomy and lesion location along portal vein and characteristics (not a discrete mass). However RUQ US with doppler to assess patency of the portal vein may be helpful to better understand patient's ascites and LE edema.

## 2018-03-15 NOTE — PROGRESS NOTE ADULT - ASSESSMENT
72F a/p Whipple procedure with portal vein resection and repair and feeding jejunostomy on 11/7/17 for pancreatic cancer presenting with abdominal distension, found to have new large volume ascites and abnormal enhancement along the course of the intrahepatic portal vein concerning for metastatic disease.     - Recommend repeat paracentesis  - D/w Dr. Tacho Belcher MD PGY 2   [id number]

## 2018-03-15 NOTE — PROGRESS NOTE ADULT - ASSESSMENT
Patient with history of Pancreatic adenocarcinoma s/p moustapha adjuvant chemotherapy followed by radiation therapy and definitive surgery in Nov 2017.Now presenting with ascitis,with CT findings consistent with :New large volume ascites.   Abnormal enhancement along the course of the   intrahepatic portal vein concerning for metastatic disease.New narrowing   of the intrahepatic IVC without gross filling defect.Unchanged encasement   of the portal confluence. Mural wall thickening and hyperemia of cecum and ascending colonic   loops, difficult to evaluate secondary to inadequate distention.   Correlate clinically to assess for colitis. No bowel obstruction.  Loculated left pleural effusion with associated atelectasis of the   left lower lobe. Anasarca.  These findings are concerning for recurrent pancreatic carcinoma. Patient had her ascitic tap done today ,will follow the results.Tumor marker ordered with am labs.Will follow the results.Seen by surgical oncology team.      Problem/Recommendation - 1:  Problem: Other ascites. Recommendation: s/p paracentesis,will follow the results. not impressive for recurrence.  3/12/18 Now with recurrence of ascitis.For repeat tap,should get pleurex if it reaccumulates.  3/15 Patient had repeat paracentesis with 2.6 liter fluid removed.Sample sent for cytology.    Problem/Recommendation - 2:  ·  Problem: Malignant neoplasm of pancreas, unspecified location of malignancy.  Recommendation: Likley recurrence.Follow the cytology of ascitic tap,tumor marker not impressive. Will decide about further management depending on the etiology.  3/12/18 Ascitc fluid Cytology negative for malignant cells. Patient off chemotherapy post surgery due to her poor overall health and close observation was opted. It was decided that further chemo might be considered if there is any evidence of recurrence.  . Will need proper tissue biopsy or positive cytology from ascitic fluid to confirm recurrence. She might be a candidate for palliative chemo once diagnosis is confirmed. D/W Dr Titus,will need GI intervention.  3/13/18 Patient comfortable,no paracentesis performed. Awaiting GI input to evaluate for possible biopsy to confirm recurrence.  3/15/18 Patient seen by GI ,due to prior whipples EUS/BX not possible.Ultrasound with dopplers recommended to better characterize the portal vein and periportal region.    Problem/Recommendation - 3:  ·  Problem: Breast cancer.  Recommendation: No signs of recurrence on physical exam.     Problem/Recommendation - 4:  ·  Problem: Transaminitis.  Recommendation: Folllow the trend.?cause.     Problem/Recommendation - 5:  ·  Problem: Mild asthma without complication, unspecified whether persistent.     Problem/Recommendation - 6:  Problem: Depression, unspecified depression type.

## 2018-03-16 LAB
ALBUMIN SERPL ELPH-MCNC: 1.9 G/DL — LOW (ref 3.3–5)
ALP SERPL-CCNC: 178 U/L — HIGH (ref 40–120)
ALT FLD-CCNC: 43 U/L — HIGH (ref 4–33)
AST SERPL-CCNC: 86 U/L — HIGH (ref 4–32)
BILIRUB SERPL-MCNC: 1.7 MG/DL — HIGH (ref 0.2–1.2)
BUN SERPL-MCNC: 8 MG/DL — SIGNIFICANT CHANGE UP (ref 7–23)
CALCIUM SERPL-MCNC: 7.1 MG/DL — LOW (ref 8.4–10.5)
CHLORIDE SERPL-SCNC: 102 MMOL/L — SIGNIFICANT CHANGE UP (ref 98–107)
CO2 SERPL-SCNC: 24 MMOL/L — SIGNIFICANT CHANGE UP (ref 22–31)
CREAT SERPL-MCNC: 0.67 MG/DL — SIGNIFICANT CHANGE UP (ref 0.5–1.3)
GLUCOSE SERPL-MCNC: 94 MG/DL — SIGNIFICANT CHANGE UP (ref 70–99)
HCT VFR BLD CALC: 27.9 % — LOW (ref 34.5–45)
HGB BLD-MCNC: 10.2 G/DL — LOW (ref 11.5–15.5)
MAGNESIUM SERPL-MCNC: 1.8 MG/DL — SIGNIFICANT CHANGE UP (ref 1.6–2.6)
MCHC RBC-ENTMCNC: 32.4 PG — SIGNIFICANT CHANGE UP (ref 27–34)
MCHC RBC-ENTMCNC: 36.6 % — HIGH (ref 32–36)
MCV RBC AUTO: 88.6 FL — SIGNIFICANT CHANGE UP (ref 80–100)
NRBC # FLD: 0 — SIGNIFICANT CHANGE UP
PHOSPHATE SERPL-MCNC: 2.8 MG/DL — SIGNIFICANT CHANGE UP (ref 2.5–4.5)
PLATELET # BLD AUTO: 201 K/UL — SIGNIFICANT CHANGE UP (ref 150–400)
PMV BLD: 10.3 FL — SIGNIFICANT CHANGE UP (ref 7–13)
POTASSIUM SERPL-MCNC: 3 MMOL/L — LOW (ref 3.5–5.3)
POTASSIUM SERPL-SCNC: 3 MMOL/L — LOW (ref 3.5–5.3)
PROT SERPL-MCNC: 5.3 G/DL — LOW (ref 6–8.3)
RBC # BLD: 3.15 M/UL — LOW (ref 3.8–5.2)
RBC # FLD: 19.3 % — HIGH (ref 10.3–14.5)
SODIUM SERPL-SCNC: 137 MMOL/L — SIGNIFICANT CHANGE UP (ref 135–145)
WBC # BLD: 5.71 K/UL — SIGNIFICANT CHANGE UP (ref 3.8–10.5)
WBC # FLD AUTO: 5.71 K/UL — SIGNIFICANT CHANGE UP (ref 3.8–10.5)

## 2018-03-16 PROCEDURE — 99232 SBSQ HOSP IP/OBS MODERATE 35: CPT

## 2018-03-16 PROCEDURE — 93976 VASCULAR STUDY: CPT | Mod: 26

## 2018-03-16 RX ORDER — POTASSIUM CHLORIDE 20 MEQ
40 PACKET (EA) ORAL EVERY 4 HOURS
Qty: 0 | Refills: 0 | Status: COMPLETED | OUTPATIENT
Start: 2018-03-16 | End: 2018-03-16

## 2018-03-16 RX ADMIN — BUDESONIDE AND FORMOTEROL FUMARATE DIHYDRATE 2 PUFF(S): 160; 4.5 AEROSOL RESPIRATORY (INHALATION) at 10:18

## 2018-03-16 RX ADMIN — Medication 5 MILLIGRAM(S): at 21:27

## 2018-03-16 RX ADMIN — ATORVASTATIN CALCIUM 10 MILLIGRAM(S): 80 TABLET, FILM COATED ORAL at 21:27

## 2018-03-16 RX ADMIN — Medication 500 MILLIGRAM(S): at 05:57

## 2018-03-16 RX ADMIN — SPIRONOLACTONE 25 MILLIGRAM(S): 25 TABLET, FILM COATED ORAL at 06:05

## 2018-03-16 RX ADMIN — PANTOPRAZOLE SODIUM 40 MILLIGRAM(S): 20 TABLET, DELAYED RELEASE ORAL at 06:06

## 2018-03-16 RX ADMIN — Medication 500 MILLIGRAM(S): at 14:07

## 2018-03-16 RX ADMIN — Medication 40 MILLIGRAM(S): at 05:57

## 2018-03-16 RX ADMIN — LOSARTAN POTASSIUM 100 MILLIGRAM(S): 100 TABLET, FILM COATED ORAL at 05:57

## 2018-03-16 RX ADMIN — Medication 40 MILLIEQUIVALENT(S): at 14:06

## 2018-03-16 RX ADMIN — Medication 40 MILLIEQUIVALENT(S): at 10:19

## 2018-03-16 RX ADMIN — BUDESONIDE AND FORMOTEROL FUMARATE DIHYDRATE 2 PUFF(S): 160; 4.5 AEROSOL RESPIRATORY (INHALATION) at 21:27

## 2018-03-16 RX ADMIN — SERTRALINE 75 MILLIGRAM(S): 25 TABLET, FILM COATED ORAL at 21:27

## 2018-03-16 RX ADMIN — Medication 5 MILLIGRAM(S): at 14:07

## 2018-03-16 NOTE — PROGRESS NOTE ADULT - ASSESSMENT
72F a/p Whipple procedure with portal vein resection and repair and feeding jejunostomy on 11/7/17 for pancreatic cancer presenting with abdominal distension, found to have new large volume ascites and abnormal enhancement along the course of the intrahepatic portal vein concerning for metastatic disease.     - If ascities continues to require paracentesis, recommend Pleurex catheter    CONCEPCION Belcher MD PGY 2   [id number]

## 2018-03-17 LAB
ALBUMIN SERPL ELPH-MCNC: 1.7 G/DL — LOW (ref 3.3–5)
ALP SERPL-CCNC: 195 U/L — HIGH (ref 40–120)
ALT FLD-CCNC: 45 U/L — HIGH (ref 4–33)
AST SERPL-CCNC: 107 U/L — HIGH (ref 4–32)
BILIRUB SERPL-MCNC: 1.8 MG/DL — HIGH (ref 0.2–1.2)
BUN SERPL-MCNC: 7 MG/DL — SIGNIFICANT CHANGE UP (ref 7–23)
CALCIUM SERPL-MCNC: 7.4 MG/DL — LOW (ref 8.4–10.5)
CHLORIDE SERPL-SCNC: 102 MMOL/L — SIGNIFICANT CHANGE UP (ref 98–107)
CO2 SERPL-SCNC: 22 MMOL/L — SIGNIFICANT CHANGE UP (ref 22–31)
CREAT SERPL-MCNC: 0.72 MG/DL — SIGNIFICANT CHANGE UP (ref 0.5–1.3)
GLUCOSE SERPL-MCNC: 89 MG/DL — SIGNIFICANT CHANGE UP (ref 70–99)
HCT VFR BLD CALC: 28.5 % — LOW (ref 34.5–45)
HGB BLD-MCNC: 9.9 G/DL — LOW (ref 11.5–15.5)
MAGNESIUM SERPL-MCNC: 1.8 MG/DL — SIGNIFICANT CHANGE UP (ref 1.6–2.6)
MCHC RBC-ENTMCNC: 30.9 PG — SIGNIFICANT CHANGE UP (ref 27–34)
MCHC RBC-ENTMCNC: 34.7 % — SIGNIFICANT CHANGE UP (ref 32–36)
MCV RBC AUTO: 89.1 FL — SIGNIFICANT CHANGE UP (ref 80–100)
NRBC # FLD: 0 — SIGNIFICANT CHANGE UP
PHOSPHATE SERPL-MCNC: 2.9 MG/DL — SIGNIFICANT CHANGE UP (ref 2.5–4.5)
PLATELET # BLD AUTO: 209 K/UL — SIGNIFICANT CHANGE UP (ref 150–400)
PMV BLD: 10.1 FL — SIGNIFICANT CHANGE UP (ref 7–13)
POTASSIUM SERPL-MCNC: 3.4 MMOL/L — LOW (ref 3.5–5.3)
POTASSIUM SERPL-SCNC: 3.4 MMOL/L — LOW (ref 3.5–5.3)
PROT SERPL-MCNC: 5.3 G/DL — LOW (ref 6–8.3)
RBC # BLD: 3.2 M/UL — LOW (ref 3.8–5.2)
RBC # FLD: 19.6 % — HIGH (ref 10.3–14.5)
SODIUM SERPL-SCNC: 136 MMOL/L — SIGNIFICANT CHANGE UP (ref 135–145)
WBC # BLD: 7.3 K/UL — SIGNIFICANT CHANGE UP (ref 3.8–10.5)
WBC # FLD AUTO: 7.3 K/UL — SIGNIFICANT CHANGE UP (ref 3.8–10.5)

## 2018-03-17 RX ORDER — POTASSIUM CHLORIDE 20 MEQ
40 PACKET (EA) ORAL ONCE
Qty: 0 | Refills: 0 | Status: COMPLETED | OUTPATIENT
Start: 2018-03-17 | End: 2018-03-17

## 2018-03-17 RX ADMIN — Medication 5 MILLIGRAM(S): at 21:26

## 2018-03-17 RX ADMIN — Medication 40 MILLIGRAM(S): at 06:56

## 2018-03-17 RX ADMIN — BUDESONIDE AND FORMOTEROL FUMARATE DIHYDRATE 2 PUFF(S): 160; 4.5 AEROSOL RESPIRATORY (INHALATION) at 09:11

## 2018-03-17 RX ADMIN — PANTOPRAZOLE SODIUM 40 MILLIGRAM(S): 20 TABLET, DELAYED RELEASE ORAL at 06:56

## 2018-03-17 RX ADMIN — BUDESONIDE AND FORMOTEROL FUMARATE DIHYDRATE 2 PUFF(S): 160; 4.5 AEROSOL RESPIRATORY (INHALATION) at 21:26

## 2018-03-17 RX ADMIN — Medication 40 MILLIEQUIVALENT(S): at 18:03

## 2018-03-17 RX ADMIN — SPIRONOLACTONE 25 MILLIGRAM(S): 25 TABLET, FILM COATED ORAL at 06:56

## 2018-03-17 RX ADMIN — LOSARTAN POTASSIUM 100 MILLIGRAM(S): 100 TABLET, FILM COATED ORAL at 06:56

## 2018-03-17 RX ADMIN — SERTRALINE 75 MILLIGRAM(S): 25 TABLET, FILM COATED ORAL at 21:25

## 2018-03-17 RX ADMIN — ATORVASTATIN CALCIUM 10 MILLIGRAM(S): 80 TABLET, FILM COATED ORAL at 21:26

## 2018-03-17 NOTE — PROGRESS NOTE ADULT - ASSESSMENT
Patient with history of Pancreatic adenocarcinoma s/p moustapha adjuvant chemotherapy followed by radiation therapy and definitive surgery in Nov 2017.Now presenting with ascitis,with CT findings consistent with :New large volume ascites.   Abnormal enhancement along the course of the   intrahepatic portal vein concerning for metastatic disease.New narrowing   of the intrahepatic IVC without gross filling defect.Unchanged encasement   of the portal confluence. Mural wall thickening and hyperemia of cecum and ascending colonic   loops, difficult to evaluate secondary to inadequate distention.   Correlate clinically to assess for colitis. No bowel obstruction.  Loculated left pleural effusion with associated atelectasis of the   left lower lobe. Anasarca.  These findings are concerning for recurrent pancreatic carcinoma. Patient had her ascitic tap done today ,will follow the results.Tumor marker ordered with am labs.Will follow the results.Seen by surgical oncology team.      Problem/Recommendation - 1:  Problem: Other ascites. Recommendation: s/p paracentesis,will follow the results. not impressive for recurrence.  3/12/18 Now with recurrence of ascitis.For repeat tap,should get pleurex if it reaccumulates.  3/15 Patient had repeat paracentesis with 2.6 liter fluid removed.Sample sent for cytology.  3/17 Patient with mild abdominal distention.Will recommend pleurex with external drain if she continues to have reaccumulation. Possibility of recurrent malignancy was discussed with her and the further role of chemotherapy was also mentioned depending on her overall performance. As abdominal ultrasound did not show any evidence of thrombosis and EGD can't be done due to prior surgery ,likely cause of ascitis may be due to malignancy.    Problem/Recommendation - 2:  ·  Problem: Malignant neoplasm of pancreas, unspecified location of malignancy.  Recommendation: Yesenialey recurrence.Follow the cytology of ascitic tap,tumor marker not impressive. Will decide about further management depending on the etiology.  3/12/18 Ascitc fluid Cytology negative for malignant cells. Patient off chemotherapy post surgery due to her poor overall health and close observation was opted. It was decided that further chemo might be considered if there is any evidence of recurrence.  . Will need proper tissue biopsy or positive cytology from ascitic fluid to confirm recurrence. She might be a candidate for palliative chemo once diagnosis is confirmed. D/W Dr Titus,will need GI intervention.  3/13/18 Patient comfortable,no paracentesis performed. Awaiting GI input to evaluate for possible biopsy to confirm recurrence.  3/15/18 Patient seen by GI ,due to prior whipples EUS/BX not possible.Ultrasound with dopplers recommended to better characterize the portal vein and periportal region.  3/17/18 US negative for thrombosis. Ascitis likley due to recurrent pancreatic cancer. Will follow the results of cytology from repeat paracentesis.    Problem/Recommendation - 3:  ·  Problem: Breast cancer.  Recommendation: No signs of recurrence on physical exam.     Problem/Recommendation - 4:  ·  Problem: Transaminitis.  Recommendation: Folllow the trend.?cause.     Problem/Recommendation - 5:  ·  Problem: Mild asthma without complication, unspecified whether persistent.     Problem/Recommendation - 6:  Problem: Depression, unspecified depression type.

## 2018-03-18 LAB
ALBUMIN SERPL ELPH-MCNC: 1.6 G/DL — LOW (ref 3.3–5)
ALP SERPL-CCNC: 171 U/L — HIGH (ref 40–120)
ALT FLD-CCNC: 41 U/L — HIGH (ref 4–33)
AST SERPL-CCNC: 97 U/L — HIGH (ref 4–32)
BILIRUB SERPL-MCNC: 1.6 MG/DL — HIGH (ref 0.2–1.2)
BUN SERPL-MCNC: 6 MG/DL — LOW (ref 7–23)
BUN SERPL-MCNC: 8 MG/DL — SIGNIFICANT CHANGE UP (ref 7–23)
CALCIUM SERPL-MCNC: 6 MG/DL — CRITICAL LOW (ref 8.4–10.5)
CALCIUM SERPL-MCNC: 8 MG/DL — LOW (ref 8.4–10.5)
CHLORIDE SERPL-SCNC: 100 MMOL/L — SIGNIFICANT CHANGE UP (ref 98–107)
CHLORIDE SERPL-SCNC: 108 MMOL/L — HIGH (ref 98–107)
CO2 SERPL-SCNC: 21 MMOL/L — LOW (ref 22–31)
CO2 SERPL-SCNC: 21 MMOL/L — LOW (ref 22–31)
CREAT SERPL-MCNC: 0.54 MG/DL — SIGNIFICANT CHANGE UP (ref 0.5–1.3)
CREAT SERPL-MCNC: 0.71 MG/DL — SIGNIFICANT CHANGE UP (ref 0.5–1.3)
GLUCOSE SERPL-MCNC: 155 MG/DL — HIGH (ref 70–99)
GLUCOSE SERPL-MCNC: 188 MG/DL — HIGH (ref 70–99)
MAGNESIUM SERPL-MCNC: 1.4 MG/DL — LOW (ref 1.6–2.6)
MAGNESIUM SERPL-MCNC: 2.1 MG/DL — SIGNIFICANT CHANGE UP (ref 1.6–2.6)
PHOSPHATE SERPL-MCNC: 2.1 MG/DL — LOW (ref 2.5–4.5)
PHOSPHATE SERPL-MCNC: 2.5 MG/DL — SIGNIFICANT CHANGE UP (ref 2.5–4.5)
POTASSIUM SERPL-MCNC: 2.8 MMOL/L — CRITICAL LOW (ref 3.5–5.3)
POTASSIUM SERPL-MCNC: 3.9 MMOL/L — SIGNIFICANT CHANGE UP (ref 3.5–5.3)
POTASSIUM SERPL-SCNC: 2.8 MMOL/L — CRITICAL LOW (ref 3.5–5.3)
POTASSIUM SERPL-SCNC: 3.9 MMOL/L — SIGNIFICANT CHANGE UP (ref 3.5–5.3)
PROT SERPL-MCNC: 4.6 G/DL — LOW (ref 6–8.3)
SODIUM SERPL-SCNC: 133 MMOL/L — LOW (ref 135–145)
SODIUM SERPL-SCNC: 139 MMOL/L — SIGNIFICANT CHANGE UP (ref 135–145)

## 2018-03-18 RX ORDER — CALCIUM GLUCONATE 100 MG/ML
1 VIAL (ML) INTRAVENOUS
Qty: 0 | Refills: 0 | Status: DISCONTINUED | OUTPATIENT
Start: 2018-03-18 | End: 2018-03-18

## 2018-03-18 RX ORDER — POTASSIUM CHLORIDE 20 MEQ
40 PACKET (EA) ORAL EVERY 4 HOURS
Qty: 0 | Refills: 0 | Status: COMPLETED | OUTPATIENT
Start: 2018-03-18 | End: 2018-03-18

## 2018-03-18 RX ORDER — MAGNESIUM SULFATE 500 MG/ML
2 VIAL (ML) INJECTION ONCE
Qty: 0 | Refills: 0 | Status: COMPLETED | OUTPATIENT
Start: 2018-03-18 | End: 2018-03-18

## 2018-03-18 RX ORDER — SODIUM,POTASSIUM PHOSPHATES 278-250MG
1 POWDER IN PACKET (EA) ORAL
Qty: 0 | Refills: 0 | Status: DISCONTINUED | OUTPATIENT
Start: 2018-03-18 | End: 2018-03-19

## 2018-03-18 RX ORDER — CALCIUM GLUCONATE 100 MG/ML
1 VIAL (ML) INTRAVENOUS
Qty: 0 | Refills: 0 | Status: COMPLETED | OUTPATIENT
Start: 2018-03-18 | End: 2018-03-18

## 2018-03-18 RX ADMIN — Medication 40 MILLIEQUIVALENT(S): at 13:54

## 2018-03-18 RX ADMIN — Medication 5 MILLIGRAM(S): at 21:18

## 2018-03-18 RX ADMIN — BUDESONIDE AND FORMOTEROL FUMARATE DIHYDRATE 2 PUFF(S): 160; 4.5 AEROSOL RESPIRATORY (INHALATION) at 08:50

## 2018-03-18 RX ADMIN — Medication 40 MILLIGRAM(S): at 05:30

## 2018-03-18 RX ADMIN — Medication 100 GRAM(S): at 15:14

## 2018-03-18 RX ADMIN — Medication 650 MILLIGRAM(S): at 16:58

## 2018-03-18 RX ADMIN — ATORVASTATIN CALCIUM 10 MILLIGRAM(S): 80 TABLET, FILM COATED ORAL at 21:18

## 2018-03-18 RX ADMIN — BUDESONIDE AND FORMOTEROL FUMARATE DIHYDRATE 2 PUFF(S): 160; 4.5 AEROSOL RESPIRATORY (INHALATION) at 21:18

## 2018-03-18 RX ADMIN — Medication 650 MILLIGRAM(S): at 17:35

## 2018-03-18 RX ADMIN — Medication 100 GRAM(S): at 16:19

## 2018-03-18 RX ADMIN — Medication 1 TABLET(S): at 16:58

## 2018-03-18 RX ADMIN — Medication 50 GRAM(S): at 13:54

## 2018-03-18 RX ADMIN — PANTOPRAZOLE SODIUM 40 MILLIGRAM(S): 20 TABLET, DELAYED RELEASE ORAL at 05:29

## 2018-03-18 RX ADMIN — SERTRALINE 75 MILLIGRAM(S): 25 TABLET, FILM COATED ORAL at 21:18

## 2018-03-18 RX ADMIN — Medication 1 TABLET(S): at 21:18

## 2018-03-18 RX ADMIN — Medication 40 MILLIEQUIVALENT(S): at 21:18

## 2018-03-18 RX ADMIN — SPIRONOLACTONE 25 MILLIGRAM(S): 25 TABLET, FILM COATED ORAL at 05:29

## 2018-03-18 RX ADMIN — Medication 40 MILLIEQUIVALENT(S): at 16:58

## 2018-03-18 RX ADMIN — LOSARTAN POTASSIUM 100 MILLIGRAM(S): 100 TABLET, FILM COATED ORAL at 05:30

## 2018-03-19 LAB
ALBUMIN SERPL ELPH-MCNC: 1.9 G/DL — LOW (ref 3.3–5)
ALP SERPL-CCNC: 201 U/L — HIGH (ref 40–120)
ALT FLD-CCNC: 44 U/L — HIGH (ref 4–33)
AST SERPL-CCNC: 104 U/L — HIGH (ref 4–32)
BILIRUB SERPL-MCNC: 1.8 MG/DL — HIGH (ref 0.2–1.2)
BUN SERPL-MCNC: 8 MG/DL — SIGNIFICANT CHANGE UP (ref 7–23)
CALCIUM SERPL-MCNC: 7.7 MG/DL — LOW (ref 8.4–10.5)
CHLORIDE SERPL-SCNC: 108 MMOL/L — HIGH (ref 98–107)
CO2 SERPL-SCNC: 24 MMOL/L — SIGNIFICANT CHANGE UP (ref 22–31)
CREAT SERPL-MCNC: 0.69 MG/DL — SIGNIFICANT CHANGE UP (ref 0.5–1.3)
GLUCOSE SERPL-MCNC: 109 MG/DL — HIGH (ref 70–99)
HCT VFR BLD CALC: 30.7 % — LOW (ref 34.5–45)
HGB BLD-MCNC: 10.5 G/DL — LOW (ref 11.5–15.5)
MAGNESIUM SERPL-MCNC: 1.9 MG/DL — SIGNIFICANT CHANGE UP (ref 1.6–2.6)
MCHC RBC-ENTMCNC: 30.6 PG — SIGNIFICANT CHANGE UP (ref 27–34)
MCHC RBC-ENTMCNC: 34.2 % — SIGNIFICANT CHANGE UP (ref 32–36)
MCV RBC AUTO: 89.5 FL — SIGNIFICANT CHANGE UP (ref 80–100)
NRBC # FLD: 0 — SIGNIFICANT CHANGE UP
PHOSPHATE SERPL-MCNC: 2.7 MG/DL — SIGNIFICANT CHANGE UP (ref 2.5–4.5)
PLATELET # BLD AUTO: 232 K/UL — SIGNIFICANT CHANGE UP (ref 150–400)
PMV BLD: 9.6 FL — SIGNIFICANT CHANGE UP (ref 7–13)
POTASSIUM SERPL-MCNC: 4.9 MMOL/L — SIGNIFICANT CHANGE UP (ref 3.5–5.3)
POTASSIUM SERPL-SCNC: 4.9 MMOL/L — SIGNIFICANT CHANGE UP (ref 3.5–5.3)
PROT SERPL-MCNC: 5.6 G/DL — LOW (ref 6–8.3)
RBC # BLD: 3.43 M/UL — LOW (ref 3.8–5.2)
RBC # FLD: 18.9 % — HIGH (ref 10.3–14.5)
SODIUM SERPL-SCNC: 141 MMOL/L — SIGNIFICANT CHANGE UP (ref 135–145)
WBC # BLD: 7.49 K/UL — SIGNIFICANT CHANGE UP (ref 3.8–10.5)
WBC # FLD AUTO: 7.49 K/UL — SIGNIFICANT CHANGE UP (ref 3.8–10.5)

## 2018-03-19 PROCEDURE — 99232 SBSQ HOSP IP/OBS MODERATE 35: CPT | Mod: GC

## 2018-03-19 PROCEDURE — 99232 SBSQ HOSP IP/OBS MODERATE 35: CPT

## 2018-03-19 RX ADMIN — Medication 40 MILLIGRAM(S): at 05:38

## 2018-03-19 RX ADMIN — Medication 650 MILLIGRAM(S): at 23:55

## 2018-03-19 RX ADMIN — BUDESONIDE AND FORMOTEROL FUMARATE DIHYDRATE 2 PUFF(S): 160; 4.5 AEROSOL RESPIRATORY (INHALATION) at 22:54

## 2018-03-19 RX ADMIN — SPIRONOLACTONE 25 MILLIGRAM(S): 25 TABLET, FILM COATED ORAL at 05:38

## 2018-03-19 RX ADMIN — Medication 1 TABLET(S): at 09:11

## 2018-03-19 RX ADMIN — Medication 5 MILLIGRAM(S): at 22:56

## 2018-03-19 RX ADMIN — PANTOPRAZOLE SODIUM 40 MILLIGRAM(S): 20 TABLET, DELAYED RELEASE ORAL at 05:38

## 2018-03-19 RX ADMIN — LOSARTAN POTASSIUM 100 MILLIGRAM(S): 100 TABLET, FILM COATED ORAL at 05:38

## 2018-03-19 RX ADMIN — BUDESONIDE AND FORMOTEROL FUMARATE DIHYDRATE 2 PUFF(S): 160; 4.5 AEROSOL RESPIRATORY (INHALATION) at 09:11

## 2018-03-19 RX ADMIN — SERTRALINE 75 MILLIGRAM(S): 25 TABLET, FILM COATED ORAL at 22:55

## 2018-03-19 RX ADMIN — Medication 650 MILLIGRAM(S): at 22:56

## 2018-03-19 RX ADMIN — ATORVASTATIN CALCIUM 10 MILLIGRAM(S): 80 TABLET, FILM COATED ORAL at 22:55

## 2018-03-19 NOTE — PROGRESS NOTE ADULT - ASSESSMENT
Patient with history of Pancreatic adenocarcinoma s/p moustapha adjuvant chemotherapy followed by radiation therapy and definitive surgery in Nov 2017.Now presenting with ascitis,with CT findings consistent with :New large volume ascites.   Abnormal enhancement along the course of the   intrahepatic portal vein concerning for metastatic disease.New narrowing   of the intrahepatic IVC without gross filling defect.Unchanged encasement   of the portal confluence. Mural wall thickening and hyperemia of cecum and ascending colonic   loops, difficult to evaluate secondary to inadequate distention.   Correlate clinically to assess for colitis. No bowel obstruction.  Loculated left pleural effusion with associated atelectasis of the   left lower lobe. Anasarca.  These findings are concerning for recurrent pancreatic carcinoma. Patient had her ascitic tap done today ,will follow the results.Tumor marker ordered with am labs.Will follow the results.Seen by surgical oncology team.Problem/Recommendation - 1:  Problem: Other ascites. Recommendation: s/p paracentesis,will follow the results. not impressive for recurrence.  3/12/18 Now with recurrence of ascitis.For repeat tap,should get pleurex if it reaccumulates.  3/15 Patient had repeat paracentesis with 2.6 liter fluid removed.Sample sent for cytology.  3/17 Patient with mild abdominal distention.Will recommend pleurex with external drain if she continues to have reaccumulation. Possibility of recurrent malignancy was discussed with her and the further role of chemotherapy was also mentioned depending on her overall performance. As abdominal ultrasound did not show any evidence of thrombosis and EGD can't be done due to prior surgery ,likely cause of ascitis may be due to malignancy.    Problem/Recommendation - 2:  ·  Problem: Malignant neoplasm of pancreas, unspecified location of malignancy.  Recommendation: Yesenialey recurrence.Follow the cytology of ascitic tap,tumor marker not impressive. Will decide about further management depending on the etiology.  3/12/18 Ascitc fluid Cytology negative for malignant cells. Patient off chemotherapy post surgery due to her poor overall health and close observation was opted. It was decided that further chemo might be considered if there is any evidence of recurrence.Will need proper tissue biopsy or positive cytology from ascitic fluid to confirm recurrence. She might be a candidate for palliative chemo once diagnosis is confirmed. D/W Dr Titus,will need GI intervention.  3/13/18 Patient comfortable,no paracentesis performed. Awaiting GI input to evaluate for possible biopsy to confirm recurrence.  3/15/18 Patient seen by GI ,due to prior whipples EUS/BX not possible.Ultrasound with dopplers recommended to better characterize the portal vein and periportal region.  3/17/18 US negative for thrombosis. Ascitis likley due to recurrent pancreatic cancer. Will follow the results of cytology from repeat paracentesis.  3/19/18 Patient keeps accumulating ascitic fluid. Will advice pleurex for symptom management. Further workup to rule out other sites of metastasis can be done as outpt if her symptoms are controlled. Option of chemotherapy will be considered depending on her overall condition as outpt.  Problem/Recommendation - 3:  ·  Problem: Breast cancer.  Recommendation: No signs of recurrence on physical exam.     Problem/Recommendation - 4:  ·  Problem: Transaminitis.  Recommendation: Folllow the trend.?cause.     Problem/Recommendation - 5:  ·  Problem: Mild asthma without complication, unspecified whether persistent.     Problem/Recommendation - 6:  Problem: Depression, unspecified depression type.

## 2018-03-19 NOTE — PROGRESS NOTE ADULT - ASSESSMENT
72F a/p Whipple procedure with portal vein resection and repair and feeding jejunostomy on 11/7/17 for pancreatic cancer presenting with abdominal distension, found to have new large volume ascites and abnormal enhancement along the course of the intrahepatic portal vein concerning for metastatic disease.     - If ascites continues to require paracentesis, recommend Pleurex catheter  - Care per primary    CONCEPCION Belcher MD PGY 2   [id number] 72F a/p Whipple procedure with portal vein resection and repair and feeding jejunostomy on 11/7/17 for pancreatic cancer presenting with abdominal distension, found to have new large volume ascites and abnormal enhancement along the course of the intrahepatic portal vein concerning for metastatic disease.     - Recommend PET scan to assess disease burden  - Care per primary    CONCEPCION Belcher MD PGY 2   [id number]

## 2018-03-19 NOTE — PROGRESS NOTE ADULT - ASSESSMENT
73 yo woman with h/o pancreatic cancer s/p Whipple (11/2017) presently admitted with LE edema, ascites, poor appetite and weight loss with finding of possible metastatic spread along the intrahepatic portal vein. After review of the imaging with senior radiology staff, it is uncertain that EUS/FNA will possible in this particular case due to her post-Whipple anatomy and lesion location along portal vein and characteristics (not a discrete mass). RUQ US was non-diagnostic in terms of looking for PVT. Please obtain MRI abd for further assessment. Finding of cecal wall thickening may be due to ascites.

## 2018-03-20 DIAGNOSIS — C25.9 MALIGNANT NEOPLASM OF PANCREAS, UNSPECIFIED: ICD-10-CM

## 2018-03-20 LAB
ALBUMIN SERPL ELPH-MCNC: 1.9 G/DL — LOW (ref 3.3–5)
ALP SERPL-CCNC: 194 U/L — HIGH (ref 40–120)
ALT FLD-CCNC: 41 U/L — HIGH (ref 4–33)
AST SERPL-CCNC: 77 U/L — HIGH (ref 4–32)
BILIRUB SERPL-MCNC: 2.1 MG/DL — HIGH (ref 0.2–1.2)
BUN SERPL-MCNC: 9 MG/DL — SIGNIFICANT CHANGE UP (ref 7–23)
CALCIUM SERPL-MCNC: 7.6 MG/DL — LOW (ref 8.4–10.5)
CHLORIDE SERPL-SCNC: 106 MMOL/L — SIGNIFICANT CHANGE UP (ref 98–107)
CO2 SERPL-SCNC: 24 MMOL/L — SIGNIFICANT CHANGE UP (ref 22–31)
CREAT SERPL-MCNC: 0.72 MG/DL — SIGNIFICANT CHANGE UP (ref 0.5–1.3)
GLUCOSE SERPL-MCNC: 124 MG/DL — HIGH (ref 70–99)
HCT VFR BLD CALC: 30.5 % — LOW (ref 34.5–45)
HCT VFR BLD CALC: 31.4 % — LOW (ref 34.5–45)
HGB BLD-MCNC: 10.7 G/DL — LOW (ref 11.5–15.5)
HGB BLD-MCNC: 11.3 G/DL — LOW (ref 11.5–15.5)
MAGNESIUM SERPL-MCNC: 1.7 MG/DL — SIGNIFICANT CHANGE UP (ref 1.6–2.6)
MCHC RBC-ENTMCNC: 31.2 PG — SIGNIFICANT CHANGE UP (ref 27–34)
MCHC RBC-ENTMCNC: 32.7 PG — SIGNIFICANT CHANGE UP (ref 27–34)
MCHC RBC-ENTMCNC: 35.1 % — SIGNIFICANT CHANGE UP (ref 32–36)
MCHC RBC-ENTMCNC: 36 % — SIGNIFICANT CHANGE UP (ref 32–36)
MCV RBC AUTO: 88.9 FL — SIGNIFICANT CHANGE UP (ref 80–100)
MCV RBC AUTO: 90.8 FL — SIGNIFICANT CHANGE UP (ref 80–100)
NRBC # FLD: 0 — SIGNIFICANT CHANGE UP
NRBC # FLD: 0 — SIGNIFICANT CHANGE UP
PHOSPHATE SERPL-MCNC: 2.9 MG/DL — SIGNIFICANT CHANGE UP (ref 2.5–4.5)
PLATELET # BLD AUTO: 222 K/UL — SIGNIFICANT CHANGE UP (ref 150–400)
PLATELET # BLD AUTO: 226 K/UL — SIGNIFICANT CHANGE UP (ref 150–400)
PMV BLD: 9.7 FL — SIGNIFICANT CHANGE UP (ref 7–13)
PMV BLD: 9.9 FL — SIGNIFICANT CHANGE UP (ref 7–13)
POTASSIUM SERPL-MCNC: 4.3 MMOL/L — SIGNIFICANT CHANGE UP (ref 3.5–5.3)
POTASSIUM SERPL-SCNC: 4.3 MMOL/L — SIGNIFICANT CHANGE UP (ref 3.5–5.3)
PROT SERPL-MCNC: 5.4 G/DL — LOW (ref 6–8.3)
RBC # BLD: 3.43 M/UL — LOW (ref 3.8–5.2)
RBC # BLD: 3.46 M/UL — LOW (ref 3.8–5.2)
RBC # FLD: 18.8 % — HIGH (ref 10.3–14.5)
RBC # FLD: 18.9 % — HIGH (ref 10.3–14.5)
SODIUM SERPL-SCNC: 138 MMOL/L — SIGNIFICANT CHANGE UP (ref 135–145)
WBC # BLD: 13.27 K/UL — HIGH (ref 3.8–10.5)
WBC # BLD: 13.86 K/UL — HIGH (ref 3.8–10.5)
WBC # FLD AUTO: 13.27 K/UL — HIGH (ref 3.8–10.5)
WBC # FLD AUTO: 13.86 K/UL — HIGH (ref 3.8–10.5)

## 2018-03-20 PROCEDURE — 99232 SBSQ HOSP IP/OBS MODERATE 35: CPT

## 2018-03-20 PROCEDURE — 99221 1ST HOSP IP/OBS SF/LOW 40: CPT

## 2018-03-20 PROCEDURE — 74182 MRI ABDOMEN W/CONTRAST: CPT | Mod: 26

## 2018-03-20 RX ADMIN — BUDESONIDE AND FORMOTEROL FUMARATE DIHYDRATE 2 PUFF(S): 160; 4.5 AEROSOL RESPIRATORY (INHALATION) at 22:40

## 2018-03-20 RX ADMIN — PANTOPRAZOLE SODIUM 40 MILLIGRAM(S): 20 TABLET, DELAYED RELEASE ORAL at 05:03

## 2018-03-20 RX ADMIN — Medication 40 MILLIGRAM(S): at 05:03

## 2018-03-20 RX ADMIN — Medication 5 MILLIGRAM(S): at 22:41

## 2018-03-20 RX ADMIN — SERTRALINE 75 MILLIGRAM(S): 25 TABLET, FILM COATED ORAL at 22:40

## 2018-03-20 RX ADMIN — ATORVASTATIN CALCIUM 10 MILLIGRAM(S): 80 TABLET, FILM COATED ORAL at 22:41

## 2018-03-20 RX ADMIN — SPIRONOLACTONE 25 MILLIGRAM(S): 25 TABLET, FILM COATED ORAL at 05:03

## 2018-03-20 RX ADMIN — LOSARTAN POTASSIUM 100 MILLIGRAM(S): 100 TABLET, FILM COATED ORAL at 05:03

## 2018-03-20 RX ADMIN — BUDESONIDE AND FORMOTEROL FUMARATE DIHYDRATE 2 PUFF(S): 160; 4.5 AEROSOL RESPIRATORY (INHALATION) at 10:50

## 2018-03-20 NOTE — PROGRESS NOTE ADULT - ASSESSMENT
Patient with history of Pancreatic adenocarcinoma s/p moustapha adjuvant chemotherapy followed by radiation therapy and definitive surgery in Nov 2017.Now presenting with ascitis,with CT findings consistent with :New large volume ascites.   Abnormal enhancement along the course of the   intrahepatic portal vein concerning for metastatic disease.New narrowing   of the intrahepatic IVC without gross filling defect.Unchanged encasement   of the portal confluence. Mural wall thickening and hyperemia of cecum and ascending colonic   loops, difficult to evaluate secondary to inadequate distention.   Correlate clinically to assess for colitis. No bowel obstruction.  Loculated left pleural effusion with associated atelectasis of the   left lower lobe. Anasarca.  These findings are concerning for recurrent pancreatic carcinoma. Patient had her ascitic tap done today ,will follow the results.Tumor marker ordered with am labs.Will follow the results.Seen by surgical oncology team.Problem/Recommendation - 1:  Problem: Other ascites. Recommendation: s/p paracentesis,will follow the results. not impressive for recurrence.  3/12/18 Now with recurrence of ascitis.For repeat tap,should get pleurex if it reaccumulates.  3/15 Patient had repeat paracentesis with 2.6 liter fluid removed.Sample sent for cytology.  3/17 Patient with mild abdominal distention.Will recommend pleurex with external drain if she continues to have reaccumulation. Possibility of recurrent malignancy was discussed with her and the further role of chemotherapy was also mentioned depending on her overall performance. As abdominal ultrasound did not show any evidence of thrombosis and EGD can't be done due to prior surgery ,likely cause of ascitis may be due to malignancy.    Problem/Recommendation - 2:  ·  Problem: Malignant neoplasm of pancreas, unspecified location of malignancy.  Recommendation: Yesenialey recurrence.Follow the cytology of ascitic tap,tumor marker not impressive. Will decide about further management depending on the etiology.  3/12/18 Ascitc fluid Cytology negative for malignant cells. Patient off chemotherapy post surgery due to her poor overall health and close observation was opted. It was decided that further chemo might be considered if there is any evidence of recurrence.Will need proper tissue biopsy or positive cytology from ascitic fluid to confirm recurrence. She might be a candidate for palliative chemo once diagnosis is confirmed. D/W Dr Titus,will need GI intervention.  3/13/18 Patient comfortable,no paracentesis performed. Awaiting GI input to evaluate for possible biopsy to confirm recurrence.  3/15/18 Patient seen by GI ,due to prior whipples EUS/BX not possible.Ultrasound with dopplers recommended to better characterize the portal vein and periportal region.  3/17/18 US negative for thrombosis. Ascitis likley due to recurrent pancreatic cancer. Will follow the results of cytology from repeat paracentesis.  3/19/18 Patient keeps accumulating ascitic fluid. Will advice pleurex for symptom management. Further workup to rule out other sites of metastasis can be done as outpt if her symptoms are controlled. Option of chemotherapy will be considered depending on her overall condition as outpt.  MRI  Abdomen without any definitive mass .Patient awaiting Pleurex cathetor.  Problem/Recommendation - 3:  ·  Problem: Breast cancer.  Recommendation: No signs of recurrence on physical exam.     Problem/Recommendation - 4:  ·  Problem: Transaminitis.  Recommendation: Folllow the trend.?cause.     Problem/Recommendation - 5:  ·  Problem: Mild asthma without complication, unspecified whether persistent.     Problem/Recommendation - 6:  Problem: Depression, unspecified depression type.

## 2018-03-20 NOTE — CONSULT NOTE ADULT - SUBJECTIVE AND OBJECTIVE BOX
Chief Complaint:  Patient is a 72y old  Female who presents with a chief complaint of Abdominal Distention (07 Mar 2018 07:34)    HPI:  72F s/p Whipple procedure with portal vein resection and repair and feeding jejunostomy on 11/7/17 for pancreatic cancer presenting with abdominal distension, found to have new large volume ascites and abnormal enhancement along the course of the intrahepatic portal vein concerning for metastatic disease. IR was consulted for placement of a pleurex catheter to assist with drainage of ascites. Pt reported feeling some moderate abdominal bloating, but was otherwise without complaints. Pt denied fever ,CP, SOB, NVD, cough, hematuria, hematochezia.    Allergies  No Known Drug Allergies  SteriStrips (Blisters)    MEDICATIONS  (STANDING):  atorvastatin 10 milliGRAM(s) Oral at bedtime  buDESOnide 160 MICROgram(s)/formoterol 4.5 MICROgram(s) Inhaler 2 Puff(s) Inhalation two times a day  furosemide    Tablet 40 milliGRAM(s) Oral daily  lidocaine 1% Injectable 20 milliLiter(s) Local Injection once  losartan 100 milliGRAM(s) Oral daily  oxybutynin 5 milliGRAM(s) Oral at bedtime  pantoprazole    Tablet 40 milliGRAM(s) Oral before breakfast  sertraline 75 milliGRAM(s) Oral daily  spironolactone 25 milliGRAM(s) Oral daily    MEDICATIONS  (PRN):  acetaminophen   Tablet. 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  Insomnia  Hiatal hernia with GERD: no changes  Colitis: due to chemo 1/2017  Malignant neoplasm of pancreas, unspecified location of malignancy: started chemo 10/2016, has mediport - left chest, now s/p whipple 11/2017  Asthma: denies any recent hospitalizations/intubations  Depression  Hyperlipidemia  HTN (hypertension)  Carcinoma of pancreas: s/p whipple  History of lung surgery: right VATS, wedge resection (August 2017) benign neoplasm  History of lumpectomy: right (1996)  Port-a-cath in place  Status post right breast lumpectomy: malignant treated with radiation  H/O abdominal hysterectomy  H/O umbilical hernia repair: with mesh      FAMILY HISTORY:  Family history of kidney cancer (Sibling): brother  Family history of brain cancer (Sibling): sister  Family history of ovarian cancer (Mother): mother      Review of Systems:    GENERAL/CONSTITUTIONAL:  The patient denies fever, fatigue, weakness, weight gain or weight loss.    HEAD, EYES, EARS, NOSE AND THROAT: Eyes – The patient denies pain, redness, loss of vision, double or blurred vision, flashing lights or spots, dryness, the feeling that something is in the eye    CARDIOVASCULAR:  The patient denies chest pain, irregular heartbeats, sudden changes in heartbeat or palpitation, shortness of breath, difficulty breathing at night, swollen legs or feet, heart murmurs    RESPIRATORY:  The patient denies chronic dry cough, coughing up blood, coughing up mucus, waking at night coughing or choking, repeated pneumonias, wheezing or night sweats.    GASTROINTESTINAL:  The patient denies decreased appetite, nausea, vomiting, vomiting blood or coffee ground material, heartburn, regurgitation, frequent belching, stomach pain relieved by food, yellow jaundice, diarrhea, constipation, gas, blood in the stools, black tarry stools or hemorrhoids.    GENITOURINARY: The patient denies difficult urination, pain or burning with urination, blood in the urine, cloudy or smoky urine, frequent need to urinate, urgency, needing to urinate frequently at night, inability to hold the urine, discharge from the penis, kidney stones, rash or ulcers, sexual difficulties, impotence or prostate trouble, no sexually transmitted diseases.    MUSCULOSKELETAL:  The patient denies arm, buttock, thigh or calf cramps. No joint or muscle pain. No muscle weakness or tenderness. No joint swelling, neck pain, back pain or major orthopedic injuries.    SKIN AND BREASTS: The patient denies easy bruising, skin redness, skin rash, hives, sensitivity to sun exposure, tightness, nodules or bumps, hair loss, color changes in the hands or feet with cold, breast lump, breast pain or nipple discharge.    NEUROLOGIC:  The patient denies headache, dizziness, fainting, muscle spasm, loss of consciousness, sensitivity or pain in the hands and feet or memory loss.    PSYCHIATRIC: The patient denies depression with thoughts of suicide, voices in ?? head telling ?? to do things and has not been seen for psychiatric counseling or treatment.    ENDOCRINE:  The patient denies intolerance to hot or cold temperature, flushing, fingernail changes, increased thirst, increased salt intake or decreased sexual desire.    HEMATOLOGIC/LYMPHATIC: The patient denies anemia, bleeding tendency or clotting tendency.    ALLERGIC/IMMUNOLOGIC:  The patient denies rhinitis, asthma, skin sensitivity, latex allergies or sensitivity.    Vital Signs Last 24 Hrs  T(C): 37.1 (20 Mar 2018 04:50), Max: 37.1 (20 Mar 2018 04:50)  T(F): 98.7 (20 Mar 2018 04:50), Max: 98.7 (20 Mar 2018 04:50)  HR: 95 (20 Mar 2018 04:50) (90 - 95)  BP: 116/60 (20 Mar 2018 04:50) (108/67 - 116/60)  BP(mean): --  RR: 18 (20 Mar 2018 04:50) (18 - 18)  SpO2: 100% (20 Mar 2018 04:50) (99% - 100%)    Physical Exam:    GENERAL APPEARANCE: Well developed, well nourished, in no acute distress.    SKIN: Inspection of the skin reveals no rashes, ulcerations or petechiae.    HEENT: The sclerae were anicteric and conjunctivae were pink and moist. Extraocular movements were intact and pupils were equal, round, and reactive to light with normal accommodation. External inspection of the ears and nose showed no scars, lesions, or masses. Lips, teeth, and gums showed normal mucosa. The oral mucosa, hard and soft palate, tongue and posterior pharynx were normal.    NECK: Supple and symmetric. There was no thyroid enlargement, and no tenderness, or masses were felt.    CHEST: Normal AP diameter and normal contour without any kyphoscoliosis.    LUNGS: Auscultation of the lungs revealed normal breath sounds without any other adventitious sounds or rubs.    CARDIOVASCULAR: There was a regular rate and rhythm without any murmurs, gallops, rubs. The carotid pulses were normal and 2+ bilaterally without bruits. Peripheral pulses were 2+ and symmetric.    ABDOMEN: Soft and nontender with normal bowel sounds. The liver span was approximately 5-6 cm in the right midclavicular line by percussion. The liver edge was nontender. The spleen was not palpable. There were no inguinal or umbilical hernias noted. No ascites was noted.    RECTAL: Normal perineal exam. Sphincter tone was normal. There was no external hemorrhoids or rectal masses. Stool Hemoccult was negative. The prostate was normal size without any nodules appreciated (men only).    LYMPH NODES: No lymphadenopathy was appreciated in the neck, axillae or groin.    MUSCULOSKELETAL: Gait was normal. There was no tenderness or effusions noted. Muscle strength and tone were normal.    EXTREMITIES: No cyanosis, clubbing or edema.    NEUROLOGIC: Alert and oriented x 3. Normal affect. Gait was normal. Normal deep tendon reflexes with no pathological reflexes. Sensation to touch was normal.    CBC                        10.7   13.27 )-----------( 226      ( 20 Mar 2018 04:40 )             30.5       Chemistry  03-20    138  |  106  |  9   ----------------------------<  124<H>  4.3   |  24  |  0.72    Ca    7.6<L>      20 Mar 2018 04:40  Phos  2.9     03-20  Mg     1.7     03-20    TPro  5.4<L>  /  Alb  1.9<L>  /  TBili  2.1<H>  /  DBili  x   /  AST  77<H>  /  ALT  41<H>  /  AlkPhos  194<H>  03-20 Chief Complaint:  Patient is a 72y old  Female who presents with a chief complaint of Abdominal Distention (07 Mar 2018 07:34)    HPI:  72F s/p Whipple procedure with portal vein resection and repair and feeding jejunostomy on 11/7/17 for pancreatic cancer presenting with abdominal distension, found to have new large volume ascites and abnormal enhancement along the course of the intrahepatic portal vein concerning for metastatic disease. IR was consulted for placement of a pleurex catheter to assist with drainage of ascites. Pt reported feeling some moderate abdominal bloating, but was otherwise without complaints. Pt denied fever ,CP, SOB, NVD, cough, hematuria, hematochezia.    Allergies  No Known Drug Allergies  SteriStrips (Blisters)    MEDICATIONS  (STANDING):  atorvastatin 10 milliGRAM(s) Oral at bedtime  buDESOnide 160 MICROgram(s)/formoterol 4.5 MICROgram(s) Inhaler 2 Puff(s) Inhalation two times a day  furosemide    Tablet 40 milliGRAM(s) Oral daily  lidocaine 1% Injectable 20 milliLiter(s) Local Injection once  losartan 100 milliGRAM(s) Oral daily  oxybutynin 5 milliGRAM(s) Oral at bedtime  pantoprazole    Tablet 40 milliGRAM(s) Oral before breakfast  sertraline 75 milliGRAM(s) Oral daily  spironolactone 25 milliGRAM(s) Oral daily    MEDICATIONS  (PRN):  acetaminophen   Tablet. 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  Insomnia  Hiatal hernia with GERD: no changes  Colitis: due to chemo 1/2017  Malignant neoplasm of pancreas, unspecified location of malignancy: started chemo 10/2016, has mediport - left chest, now s/p whipple 11/2017  Asthma: denies any recent hospitalizations/intubations  Depression  Hyperlipidemia  HTN (hypertension)  Carcinoma of pancreas: s/p whipple  History of lung surgery: right VATS, wedge resection (August 2017) benign neoplasm  History of lumpectomy: right (1996)  Port-a-cath in place  Status post right breast lumpectomy: malignant treated with radiation  H/O abdominal hysterectomy  H/O umbilical hernia repair: with mesh      FAMILY HISTORY:  Family history of kidney cancer (Sibling): brother  Family history of brain cancer (Sibling): sister  Family history of ovarian cancer (Mother): mother      Review of Systems:    GENERAL/CONSTITUTIONAL:  The patient denies fever, fatigue, weakness, weight gain or weight loss.    HEAD, EYES, EARS, NOSE AND THROAT: Eyes – The patient denies pain, redness, loss of vision, double or blurred vision, flashing lights or spots, dryness, the feeling that something is in the eye    CARDIOVASCULAR:  The patient denies chest pain, irregular heartbeats, sudden changes in heartbeat or palpitation, shortness of breath, difficulty breathing at night, swollen legs or feet, heart murmurs    RESPIRATORY:  The patient denies chronic dry cough, coughing up blood, coughing up mucus, waking at night coughing or choking, repeated pneumonias, wheezing or night sweats    GASTROINTESTINAL:  The patient denies decreased appetite, nausea, vomiting, vomiting blood or coffee ground material, heartburn    GENITOURINARY: The patient denies difficult urination, pain or burning with urination, blood in the urine, cloudy or smoky urine    Physical Exam:    Vital Signs Last 24 Hrs  T(C): 37.1 (20 Mar 2018 04:50), Max: 37.1 (20 Mar 2018 04:50)  T(F): 98.7 (20 Mar 2018 04:50), Max: 98.7 (20 Mar 2018 04:50)  HR: 95 (20 Mar 2018 04:50) (90 - 95)  BP: 116/60 (20 Mar 2018 04:50) (108/67 - 116/60)  BP(mean): --  RR: 18 (20 Mar 2018 04:50) (18 - 18)  SpO2: 100% (20 Mar 2018 04:50) (99% - 100%)    GENERAL APPEARANCE: Well developed, well nourished, in no acute distress.    HEENT: The sclerae were anicteric and conjunctivae were pink and moist. Extraocular movements were intact and pupils were equal, round, and reactive to light with normal accommodation.     LUNGS: Auscultation of the lungs revealed normal breath sounds without any other adventitious sounds or rubs.    CARDIOVASCULAR: There was a regular rate and rhythm without any murmurs, gallops, rubs.     ABDOMEN: Soft and nontender with normal bowel sounds.     NEUROLOGIC: Alert and oriented x 3. Normal affect.     CBC                        10.7   13.27 )-----------( 226      ( 20 Mar 2018 04:40 )             30.5       Chemistry  03-20    138  |  106  |  9   ----------------------------<  124<H>  4.3   |  24  |  0.72    Ca    7.6<L>      20 Mar 2018 04:40  Phos  2.9     03-20  Mg     1.7     03-20    TPro  5.4<L>  /  Alb  1.9<L>  /  TBili  2.1<H>  /  DBili  x   /  AST  77<H>  /  ALT  41<H>  /  AlkPhos  194<H>  03-20

## 2018-03-20 NOTE — PROGRESS NOTE ADULT - ASSESSMENT
72F a/p Whipple procedure with portal vein resection and repair and feeding jejunostomy on 11/7/17 for pancreatic cancer presenting with abdominal distension, found to have new large volume ascites and abnormal enhancement along the course of the intrahepatic portal vein concerning for metastatic disease.     - Recommend PET scan to assess disease burden  - Care per primary    CONCEPCION Belcher MD PGY 2   [id number]

## 2018-03-21 ENCOUNTER — RESULT REVIEW (OUTPATIENT)
Age: 73
End: 2018-03-21

## 2018-03-21 DIAGNOSIS — D72.829 ELEVATED WHITE BLOOD CELL COUNT, UNSPECIFIED: ICD-10-CM

## 2018-03-21 LAB
ALBUMIN FLD-MCNC: 0.4 G/DL — SIGNIFICANT CHANGE UP
ALBUMIN SERPL ELPH-MCNC: 1.9 G/DL — LOW (ref 3.3–5)
ALP SERPL-CCNC: 208 U/L — HIGH (ref 40–120)
ALT FLD-CCNC: 38 U/L — HIGH (ref 4–33)
AST SERPL-CCNC: 72 U/L — HIGH (ref 4–32)
BILIRUB SERPL-MCNC: 2.4 MG/DL — HIGH (ref 0.2–1.2)
BODY FLUID TYPE: SIGNIFICANT CHANGE UP
BUN SERPL-MCNC: 14 MG/DL — SIGNIFICANT CHANGE UP (ref 7–23)
CALCIUM SERPL-MCNC: 8 MG/DL — LOW (ref 8.4–10.5)
CHLORIDE SERPL-SCNC: 102 MMOL/L — SIGNIFICANT CHANGE UP (ref 98–107)
CLARITY SPEC: SIGNIFICANT CHANGE UP
CO2 SERPL-SCNC: 20 MMOL/L — LOW (ref 22–31)
COLOR FLD: YELLOW — SIGNIFICANT CHANGE UP
CREAT SERPL-MCNC: 0.8 MG/DL — SIGNIFICANT CHANGE UP (ref 0.5–1.3)
GLUCOSE FLD-MCNC: 119 MG/DL — SIGNIFICANT CHANGE UP
GLUCOSE SERPL-MCNC: 118 MG/DL — HIGH (ref 70–99)
GRAM STN FLD: SIGNIFICANT CHANGE UP
HCT VFR BLD CALC: 31.7 % — LOW (ref 34.5–45)
HGB BLD-MCNC: 11.1 G/DL — LOW (ref 11.5–15.5)
LDH SERPL L TO P-CCNC: 191 U/L — SIGNIFICANT CHANGE UP
LYMPHOCYTES NFR FLD: 3 % — SIGNIFICANT CHANGE UP
MAGNESIUM SERPL-MCNC: 2.7 MG/DL — HIGH (ref 1.6–2.6)
MCHC RBC-ENTMCNC: 32 PG — SIGNIFICANT CHANGE UP (ref 27–34)
MCHC RBC-ENTMCNC: 35 % — SIGNIFICANT CHANGE UP (ref 32–36)
MCV RBC AUTO: 91.4 FL — SIGNIFICANT CHANGE UP (ref 80–100)
MONOCYTES # FLD: 5 % — SIGNIFICANT CHANGE UP
NEUTS SEG NFR FLD MANUAL: 92 % — SIGNIFICANT CHANGE UP
NRBC # FLD: 0 — SIGNIFICANT CHANGE UP
PHOSPHATE SERPL-MCNC: 3.6 MG/DL — SIGNIFICANT CHANGE UP (ref 2.5–4.5)
PLATELET # BLD AUTO: 242 K/UL — SIGNIFICANT CHANGE UP (ref 150–400)
PMV BLD: 10.2 FL — SIGNIFICANT CHANGE UP (ref 7–13)
POTASSIUM SERPL-MCNC: 4.4 MMOL/L — SIGNIFICANT CHANGE UP (ref 3.5–5.3)
POTASSIUM SERPL-SCNC: 4.4 MMOL/L — SIGNIFICANT CHANGE UP (ref 3.5–5.3)
PROT FLD-MCNC: 0.9 G/DL — SIGNIFICANT CHANGE UP
PROT SERPL-MCNC: 6 G/DL — SIGNIFICANT CHANGE UP (ref 6–8.3)
RBC # BLD: 3.47 M/UL — LOW (ref 3.8–5.2)
RBC # FLD: 18.9 % — HIGH (ref 10.3–14.5)
RCV VOL RI: 1000 CELL/UL — HIGH (ref 0–5)
SODIUM SERPL-SCNC: 137 MMOL/L — SIGNIFICANT CHANGE UP (ref 135–145)
SPECIMEN SOURCE: SIGNIFICANT CHANGE UP
TOTAL CELLS COUNTED, BODY FLUID: 100 CELLS — SIGNIFICANT CHANGE UP
TOTAL NUCLEATED CELL COUNT, BODY FLUID: 5430 CELL/UL — HIGH (ref 0–5)
TRIGL FLD-MCNC: 33 MG/DL — SIGNIFICANT CHANGE UP
WBC # BLD: 11.92 K/UL — HIGH (ref 3.8–10.5)
WBC # FLD AUTO: 11.92 K/UL — HIGH (ref 3.8–10.5)

## 2018-03-21 PROCEDURE — 49418 INSERT TUN IP CATH PERC: CPT | Mod: GC

## 2018-03-21 PROCEDURE — 88112 CYTOPATH CELL ENHANCE TECH: CPT | Mod: 26

## 2018-03-21 PROCEDURE — 99222 1ST HOSP IP/OBS MODERATE 55: CPT

## 2018-03-21 PROCEDURE — 99232 SBSQ HOSP IP/OBS MODERATE 35: CPT | Mod: GC

## 2018-03-21 RX ORDER — ALTEPLASE 100 MG
2 KIT INTRAVENOUS ONCE
Qty: 0 | Refills: 0 | Status: COMPLETED | OUTPATIENT
Start: 2018-03-21 | End: 2018-03-21

## 2018-03-21 RX ADMIN — PANTOPRAZOLE SODIUM 40 MILLIGRAM(S): 20 TABLET, DELAYED RELEASE ORAL at 05:09

## 2018-03-21 RX ADMIN — BUDESONIDE AND FORMOTEROL FUMARATE DIHYDRATE 2 PUFF(S): 160; 4.5 AEROSOL RESPIRATORY (INHALATION) at 21:28

## 2018-03-21 RX ADMIN — SERTRALINE 75 MILLIGRAM(S): 25 TABLET, FILM COATED ORAL at 21:26

## 2018-03-21 RX ADMIN — ATORVASTATIN CALCIUM 10 MILLIGRAM(S): 80 TABLET, FILM COATED ORAL at 21:27

## 2018-03-21 RX ADMIN — SPIRONOLACTONE 25 MILLIGRAM(S): 25 TABLET, FILM COATED ORAL at 05:09

## 2018-03-21 RX ADMIN — Medication 40 MILLIGRAM(S): at 05:09

## 2018-03-21 RX ADMIN — LOSARTAN POTASSIUM 100 MILLIGRAM(S): 100 TABLET, FILM COATED ORAL at 05:09

## 2018-03-21 RX ADMIN — Medication 5 MILLIGRAM(S): at 21:28

## 2018-03-21 NOTE — PROGRESS NOTE ADULT - ASSESSMENT
72F a/p Whipple procedure with portal vein resection and repair and feeding jejunostomy on 11/7/17 for pancreatic cancer presenting with abdominal distension, found to have new large volume ascites and abnormal enhancement along the course of the intrahepatic portal vein concerning for metastatic disease.     - Recommend PET scan  - IR today for Pleurex cath placement    CONCEPCION Belcher MD PGY 2   [id number]

## 2018-03-21 NOTE — CONSULT NOTE ADULT - ATTENDING COMMENTS
Steve Henning  Attending Physician   Division of Infectious Disease  Pager #[phone]  After 5pm/weekend or no response, call #[phone]
72 year old female/ S/P whipple for pancreatic head CA. Now presents with ascites. Paracentesis negative for cytology and SBP. However CT scan suggestive of mets to portal area.    Plan: EUS when stable.

## 2018-03-21 NOTE — CONSULT NOTE ADULT - PROBLEM SELECTOR RECOMMENDATION 2
Jessica recurrence.Follow the cytology of ascitic tap,tumor marker requested.
-from CA most liekly  -no fever  -peritoneal fluid cx -ve  -port looks ok  -if fever or worsening WBC, can check bcx x 2  -hold off abx

## 2018-03-21 NOTE — CONSULT NOTE ADULT - CONSULT REASON
71yo F with ascites 2/2 metastatic pancreatic CA referred to IR for pleurex catheter placement.
Abnormal CT abd, h/o pancreatic cancer
H/O pancreatic cancer, now with ascitis.
Leukocytosis
abdominal distension, history of pancreaticoduodenectomy for pancreatic cancer
H/O pancreatic cancer admitted with ascitis.

## 2018-03-21 NOTE — CONSULT NOTE ADULT - ASSESSMENT
Patient with history of Pancreatic adenocarcinoma s/p moustapha adjuvant chemotherapy followed by radiation therapy and definitive surgery in Nov 2017.Now presenting with ascitis,with CT findings consistent with :New large volume ascites.   Abnormal enhancement along the course of the   intrahepatic portal vein concerning for metastatic disease.New narrowing   of the intrahepatic IVC without gross filling defect.Unchanged encasement   of the portal confluence. Mural wall thickening and hyperemia of cecum and ascending colonic   loops, difficult to evaluate secondary to inadequate distention.   Correlate clinically to assess for colitis. No bowel obstruction.  Loculated left pleural effusion with associated atelectasis of the   left lower lobe. Anasarca.  These findings are concerning for recurrent pancreatic carcinoma. Patient had her ascitic tap done today ,will follow the results.Tumor marker ordered with am labs.Will follow the results.Seen by surgical oncology team.
73 yo woman with h/o pancreatic cancer s/p Whipple (11/2017) now with LE edema, ascites, poor appetite and weight loss with finding of possible metastatic spread along the intrahepatic portal vein. EUS may help further evaluate this area and allow for FNA. At this time however, her breathing is tenuous, with significant dyspnea without clear etiology. DDx: PE vs pulmonary edema vs CHF vs anasarca.    Plan:  - will review CT with radiology to determine value of EUS and ability to biopsy abnormal tissue  - please work-up dyspnea (CXR, TTE, CT-A)  - please treat with vit K 5 mg PO daily for elevated INR  - replete K
Assessment: Samia is a very pleasant 72 year-old female with a medical history significant for Whipple procedure with portal vein resection and repair and feeding jejunostomy on 11/7/17 for pancreatic cancer presenting with abdominal distension, found to have new large volume ascites and abnormal enhancement along the course of the intrahepatic portal vein concerning for metastatic disease.     Plant:     - We recommend paracentesis and analysis of the peritoneal fluid.   - Medical oncology evaluation   - No surgical intervention warranted at this time.   - Discussed with Dr. Madan Titus  - Full support in place.       John Roca   Surgical Oncology   D Team   Pager: [phone]
71yo F with ascites 2/2 metastatic pancreatic CA referred to IR for pleurex catheter placement.  - Pt is consentable  - Elevated WBC today, 13.2  - VSS  - Team wants sample sent for cytology
72F with history of Pancreatic cancer s/p chemo and surgery (whipple in Nov 2017), R Breast cancer s/p RT and R lumpectomy, Pulm nodule s/p R VATS (Bx showing necrotizing granulomas), HTN, HLD, Asthma, and Depression who presents to the hospital with complaints of worsening abdominal distention with ascites and leukocytosis

## 2018-03-21 NOTE — CONSULT NOTE ADULT - PROBLEM SELECTOR RECOMMENDATION 9
s/p paracentesis,will follow the results.
- Consent obtained  - Repeat labs in AM (CBC, CMP, Coags)  - Sample to be sent for cytology  - Please keep pt NPO after midnight  - Continue to hold lovenox for procedure
-fluid not c/w infection  -for pleurex catheter by IR  -can proceed from ID point of view if needed

## 2018-03-21 NOTE — CONSULT NOTE ADULT - SUBJECTIVE AND OBJECTIVE BOX
INGRID JOSEPH 72y Female  MRN-[id number]    Patient is a 72y old  Female who presents with a chief complaint of Abdominal Distention (07 Mar 2018 07:34)      HPI:  This is a 72F with history of Pancreatic cancer s/p chemo and surgery (whipple in Nov 2017), R Breast cancer s/p RT and R lumpectomy, Pulm nodule s/p R VATS (Bx showing necrotizing granulomas), HTN, HLD, Asthma, and Depression who presents to the hospital with complaints of worsening abdominal distention for the past 4 weeks. Said that she had last followed up with Dr. Titus near the end of February and at that had been sent for some imaging tests which the patient said she completed. She had not heard any results from the doctor and was noting her distention worsening which concerned her and she therefore called Dr. Titus who recommended that she come to the hospital for evaluation. She said that she has some associated epigastric/umbilical pain with the distention, also said that it feels like fluid is moving around inside her stomach. She also has noted worsening b/l LE swelling and as a result has noted some increasing difficulty in her walking and in her b/l knee pain. She also endorsed a change in her urine over this time with it become more red in color. She denies any nausea, vomiting, diarrhea, fevers, chills, or changes in her appetite. No other complaints.    In the ED, her initial vitals were T 98.3, P 83, /81, R 16, O2 sat 100% RA. Her lab work here was most significant for coagulopathy and transaminitis. She had a CT A/P that showed large volume ascites, new hepatic mets, possible colitis of the cecum and ascending colon, and a loculated pleural effusion on the L side. She was started on cipro/flagyl and admitted to medicine. 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  Insomnia  Hiatal hernia with GERD: no changes  Colitis: due to chemo 1/2017  Malignant neoplasm of pancreas, unspecified location of malignancy: started chemo 10/2016, has mediport - left chest, now s/p whipple 11/2017  Asthma: denies any recent hospitalizations/intubations  Depression  Hyperlipidemia  HTN (hypertension)  Carcinoma of pancreas: s/p whipple  History of lung surgery: right VATS, wedge resection (August 2017) benign neoplasm  History of lumpectomy: right (1996)  Port-a-cath in place  Status post right breast lumpectomy: malignant treated with radiation  H/O abdominal hysterectomy  H/O umbilical hernia repair: with mesh      Allergies    No Known Drug Allergies  SteriStrips (Blisters)    Intolerances        ANTIMICROBIALS:      MEDICATIONS  (STANDING):  atorvastatin 10 milliGRAM(s) Oral at bedtime  buDESOnide 160 MICROgram(s)/formoterol 4.5 MICROgram(s) Inhaler 2 Puff(s) Inhalation two times a day  furosemide    Tablet 40 milliGRAM(s) Oral daily  lidocaine 1% Injectable 20 milliLiter(s) Local Injection once  losartan 100 milliGRAM(s) Oral daily  oxybutynin 5 milliGRAM(s) Oral at bedtime  pantoprazole    Tablet 40 milliGRAM(s) Oral before breakfast  sertraline 75 milliGRAM(s) Oral daily  spironolactone 25 milliGRAM(s) Oral daily      Social History  Smoking:  Etoh:  Drug use:      FAMILY HISTORY:  Family history of kidney cancer (Sibling): brother  Family history of brain cancer (Sibling): sister  Family history of ovarian cancer (Mother): mother      Vital Signs Last 24 Hrs  T(C): 37.1 (21 Mar 2018 05:06), Max: 37.1 (21 Mar 2018 05:06)  T(F): 98.7 (21 Mar 2018 05:06), Max: 98.7 (21 Mar 2018 05:06)  HR: 72 (21 Mar 2018 05:06) (72 - 93)  BP: 114/68 (21 Mar 2018 05:06) (101/59 - 128/54)  BP(mean): --  RR: 18 (21 Mar 2018 05:06) (18 - 18)  SpO2: 100% (21 Mar 2018 05:06) (100% - 100%)    CBC Full  -  ( 21 Mar 2018 06:55 )  WBC Count : 11.92 K/uL  Hemoglobin : 11.1 g/dL  Hematocrit : 31.7 %  Platelet Count - Automated : 242 K/uL  Mean Cell Volume : 91.4 fL  Mean Cell Hemoglobin : 32.0 pg  Mean Cell Hemoglobin Concentration : 35.0 %  Auto Neutrophil # : x  Auto Lymphocyte # : x  Auto Monocyte # : x  Auto Eosinophil # : x  Auto Basophil # : x  Auto Neutrophil % : x  Auto Lymphocyte % : x  Auto Monocyte % : x  Auto Eosinophil % : x  Auto Basophil % : x    03-20    138  |  106  |  9   ----------------------------<  124<H>  4.3   |  24  |  0.72    Ca    7.6<L>      20 Mar 2018 04:40  Phos  2.9     03-20  Mg     1.7     03-20    TPro  5.4<L>  /  Alb  1.9<L>  /  TBili  2.1<H>  /  DBili  x   /  AST  77<H>  /  ALT  41<H>  /  AlkPhos  194<H>  03-20    LIVER FUNCTIONS - ( 20 Mar 2018 04:40 )  Alb: 1.9 g/dL / Pro: 5.4 g/dL / ALK PHOS: 194 u/L / ALT: 41 u/L / AST: 77 u/L / GGT: x               MICROBIOLOGY:  PERITONEAL FLUID  03-08-18 --  --  --      PERITONEAL FLUID  03-08-18 --  --    NOS^No Organisms Seen  WBC^White Blood Cells  QNTY CELLS IN GRAM STAIN: FEW (2+)              v              RADIOLOGY INGRID JOSEPH 72y Female  MRN-[id number]    Patient is a 72y old  Female who presents with a chief complaint of Abdominal Distention (07 Mar 2018 07:34)      HPI:  This is a 72F with history of Pancreatic cancer s/p chemo and surgery (whipple in Nov 2017), R Breast cancer s/p RT and R lumpectomy, Pulm nodule s/p R VATS (Bx showing necrotizing granulomas), HTN, HLD, Asthma, and Depression who presents to the hospital with complaints of worsening abdominal distention for the past 4 weeks. Said that she had last followed up with Dr. Titus near the end of February and at that had been sent for some imaging tests which the patient said she completed. She had not heard any results from the doctor and was noting her distention worsening which concerned her and she therefore called Dr. Titus who recommended that she come to the hospital for evaluation. She said that she has some associated epigastric/umbilical pain with the distention, also said that it feels like fluid is moving around inside her stomach. She also has noted worsening b/l LE swelling and as a result has noted some increasing difficulty in her walking and in her b/l knee pain. She also endorsed a change in her urine over this time with it become more red in color. She denies any nausea, vomiting, diarrhea, fevers, chills, or changes in her appetite. No other complaints.    In the ED, her initial vitals were T 98.3, P 83, /81, R 16, O2 sat 100% RA. Her lab work here was most significant for coagulopathy and transaminitis. She had a CT A/P that showed large volume ascites, new hepatic mets, possible colitis of the cecum and ascending colon, and a loculated pleural effusion on the L side. She was started on cipro/flagyl and admitted to medicine. (07 Mar 2018 07:34)    Had paracentesis done 3/15. Pt planning on going to IR for pleurex catheter for ascites    No fevers. No acute issues o/n    No abx at this time. Got 4 days cipro/flagyl for colitis      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  Insomnia  Hiatal hernia with GERD: no changes  Colitis: due to chemo 1/2017  Malignant neoplasm of pancreas, unspecified location of malignancy: started chemo 10/2016, has mediport - left chest, now s/p whipple 11/2017  Asthma: denies any recent hospitalizations/intubations  Depression  Hyperlipidemia  HTN (hypertension)  Carcinoma of pancreas: s/p whipple  History of lung surgery: right VATS, wedge resection (August 2017) benign neoplasm  History of lumpectomy: right (1996)  Port-a-cath in place  Status post right breast lumpectomy: malignant treated with radiation  H/O abdominal hysterectomy  H/O umbilical hernia repair: with mesh      Allergies    No Known Drug Allergies  SteriStrips (Blisters)    Intolerances        ANTIMICROBIALS:      MEDICATIONS  (STANDING):  atorvastatin 10 milliGRAM(s) Oral at bedtime  buDESOnide 160 MICROgram(s)/formoterol 4.5 MICROgram(s) Inhaler 2 Puff(s) Inhalation two times a day  furosemide    Tablet 40 milliGRAM(s) Oral daily  lidocaine 1% Injectable 20 milliLiter(s) Local Injection once  losartan 100 milliGRAM(s) Oral daily  oxybutynin 5 milliGRAM(s) Oral at bedtime  pantoprazole    Tablet 40 milliGRAM(s) Oral before breakfast  sertraline 75 milliGRAM(s) Oral daily  spironolactone 25 milliGRAM(s) Oral daily      Social History  Smoking: no  Etoh: no  Drug use: no      FAMILY HISTORY:  Family history of kidney cancer (Sibling): brother  Family history of brain cancer (Sibling): sister  Family history of ovarian cancer (Mother): mother      Vital Signs Last 24 Hrs  T(C): 37.1 (21 Mar 2018 05:06), Max: 37.1 (21 Mar 2018 05:06)  T(F): 98.7 (21 Mar 2018 05:06), Max: 98.7 (21 Mar 2018 05:06)  HR: 72 (21 Mar 2018 05:06) (72 - 93)  BP: 114/68 (21 Mar 2018 05:06) (101/59 - 128/54)  BP(mean): --  RR: 18 (21 Mar 2018 05:06) (18 - 18)  SpO2: 100% (21 Mar 2018 05:06) (100% - 100%)    CBC Full  -  ( 21 Mar 2018 06:55 )  WBC Count : 11.92 K/uL  Hemoglobin : 11.1 g/dL  Hematocrit : 31.7 %  Platelet Count - Automated : 242 K/uL  Mean Cell Volume : 91.4 fL  Mean Cell Hemoglobin : 32.0 pg  Mean Cell Hemoglobin Concentration : 35.0 %  Auto Neutrophil # : x  Auto Lymphocyte # : x  Auto Monocyte # : x  Auto Eosinophil # : x  Auto Basophil # : x  Auto Neutrophil % : x  Auto Lymphocyte % : x  Auto Monocyte % : x  Auto Eosinophil % : x  Auto Basophil % : x    03-20    138  |  106  |  9   ----------------------------<  124<H>  4.3   |  24  |  0.72    Ca    7.6<L>      20 Mar 2018 04:40  Phos  2.9     03-20  Mg     1.7     03-20    TPro  5.4<L>  /  Alb  1.9<L>  /  TBili  2.1<H>  /  DBili  x   /  AST  77<H>  /  ALT  41<H>  /  AlkPhos  194<H>  03-20    LIVER FUNCTIONS - ( 20 Mar 2018 04:40 )  Alb: 1.9 g/dL / Pro: 5.4 g/dL / ALK PHOS: 194 u/L / ALT: 41 u/L / AST: 77 u/L / GGT: x               MICROBIOLOGY:  Culture - Acid Fast - Body Fluid w/Smear (03.08.18 @ 15:40)    Culture - Acid Fast - Body Fluid w/Smear:   ------------------ PRELIMINARY --------------------             NO ACID FAST BACILLI ISOLATED  AFTER ONE WEEK'S INCUBATION    Culture - Acid Fast Smear Concentrated:   AFB SMEAR= NO ACID FAST BACILLI SEEN    Specimen Source: PERITONEAL FLUID    Culture - Body Fluid with Gram Stain (03.08.18 @ 12:45)    Culture - Body Fluid:   NO GROWTH - PRELIMINARY RESULTS  NO ORGANISMS ISOLATED AT 24 HOURS  NO ORGANISMS ISOLATED AT 48 HRS.  NO ORGANISMS ISOLATED AT 72 HRS.  NO GROWTH AFTER 4 DAYS INCUBATION  NO GROWTH AFTER 5 DAYS INCUBATION    Gram Stain:   NOS^No Organisms Seen  WBC^White Blood Cells  QNTY CELLS IN GRAM STAIN: FEW (2+)    Specimen Source: PERITONEAL FLUID        PERITONEAL FLUID  03-08-18 --  --  --      PERITONEAL FLUID  03-08-18 --  --    NOS^No Organisms Seen  WBC^White Blood Cells  QNTY CELLS IN GRAM STAIN: FEW (2+)    RADIOLOGY  MR Abdomen w/ IV Cont (03.20.18 @ 10:28) >  Status post Whipple's. Limited 3-D MRCP, however no secondary   findings to suggest biliary obstruction.    Hepatomegaly and severe steatosis. Moderate to large volume ascites,   stable from 3/7.    Increased narrowing of the mesenteric arteries and portal vein, tumor   encasement is in the differential.    Right-sided colitis.    Other incidental comments as above.    < from: CT Abdomen and Pelvis w/ IV Cont (03.07.18 @ 01:07) >  1. New large volume ascites.  2. Status post Whipple. Abnormal enhancement along the course of the   intrahepatic portal vein concerning for metastatic disease.New narrowing   of the intrahepatic IVC without gross filling defect.Unchanged encasement   of the portal confluence.  3. Mural wall thickening and hyperemia of cecum and ascending colonic   loops, difficult to evaluate secondary to inadequate distention.   Correlate clinically to assess for colitis. No bowel obstruction.   4. Loculated left pleural effusion with associated atelectasis of the   left lower lobe.  5. Anasarca.

## 2018-03-21 NOTE — PROGRESS NOTE ADULT - ASSESSMENT
71 yo woman with h/o pancreatic cancer s/p Whipple (11/2017) presently admitted with LE edema, ascites, poor appetite and weight loss with finding of possible metastatic spread along the intrahepatic portal vein vs post-surgical changes causing portal vein occlusion with cavernous transformation. After review of the imaging with senior radiology staff, it is uncertain that EUS/FNA will possible in this particular case due to her post-Whipple anatomy and lesion location along portal vein and characteristics (not a discrete mass). Recommend PET CT scan for further evaluation. Continue diuretics for ascites and LE edema related to portal vein occlusion.

## 2018-03-21 NOTE — CONSULT NOTE ADULT - CONSULT REQUESTED DATE/TIME
07-Mar-2018 09:18
08-Mar-2018 17:25
14-Mar-2018 09:32
20-Mar-2018 11:40
21-Mar-2018 07:51
08-Mar-2018 14:28

## 2018-03-22 ENCOUNTER — TRANSCRIPTION ENCOUNTER (OUTPATIENT)
Age: 73
End: 2018-03-22

## 2018-03-22 LAB
ALBUMIN SERPL ELPH-MCNC: 1.8 G/DL — LOW (ref 3.3–5)
ALP SERPL-CCNC: 210 U/L — HIGH (ref 40–120)
ALT FLD-CCNC: 34 U/L — HIGH (ref 4–33)
AST SERPL-CCNC: 65 U/L — HIGH (ref 4–32)
BILIRUB SERPL-MCNC: 1.9 MG/DL — HIGH (ref 0.2–1.2)
BUN SERPL-MCNC: 18 MG/DL — SIGNIFICANT CHANGE UP (ref 7–23)
CALCIUM SERPL-MCNC: 8.1 MG/DL — LOW (ref 8.4–10.5)
CHLORIDE SERPL-SCNC: 101 MMOL/L — SIGNIFICANT CHANGE UP (ref 98–107)
CO2 SERPL-SCNC: 25 MMOL/L — SIGNIFICANT CHANGE UP (ref 22–31)
CREAT SERPL-MCNC: 0.87 MG/DL — SIGNIFICANT CHANGE UP (ref 0.5–1.3)
GLUCOSE SERPL-MCNC: 111 MG/DL — HIGH (ref 70–99)
HCT VFR BLD CALC: 29 % — LOW (ref 34.5–45)
HGB BLD-MCNC: 10.1 G/DL — LOW (ref 11.5–15.5)
MCHC RBC-ENTMCNC: 31.8 PG — SIGNIFICANT CHANGE UP (ref 27–34)
MCHC RBC-ENTMCNC: 34.8 % — SIGNIFICANT CHANGE UP (ref 32–36)
MCV RBC AUTO: 91.2 FL — SIGNIFICANT CHANGE UP (ref 80–100)
NRBC # FLD: 0 — SIGNIFICANT CHANGE UP
PH FLD: 7.6 PH — SIGNIFICANT CHANGE UP
PLATELET # BLD AUTO: 187 K/UL — SIGNIFICANT CHANGE UP (ref 150–400)
PMV BLD: 10.4 FL — SIGNIFICANT CHANGE UP (ref 7–13)
POTASSIUM SERPL-MCNC: 3.8 MMOL/L — SIGNIFICANT CHANGE UP (ref 3.5–5.3)
POTASSIUM SERPL-SCNC: 3.8 MMOL/L — SIGNIFICANT CHANGE UP (ref 3.5–5.3)
PROT SERPL-MCNC: 5.7 G/DL — LOW (ref 6–8.3)
RBC # BLD: 3.18 M/UL — LOW (ref 3.8–5.2)
RBC # FLD: 18.8 % — HIGH (ref 10.3–14.5)
SODIUM SERPL-SCNC: 135 MMOL/L — SIGNIFICANT CHANGE UP (ref 135–145)
SPECIMEN SOURCE: SIGNIFICANT CHANGE UP
WBC # BLD: 8.68 K/UL — SIGNIFICANT CHANGE UP (ref 3.8–10.5)
WBC # FLD AUTO: 8.68 K/UL — SIGNIFICANT CHANGE UP (ref 3.8–10.5)

## 2018-03-22 RX ADMIN — Medication 650 MILLIGRAM(S): at 20:30

## 2018-03-22 RX ADMIN — SPIRONOLACTONE 25 MILLIGRAM(S): 25 TABLET, FILM COATED ORAL at 05:29

## 2018-03-22 RX ADMIN — ATORVASTATIN CALCIUM 10 MILLIGRAM(S): 80 TABLET, FILM COATED ORAL at 21:32

## 2018-03-22 RX ADMIN — BUDESONIDE AND FORMOTEROL FUMARATE DIHYDRATE 2 PUFF(S): 160; 4.5 AEROSOL RESPIRATORY (INHALATION) at 11:56

## 2018-03-22 RX ADMIN — Medication 5 MILLIGRAM(S): at 21:32

## 2018-03-22 RX ADMIN — SERTRALINE 75 MILLIGRAM(S): 25 TABLET, FILM COATED ORAL at 20:29

## 2018-03-22 RX ADMIN — PANTOPRAZOLE SODIUM 40 MILLIGRAM(S): 20 TABLET, DELAYED RELEASE ORAL at 05:29

## 2018-03-22 RX ADMIN — BUDESONIDE AND FORMOTEROL FUMARATE DIHYDRATE 2 PUFF(S): 160; 4.5 AEROSOL RESPIRATORY (INHALATION) at 20:29

## 2018-03-22 RX ADMIN — Medication 650 MILLIGRAM(S): at 21:06

## 2018-03-22 NOTE — DISCHARGE NOTE ADULT - MEDICATION SUMMARY - MEDICATIONS TO STOP TAKING
I will STOP taking the medications listed below when I get home from the hospital:    Marinol 5 mg oral capsule  -- 1 cap(s) by mouth 2 times a day    losartan-hydrochlorothiazide 100mg-12.5mg oral tablet  -- 1 tab(s) by mouth once a day    gabapentin 100 mg oral capsule  -- 1 cap(s) by mouth 2 times a day

## 2018-03-22 NOTE — DISCHARGE NOTE ADULT - COMMUNITY RESOURCES
[facility] for rehab address: [address] [place] [address] tel #[phone], 5pm  via [facility] tel # [phone]

## 2018-03-22 NOTE — DISCHARGE NOTE ADULT - CARE PLAN
Principal Discharge DX:	Carcinoma of pancreas  Assessment and plan of treatment:	Please drain the pigtail catheter every monday, wednesday and friday  Please follow up outpatient for your PET scan  Secondary Diagnosis:	Colitis  Goal:	resolved  Assessment and plan of treatment:	You completed course of antibiotics during your hospitalization  Secondary Diagnosis:	Lower extremity edema  Assessment and plan of treatment:	Continue with lasix and aldactone as instructed Principal Discharge DX:	Carcinoma of pancreas  Goal:	Remain pain free at home and community  Assessment and plan of treatment:	- Heme /Onc Dr Mitchell consulted  - CT A/P -New large volume ascites. Status post Whipple. Abnormal enhancement along the course of the intrahepatic portal vein concerning for metastatic disease.  Anasarca.  - started trial of Lasix diuresis, Aldactone  - Surg /Onc Dr Titus consulted- 3/8 s/p Paracentesis, albumin 25% x 2.  - Peritoneal fluid AFB- neg, Cx- neg, Cytopathology- atypical findings  - 3/15 Patient had repeat paracentesis with 2.6 liter fluid removed.   - RUQ US -Fatty liver is noted. Right kidney is without hydronephrosis.  - MRI of abdomen -Hepatomegaly and severe steatosis. Moderate to large volume ascites, stable from 3/7. Increased narrowing of the mesenteric arteries and portal vein, tumor encasement is in the differential. Right-sided colitis.  - 3/22/18 Patient had Pleurex catheter placed. Cytology X 3 is negative.  - Please drain the pigtail catheter every Monday, Wednesday and Friday  - Please follow up outpatient for your PET scan. At present there is no pathologic evidence of recurrence. Heme Dr Mitchell will follow the results of PET scan and get further tissue depending on the results.  Secondary Diagnosis:	Colitis  Goal:	resolved  Assessment and plan of treatment:	- Colitis of cecum and ascending colon though only minimally symptomatic with pain  - IV Flagyl completed on 3/16  - Tylenol for pain control for now, Zofran for nausea.   - You completed course of antibiotics during your hospitalization  Secondary Diagnosis:	Lower extremity edema  Assessment and plan of treatment:	- Continue with Lasix and Aldactone as instructed  - Follow up with PCP as outpatient for further management and treatment Principal Discharge DX:	Carcinoma of pancreas  Goal:	Remain pain free at home and community  Assessment and plan of treatment:	- Heme /Onc Dr Mitchell consulted  - CT A/P -New large volume ascites. Status post Whipple. Abnormal enhancement along the course of the intrahepatic portal vein concerning for metastatic disease.  Anasarca.  - started trial of Lasix diuresis, Aldactone  - Surg /Onc Dr Titus consulted- 3/8 s/p Paracentesis, albumin 25% x 2.  - Peritoneal fluid AFB- neg, Cx- neg, Cytopathology- atypical findings  - 3/15 Patient had repeat paracentesis with 2.6 liter fluid removed.   - RUQ US -Fatty liver is noted. Right kidney is without hydronephrosis.  - MRI of abdomen -Hepatomegaly and severe steatosis. Moderate to large volume ascites, stable from 3/7. Increased narrowing of the mesenteric arteries and portal vein, tumor encasement is in the differential. Right-sided colitis.  - 3/22/18 Patient had Pleurex catheter placed. Cytology X 3 is negative.  - Please drain the pigtail catheter every Monday, Wednesday and Friday not more than 500 cc  - Please follow up outpatient for your PET scan. At present there is no pathologic evidence of recurrence. Heme Dr Mitchell will follow the results of PET scan and get further tissue depending on the results.  Secondary Diagnosis:	Colitis  Goal:	resolved  Assessment and plan of treatment:	- Colitis of cecum and ascending colon though only minimally symptomatic with pain  - IV Flagyl completed on 3/16  - Tylenol for pain control for now, Zofran for nausea.   - You completed course of antibiotics during your hospitalization  Secondary Diagnosis:	Lower extremity edema  Assessment and plan of treatment:	- Continue with Lasix and Aldactone as instructed  - Follow up with PCP as outpatient for further management and treatment

## 2018-03-22 NOTE — PROGRESS NOTE ADULT - PROBLEM SELECTOR PROBLEM 5
Hyperlipidemia, unspecified hyperlipidemia type

## 2018-03-22 NOTE — PROGRESS NOTE ADULT - PROBLEM SELECTOR PLAN 4
- c/w home losartan with hold parameters, changed HCTZ to lasix for now given her ascites

## 2018-03-22 NOTE — DISCHARGE NOTE ADULT - MEDICATION SUMMARY - MEDICATIONS TO TAKE
I will START or STAY ON the medications listed below when I get home from the hospital:    spironolactone 25 mg oral tablet  -- 1 tab(s) by mouth once a day  -- Indication: For Other ascites    acetaminophen 325 mg oral tablet  -- 2 tab(s) by mouth every 6 hours, As needed, Mild to moderate pain  -- Indication: For abdominal pain    losartan 100 mg oral tablet  -- 1 tab(s) by mouth once a day  -- Indication: For Essential hypertension    magnesium hydroxide 8% oral suspension  -- 30 milliliter(s) by mouth once a day, As needed, Constipation  -- Indication: For Constipation    traZODone 100 mg oral tablet  -- 1 tab(s) by mouth once a day (at bedtime), As needed, Insomnia  -- Indication: For Insomnia    sertraline 25 mg oral tablet  -- 3 tab(s) by mouth once a day  -- Indication: For Depression, unspecified depression type    lovastatin 40 mg oral tablet  -- 1 tab(s) by mouth once a day (at bedtime)  -- Indication: For Hyperlipidemia, unspecified hyperlipidemia type    albuterol 90 mcg/inh inhalation aerosol  -- 2 puff(s) inhaled every 6 hours, As needed, Shortness of Breath and/or Wheezing  -- Indication: For Mild asthma without complication, unspecified whether persistent    Advair Diskus 250 mcg-50 mcg inhalation powder  -- 1 puff(s) inhaled 2 times a day  -- Indication: For Mild asthma without complication, unspecified whether persistent    furosemide 40 mg oral tablet  -- 1 tab(s) by mouth once a day  -- Indication: For Other ascites    famotidine 20 mg oral tablet  -- 1 tab(s) by mouth 2 times a day  -- Indication: For Noninfectious gastroenteritis    oxybutynin 5 mg oral tablet  -- 1 tab(s) by mouth once a day (at bedtime)  -- Indication: For Overactive bladder    Vitamin D3 2000 intl units oral capsule  -- 1 cap(s) by mouth once a day  -- Indication: For Need for prophylactic measure

## 2018-03-22 NOTE — DISCHARGE NOTE ADULT - ADDITIONAL INSTRUCTIONS
Follow up with Dr Mitchell Heme /Onc, Surg/Onc Dr Titus and GI Dr Ennis as outpatient for further management and treatment. Follow up with Dr Mitchell Heme /Onc, Surg/Onc Dr Titus and GI Dr Ennis as outpatient for further management and treatment.    Patient keeps accumulating ascitic fluid. Per Heme / Onc recs  Pleurex was placed for symptom management. Further workup to rule out other sites of metastasis can be done as out patient since her symptoms are controlled. Option of chemotherapy will be considered depending on her overall condition as outpatient not while patient in Rehab. MRI Abdomen without any definitive mass. At present there is no pathologic evidence of recurrence. Heme / Onc Dr Mitchell will perform PET scan and will follow the results of PET scan and get further tissue depending on the results. Follow up with Dr Mitchell Heme /Onc, Surg/Onc Dr Titus and GI Dr Ennis as outpatient for further management and treatment.    Patient keeps accumulating ascitic fluid. Per Heme / Onc recommended Pleurex was placed for symptom management. Further workup to rule out other sites of metastasis can be done as out patient since her symptoms are controlled. Option of chemotherapy will be considered depending on her overall condition as outpatient not while patient in Rehab. MRI Abdomen without any definitive mass. At present there is no pathologic evidence of recurrence. Heme / Onc Dr Mitchell will perform PET scan and will follow the results of PET scan and get further tissue depending on the results.

## 2018-03-22 NOTE — PROGRESS NOTE ADULT - ASSESSMENT
Patient with history of Pancreatic adenocarcinoma s/p moustapha adjuvant chemotherapy followed by radiation therapy and definitive surgery in Nov 2017.Now presenting with ascitis,with CT findings consistent with :New large volume ascites.   Abnormal enhancement along the course of the   intrahepatic portal vein concerning for metastatic disease.New narrowing   of the intrahepatic IVC without gross filling defect.Unchanged encasement   of the portal confluence. Mural wall thickening and hyperemia of cecum and ascending colonic   loops, difficult to evaluate secondary to inadequate distention.   Correlate clinically to assess for colitis. No bowel obstruction.  Loculated left pleural effusion with associated atelectasis of the   left lower lobe. Anasarca.  These findings are concerning for recurrent pancreatic carcinoma. Patient had her ascitic tap done today ,will follow the results.Tumor marker ordered with am labs.Will follow the results.Seen by surgical oncology team.    Problem/Recommendation - 1:  Problem: Other ascites. Recommendation: s/p paracentesis,will follow the results. not impressive for recurrence.  3/12/18 Now with recurrence of ascitis.For repeat tap,should get pleurex if it reaccumulates.  3/15 Patient had repeat paracentesis with 2.6 liter fluid removed.Sample sent for cytology.  3/17 Patient with mild abdominal distention.Will recommend pleurex with external drain if she continues to have reaccumulation. Possibility of recurrent malignancy was discussed with her and the further role of chemotherapy was also mentioned depending on her overall performance. As abdominal ultrasound did not show any evidence of thrombosis and EGD can't be done due to prior surgery ,likely cause of ascitis may be due to malignancy.  3/22/18 Patient had pleurex cathetor placed. Cytology X 3 is negative. Will need PET scan as outpt.    Problem/Recommendation - 2:  ·  Problem: Malignant neoplasm of pancreas, unspecified location of malignancy.  Recommendation: Likley recurrence.Follow the cytology of ascitic tap,tumor marker not impressive. Will decide about further management depending on the etiology.  3/12/18 Ascitc fluid Cytology negative for malignant cells. Patient off chemotherapy post surgery due to her poor overall health and close observation was opted. It was decided that further chemo might be considered if there is any evidence of recurrence.Will need proper tissue biopsy or positive cytology from ascitic fluid to confirm recurrence. She might be a candidate for palliative chemo once diagnosis is confirmed. D/W Dr Titus,will need GI intervention./13/18 Patient comfortable,no paracentesis performed. Awaiting GI input to evaluate for possible biopsy to confirm recurrence.  3/15/18 Patient seen by GI ,due to prior whipples EUS/BX not possible.Ultrasound with dopplers recommended to better characterize the portal vein and periportal region.  3/17/18 US negative for thrombosis. Ascitis likley due to recurrent pancreatic cancer. Will follow the results of cytology from repeat paracentesis.  3/19/18 Patient keeps accumulating ascitic fluid. Will advice pleurex for symptom management. Further workup to rule out other sites of metastasis can be done as outpt if her symptoms are controlled. Option of chemotherapy will be considered depending on her overall condition as outpt.  MRI  Abdomen without any definitive mass .Patient awaiting Pleurex cathetor.  3/22/18 S/P pleurex cathetor. Oupt PET scan ,at present there is no pathologic evidence of recurrence. Will follow the results of PET scan and get further tissue depending on the results.  Problem/Recommendation - 3:    ·  Problem: Breast cancer.  Recommendation: No signs of recurrence on physical exam.     Problem/Recommendation - 4:  ·  Problem: Transaminitis.  Recommendation: Folllow the trend.?cause.     Problem/Recommendation - 5:  ·  Problem: Mild asthma without complication, unspecified whether persistent.     Problem/Recommendation - 6:  Problem: Depression, unspecified depression type.

## 2018-03-22 NOTE — PROGRESS NOTE ADULT - PROBLEM SELECTOR PROBLEM 3
Malignant neoplasm of pancreas, unspecified location of malignancy

## 2018-03-22 NOTE — PROGRESS NOTE ADULT - PROBLEM SELECTOR PLAN 3
seen by Dr. Howard (oncho) d/w her , ascitic fluid is neg for malignant cells  -unable to do EUS with biopsy 2/2 prior wipple procedure  -ultrasound with doppler to better visualize portal vein and anatomy
seen by Dr. Howard (onc) d/w her , ascitic fluid is neg for malignant cells  -house GI consulted for EUS with biopsy for tissue diagnosis for likely recurrence of pancreatic Ca
seen by Dr. Howard (onc) d/w her , pending fluid analysis and send some markers
seen by Dr. Howard (oncho) d/w her , ascitic fluid is neg for malignant cells
seen by Dr. Howard (oncho) d/w her , ascitic fluid is neg for malignant cells  -unable to do EUS with biopsy 2/2 prior wipple procedure  -ultrasound with doppler to better visualize portal vein and anatomy: done, difficult study, nondiagnostic
seen by Dr. Howard (onc) d/w her , ascitic fluid is neg for malignant cells  -house GI consulted for EUS with biopsy for tissue diagnosis for likely recurrence of pancreatic Ca
seen by Dr. Howard (onc) d/w her , pending fluid analysis and send some markers
seen by Dr. Howard (oncho) d/w her , ascitic fluid is neg for malignant cells  -unable to do EUS with biopsy 2/2 prior wipple procedure  -ultrasound with doppler to better visualize portal vein and anatomy: done, difficult study, nondiagnostic

## 2018-03-22 NOTE — DISCHARGE NOTE ADULT - PLAN OF CARE
Please drain the pigtail catheter every monday, wednesday and friday  Please follow up outpatient for your PET scan resolved You completed course of antibiotics during your hospitalization Continue with lasix and aldactone as instructed Remain pain free at home and community - Heme /Onc Dr Mitchell consulted  - CT A/P -New large volume ascites. Status post Whipple. Abnormal enhancement along the course of the intrahepatic portal vein concerning for metastatic disease.  Anasarca.  - started trial of Lasix diuresis, Aldactone  - Surg /Onc Dr Titus consulted- 3/8 s/p Paracentesis, albumin 25% x 2.  - Peritoneal fluid AFB- neg, Cx- neg, Cytopathology- atypical findings  - 3/15 Patient had repeat paracentesis with 2.6 liter fluid removed.   - RUQ US -Fatty liver is noted. Right kidney is without hydronephrosis.  - MRI of abdomen -Hepatomegaly and severe steatosis. Moderate to large volume ascites, stable from 3/7. Increased narrowing of the mesenteric arteries and portal vein, tumor encasement is in the differential. Right-sided colitis.  - 3/22/18 Patient had Pleurex catheter placed. Cytology X 3 is negative.  - Please drain the pigtail catheter every Monday, Wednesday and Friday  - Please follow up outpatient for your PET scan. At present there is no pathologic evidence of recurrence. Heme Dr Mitchell will follow the results of PET scan and get further tissue depending on the results. - Colitis of cecum and ascending colon though only minimally symptomatic with pain  - IV Flagyl completed on 3/16  - Tylenol for pain control for now, Zofran for nausea.   - You completed course of antibiotics during your hospitalization - Continue with Lasix and Aldactone as instructed  - Follow up with PCP as outpatient for further management and treatment - Heme /Onc Dr Mitchell consulted  - CT A/P -New large volume ascites. Status post Whipple. Abnormal enhancement along the course of the intrahepatic portal vein concerning for metastatic disease.  Anasarca.  - started trial of Lasix diuresis, Aldactone  - Surg /Onc Dr Titus consulted- 3/8 s/p Paracentesis, albumin 25% x 2.  - Peritoneal fluid AFB- neg, Cx- neg, Cytopathology- atypical findings  - 3/15 Patient had repeat paracentesis with 2.6 liter fluid removed.   - RUQ US -Fatty liver is noted. Right kidney is without hydronephrosis.  - MRI of abdomen -Hepatomegaly and severe steatosis. Moderate to large volume ascites, stable from 3/7. Increased narrowing of the mesenteric arteries and portal vein, tumor encasement is in the differential. Right-sided colitis.  - 3/22/18 Patient had Pleurex catheter placed. Cytology X 3 is negative.  - Please drain the pigtail catheter every Monday, Wednesday and Friday not more than 500 cc  - Please follow up outpatient for your PET scan. At present there is no pathologic evidence of recurrence. Heme Dr Mitchell will follow the results of PET scan and get further tissue depending on the results.

## 2018-03-22 NOTE — PROGRESS NOTE ADULT - PROBLEM SELECTOR PROBLEM 6
Depression, unspecified depression type

## 2018-03-22 NOTE — DISCHARGE NOTE ADULT - CARE PROVIDERS DIRECT ADDRESSES
[email or web address]

## 2018-03-22 NOTE — PROGRESS NOTE ADULT - PROBLEM SELECTOR PLAN 5
- atorvastatin as therapeutic interchange for her home lovastatin

## 2018-03-22 NOTE — DISCHARGE NOTE ADULT - HOSPITAL COURSE
This is a 72F with history as above who presents to the hospital with worsening abdominal distention found to have new ascites likely 2/2 metastatic pancreatic cancer.     Hospital Course:  ASCITES   - CT A/P -New large volume ascites. Status post Whipple. Abnormal enhancement along the course of the intrahepatic portal vein concerning for metastatic disease.  Anasarca.  - 3/8 s/p Paracentesis, albumin 25% x 2.  - Peritoneal fluid AFB- neg, Cx- neg, Cytopathology- atypical findings  - 3/12/18 Ascitc fluid Cytology- negative for malignant cells..  - 3/15 Patient had repeat paracentesis with 2.6 liter fluid removed. Sample sent for cytology:NEGATIVE FOR MALIGNANT CELLS.  Reactive mesothelial cells.  - RUQ US to better characterize the portal vein and periportal region. RUQ US- The overall exam is essentially nondiagnostic and technically difficult to perform. Fatty liver is noted. Right kidney is without hydronephrosis.  - MRI of abdomen -Hepatomegaly and severe steatosis. Moderate to large volume ascites, stable from 3/7. Increased narrowing of the mesenteric arteries and portal vein, tumor encasement is in the differential. Right-sided colitis.  - 3/21: Pleurex catheter placed by IR ;gram stain neg; cytology negative  - GI & Sx recs PET CT scan for further evaluation. Continue diuretics for ascites and LE edema related to portal vein occlusion.      Leukocytosis  - improved    COLITIS    - Colitis of cecum and ascending colon though only minimally symptomatic with pain  - IV Flagyl completed on 3/16  - Tylenol for pain control for now, zofran for nausea.     TRANSAMINITIS    - Likely in setting of her metastatic cancer noted on CT    LE EDEMA   - tylenol for pain control for now.  - Bilat LE Doppler- negative for DVT  - c/w Lasix, Aldactone    MALIGNANT NEOPLASM OF PANCREAS   - Dr. Mitchell for onc following   - Follow the cytology of ascitic tap,tumor marker not impressive.   - cancer antigen: 64.3 elevated   - 3/15/18 Patient seen by GI ,due to prior whipples EUS/BX not possible.    HTN   - c/w home Losartan with hold parameters  - changed HCTZ to lasix for now given her ascites.    HYPERLIPIDEMIA   - atorvastatin as therapeutic interchange for her home lovastatin.     DEPRESSION   - c/w home sertraline and trazadone. This is a 72F with history as above who presents to the hospital with worsening abdominal distention found to have new ascites likely 2/2 metastatic pancreatic cancer.     Hospital Course:  ASCITES - CT A/P -New large volume ascites. Status post Whipple. Abnormal enhancement along the course of the intrahepatic portal vein concerning for metastatic disease.  Anasarca. 3/8 s/p Paracentesis, albumin 25% x 2. Peritoneal fluid AFB- neg, Cx- neg, Cytopathology- atypical findings. 3/12/18 Ascitc fluid Cytology- negative for malignant cells.. 3/15 Patient had repeat paracentesis with 2.6 liter fluid removed. Sample sent for cytology -NEGATIVE FOR MALIGNANT CELLS. Reactive mesothelial cells. RUQ US to better characterize the portal vein and periportal region. RUQ US- The overall exam is essentially nondiagnostic and technically difficult to perform. Fatty liver is noted. Right kidney is without hydronephrosis.  MRI of abdomen -Hepatomegaly and severe steatosis. Moderate to large volume ascites, stable from 3/7. Increased narrowing of the mesenteric arteries and portal vein, tumor encasement is in the differential. Right-sided colitis. 3/21: Pleurex catheter placed by IR ;gram stain neg; cytology negative. GI & Sx recs PET CT scan for further evaluation. Continue diuretics for ascites and LE edema related to portal vein occlusion.      Leukocytosis- improved    COLITIS  - Colitis of cecum and ascending colon though only minimally symptomatic with pain. IV Flagyl completed on 3/16. Tylenol for pain control for now, zofran for nausea.     TRANSAMINITIS  - Likely in setting of her metastatic cancer noted on CT    LE EDEMA - Tylenol for pain control for now. Bilat LE Doppler- negative for DVT, c/w Lasix, Aldactone    MALIGNANT NEOPLASM OF PANCREAS - Dr. Mitchell for onc following - Follow the cytology of ascitic tap -negative    HTN - c/w home Losartan with hold parameters, changed HCTZ to Lasix for now given her ascites.    HYPERLIPIDEMIA - atorvastatin as therapeutic interchange for her home lovastatin.     DEPRESSION - c/w home sertraline and Trazadone     Dispo planning to Rehab

## 2018-03-22 NOTE — DISCHARGE NOTE ADULT - CARE PROVIDER_API CALL
Madan Titus), Surgery  [address] [place] [address]  Phone: [phone]  Fax: [phone]    Sunny Mitchell (CESAR), Hematology; HospicePalliative Medicine; Internal Medicine; Medical Oncology  [address] [place] [address]  Phone: [phone]  Fax: [phone]    Rahul Ennis), Gastroenterology; Internal Medicine  [address] [place] [address]  Phone: [phone]  Fax: [phone]

## 2018-03-22 NOTE — DISCHARGE NOTE ADULT - PATIENT PORTAL LINK FT
You can access the [facility] Patient Portal, offered by [facility], by registering with the following website: [email or web address]

## 2018-03-22 NOTE — PROGRESS NOTE ADULT - PROBLEM SELECTOR PLAN 6
- c/w home sertraline and trazadone

## 2018-03-23 VITALS
TEMPERATURE: 98 F | DIASTOLIC BLOOD PRESSURE: 69 MMHG | SYSTOLIC BLOOD PRESSURE: 113 MMHG | RESPIRATION RATE: 17 BRPM | OXYGEN SATURATION: 100 % | HEART RATE: 89 BPM

## 2018-03-23 LAB
ALBUMIN SERPL ELPH-MCNC: 1.6 G/DL — LOW (ref 3.3–5)
ALP SERPL-CCNC: 202 U/L — HIGH (ref 40–120)
ALT FLD-CCNC: 34 U/L — HIGH (ref 4–33)
AST SERPL-CCNC: 63 U/L — HIGH (ref 4–32)
BILIRUB SERPL-MCNC: 1.7 MG/DL — HIGH (ref 0.2–1.2)
BUN SERPL-MCNC: 17 MG/DL — SIGNIFICANT CHANGE UP (ref 7–23)
CALCIUM SERPL-MCNC: 7.8 MG/DL — LOW (ref 8.4–10.5)
CHLORIDE SERPL-SCNC: 103 MMOL/L — SIGNIFICANT CHANGE UP (ref 98–107)
CO2 SERPL-SCNC: 24 MMOL/L — SIGNIFICANT CHANGE UP (ref 22–31)
CREAT SERPL-MCNC: 0.75 MG/DL — SIGNIFICANT CHANGE UP (ref 0.5–1.3)
GLUCOSE SERPL-MCNC: 104 MG/DL — HIGH (ref 70–99)
HCT VFR BLD CALC: 27.1 % — LOW (ref 34.5–45)
HGB BLD-MCNC: 9.7 G/DL — LOW (ref 11.5–15.5)
MAGNESIUM SERPL-MCNC: 1.9 MG/DL — SIGNIFICANT CHANGE UP (ref 1.6–2.6)
MCHC RBC-ENTMCNC: 32.6 PG — SIGNIFICANT CHANGE UP (ref 27–34)
MCHC RBC-ENTMCNC: 35.8 % — SIGNIFICANT CHANGE UP (ref 32–36)
MCV RBC AUTO: 90.9 FL — SIGNIFICANT CHANGE UP (ref 80–100)
NRBC # FLD: 0 — SIGNIFICANT CHANGE UP
PLATELET # BLD AUTO: 177 K/UL — SIGNIFICANT CHANGE UP (ref 150–400)
PMV BLD: 10.4 FL — SIGNIFICANT CHANGE UP (ref 7–13)
POTASSIUM SERPL-MCNC: 3.8 MMOL/L — SIGNIFICANT CHANGE UP (ref 3.5–5.3)
POTASSIUM SERPL-SCNC: 3.8 MMOL/L — SIGNIFICANT CHANGE UP (ref 3.5–5.3)
PROT SERPL-MCNC: 5.2 G/DL — LOW (ref 6–8.3)
RBC # BLD: 2.98 M/UL — LOW (ref 3.8–5.2)
RBC # FLD: 17.9 % — HIGH (ref 10.3–14.5)
SODIUM SERPL-SCNC: 136 MMOL/L — SIGNIFICANT CHANGE UP (ref 135–145)
WBC # BLD: 6.99 K/UL — SIGNIFICANT CHANGE UP (ref 3.8–10.5)
WBC # FLD AUTO: 6.99 K/UL — SIGNIFICANT CHANGE UP (ref 3.8–10.5)

## 2018-03-23 PROCEDURE — 99232 SBSQ HOSP IP/OBS MODERATE 35: CPT

## 2018-03-23 RX ORDER — SERTRALINE 25 MG/1
3 TABLET, FILM COATED ORAL
Qty: 0 | Refills: 0 | COMMUNITY
Start: 2018-03-23

## 2018-03-23 RX ORDER — OXYBUTYNIN CHLORIDE 5 MG
1 TABLET ORAL
Qty: 0 | Refills: 0 | COMMUNITY
Start: 2018-03-23

## 2018-03-23 RX ORDER — FUROSEMIDE 40 MG
1 TABLET ORAL
Qty: 0 | Refills: 0 | COMMUNITY
Start: 2018-03-23

## 2018-03-23 RX ORDER — LOSARTAN POTASSIUM 100 MG/1
1 TABLET, FILM COATED ORAL
Qty: 0 | Refills: 0 | COMMUNITY
Start: 2018-03-23

## 2018-03-23 RX ORDER — SPIRONOLACTONE 25 MG/1
1 TABLET, FILM COATED ORAL
Qty: 0 | Refills: 0 | COMMUNITY
Start: 2018-03-23

## 2018-03-23 RX ORDER — ACETAMINOPHEN 500 MG
2 TABLET ORAL
Qty: 0 | Refills: 0 | COMMUNITY
Start: 2018-03-23

## 2018-03-23 RX ORDER — MAGNESIUM HYDROXIDE 400 MG/1
30 TABLET, CHEWABLE ORAL
Qty: 0 | Refills: 0 | COMMUNITY
Start: 2018-03-23

## 2018-03-23 RX ORDER — LOSARTAN/HYDROCHLOROTHIAZIDE 100MG-25MG
1 TABLET ORAL
Qty: 0 | Refills: 0 | COMMUNITY

## 2018-03-23 RX ORDER — TRAZODONE HCL 50 MG
1 TABLET ORAL
Qty: 0 | Refills: 0 | COMMUNITY
Start: 2018-03-23

## 2018-03-23 RX ORDER — GABAPENTIN 400 MG/1
1 CAPSULE ORAL
Qty: 0 | Refills: 0 | COMMUNITY

## 2018-03-23 RX ADMIN — Medication 650 MILLIGRAM(S): at 08:48

## 2018-03-23 RX ADMIN — PANTOPRAZOLE SODIUM 40 MILLIGRAM(S): 20 TABLET, DELAYED RELEASE ORAL at 06:07

## 2018-03-23 RX ADMIN — BUDESONIDE AND FORMOTEROL FUMARATE DIHYDRATE 2 PUFF(S): 160; 4.5 AEROSOL RESPIRATORY (INHALATION) at 10:52

## 2018-03-23 RX ADMIN — SPIRONOLACTONE 25 MILLIGRAM(S): 25 TABLET, FILM COATED ORAL at 06:07

## 2018-03-23 RX ADMIN — Medication 650 MILLIGRAM(S): at 10:45

## 2018-03-23 NOTE — PROGRESS NOTE ADULT - PROBLEM SELECTOR PROBLEM 2
Colitis
Leukocytosis, unspecified type

## 2018-03-23 NOTE — PROGRESS NOTE ADULT - PROVIDER SPECIALTY LIST ADULT
Gastroenterology
Gastroenterology
Heme/Onc
Infectious Disease
Internal Medicine
Surgery
Heme/Onc
Gastroenterology
Internal Medicine

## 2018-03-23 NOTE — PROGRESS NOTE ADULT - ASSESSMENT
72F a/p Whipple procedure with portal vein resection and repair and feeding jejunostomy on 11/7/17 for pancreatic cancer presenting with abdominal distension, found to have new large volume ascites and abnormal enhancement along the course of the intrahepatic portal vein concerning for metastatic disease.     - Outpt PET scan    CONCEPCION Belcher MD PGY 2   [id number]

## 2018-03-23 NOTE — PROGRESS NOTE ADULT - ASSESSMENT
72F with history of Pancreatic cancer s/p chemo and surgery (whipple in Nov 2017), R Breast cancer s/p RT and R lumpectomy, Pulm nodule s/p R VATS (Bx showing necrotizing granulomas), HTN, HLD, Asthma, and Depression who presents to the hospital with complaints of worsening abdominal distention with ascites and leukocytosis

## 2018-03-23 NOTE — PROGRESS NOTE ADULT - ATTENDING COMMENTS
Agree w plan for paracentesis -- send fluid for cytology, amylase, culture etc
Agree with above note and plan.  Patient post whipple. Imaging showing thickening of the portal vein wall.  We are consulted for possible EUS. It will be very hard for us to visualize this given the post-surgical anatomy.
D/w pt and family concerns re recurrent dz.
ultrasound with doppler of portal vein
house GI consulted for EUS with biopsy
plan for d/c to rehab, PET scan as out pt
plan for repeat paracentesis , ascitic fluid neg for malignant cells, will need tissue biopsy if palliative chemo to be considered : as per oncho
seen by PT, recommend STR
GI and onchology f/u, plan for d/c if further w/u can be done as out pt.
IR consult for pleurex cath for recurrent ascitis
IR consult for pleurex cath for recurrent ascitis, repeat CBC in am, if wbc elevated , then house ID consult
IR for pleurex cath for recurrent ascitis,
house GI consulted for EUS with biopsy: scheduled for paracentesis and EUS in am
seen by PT, recommend STR , awaiting ascitic fluid analysis
seen by PT, recommend STR , awaiting ascitic fluid analysis
Steve Henning  Attending Physician   Division of Infectious Disease  Pager #[phone]  After 5pm/weekend or no response, call #[phone]    Will sign off, recall ID if needed #[phone].

## 2018-03-23 NOTE — PROGRESS NOTE ADULT - PROBLEM SELECTOR PLAN 1
s/p paracentesis , 4 litre of fluid taken out   -f/u result
s/p paracentesis , 4 litre of fluid taken out   -need repeat paracentesis tomorrow for distension
s/p paracentesis , 4 litre of fluid taken out   seen by procedure team: no need for repeat paracentesis
s/p repeat paracentesis  -s/p pleurex cath placed for recurrent ascitis  -seen by ID: no need for abx at present
s/p paracentesis , 4 litre of fluid taken out   -f/u result
s/p paracentesis , 4 litre of fluid taken out   need repeat paracentesis : as she is c/o SOB. CXR today  paracentesis cannot be done 2/2 lovenox  will d/c lovenox  scheduled for paracentesis in am
s/p repeat paracentesis
s/p repeat paracentesis  -if need further paracentesis or distension increases : will need plurex cath
s/p repeat paracentesis  -if need further paracentesis or distension increases : will need plurex cath  -was scheduled today : however spiked wbc, no fever  -will repeat wbc in am, if spikes temp then will start abx after pan c/s
s/p repeat paracentesis  -if need further paracentesis or distension increases : will need plurex cath  -was scheduled today : however spiked wbc, no fever  -will repeat wbc in am, is improved  -seen by ID: no need for abx at present
s/p repeat paracentesis
-from [place]  -no evidence of infection

## 2018-03-23 NOTE — PROGRESS NOTE ADULT - PROBLEM SELECTOR PROBLEM 1
Other ascites

## 2018-03-23 NOTE — PROGRESS NOTE ADULT - PROBLEM SELECTOR PLAN 2
-c/w cipro/flagyl  -can be switch to oral once tolerate PO
-c/w cipro/flagyl  -can be switch to oral once tolerate PO
-c/w cipro/flagyl  -now on oral
completed abx
-c/w cipro/flagyl  -can be switch to oral once tolerate PO
-c/w cipro/flagyl  -now on oral
-resolved  -no abx indicated

## 2018-03-23 NOTE — PROGRESS NOTE ADULT - SUBJECTIVE AND OBJECTIVE BOX
Chief Complaint:  Patient is a 72y old  Female who presents with a chief complaint of Abdominal Distention (07 Mar 2018 07:34)      Interval Events:   - US with doppler was non-diagnostic      Allergies:  No Known Drug Allergies  SteriStrips (Blisters)      Hospital Medications:  MEDICATIONS  (STANDING):  atorvastatin 10 milliGRAM(s) Oral at bedtime  buDESOnide 160 MICROgram(s)/formoterol 4.5 MICROgram(s) Inhaler 2 Puff(s) Inhalation two times a day  furosemide    Tablet 40 milliGRAM(s) Oral daily  lidocaine 1% Injectable 20 milliLiter(s) Local Injection once  losartan 100 milliGRAM(s) Oral daily  oxybutynin 5 milliGRAM(s) Oral at bedtime  pantoprazole    Tablet 40 milliGRAM(s) Oral before breakfast  potassium acid phosphate/sodium acid phosphate tablet (K-PHOS No. 2) 1 Tablet(s) Oral four times a day with meals  sertraline 75 milliGRAM(s) Oral daily  spironolactone 25 milliGRAM(s) Oral daily    MEDICATIONS  (PRN):  acetaminophen   Tablet. 650 milliGRAM(s) Oral every 6 hours PRN Mild to moderate pain  ALBUTerol    90 MICROgram(s) HFA Inhaler 2 Puff(s) Inhalation every 6 hours PRN Shortness of Breath and/or Wheezing  magnesium hydroxide Suspension 30 milliLiter(s) Oral daily PRN Constipation  ondansetron Injectable 4 milliGRAM(s) IV Push every 6 hours PRN Nausea and/or Vomiting  traZODone 100 milliGRAM(s) Oral at bedtime PRN Insomnia        PMHX/PSHX:  Pancreatic cancer  Breast cancer  Solitary pulmonary nodule  Insomnia  Hiatal hernia with GERD  Colitis  Malignant neoplasm of pancreas, unspecified location of malignancy  Malignant neoplasm of right female breast, unspecified site of breast  Asthma  Depression  Hyperlipidemia  HTN (hypertension)  Carcinoma of pancreas  History of lung surgery  History of lumpectomy  Port-a-cath in place  Status post right breast lumpectomy  H/O abdominal hysterectomy  H/O umbilical hernia repair      Family history:  Family history of kidney cancer (Sibling)  Family history of brain cancer (Sibling)  Family history of ovarian cancer (Mother)  No pertinent family history in first degree relatives      ROS:     General:  No wt loss, fevers, chills, night sweats, fatigue   Eyes:  Good vision, no reported pain  ENT:  No sore throat, pain, runny nose  CV:  No chest pain, palpitations, (+) LE edema  Resp:  No cough, wheezing, SOB  GI:  See HPI  :  No pain, bleeding, incontinence, nocturia  Muscle:  No pain, weakness  Neuro:  No weakness, tingling, memory problems  Psych:  No fatigue, insomnia, mood problems, depression  Endocrine:  No cold/heat intolerance  Heme:  No petechiae, ecchymosis, easy bruising  Skin:  No rash, (+) edema      PHYSICAL EXAM:   Vital Signs:  Vital Signs Last 24 Hrs  T(C): 36.6 (15 Mar 2018 05:12), Max: 36.7 (14 Mar 2018 20:29)  T(F): 97.8 (15 Mar 2018 05:12), Max: 98 (14 Mar 2018 20:29)  HR: 85 (15 Mar 2018 05:12) (85 - 95)  BP: 115/64 (15 Mar 2018 05:12) (115/64 - 134/87)  RR: 18 (15 Mar 2018 05:12) (17 - 18)  SpO2: 97% (15 Mar 2018 05:12) (97% - 100%)  Daily     Daily Weight in k (14 Mar 2018 14:53)    GENERAL:  NAD  HEENT:  sclera anicteric  CHEST:  respiratory status improved  HEART:  RRR, no MRG, 1+ LE edema  ABDOMEN:  Soft, non-tender, mildly-distended, healed midline surgical scar  EXTREMITIES:  no cyanosis, 1+ edema  SKIN:  No rash  NEURO:  Alert, oriented      LABS:                                   10.5   7.49  )-----------( 232      ( 19 Mar 2018 06:46 )             30.7         133<L>  |  100  |  8   ----------------------------<  188<H>  3.9   |  21<L>  |  0.71    Ca    8.0<L>      18 Mar 2018 18:00  Phos  2.5       Mg     2.1         TPro  4.6<L>  /  Alb  1.6<L>  /  TBili  1.6<H>  /  DBili  x   /  AST  97<H>  /  ALT  41<H>  /  AlkPhos  171<H>          Imaging:    < from: CT Abdomen and Pelvis w/ IV Cont (03.07.18 @ 01:07) >  IMPRESSION:   1. New large volume ascites.  2. Status post Whipple. Abnormal enhancement along the course of the   intrahepatic portal vein concerning for metastatic disease.New narrowing   of the intrahepatic IVC without gross filling defect.Unchanged encasement   of the portal confluence.  3. Mural wall thickening and hyperemia of cecum and ascending colonic   loops, difficult to evaluate secondary to inadequate distention.   Correlate clinically to assess for colitis. No bowel obstruction.   4. Loculated left pleural effusion with associated atelectasis of the   left lower lobe.  5. Anasarca.    < end of copied text >
Patient is a 72y old  Female who presents with a chief complaint of Abdominal Distention (07 Mar 2018 07:34)    pt. seen and examined, NAD    INTERVAL HPI/OVERNIGHT EVENTS:  T(C): 36.5 (03-11-18 @ 14:42), Max: 36.7 (03-10-18 @ 20:39)  HR: 71 (03-11-18 @ 14:42) (71 - 83)  BP: 106/59 (03-11-18 @ 14:42) (106/59 - 138/85)  RR: 18 (03-11-18 @ 14:42) (18 - 18)  SpO2: 97% (03-11-18 @ 14:42) (97% - 97%)  Wt(kg): --  I&O's Summary      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  Insomnia  Hiatal hernia with GERD: no changes  Colitis: due to chemo 1/2017  Malignant neoplasm of pancreas, unspecified location of malignancy: started chemo 10/2016, has mediport - left chest, now s/p whipple 11/2017  Asthma: denies any recent hospitalizations/intubations  Depression  Hyperlipidemia  HTN (hypertension)  Carcinoma of pancreas: s/p whipple  History of lung surgery: right VATS, wedge resection (August 2017) benign neoplasm  History of lumpectomy: right (1996)  Port-a-cath in place  Status post right breast lumpectomy: malignant treated with radiation  H/O abdominal hysterectomy  H/O umbilical hernia repair: with mesh      SOCIAL HISTORY  Alcohol:  Tobacco:  Illicit substance use:    FAMILY HISTORY:    REVIEW OF SYSTEMS:  CONSTITUTIONAL: No fever, weight loss, or fatigue  EYES: No eye pain, visual disturbances, or discharge  ENMT:  No difficulty hearing, tinnitus, vertigo; No sinus or throat pain  NECK: No pain or stiffness  RESPIRATORY: No cough, wheezing, chills or hemoptysis; No shortness of breath  CARDIOVASCULAR: No chest pain, palpitations, dizziness, or leg swelling  GASTROINTESTINAL: No abdominal or epigastric pain. No nausea, vomiting, or hematemesis; No diarrhea or constipation. No melena or hematochezia.  GENITOURINARY: No dysuria, frequency, hematuria, or incontinence  NEUROLOGICAL: No headaches, memory loss, loss of strength, numbness, or tremors  SKIN: No itching, burning, rashes, or lesions   LYMPH NODES: No enlarged glands  ENDOCRINE: No heat or cold intolerance; No hair loss  MUSCULOSKELETAL: No joint pain or swelling; No muscle, back, or extremity pain  PSYCHIATRIC: No depression, anxiety, mood swings, or difficulty sleeping  HEME/LYMPH: No easy bruising, or bleeding gums  ALLERY AND IMMUNOLOGIC: No hives or eczema    RADIOLOGY & ADDITIONAL TESTS:    Imaging Personally Reviewed:  [ ] YES  [ ] NO    Consultant(s) Notes Reviewed:  [ ] YES  [ ] NO    PHYSICAL EXAM:  GENERAL: NAD, well-groomed, well-developed  HEAD:  Atraumatic, Normocephalic  EYES: EOMI, PERRLA, conjunctiva and sclera clear  ENMT: No tonsillar erythema, exudates, or enlargement; Moist mucous membranes, Good dentition, No lesions  NECK: Supple, No JVD, Normal thyroid  NERVOUS SYSTEM:  Alert & Oriented X3, Good concentration; Motor Strength 5/5 B/L upper and lower extremities; DTRs 2+ intact and symmetric  CHEST/LUNG: Clear to percussion bilaterally; No rales, rhonchi, wheezing, or rubs  HEART: Regular rate and rhythm; No murmurs, rubs, or gallops  ABDOMEN: Soft, Nontender, Nondistended; Bowel sounds present  EXTREMITIES:  2+ Peripheral Pulses, No clubbing, cyanosis, or edema  LYMPH: No lymphadenopathy noted  SKIN: No rashes or lesions    LABS:                        11.1   4.79  )-----------( 207      ( 11 Mar 2018 05:00 )             31.3     03-11    138  |  103  |  6<L>  ----------------------------<  92  3.5   |  23  |  0.62    Ca    7.2<L>      11 Mar 2018 05:00  Phos  2.3     03-11  Mg     1.6     03-11    TPro  5.4<L>  /  Alb  2.0<L>  /  TBili  1.8<H>  /  DBili  x   /  AST  148<H>  /  ALT  54<H>  /  AlkPhos  162<H>  03-11        CAPILLARY BLOOD GLUCOSE      POCT Blood Glucose.: 144 mg/dL (10 Mar 2018 21:14)            MEDICATIONS  (STANDING):  atorvastatin 10 milliGRAM(s) Oral at bedtime  buDESOnide 160 MICROgram(s)/formoterol 4.5 MICROgram(s) Inhaler 2 Puff(s) Inhalation two times a day  ciprofloxacin   IVPB 400 milliGRAM(s) IV Intermittent every 12 hours  enoxaparin Injectable 40 milliGRAM(s) SubCutaneous every 24 hours  furosemide    Tablet 40 milliGRAM(s) Oral daily  losartan 100 milliGRAM(s) Oral daily  metroNIDAZOLE  IVPB      metroNIDAZOLE  IVPB 500 milliGRAM(s) IV Intermittent every 8 hours  oxybutynin 5 milliGRAM(s) Oral at bedtime  pantoprazole    Tablet 40 milliGRAM(s) Oral before breakfast  sertraline 75 milliGRAM(s) Oral daily    MEDICATIONS  (PRN):  acetaminophen   Tablet. 650 milliGRAM(s) Oral every 6 hours PRN Mild to moderate pain  ALBUTerol    90 MICROgram(s) HFA Inhaler 2 Puff(s) Inhalation every 6 hours PRN Shortness of Breath and/or Wheezing  magnesium hydroxide Suspension 30 milliLiter(s) Oral daily PRN Constipation  ondansetron Injectable 4 milliGRAM(s) IV Push every 6 hours PRN Nausea and/or Vomiting  traZODone 100 milliGRAM(s) Oral at bedtime PRN Insomnia      Care Discussed with Consultants/Other Providers [ ] YES  [ ] NO
Patient is a 72y old  Female who presents with a chief complaint of Abdominal Distention (07 Mar 2018 07:34)    pt. seen and examined, c/o abd distension again     INTERVAL HPI/OVERNIGHT EVENTS:  T(C): 36.4 (03-12-18 @ 20:48), Max: 36.8 (03-12-18 @ 04:54)  HR: 87 (03-12-18 @ 20:48) (84 - 89)  BP: 136/88 (03-12-18 @ 20:48) (116/76 - 136/88)  RR: 19 (03-12-18 @ 20:48) (18 - 20)  SpO2: 97% (03-12-18 @ 20:48) (97% - 100%)  Wt(kg): --  I&O's Summary      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  Insomnia  Hiatal hernia with GERD: no changes  Colitis: due to chemo 1/2017  Malignant neoplasm of pancreas, unspecified location of malignancy: started chemo 10/2016, has mediport - left chest, now s/p whipple 11/2017  Asthma: denies any recent hospitalizations/intubations  Depression  Hyperlipidemia  HTN (hypertension)  Carcinoma of pancreas: s/p whipple  History of lung surgery: right VATS, wedge resection (August 2017) benign neoplasm  History of lumpectomy: right (1996)  Port-a-cath in place  Status post right breast lumpectomy: malignant treated with radiation  H/O abdominal hysterectomy  H/O umbilical hernia repair: with mesh      SOCIAL HISTORY  Alcohol:  Tobacco:  Illicit substance use:    FAMILY HISTORY:    REVIEW OF SYSTEMS:  CONSTITUTIONAL: No fever, weight loss, or fatigue  EYES: No eye pain, visual disturbances, or discharge  ENMT:  No difficulty hearing, tinnitus, vertigo; No sinus or throat pain  NECK: No pain or stiffness  RESPIRATORY: No cough, wheezing, chills or hemoptysis; No shortness of breath  CARDIOVASCULAR: No chest pain, palpitations, dizziness, or leg swelling  GASTROINTESTINAL: No abdominal or epigastric pain. No nausea, vomiting, or hematemesis; No diarrhea or constipation. No melena or hematochezia.  GENITOURINARY: No dysuria, frequency, hematuria, or incontinence  NEUROLOGICAL: No headaches, memory loss, loss of strength, numbness, or tremors  SKIN: No itching, burning, rashes, or lesions   LYMPH NODES: No enlarged glands  ENDOCRINE: No heat or cold intolerance; No hair loss  MUSCULOSKELETAL: No joint pain or swelling; No muscle, back, or extremity pain  PSYCHIATRIC: No depression, anxiety, mood swings, or difficulty sleeping  HEME/LYMPH: No easy bruising, or bleeding gums  ALLERY AND IMMUNOLOGIC: No hives or eczema    RADIOLOGY & ADDITIONAL TESTS:    Imaging Personally Reviewed:  [ ] YES  [ ] NO    Consultant(s) Notes Reviewed:  [ ] YES  [ ] NO    PHYSICAL EXAM:  GENERAL: NAD, well-groomed, well-developed  HEAD:  Atraumatic, Normocephalic  EYES: EOMI, PERRLA, conjunctiva and sclera clear  ENMT: No tonsillar erythema, exudates, or enlargement; Moist mucous membranes, Good dentition, No lesions  NECK: Supple, No JVD, Normal thyroid  NERVOUS SYSTEM:  Alert & Oriented X3, Good concentration; Motor Strength 5/5 B/L upper and lower extremities; DTRs 2+ intact and symmetric  CHEST/LUNG: Clear to percussion bilaterally; No rales, rhonchi, wheezing, or rubs  HEART: Regular rate and rhythm; No murmurs, rubs, or gallops  ABDOMEN: Soft, Nontender, Nondistended; Bowel sounds present  EXTREMITIES:  2+ Peripheral Pulses, No clubbing, cyanosis, or edema  LYMPH: No lymphadenopathy noted  SKIN: No rashes or lesions    LABS:                        11.0   5.26  )-----------( 233      ( 12 Mar 2018 06:15 )             31.4     03-12    137  |  102  |  7   ----------------------------<  116<H>  Test not performed SPECIMEN GROSSLY HEMOLYZED   |  23  |  0.68    Ca    7.3<L>      12 Mar 2018 06:15  Phos  2.4     03-12  Mg     1.5     03-12    TPro  5.7<L>  /  Alb  2.0<L>  /  TBili  1.7<H>  /  DBili  x   /  AST  159<H>  /  ALT  61<H>  /  AlkPhos  153<H>  03-12        CAPILLARY BLOOD GLUCOSE                MEDICATIONS  (STANDING):  atorvastatin 10 milliGRAM(s) Oral at bedtime  buDESOnide 160 MICROgram(s)/formoterol 4.5 MICROgram(s) Inhaler 2 Puff(s) Inhalation two times a day  ciprofloxacin     Tablet 500 milliGRAM(s) Oral every 12 hours  enoxaparin Injectable 40 milliGRAM(s) SubCutaneous every 24 hours  furosemide    Tablet 40 milliGRAM(s) Oral daily  losartan 100 milliGRAM(s) Oral daily  metroNIDAZOLE    Tablet 500 milliGRAM(s) Oral every 8 hours  oxybutynin 5 milliGRAM(s) Oral at bedtime  pantoprazole    Tablet 40 milliGRAM(s) Oral before breakfast  sertraline 75 milliGRAM(s) Oral daily    MEDICATIONS  (PRN):  acetaminophen   Tablet. 650 milliGRAM(s) Oral every 6 hours PRN Mild to moderate pain  ALBUTerol    90 MICROgram(s) HFA Inhaler 2 Puff(s) Inhalation every 6 hours PRN Shortness of Breath and/or Wheezing  magnesium hydroxide Suspension 30 milliLiter(s) Oral daily PRN Constipation  ondansetron Injectable 4 milliGRAM(s) IV Push every 6 hours PRN Nausea and/or Vomiting  traZODone 100 milliGRAM(s) Oral at bedtime PRN Insomnia      Care Discussed with Consultants/Other Providers [ ] YES  [ ] NO
Patient is a 72y old  Female who presents with a chief complaint of Abdominal Distention (07 Mar 2018 07:34)    pt. seen and examined, s/p pleurex cath placed by IR     INTERVAL HPI/OVERNIGHT EVENTS:  T(C): 36.2 (03-22-18 @ 21:28), Max: 36.6 (03-22-18 @ 05:17)  HR: 73 (03-22-18 @ 21:28) (70 - 96)  BP: 106/60 (03-22-18 @ 21:28) (91/55 - 106/60)  RR: 18 (03-22-18 @ 21:28) (17 - 18)  SpO2: 100% (03-22-18 @ 21:28) (98% - 100%)  Wt(kg): --  I&O's Summary      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  Insomnia  Hiatal hernia with GERD: no changes  Colitis: due to chemo 1/2017  Malignant neoplasm of pancreas, unspecified location of malignancy: started chemo 10/2016, has mediport - left chest, now s/p whipple 11/2017  Asthma: denies any recent hospitalizations/intubations  Depression  Hyperlipidemia  HTN (hypertension)  Carcinoma of pancreas: s/p whipple  History of lung surgery: right VATS, wedge resection (August 2017) benign neoplasm  History of lumpectomy: right (1996)  Port-a-cath in place  Status post right breast lumpectomy: malignant treated with radiation  H/O abdominal hysterectomy  H/O umbilical hernia repair: with mesh      SOCIAL HISTORY  Alcohol:  Tobacco:  Illicit substance use:    FAMILY HISTORY:    REVIEW OF SYSTEMS:  CONSTITUTIONAL: No fever, weight loss, or fatigue  EYES: No eye pain, visual disturbances, or discharge  ENMT:  No difficulty hearing, tinnitus, vertigo; No sinus or throat pain  NECK: No pain or stiffness  RESPIRATORY: No cough, wheezing, chills or hemoptysis; No shortness of breath  CARDIOVASCULAR: No chest pain, palpitations, dizziness, or leg swelling  GASTROINTESTINAL: No abdominal or epigastric pain. No nausea, vomiting, or hematemesis; No diarrhea or constipation. No melena or hematochezia.  GENITOURINARY: No dysuria, frequency, hematuria, or incontinence  NEUROLOGICAL: No headaches, memory loss, loss of strength, numbness, or tremors  SKIN: No itching, burning, rashes, or lesions   LYMPH NODES: No enlarged glands  ENDOCRINE: No heat or cold intolerance; No hair loss  MUSCULOSKELETAL: No joint pain or swelling; No muscle, back, or extremity pain  PSYCHIATRIC: No depression, anxiety, mood swings, or difficulty sleeping  HEME/LYMPH: No easy bruising, or bleeding gums  ALLERY AND IMMUNOLOGIC: No hives or eczema    RADIOLOGY & ADDITIONAL TESTS:    Imaging Personally Reviewed:  [ ] YES  [ ] NO    Consultant(s) Notes Reviewed:  [ ] YES  [ ] NO    PHYSICAL EXAM:  GENERAL: NAD, well-groomed, well-developed  HEAD:  Atraumatic, Normocephalic  EYES: EOMI, PERRLA, conjunctiva and sclera clear  ENMT: No tonsillar erythema, exudates, or enlargement; Moist mucous membranes, Good dentition, No lesions  NECK: Supple, No JVD, Normal thyroid  NERVOUS SYSTEM:  Alert & Oriented X3, Good concentration; Motor Strength 5/5 B/L upper and lower extremities; DTRs 2+ intact and symmetric  CHEST/LUNG: Clear to percussion bilaterally; No rales, rhonchi, wheezing, or rubs  HEART: Regular rate and rhythm; No murmurs, rubs, or gallops  ABDOMEN: Soft, Nontender, Nondistended; Bowel sounds present  EXTREMITIES:  2+ Peripheral Pulses, No clubbing, cyanosis, or edema  LYMPH: No lymphadenopathy noted  SKIN: No rashes or lesions    LABS:                        10.1   8.68  )-----------( 187      ( 22 Mar 2018 04:50 )             29.0     03-22    135  |  101  |  18  ----------------------------<  111<H>  3.8   |  25  |  0.87    Ca    8.1<L>      22 Mar 2018 04:50  Phos  3.6     03-21  Mg     2.7     03-21    TPro  5.7<L>  /  Alb  1.8<L>  /  TBili  1.9<H>  /  DBili  x   /  AST  65<H>  /  ALT  34<H>  /  AlkPhos  210<H>  03-22        CAPILLARY BLOOD GLUCOSE                MEDICATIONS  (STANDING):  atorvastatin 10 milliGRAM(s) Oral at bedtime  buDESOnide 160 MICROgram(s)/formoterol 4.5 MICROgram(s) Inhaler 2 Puff(s) Inhalation two times a day  furosemide    Tablet 40 milliGRAM(s) Oral daily  losartan 100 milliGRAM(s) Oral daily  oxybutynin 5 milliGRAM(s) Oral at bedtime  pantoprazole    Tablet 40 milliGRAM(s) Oral before breakfast  sertraline 75 milliGRAM(s) Oral daily  spironolactone 25 milliGRAM(s) Oral daily    MEDICATIONS  (PRN):  acetaminophen   Tablet. 650 milliGRAM(s) Oral every 6 hours PRN Mild to moderate pain  ALBUTerol    90 MICROgram(s) HFA Inhaler 2 Puff(s) Inhalation every 6 hours PRN Shortness of Breath and/or Wheezing  magnesium hydroxide Suspension 30 milliLiter(s) Oral daily PRN Constipation  ondansetron Injectable 4 milliGRAM(s) IV Push every 6 hours PRN Nausea and/or Vomiting  traZODone 100 milliGRAM(s) Oral at bedtime PRN Insomnia      Care Discussed with Consultants/Other Providers [ ] YES  [ ] NO
Patient is a 72y old  Female who presents with a chief complaint of Abdominal Distention (07 Mar 2018 07:34)    pt. seen and examined, seen by procedure team, no need for paracentesis    INTERVAL HPI/OVERNIGHT EVENTS:  T(C): 36.4 (03-13-18 @ 20:13), Max: 36.9 (03-13-18 @ 05:00)  HR: 89 (03-13-18 @ 20:13) (80 - 92)  BP: 114/68 (03-13-18 @ 20:13) (114/68 - 137/88)  RR: 17 (03-13-18 @ 20:13) (16 - 17)  SpO2: 100% (03-13-18 @ 20:13) (98% - 100%)  Wt(kg): --  I&O's Summary      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  Insomnia  Hiatal hernia with GERD: no changes  Colitis: due to chemo 1/2017  Malignant neoplasm of pancreas, unspecified location of malignancy: started chemo 10/2016, has mediport - left chest, now s/p whipple 11/2017  Asthma: denies any recent hospitalizations/intubations  Depression  Hyperlipidemia  HTN (hypertension)  Carcinoma of pancreas: s/p whipple  History of lung surgery: right VATS, wedge resection (August 2017) benign neoplasm  History of lumpectomy: right (1996)  Port-a-cath in place  Status post right breast lumpectomy: malignant treated with radiation  H/O abdominal hysterectomy  H/O umbilical hernia repair: with mesh      SOCIAL HISTORY  Alcohol:  Tobacco:  Illicit substance use:    FAMILY HISTORY:    REVIEW OF SYSTEMS:  CONSTITUTIONAL: No fever, weight loss, or fatigue  EYES: No eye pain, visual disturbances, or discharge  ENMT:  No difficulty hearing, tinnitus, vertigo; No sinus or throat pain  NECK: No pain or stiffness  RESPIRATORY: No cough, wheezing, chills or hemoptysis; No shortness of breath  CARDIOVASCULAR: No chest pain, palpitations, dizziness, or leg swelling  GASTROINTESTINAL: No abdominal or epigastric pain. No nausea, vomiting, or hematemesis; No diarrhea or constipation. No melena or hematochezia.  GENITOURINARY: No dysuria, frequency, hematuria, or incontinence  NEUROLOGICAL: No headaches, memory loss, loss of strength, numbness, or tremors  SKIN: No itching, burning, rashes, or lesions   LYMPH NODES: No enlarged glands  ENDOCRINE: No heat or cold intolerance; No hair loss  MUSCULOSKELETAL: No joint pain or swelling; No muscle, back, or extremity pain  PSYCHIATRIC: No depression, anxiety, mood swings, or difficulty sleeping  HEME/LYMPH: No easy bruising, or bleeding gums  ALLERY AND IMMUNOLOGIC: No hives or eczema    RADIOLOGY & ADDITIONAL TESTS:    Imaging Personally Reviewed:  [ ] YES  [ ] NO    Consultant(s) Notes Reviewed:  [ ] YES  [ ] NO    PHYSICAL EXAM:  GENERAL: NAD, well-groomed, well-developed  HEAD:  Atraumatic, Normocephalic  EYES: EOMI, PERRLA, conjunctiva and sclera clear  ENMT: No tonsillar erythema, exudates, or enlargement; Moist mucous membranes, Good dentition, No lesions  NECK: Supple, No JVD, Normal thyroid  NERVOUS SYSTEM:  Alert & Oriented X3, Good concentration; Motor Strength 5/5 B/L upper and lower extremities; DTRs 2+ intact and symmetric  CHEST/LUNG: Clear to percussion bilaterally; No rales, rhonchi, wheezing, or rubs  HEART: Regular rate and rhythm; No murmurs, rubs, or gallops  ABDOMEN: Soft, Nontender, Nondistended; Bowel sounds present  EXTREMITIES:  2+ Peripheral Pulses, No clubbing, cyanosis, or edema  LYMPH: No lymphadenopathy noted  SKIN: No rashes or lesions    LABS:                        11.2   6.87  )-----------( 177      ( 13 Mar 2018 05:00 )             30.9     03-13    139  |  102  |  6<L>  ----------------------------<  102<H>  2.8<LL>   |  24  |  0.58    Ca    7.1<L>      13 Mar 2018 05:00  Phos  2.4     03-12  Mg     1.6     03-13    TPro  5.7<L>  /  Alb  2.0<L>  /  TBili  1.7<H>  /  DBili  x   /  AST  159<H>  /  ALT  61<H>  /  AlkPhos  153<H>  03-12    PT/INR - ( 13 Mar 2018 10:45 )   PT: 18.8 SEC;   INR: 1.68              CAPILLARY BLOOD GLUCOSE                MEDICATIONS  (STANDING):  atorvastatin 10 milliGRAM(s) Oral at bedtime  buDESOnide 160 MICROgram(s)/formoterol 4.5 MICROgram(s) Inhaler 2 Puff(s) Inhalation two times a day  ciprofloxacin     Tablet 500 milliGRAM(s) Oral every 12 hours  enoxaparin Injectable 40 milliGRAM(s) SubCutaneous every 24 hours  furosemide    Tablet 40 milliGRAM(s) Oral daily  losartan 100 milliGRAM(s) Oral daily  metroNIDAZOLE    Tablet 500 milliGRAM(s) Oral every 8 hours  oxybutynin 5 milliGRAM(s) Oral at bedtime  pantoprazole    Tablet 40 milliGRAM(s) Oral before breakfast  sertraline 75 milliGRAM(s) Oral daily    MEDICATIONS  (PRN):  acetaminophen   Tablet. 650 milliGRAM(s) Oral every 6 hours PRN Mild to moderate pain  ALBUTerol    90 MICROgram(s) HFA Inhaler 2 Puff(s) Inhalation every 6 hours PRN Shortness of Breath and/or Wheezing  magnesium hydroxide Suspension 30 milliLiter(s) Oral daily PRN Constipation  ondansetron Injectable 4 milliGRAM(s) IV Push every 6 hours PRN Nausea and/or Vomiting  traZODone 100 milliGRAM(s) Oral at bedtime PRN Insomnia      Care Discussed with Consultants/Other Providers [ ] YES  [ ] NO
Chief Complaint:  Patient is a 72y old  Female who presents with a chief complaint of Abdominal Distention (07 Mar 2018 07:34)      Interval Events:   - MRI abd was done and read below  - her lower abd distention is slightly improved  - she is on lasix 40 mg PO daily      Allergies:  No Known Drug Allergies  SteriStrips (Blisters)      Hospital Medications:  MEDICATIONS  (STANDING):  atorvastatin 10 milliGRAM(s) Oral at bedtime  buDESOnide 160 MICROgram(s)/formoterol 4.5 MICROgram(s) Inhaler 2 Puff(s) Inhalation two times a day  furosemide    Tablet 40 milliGRAM(s) Oral daily  lidocaine 1% Injectable 20 milliLiter(s) Local Injection once  losartan 100 milliGRAM(s) Oral daily  oxybutynin 5 milliGRAM(s) Oral at bedtime  pantoprazole    Tablet 40 milliGRAM(s) Oral before breakfast  sertraline 75 milliGRAM(s) Oral daily  spironolactone 25 milliGRAM(s) Oral daily    MEDICATIONS  (PRN):  acetaminophen   Tablet. 650 milliGRAM(s) Oral every 6 hours PRN Mild to moderate pain  ALBUTerol    90 MICROgram(s) HFA Inhaler 2 Puff(s) Inhalation every 6 hours PRN Shortness of Breath and/or Wheezing  magnesium hydroxide Suspension 30 milliLiter(s) Oral daily PRN Constipation  ondansetron Injectable 4 milliGRAM(s) IV Push every 6 hours PRN Nausea and/or Vomiting  traZODone 100 milliGRAM(s) Oral at bedtime PRN Insomnia          PMHX/PSHX:  Pancreatic cancer  Breast cancer  Solitary pulmonary nodule  Insomnia  Hiatal hernia with GERD  Colitis  Malignant neoplasm of pancreas, unspecified location of malignancy  Malignant neoplasm of right female breast, unspecified site of breast  Asthma  Depression  Hyperlipidemia  HTN (hypertension)  Carcinoma of pancreas  History of lung surgery  History of lumpectomy  Port-a-cath in place  Status post right breast lumpectomy  H/O abdominal hysterectomy  H/O umbilical hernia repair      Family history:  Family history of kidney cancer (Sibling)  Family history of brain cancer (Sibling)  Family history of ovarian cancer (Mother)  No pertinent family history in first degree relatives      ROS:     General:  No wt loss, fevers, chills, night sweats, fatigue   Eyes:  Good vision, no reported pain  ENT:  No sore throat, pain, runny nose  CV:  No chest pain, palpitations, (+) LE edema  Resp:  No cough, wheezing, SOB  GI:  See HPI  :  No pain, bleeding, incontinence, nocturia  Muscle:  No pain, weakness  Neuro:  No weakness, tingling, memory problems  Psych:  No fatigue, insomnia, mood problems, depression  Endocrine:  No cold/heat intolerance  Heme:  No petechiae, ecchymosis, easy bruising  Skin:  No rash, (+) edema      PHYSICAL EXAM:   Vital Signs Last 24 Hrs  T(C): 37.1 (21 Mar 2018 05:06), Max: 37.1 (21 Mar 2018 05:06)  T(F): 98.7 (21 Mar 2018 05:06), Max: 98.7 (21 Mar 2018 05:06)  HR: 72 (21 Mar 2018 05:06) (72 - 93)  BP: 114/68 (21 Mar 2018 05:06) (101/59 - 128/54)  RR: 18 (21 Mar 2018 05:06) (18 - 18)  SpO2: 100% (21 Mar 2018 05:06) (100% - 100%)    GENERAL:  NAD  HEENT:  sclera anicteric  CHEST:  respiratory status improved  HEART:  RRR, no MRG, 1+ LE edema  ABDOMEN:  Soft, non-tender, mildly-distended, healed midline surgical scar  EXTREMITIES:  no cyanosis, 1+ edema  SKIN:  No rash  NEURO:  Alert, oriented      LABS:                                   11.1   11.92 )-----------( 242      ( 21 Mar 2018 06:55 )             31.7     03-21    137  |  102  |  14  ----------------------------<  118<H>  4.4   |  20<L>  |  0.80    Ca    8.0<L>      21 Mar 2018 06:55  Phos  3.6     03-21  Mg     2.7     03-21    TPro  6.0  /  Alb  1.9<L>  /  TBili  2.4<H>  /  DBili  x   /  AST  72<H>  /  ALT  38<H>  /  AlkPhos  208<H>  03-21          Imaging:    < from: MR Abdomen w/ IV Cont (03.20.18 @ 10:28) >  LIVER: Severe steatosis.  BILE DUCTS: No intrahepatic biliary ductal dilatation.   Choledochojejunostomy. 3-D MRCP was limited.  GALLBLADDER: Cholecystectomy    PANCREAS: Whipple's resection. No pancreatic ductal dilatation.      VISUALIZED PORTIONS:    BOWEL: Thickened ascending colon, stable.   PERITONEUM: Moderate to large ascites, stable from 3/7. Hazy infiltration   of the root of the mesentery and surgical bed, nonspecific.    VESSELS: Narrowing of the portal vein with cavernous transformation.   Increased from preoperative 6/27 narrowing of the SMA and celiac trunk,   indeterminate for postsurgical changes versus tumor encasement.  RETROPERITONEUM: Subcentimeter lymph nodes at the root of the mesentery  and surgical bed, nonspecific.      < end of copied text >
Chief Complaint:  Patient is a 72y old  Female who presents with a chief complaint of Abdominal Distention (07 Mar 2018 07:34)      Interval Events:   - patient got IV lasix with improvement in her edema and respiratory status  - CT reviewed by GI fellow and attending with senior radiology staff    Allergies:  No Known Drug Allergies  SteriStrips (Blisters)      Hospital Medications:  acetaminophen   Tablet. 650 milliGRAM(s) Oral every 6 hours PRN  ALBUTerol    90 MICROgram(s) HFA Inhaler 2 Puff(s) Inhalation every 6 hours PRN  atorvastatin 10 milliGRAM(s) Oral at bedtime  buDESOnide 160 MICROgram(s)/formoterol 4.5 MICROgram(s) Inhaler 2 Puff(s) Inhalation two times a day  ciprofloxacin     Tablet 500 milliGRAM(s) Oral every 12 hours  furosemide    Tablet 40 milliGRAM(s) Oral daily  lidocaine 1% Injectable 20 milliLiter(s) Local Injection once  losartan 100 milliGRAM(s) Oral daily  magnesium hydroxide Suspension 30 milliLiter(s) Oral daily PRN  metroNIDAZOLE    Tablet 500 milliGRAM(s) Oral every 8 hours  ondansetron Injectable 4 milliGRAM(s) IV Push every 6 hours PRN  oxybutynin 5 milliGRAM(s) Oral at bedtime  pantoprazole    Tablet 40 milliGRAM(s) Oral before breakfast  phytonadione   Solution 5 milliGRAM(s) Oral daily  potassium chloride    Tablet ER 20 milliEquivalent(s) Oral every 2 hours  potassium chloride  10 mEq/100 mL IVPB 10 milliEquivalent(s) IV Intermittent every 1 hour  sertraline 75 milliGRAM(s) Oral daily  traZODone 100 milliGRAM(s) Oral at bedtime PRN      PMHX/PSHX:  Pancreatic cancer  Breast cancer  Solitary pulmonary nodule  Insomnia  Hiatal hernia with GERD  Colitis  Malignant neoplasm of pancreas, unspecified location of malignancy  Malignant neoplasm of right female breast, unspecified site of breast  Asthma  Depression  Hyperlipidemia  HTN (hypertension)  Carcinoma of pancreas  History of lung surgery  History of lumpectomy  Port-a-cath in place  Status post right breast lumpectomy  H/O abdominal hysterectomy  H/O umbilical hernia repair      Family history:  Family history of kidney cancer (Sibling)  Family history of brain cancer (Sibling)  Family history of ovarian cancer (Mother)  No pertinent family history in first degree relatives      ROS:     General:  No wt loss, fevers, chills, night sweats, fatigue   Eyes:  Good vision, no reported pain  ENT:  No sore throat, pain, runny nose  CV:  No chest pain, palpitations  Resp:  No cough, wheezing, SOB  GI:  See HPI  :  No pain, bleeding, incontinence, nocturia  Muscle:  No pain, weakness  Neuro:  No weakness, tingling, memory problems  Psych:  No fatigue, insomnia, mood problems, depression  Endocrine:  No cold/heat intolerance  Heme:  No petechiae, ecchymosis, easy bruising  Skin:  No rash, edema      PHYSICAL EXAM:   Vital Signs:  Vital Signs Last 24 Hrs  T(C): 36.6 (15 Mar 2018 05:12), Max: 36.7 (14 Mar 2018 20:29)  T(F): 97.8 (15 Mar 2018 05:12), Max: 98 (14 Mar 2018 20:29)  HR: 85 (15 Mar 2018 05:12) (85 - 95)  BP: 115/64 (15 Mar 2018 05:12) (115/64 - 134/87)  BP(mean): --  RR: 18 (15 Mar 2018 05:12) (17 - 18)  SpO2: 97% (15 Mar 2018 05:12) (97% - 100%)  Daily     Daily Weight in k (14 Mar 2018 14:53)    GENERAL:  NAD  HEENT:  sclera anicteric  CHEST:  respiratory status improved  HEART:  RRR, no MRG, 1+ LE edema  ABDOMEN:  Soft, non-tender, mildly-distended, healed midline surgical scar  EXTREMITIES:  no cyanosis, 1+ edema  SKIN:  No rash  NEURO:  Alert, oriented      LABS:                        10.6   7.34  )-----------( 221      ( 15 Mar 2018 04:52 )             28.8     03-15    141  |  104  |  7   ----------------------------<  86  3.0<L>   |  23  |  0.70    Ca    7.3<L>      15 Mar 2018 04:52  Phos  2.7     03-15  Mg     1.5     03-15        PT/INR - ( 15 Mar 2018 04:52 )   PT: 18.9 SEC;   INR: 1.63          PTT - ( 15 Mar 2018 04:52 )  PTT:31.8 SEC      Imaging:
HPI:  This is a 72F with history of Pancreatic cancer s/p chemo and surgery (whipple in Nov 2017), R Breast cancer s/p RT and R lumpectomy, Pulm nodule s/p R VATS (Bx showing necrotizing granulomas), HTN, HLD, Asthma, and Depression who presents to the hospital with complaints of worsening abdominal distention for the past 4 weeks. Said that she had last followed up with Dr. Titus near the end of February and at that had been sent for some imaging tests which the patient said she completed. She had not heard any results from the doctor and was noting her distention worsening which concerned her and she therefore called Dr. Titus who recommended that she come to the hospital for evaluation. She said that she has some associated epigastric/umbilical pain with the distention, also said that it feels like fluid is moving around inside her stomach. She also has noted worsening b/l LE swelling and as a result has noted some increasing difficulty in her walking and in her b/l knee pain. She also endorsed a change in her urine over this time with it become more red in color. She denies any nausea, vomiting, diarrhea, fevers, chills, or changes in her appetite. No other complaints.    In the ED, her initial vitals were T 98.3, P 83, /81, R 16, O2 sat 100% RA. Her lab work here was most significant for coagulopathy and transaminitis. She had a CT A/P that showed large volume ascites, new hepatic mets, possible colitis of the cecum and ascending colon, and a loculated pleural effusion on the L side. She was started on cipro/flagyl and admitted to medicine. (07 Mar 2018 07:34)     Pt is seen and examined  pt is awake and lying in bed/out of bed to chair  pt currently getting paracentesis    ROS:  Negative except for:  Abdominal distention and discomfort  MEDICATIONS  (STANDING):  atorvastatin 10 milliGRAM(s) Oral at bedtime  buDESOnide 160 MICROgram(s)/formoterol 4.5 MICROgram(s) Inhaler 2 Puff(s) Inhalation two times a day  ciprofloxacin     Tablet 500 milliGRAM(s) Oral every 12 hours  furosemide    Tablet 40 milliGRAM(s) Oral daily  lidocaine 1% Injectable 20 milliLiter(s) Local Injection once  losartan 100 milliGRAM(s) Oral daily  metroNIDAZOLE    Tablet 500 milliGRAM(s) Oral every 8 hours  oxybutynin 5 milliGRAM(s) Oral at bedtime  pantoprazole    Tablet 40 milliGRAM(s) Oral before breakfast  phytonadione   Solution 5 milliGRAM(s) Oral daily  sertraline 75 milliGRAM(s) Oral daily  spironolactone 25 milliGRAM(s) Oral daily    MEDICATIONS  (PRN):  acetaminophen   Tablet. 650 milliGRAM(s) Oral every 6 hours PRN Mild to moderate pain  ALBUTerol    90 MICROgram(s) HFA Inhaler 2 Puff(s) Inhalation every 6 hours PRN Shortness of Breath and/or Wheezing  magnesium hydroxide Suspension 30 milliLiter(s) Oral daily PRN Constipation  ondansetron Injectable 4 milliGRAM(s) IV Push every 6 hours PRN Nausea and/or Vomiting  traZODone 100 milliGRAM(s) Oral at bedtime PRN Insomnia      Allergies    No Known Drug Allergies  SteriStrips (Blisters)    Intolerances        Vital Signs Last 24 Hrs  T(C): 36.4 (15 Mar 2018 12:54), Max: 36.7 (14 Mar 2018 20:29)  T(F): 97.5 (15 Mar 2018 12:54), Max: 98 (14 Mar 2018 20:29)  HR: 82 (15 Mar 2018 12:54) (82 - 92)  BP: 122/77 (15 Mar 2018 12:54) (115/64 - 133/87)  BP(mean): --  RR: 18 (15 Mar 2018 12:54) (18 - 18)  SpO2: 98% (15 Mar 2018 12:54) (97% - 100%)    PHYSICAL EXAM  General: adult in NAD  HEENT: clear oropharynx, anicteric sclera, pink conjunctiva  Neck: supple  CV: normal S1/S2 with no murmur rubs or gallops  Lungs: positive air movement b/l ant lungs,clear to auscultation, no wheezes, no rales  Abdomen: ascitis with distention  Ext: no clubbing cyanosis or edema  Skin: no rashes and no petechiae  Neuro: alert and oriented X 4, no focal deficits  LABS:                          10.6   7.34  )-----------( 221      ( 15 Mar 2018 04:52 )             28.8         Mean Cell Volume : 88.1 fL  Mean Cell Hemoglobin : 32.4 pg  Mean Cell Hemoglobin Concentration : 36.8 %  Auto Neutrophil # : x  Auto Lymphocyte # : x  Auto Monocyte # : x  Auto Eosinophil # : x  Auto Basophil # : x  Auto Neutrophil % : x  Auto Lymphocyte % : x  Auto Monocyte % : x  Auto Eosinophil % : x  Auto Basophil % : x    Serial CBC's  03-15 @ 04:52  Hct-28.8 / Hgb-10.6 / Plat-221 / RBC-3.27 / WBC-7.34          Serial CBC's  03-14 @ 05:27  Hct-30.1 / Hgb-10.9 / Plat-206 / RBC-3.42 / WBC-7.22          Serial CBC's  03-13 @ 05:00  Hct-30.9 / Hgb-11.2 / Plat-177 / RBC-3.58 / WBC-6.87          Serial CBC's  03-12 @ 06:15  Hct-31.4 / Hgb-11.0 / Plat-233 / RBC-3.63 / WBC-5.26            03-15    141  |  104  |  7   ----------------------------<  86  3.0<L>   |  23  |  0.70    Ca    7.3<L>      15 Mar 2018 04:52  Phos  2.7     03-15  Mg     1.5     03-15        PT/INR - ( 15 Mar 2018 04:52 )   PT: 18.9 SEC;   INR: 1.63          PTT - ( 15 Mar 2018 04:52 )  PTT:31.8 SEC              BLOOD SMEAR INTERPRETATION:       RADIOLOGY & ADDITIONAL STUDIES:
HPI:  This is a 72F with history of Pancreatic cancer s/p chemo and surgery (whipple in Nov 2017), R Breast cancer s/p RT and R lumpectomy, Pulm nodule s/p R VATS (Bx showing necrotizing granulomas), HTN, HLD, Asthma, and Depression who presents to the hospital with complaints of worsening abdominal distention for the past 4 weeks. Said that she had last followed up with Dr. Titus near the end of February and at that had been sent for some imaging tests which the patient said she completed. She had not heard any results from the doctor and was noting her distention worsening which concerned her and she therefore called Dr. Titus who recommended that she come to the hospital for evaluation. She said that she has some associated epigastric/umbilical pain with the distention, also said that it feels like fluid is moving around inside her stomach. She also has noted worsening b/l LE swelling and as a result has noted some increasing difficulty in her walking and in her b/l knee pain. She also endorsed a change in her urine over this time with it become more red in color. She denies any nausea, vomiting, diarrhea, fevers, chills, or changes in her appetite. No other complaints.    In the ED, her initial vitals were T 98.3, P 83, /81, R 16, O2 sat 100% RA. Her lab work here was most significant for coagulopathy and transaminitis. She had a CT A/P that showed large volume ascites, new hepatic mets, possible colitis of the cecum and ascending colon, and a loculated pleural effusion on the L side. She was started on cipro/flagyl and admitted to medicine. (07 Mar 2018 07:34)     Pt is seen and examined  pt is awake and lying in bed/out of bed to chair  pt seems comfortable and denies any complaints at this time    ROS:  Negative except for: Abdominal distention    MEDICATIONS  (STANDING):  atorvastatin 10 milliGRAM(s) Oral at bedtime  buDESOnide 160 MICROgram(s)/formoterol 4.5 MICROgram(s) Inhaler 2 Puff(s) Inhalation two times a day  furosemide    Tablet 40 milliGRAM(s) Oral daily  lidocaine 1% Injectable 20 milliLiter(s) Local Injection once  losartan 100 milliGRAM(s) Oral daily  oxybutynin 5 milliGRAM(s) Oral at bedtime  pantoprazole    Tablet 40 milliGRAM(s) Oral before breakfast  sertraline 75 milliGRAM(s) Oral daily  spironolactone 25 milliGRAM(s) Oral daily    MEDICATIONS  (PRN):  acetaminophen   Tablet. 650 milliGRAM(s) Oral every 6 hours PRN Mild to moderate pain  ALBUTerol    90 MICROgram(s) HFA Inhaler 2 Puff(s) Inhalation every 6 hours PRN Shortness of Breath and/or Wheezing  magnesium hydroxide Suspension 30 milliLiter(s) Oral daily PRN Constipation  ondansetron Injectable 4 milliGRAM(s) IV Push every 6 hours PRN Nausea and/or Vomiting  traZODone 100 milliGRAM(s) Oral at bedtime PRN Insomnia      Allergies    No Known Drug Allergies  SteriStrips (Blisters)    Intolerances        Vital Signs Last 24 Hrs  T(C): 36.6 (20 Mar 2018 12:14), Max: 37.1 (20 Mar 2018 04:50)  T(F): 97.8 (20 Mar 2018 12:14), Max: 98.7 (20 Mar 2018 04:50)  HR: 93 (20 Mar 2018 12:14) (93 - 95)  BP: 101/59 (20 Mar 2018 12:14) (101/59 - 116/60)  BP(mean): --  RR: 18 (20 Mar 2018 12:14) (18 - 18)  SpO2: 100% (20 Mar 2018 12:14) (100% - 100%)    PHYSICAL EXAM  General: adult in NAD  HEENT: clear oropharynx, anicteric sclera, pink conjunctiva  Neck: supple  CV: normal S1/S2 with no murmur rubs or gallops  Lungs: positive air movement b/l ant lungs,clear to auscultation, no wheezes, no rales  Abdomen: soft non-tender ,ascitis  Ext:  edema present  Skin: no rashes and no petechiae  Neuro: alert and oriented X 4, no focal deficits  LABS:                          11.3   13.86 )-----------( 222      ( 20 Mar 2018 20:05 )             31.4         Mean Cell Volume : 90.8 fL  Mean Cell Hemoglobin : 32.7 pg  Mean Cell Hemoglobin Concentration : 36.0 %  Auto Neutrophil # : x  Auto Lymphocyte # : x  Auto Monocyte # : x  Auto Eosinophil # : x  Auto Basophil # : x  Auto Neutrophil % : x  Auto Lymphocyte % : x  Auto Monocyte % : x  Auto Eosinophil % : x  Auto Basophil % : x    Serial CBC's  03-20 @ 20:05  Hct-31.4 / Hgb-11.3 / Plat-222 / RBC-3.46 / WBC-13.86          Serial CBC's  03-20 @ 04:40  Hct-30.5 / Hgb-10.7 / Plat-226 / RBC-3.43 / WBC-13.27          Serial CBC's  03-19 @ 06:46  Hct-30.7 / Hgb-10.5 / Plat-232 / RBC-3.43 / WBC-7.49          Serial CBC's  03-17 @ 07:29  Hct-28.5 / Hgb-9.9 / Plat-209 / RBC-3.20 / WBC-7.30            03-20    138  |  106  |  9   ----------------------------<  124<H>  4.3   |  24  |  0.72    Ca    7.6<L>      20 Mar 2018 04:40  Phos  2.9     03-20  Mg     1.7     03-20    TPro  5.4<L>  /  Alb  1.9<L>  /  TBili  2.1<H>  /  DBili  x   /  AST  77<H>  /  ALT  41<H>  /  AlkPhos  194<H>  03-20                    BLOOD SMEAR INTERPRETATION:       RADIOLOGY & ADDITIONAL STUDIES:
HPI:  This is a 72F with history of Pancreatic cancer s/p chemo and surgery (whipple in Nov 2017), R Breast cancer s/p RT and R lumpectomy, Pulm nodule s/p R VATS (Bx showing necrotizing granulomas), HTN, HLD, Asthma, and Depression who presents to the hospital with complaints of worsening abdominal distention for the past 4 weeks. Said that she had last followed up with Dr. Titus near the end of February and at that had been sent for some imaging tests which the patient said she completed. She had not heard any results from the doctor and was noting her distention worsening which concerned her and she therefore called Dr. Titus who recommended that she come to the hospital for evaluation. She said that she has some associated epigastric/umbilical pain with the distention, also said that it feels like fluid is moving around inside her stomach. She also has noted worsening b/l LE swelling and as a result has noted some increasing difficulty in her walking and in her b/l knee pain. She also endorsed a change in her urine over this time with it become more red in color. She denies any nausea, vomiting, diarrhea, fevers, chills, or changes in her appetite. No other complaints.    In the ED, her initial vitals were T 98.3, P 83, /81, R 16, O2 sat 100% RA. Her lab work here was most significant for coagulopathy and transaminitis. She had a CT A/P that showed large volume ascites, new hepatic mets, possible colitis of the cecum and ascending colon, and a loculated pleural effusion on the L side. She was started on cipro/flagyl and admitted to medicine. (07 Mar 2018 07:34)     Pt is seen and examined  pt is awake and lying in bed/out of bed to chair  pt seems comfortable and denies any complaints at this time    ROS:  Negative except for: abdominal distention    MEDICATIONS  (STANDING):  atorvastatin 10 milliGRAM(s) Oral at bedtime  buDESOnide 160 MICROgram(s)/formoterol 4.5 MICROgram(s) Inhaler 2 Puff(s) Inhalation two times a day  ciprofloxacin     Tablet 500 milliGRAM(s) Oral every 12 hours  enoxaparin Injectable 40 milliGRAM(s) SubCutaneous every 24 hours  furosemide    Tablet 40 milliGRAM(s) Oral daily  losartan 100 milliGRAM(s) Oral daily  metroNIDAZOLE    Tablet 500 milliGRAM(s) Oral every 8 hours  oxybutynin 5 milliGRAM(s) Oral at bedtime  pantoprazole    Tablet 40 milliGRAM(s) Oral before breakfast  sertraline 75 milliGRAM(s) Oral daily    MEDICATIONS  (PRN):  acetaminophen   Tablet. 650 milliGRAM(s) Oral every 6 hours PRN Mild to moderate pain  ALBUTerol    90 MICROgram(s) HFA Inhaler 2 Puff(s) Inhalation every 6 hours PRN Shortness of Breath and/or Wheezing  magnesium hydroxide Suspension 30 milliLiter(s) Oral daily PRN Constipation  ondansetron Injectable 4 milliGRAM(s) IV Push every 6 hours PRN Nausea and/or Vomiting  traZODone 100 milliGRAM(s) Oral at bedtime PRN Insomnia      Allergies    No Known Drug Allergies  SteriStrips (Blisters)    Intolerances        Vital Signs Last 24 Hrs  T(C): 36.4 (13 Mar 2018 20:13), Max: 36.9 (13 Mar 2018 05:00)  T(F): 97.6 (13 Mar 2018 20:13), Max: 98.4 (13 Mar 2018 05:00)  HR: 89 (13 Mar 2018 20:13) (80 - 92)  BP: 114/68 (13 Mar 2018 20:13) (114/68 - 137/88)  BP(mean): --  RR: 17 (13 Mar 2018 20:13) (16 - 17)  SpO2: 100% (13 Mar 2018 20:13) (98% - 100%)    PHYSICAL EXAM  General: adult in NAD  HEENT: clear oropharynx, anicteric sclera, pink conjunctiva  Neck: supple  CV: normal S1/S2 with no murmur rubs or gallops  Lungs: positive air movement b/l ant lungs,clear to auscultation, no wheezes, no rales  Abdomen: soft non-tender ,distended, no hepatosplenomegaly, no rebound  Ext: no clubbing cyanosis or edema  Skin: no rashes and no petechiae  Neuro: alert and oriented X 4, no focal deficits  LABS:                          11.2   6.87  )-----------( 177      ( 13 Mar 2018 05:00 )             30.9         Mean Cell Volume : 86.3 fL  Mean Cell Hemoglobin : 31.3 pg  Mean Cell Hemoglobin Concentration : 36.2 %  Auto Neutrophil # : x  Auto Lymphocyte # : x  Auto Monocyte # : x  Auto Eosinophil # : x  Auto Basophil # : x  Auto Neutrophil % : x  Auto Lymphocyte % : x  Auto Monocyte % : x  Auto Eosinophil % : x  Auto Basophil % : x    Serial CBC's  03-13 @ 05:00  Hct-30.9 / Hgb-11.2 / Plat-177 / RBC-3.58 / WBC-6.87          Serial CBC's  03-12 @ 06:15  Hct-31.4 / Hgb-11.0 / Plat-233 / RBC-3.63 / WBC-5.26          Serial CBC's  03-11 @ 05:00  Hct-31.3 / Hgb-11.1 / Plat-207 / RBC-3.62 / WBC-4.79          Serial CBC's  03-10 @ 05:04  Hct-29.8 / Hgb-10.4 / Plat-201 / RBC-3.43 / WBC-4.57            03-13    139  |  102  |  6<L>  ----------------------------<  102<H>  2.8<LL>   |  24  |  0.58    Ca    7.1<L>      13 Mar 2018 05:00  Phos  2.4     03-12  Mg     1.6     03-13    TPro  5.7<L>  /  Alb  2.0<L>  /  TBili  1.7<H>  /  DBili  x   /  AST  159<H>  /  ALT  61<H>  /  AlkPhos  153<H>  03-12      PT/INR - ( 13 Mar 2018 10:45 )   PT: 18.8 SEC;   INR: 1.68                        BLOOD SMEAR INTERPRETATION:       RADIOLOGY & ADDITIONAL STUDIES:
HPI:  This is a 72F with history of Pancreatic cancer s/p chemo and surgery (whipple in Nov 2017), R Breast cancer s/p RT and R lumpectomy, Pulm nodule s/p R VATS (Bx showing necrotizing granulomas), HTN, HLD, Asthma, and Depression who presents to the hospital with complaints of worsening abdominal distention for the past 4 weeks. Said that she had last followed up with Dr. Titus near the end of February and at that had been sent for some imaging tests which the patient said she completed. She had not heard any results from the doctor and was noting her distention worsening which concerned her and she therefore called Dr. Titus who recommended that she come to the hospital for evaluation. She said that she has some associated epigastric/umbilical pain with the distention, also said that it feels like fluid is moving around inside her stomach. She also has noted worsening b/l LE swelling and as a result has noted some increasing difficulty in her walking and in her b/l knee pain. She also endorsed a change in her urine over this time with it become more red in color. She denies any nausea, vomiting, diarrhea, fevers, chills, or changes in her appetite. No other complaints.    In the ED, her initial vitals were T 98.3, P 83, /81, R 16, O2 sat 100% RA. Her lab work here was most significant for coagulopathy and transaminitis. She had a CT A/P that showed large volume ascites, new hepatic mets, possible colitis of the cecum and ascending colon, and a loculated pleural effusion on the L side. She was started on cipro/flagyl and admitted to medicine. (07 Mar 2018 07:34)     Pt is seen and examined  pt is awake and lying in bed/out of bed to chair  pt seems comfortable and denies any complaints at this time    ROS:  Negative except for: abdominal distention due to ascitis    MEDICATIONS  (STANDING):  atorvastatin 10 milliGRAM(s) Oral at bedtime  buDESOnide 160 MICROgram(s)/formoterol 4.5 MICROgram(s) Inhaler 2 Puff(s) Inhalation two times a day  ciprofloxacin     Tablet 500 milliGRAM(s) Oral every 12 hours  enoxaparin Injectable 40 milliGRAM(s) SubCutaneous every 24 hours  furosemide    Tablet 40 milliGRAM(s) Oral daily  losartan 100 milliGRAM(s) Oral daily  metroNIDAZOLE    Tablet 500 milliGRAM(s) Oral every 8 hours  oxybutynin 5 milliGRAM(s) Oral at bedtime  pantoprazole    Tablet 40 milliGRAM(s) Oral before breakfast  sertraline 75 milliGRAM(s) Oral daily    MEDICATIONS  (PRN):  acetaminophen   Tablet. 650 milliGRAM(s) Oral every 6 hours PRN Mild to moderate pain  ALBUTerol    90 MICROgram(s) HFA Inhaler 2 Puff(s) Inhalation every 6 hours PRN Shortness of Breath and/or Wheezing  magnesium hydroxide Suspension 30 milliLiter(s) Oral daily PRN Constipation  ondansetron Injectable 4 milliGRAM(s) IV Push every 6 hours PRN Nausea and/or Vomiting  traZODone 100 milliGRAM(s) Oral at bedtime PRN Insomnia      Allergies    No Known Drug Allergies  SteriStrips (Blisters)    Intolerances        Vital Signs Last 24 Hrs  T(C): 36.6 (12 Mar 2018 12:40), Max: 36.8 (12 Mar 2018 04:54)  T(F): 97.8 (12 Mar 2018 12:40), Max: 98.3 (12 Mar 2018 04:54)  HR: 88 (12 Mar 2018 12:40) (84 - 93)  BP: 116/76 (12 Mar 2018 12:40) (116/76 - 130/83)  BP(mean): --  RR: 20 (12 Mar 2018 12:40) (18 - 20)  SpO2: 98% (12 Mar 2018 12:40) (98% - 100%)    PHYSICAL EXAM  General: adult in NAD  HEENT: clear oropharynx, anicteric sclera, pink conjunctiva  Neck: supple  CV: normal S1/S2 with no murmur rubs or gallops  Lungs: positive air movement b/l ant lungs,clear to auscultation, no wheezes, no rales  Abdomen: soft non-tender ,ascitis present  Ext: no clubbing cyanosis or edema  Skin: no rashes and no petechiae  Neuro: alert and oriented X 4, no focal deficits  LABS:                          11.0   5.26  )-----------( 233      ( 12 Mar 2018 06:15 )             31.4         Mean Cell Volume : 86.5 fL  Mean Cell Hemoglobin : 30.3 pg  Mean Cell Hemoglobin Concentration : 35.0 %  Auto Neutrophil # : x  Auto Lymphocyte # : x  Auto Monocyte # : x  Auto Eosinophil # : x  Auto Basophil # : x  Auto Neutrophil % : x  Auto Lymphocyte % : x  Auto Monocyte % : x  Auto Eosinophil % : x  Auto Basophil % : x    Serial CBC's  03-12 @ 06:15  Hct-31.4 / Hgb-11.0 / Plat-233 / RBC-3.63 / WBC-5.26          Serial CBC's  03-11 @ 05:00  Hct-31.3 / Hgb-11.1 / Plat-207 / RBC-3.62 / WBC-4.79          Serial CBC's  03-10 @ 05:04  Hct-29.8 / Hgb-10.4 / Plat-201 / RBC-3.43 / WBC-4.57          Serial CBC's  03-09 @ 05:18  Hct-28.0 / Hgb-9.9 / Plat-176 / RBC-3.20 / WBC-3.87            03-12    137  |  102  |  7   ----------------------------<  116<H>  Test not performed SPECIMEN GROSSLY HEMOLYZED   |  23  |  0.68    Ca    7.3<L>      12 Mar 2018 06:15  Phos  2.4     03-12  Mg     1.5     03-12    TPro  5.7<L>  /  Alb  2.0<L>  /  TBili  1.7<H>  /  DBili  x   /  AST  159<H>  /  ALT  61<H>  /  AlkPhos  153<H>  03-12                    BLOOD SMEAR INTERPRETATION:       RADIOLOGY & ADDITIONAL STUDIES:
HPI:  This is a 72F with history of Pancreatic cancer s/p chemo and surgery (whipple in Nov 2017), R Breast cancer s/p RT and R lumpectomy, Pulm nodule s/p R VATS (Bx showing necrotizing granulomas), HTN, HLD, Asthma, and Depression who presents to the hospital with complaints of worsening abdominal distention for the past 4 weeks. Said that she had last followed up with Dr. Titus near the end of February and at that had been sent for some imaging tests which the patient said she completed. She had not heard any results from the doctor and was noting her distention worsening which concerned her and she therefore called Dr. Titus who recommended that she come to the hospital for evaluation. She said that she has some associated epigastric/umbilical pain with the distention, also said that it feels like fluid is moving around inside her stomach. She also has noted worsening b/l LE swelling and as a result has noted some increasing difficulty in her walking and in her b/l knee pain. She also endorsed a change in her urine over this time with it become more red in color. She denies any nausea, vomiting, diarrhea, fevers, chills, or changes in her appetite. No other complaints.    In the ED, her initial vitals were T 98.3, P 83, /81, R 16, O2 sat 100% RA. Her lab work here was most significant for coagulopathy and transaminitis. She had a CT A/P that showed large volume ascites, new hepatic mets, possible colitis of the cecum and ascending colon, and a loculated pleural effusion on the L side. She was started on cipro/flagyl and admitted to medicine. (07 Mar 2018 07:34)     Pt is seen and examined  pt is awake and lying in bed/out of bed to chair  pt seems comfortable and denies any complaints at this time    ROS:  Negative except for: mild abdominal discomfort    MEDICATIONS  (STANDING):  atorvastatin 10 milliGRAM(s) Oral at bedtime  buDESOnide 160 MICROgram(s)/formoterol 4.5 MICROgram(s) Inhaler 2 Puff(s) Inhalation two times a day  furosemide    Tablet 40 milliGRAM(s) Oral daily  lidocaine 1% Injectable 20 milliLiter(s) Local Injection once  losartan 100 milliGRAM(s) Oral daily  oxybutynin 5 milliGRAM(s) Oral at bedtime  pantoprazole    Tablet 40 milliGRAM(s) Oral before breakfast  sertraline 75 milliGRAM(s) Oral daily  spironolactone 25 milliGRAM(s) Oral daily    MEDICATIONS  (PRN):  acetaminophen   Tablet. 650 milliGRAM(s) Oral every 6 hours PRN Mild to moderate pain  ALBUTerol    90 MICROgram(s) HFA Inhaler 2 Puff(s) Inhalation every 6 hours PRN Shortness of Breath and/or Wheezing  magnesium hydroxide Suspension 30 milliLiter(s) Oral daily PRN Constipation  ondansetron Injectable 4 milliGRAM(s) IV Push every 6 hours PRN Nausea and/or Vomiting  traZODone 100 milliGRAM(s) Oral at bedtime PRN Insomnia      Allergies    No Known Drug Allergies  SteriStrips (Blisters)    Intolerances        Vital Signs Last 24 Hrs  T(C): 36.3 (17 Mar 2018 14:14), Max: 36.6 (16 Mar 2018 21:42)  T(F): 97.3 (17 Mar 2018 14:14), Max: 97.8 (16 Mar 2018 21:42)  HR: 88 (17 Mar 2018 14:14) (70 - 88)  BP: 109/71 (17 Mar 2018 14:14) (109/71 - 130/63)  BP(mean): --  RR: 18 (17 Mar 2018 14:14) (17 - 18)  SpO2: 95% (17 Mar 2018 14:14) (95% - 100%)    PHYSICAL EXAM  General: adult in NAD  HEENT: clear oropharynx, anicteric sclera, pink conjunctiva  Neck: supple  CV: normal S1/S2 with no murmur rubs or gallops  Lungs: positive air movement b/l ant lungs,clear to auscultation, no wheezes, no rales  Abdomen: soft non-tender non-distended, no hepatosplenomegaly  Ext: no clubbing cyanosis or edema  Skin: no rashes and no petechiae  Neuro: alert and oriented X 4, no focal deficits  LABS:                          9.9    7.30  )-----------( 209      ( 17 Mar 2018 07:29 )             28.5         Mean Cell Volume : 89.1 fL  Mean Cell Hemoglobin : 30.9 pg  Mean Cell Hemoglobin Concentration : 34.7 %  Auto Neutrophil # : x  Auto Lymphocyte # : x  Auto Monocyte # : x  Auto Eosinophil # : x  Auto Basophil # : x  Auto Neutrophil % : x  Auto Lymphocyte % : x  Auto Monocyte % : x  Auto Eosinophil % : x  Auto Basophil % : x    Serial CBC's  03-17 @ 07:29  Hct-28.5 / Hgb-9.9 / Plat-209 / RBC-3.20 / WBC-7.30          Serial CBC's  03-16 @ 05:45  Hct-27.9 / Hgb-10.2 / Plat-201 / RBC-3.15 / WBC-5.71          Serial CBC's  03-15 @ 04:52  Hct-28.8 / Hgb-10.6 / Plat-221 / RBC-3.27 / WBC-7.34          Serial CBC's  03-14 @ 05:27  Hct-30.1 / Hgb-10.9 / Plat-206 / RBC-3.42 / WBC-7.22            03-17    136  |  102  |  7   ----------------------------<  89  3.4<L>   |  22  |  0.72    Ca    7.4<L>      17 Mar 2018 07:29  Phos  2.9     03-17  Mg     1.8     03-17    TPro  5.3<L>  /  Alb  1.7<L>  /  TBili  1.8<H>  /  DBili  x   /  AST  107<H>  /  ALT  45<H>  /  AlkPhos  195<H>  03-17                    BLOOD SMEAR INTERPRETATION:       RADIOLOGY & ADDITIONAL STUDIES:
HPI:  This is a 72F with history of Pancreatic cancer s/p chemo and surgery (whipple in Nov 2017), R Breast cancer s/p RT and R lumpectomy, Pulm nodule s/p R VATS (Bx showing necrotizing granulomas), HTN, HLD, Asthma, and Depression who presents to the hospital with complaints of worsening abdominal distention for the past 4 weeks. Said that she had last followed up with Dr. Titus near the end of February and at that had been sent for some imaging tests which the patient said she completed. She had not heard any results from the doctor and was noting her distention worsening which concerned her and she therefore called Dr. Titus who recommended that she come to the hospital for evaluation. She said that she has some associated epigastric/umbilical pain with the distention, also said that it feels like fluid is moving around inside her stomach. She also has noted worsening b/l LE swelling and as a result has noted some increasing difficulty in her walking and in her b/l knee pain. She also endorsed a change in her urine over this time with it become more red in color. She denies any nausea, vomiting, diarrhea, fevers, chills, or changes in her appetite. No other complaints.    In the ED, her initial vitals were T 98.3, P 83, /81, R 16, O2 sat 100% RA. Her lab work here was most significant for coagulopathy and transaminitis. She had a CT A/P that showed large volume ascites, new hepatic mets, possible colitis of the cecum and ascending colon, and a loculated pleural effusion on the L side. She was started on cipro/flagyl and admitted to medicine. (07 Mar 2018 07:34)     Pt is seen and examined  pt is awake and lying in bed/out of bed to chair  pt seems comfortable and denies any complaints at this time    ROS:  Negative except for:mild abdominal distension.    MEDICATIONS  (STANDING):  atorvastatin 10 milliGRAM(s) Oral at bedtime  buDESOnide 160 MICROgram(s)/formoterol 4.5 MICROgram(s) Inhaler 2 Puff(s) Inhalation two times a day  ciprofloxacin   IVPB 400 milliGRAM(s) IV Intermittent every 12 hours  enoxaparin Injectable 40 milliGRAM(s) SubCutaneous every 24 hours  furosemide    Tablet 40 milliGRAM(s) Oral daily  losartan 100 milliGRAM(s) Oral daily  metroNIDAZOLE  IVPB      metroNIDAZOLE  IVPB 500 milliGRAM(s) IV Intermittent every 8 hours  oxybutynin 5 milliGRAM(s) Oral at bedtime  pantoprazole    Tablet 40 milliGRAM(s) Oral before breakfast  sertraline 75 milliGRAM(s) Oral daily    MEDICATIONS  (PRN):  acetaminophen   Tablet. 650 milliGRAM(s) Oral every 6 hours PRN Mild to moderate pain  ALBUTerol    90 MICROgram(s) HFA Inhaler 2 Puff(s) Inhalation every 6 hours PRN Shortness of Breath and/or Wheezing  magnesium hydroxide Suspension 30 milliLiter(s) Oral daily PRN Constipation  ondansetron Injectable 4 milliGRAM(s) IV Push every 6 hours PRN Nausea and/or Vomiting  traZODone 100 milliGRAM(s) Oral at bedtime PRN Insomnia      Allergies    No Known Drug Allergies  SteriStrips (Blisters)    Intolerances        Vital Signs Last 24 Hrs  T(C): 36.9 (09 Mar 2018 21:57), Max: 36.9 (09 Mar 2018 05:02)  T(F): 98.4 (09 Mar 2018 21:57), Max: 98.4 (09 Mar 2018 05:02)  HR: 76 (09 Mar 2018 21:57) (70 - 76)  BP: 116/76 (09 Mar 2018 14:25) (116/76 - 133/66)  BP(mean): --  RR: 18 (09 Mar 2018 21:57) (18 - 18)  SpO2: 98% (09 Mar 2018 21:57) (96% - 98%)    PHYSICAL EXAM  General: adult in NAD  HEENT: clear oropharynx, anicteric sclera, pink conjunctiva  Neck: supple  CV: normal S1/S2 with no murmur rubs or gallops  Lungs: positive air movement b/l ant lungs,clear to auscultation, no wheezes, no rales  Abdomen: soft non-tender non-distended, no hepatosplenomegaly  Ext: no clubbing cyanosis or edema  Skin: no rashes and no petechiae  Neuro: alert and oriented X 4, no focal deficits  LABS:                          9.9    3.87  )-----------( 176      ( 09 Mar 2018 05:18 )             28.0         Mean Cell Volume : 87.5 fL  Mean Cell Hemoglobin : 30.9 pg  Mean Cell Hemoglobin Concentration : 35.4 %  Auto Neutrophil # : x  Auto Lymphocyte # : x  Auto Monocyte # : x  Auto Eosinophil # : x  Auto Basophil # : x  Auto Neutrophil % : x  Auto Lymphocyte % : x  Auto Monocyte % : x  Auto Eosinophil % : x  Auto Basophil % : x    Serial CBC's  03-09 @ 05:18  Hct-28.0 / Hgb-9.9 / Plat-176 / RBC-3.20 / WBC-3.87          Serial CBC's  03-08 @ 05:31  Hct-31.5 / Hgb-10.8 / Plat-226 / RBC-3.58 / WBC-6.03          Serial CBC's  03-06 @ 20:40  Hct-34.7 / Hgb-12.1 / Plat-252 / RBC-3.94 / WBC-6.96            03-09    139  |  103  |  8   ----------------------------<  102<H>  3.8   |  27  |  0.67    Ca    7.6<L>      09 Mar 2018 05:18  Phos  3.3     03-08  Mg     1.6     03-08    TPro  5.4<L>  /  Alb  2.3<L>  /  TBili  1.9<H>  /  DBili  x   /  AST  109<H>  /  ALT  47<H>  /  AlkPhos  117  03-09        < from: CT Abdomen and Pelvis w/ IV Cont (03.07.18 @ 01:07) >  CT ABDOMEN AND PELVIS IC        PROCEDURE DATE:  Mar  7 2018       < end of copied text >  < from: CT Abdomen and Pelvis w/ IV Cont (03.07.18 @ 01:07) >  IMPRESSION:   1. New large volume ascites.  2. Status post Whipple. Abnormal enhancement along the course of the   intrahepatic portal vein concerning for metastatic disease.New narrowing   of the intrahepatic IVC without gross filling defect.Unchanged encasement   of the portal confluence.  3. Mural wall thickening and hyperemia of cecum and ascending colonic   loops, difficult to evaluate secondary to inadequate distention.   Correlate clinically to assess for colitis. No bowel obstruction.   4. Loculated left pleural effusion with associated atelectasis of the   left lower lobe.  5. Anasarca.      < end of copied text >              BLOOD SMEAR INTERPRETATION:       RADIOLOGY & ADDITIONAL STUDIES:
INGRID JOSEPH 72y [id number]    Patient is a 72y old  Female who presents with a chief complaint of Abdominal Distention (07 Mar 2018 07:34)      Follow Up/CC:  leukocytosis     Interval History/ROS: no acute issues    Allergies    No Known Drug Allergies  SteriStrips (Blisters)    Intolerances        ANTIMICROBIALS:      MEDICATIONS  (STANDING):  atorvastatin 10 milliGRAM(s) Oral at bedtime  buDESOnide 160 MICROgram(s)/formoterol 4.5 MICROgram(s) Inhaler 2 Puff(s) Inhalation two times a day  furosemide    Tablet 40 milliGRAM(s) Oral daily  losartan 100 milliGRAM(s) Oral daily  oxybutynin 5 milliGRAM(s) Oral at bedtime  pantoprazole    Tablet 40 milliGRAM(s) Oral before breakfast  sertraline 75 milliGRAM(s) Oral daily  spironolactone 25 milliGRAM(s) Oral daily    MEDICATIONS  (PRN):  acetaminophen   Tablet. 650 milliGRAM(s) Oral every 6 hours PRN Mild to moderate pain  ALBUTerol    90 MICROgram(s) HFA Inhaler 2 Puff(s) Inhalation every 6 hours PRN Shortness of Breath and/or Wheezing  magnesium hydroxide Suspension 30 milliLiter(s) Oral daily PRN Constipation  ondansetron Injectable 4 milliGRAM(s) IV Push every 6 hours PRN Nausea and/or Vomiting  traZODone 100 milliGRAM(s) Oral at bedtime PRN Insomnia        Vital Signs Last 24 Hrs  T(C): 36.4 (23 Mar 2018 12:47), Max: 36.7 (23 Mar 2018 05:29)  T(F): 97.5 (23 Mar 2018 12:47), Max: 98.1 (23 Mar 2018 05:29)  HR: 89 (23 Mar 2018 12:47) (67 - 89)  BP: 113/69 (23 Mar 2018 12:47) (100/53 - 113/69)  BP(mean): --  RR: 17 (23 Mar 2018 12:47) (17 - 18)  SpO2: 100% (23 Mar 2018 12:47) (100% - 100%)    CBC Full  -  ( 23 Mar 2018 04:44 )  WBC Count : 6.99 K/uL  Hemoglobin : 9.7 g/dL  Hematocrit : 27.1 %  Platelet Count - Automated : 177 K/uL  Mean Cell Volume : 90.9 fL  Mean Cell Hemoglobin : 32.6 pg  Mean Cell Hemoglobin Concentration : 35.8 %  Auto Neutrophil # : x  Auto Lymphocyte # : x  Auto Monocyte # : x  Auto Eosinophil # : x  Auto Basophil # : x  Auto Neutrophil % : x  Auto Lymphocyte % : x  Auto Monocyte % : x  Auto Eosinophil % : x  Auto Basophil % : x    03-23    136  |  103  |  17  ----------------------------<  104<H>  3.8   |  24  |  0.75    Ca    7.8<L>      23 Mar 2018 04:44  Mg     1.9     03-23    TPro  5.2<L>  /  Alb  1.6<L>  /  TBili  1.7<H>  /  DBili  x   /  AST  63<H>  /  ALT  34<H>  /  AlkPhos  202<H>  03-23    LIVER FUNCTIONS - ( 23 Mar 2018 04:44 )  Alb: 1.6 g/dL / Pro: 5.2 g/dL / ALK PHOS: 202 u/L / ALT: 34 u/L / AST: 63 u/L / GGT: x               MICROBIOLOGY:  ASCITIC FLUID  03-21-18 --  --    WBC^White Blood Cells  QNTY CELLS IN GRAM STAIN: MANY (4+)  NOS^No Organisms Seen      PERITONEAL FLUID  03-08-18 --  --  --      PERITONEAL FLUID  03-08-18 --  --    NOS^No Organisms Seen  WBC^White Blood Cells  QNTY CELLS IN GRAM STAIN: FEW (2+)      RADIOLOGY    < from: IR Procedure (03.21.18 @ 11:18) >  Tunneled ascites catheter was placed as described above.   During the procedure, approximately 1200 mL of ascitic fluid were removed.
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  Insomnia  Hiatal hernia with GERD: no changes  Colitis: due to chemo 1/2017  Malignant neoplasm of pancreas, unspecified location of malignancy: started chemo 10/2016, has mediport - left chest, now s/p whipple 11/2017  Asthma: denies any recent hospitalizations/intubations  Depression  Hyperlipidemia  HTN (hypertension)  Carcinoma of pancreas: s/p whipple  History of lung surgery: right VATS, wedge resection (August 2017) benign neoplasm  History of lumpectomy: right (1996)  Port-a-cath in place  Status post right breast lumpectomy: malignant treated with radiation  H/O abdominal hysterectomy  H/O umbilical hernia repair: with mesh      SOCIAL HISTORY  Alcohol:  Tobacco:  Illicit substance use:    FAMILY HISTORY:    REVIEW OF SYSTEMS:  CONSTITUTIONAL: No fever, weight loss, or fatigue  EYES: No eye pain, visual disturbances, or discharge  ENMT:  No difficulty hearing, tinnitus, vertigo; No sinus or throat pain  NECK: No pain or stiffness  RESPIRATORY: No cough, wheezing, chills or hemoptysis; No shortness of breath  CARDIOVASCULAR: No chest pain, palpitations, dizziness, or leg swelling  GASTROINTESTINAL: No abdominal or epigastric pain. No nausea, vomiting, or hematemesis; No diarrhea or constipation. No melena or hematochezia.  GENITOURINARY: No dysuria, frequency, hematuria, or incontinence  NEUROLOGICAL: No headaches, memory loss, loss of strength, numbness, or tremors  SKIN: No itching, burning, rashes, or lesions   LYMPH NODES: No enlarged glands  ENDOCRINE: No heat or cold intolerance; No hair loss  MUSCULOSKELETAL: No joint pain or swelling; No muscle, back, or extremity pain  PSYCHIATRIC: No depression, anxiety, mood swings, or difficulty sleeping  HEME/LYMPH: No easy bruising, or bleeding gums  ALLERY AND IMMUNOLOGIC: No hives or eczema    RADIOLOGY & ADDITIONAL TESTS:    Imaging Personally Reviewed:  [ ] YES  [ ] NO    Consultant(s) Notes Reviewed:  [ ] YES  [ ] NO    PHYSICAL EXAM:  GENERAL: NAD, well-groomed, well-developed  HEAD:  Atraumatic, Normocephalic  EYES: EOMI, PERRLA, conjunctiva and sclera clear  ENMT: No tonsillar erythema, exudates, or enlargement; Moist mucous membranes, Good dentition, No lesions  NECK: Supple, No JVD, Normal thyroid  NERVOUS SYSTEM:  Alert & Oriented X3, Good concentration; Motor Strength 5/5 B/L upper and lower extremities; DTRs 2+ intact and symmetric  CHEST/LUNG: Clear to percussion bilaterally; No rales, rhonchi, wheezing, or rubs  HEART: Regular rate and rhythm; No murmurs, rubs, or gallops  ABDOMEN: Soft, Nontender, Nondistended; Bowel sounds present  EXTREMITIES:  2+ Peripheral Pulses, No clubbing, cyanosis, or edema  LYMPH: No lymphadenopathy noted  SKIN: No rashes or lesions    LABS:                        10.2   5.71  )-----------( 201      ( 16 Mar 2018 05:45 )             27.9     03-16    137  |  102  |  8   ----------------------------<  94  3.0<L>   |  24  |  0.67    Ca    7.1<L>      16 Mar 2018 05:45  Phos  2.8     03-16  Mg     1.8     03-16    TPro  5.3<L>  /  Alb  1.9<L>  /  TBili  1.7<H>  /  DBili  x   /  AST  86<H>  /  ALT  43<H>  /  AlkPhos  178<H>  03-16    PT/INR - ( 15 Mar 2018 04:52 )   PT: 18.9 SEC;   INR: 1.63          PTT - ( 15 Mar 2018 04:52 )  PTT:31.8 SEC    CAPILLARY BLOOD GLUCOSE                MEDICATIONS  (STANDING):  atorvastatin 10 milliGRAM(s) Oral at bedtime  buDESOnide 160 MICROgram(s)/formoterol 4.5 MICROgram(s) Inhaler 2 Puff(s) Inhalation two times a day  furosemide    Tablet 40 milliGRAM(s) Oral daily  lidocaine 1% Injectable 20 milliLiter(s) Local Injection once  losartan 100 milliGRAM(s) Oral daily  oxybutynin 5 milliGRAM(s) Oral at bedtime  pantoprazole    Tablet 40 milliGRAM(s) Oral before breakfast  sertraline 75 milliGRAM(s) Oral daily  spironolactone 25 milliGRAM(s) Oral daily    MEDICATIONS  (PRN):  acetaminophen   Tablet. 650 milliGRAM(s) Oral every 6 hours PRN Mild to moderate pain  ALBUTerol    90 MICROgram(s) HFA Inhaler 2 Puff(s) Inhalation every 6 hours PRN Shortness of Breath and/or Wheezing  magnesium hydroxide Suspension 30 milliLiter(s) Oral daily PRN Constipation  ondansetron Injectable 4 milliGRAM(s) IV Push every 6 hours PRN Nausea and/or Vomiting  traZODone 100 milliGRAM(s) Oral at bedtime PRN Insomnia      Care Discussed with Consultants/Other Providers [ ] YES  [ ] NO
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  Insomnia  Hiatal hernia with GERD: no changes  Colitis: due to chemo 1/2017  Malignant neoplasm of pancreas, unspecified location of malignancy: started chemo 10/2016, has mediport - left chest, now s/p whipple 11/2017  Asthma: denies any recent hospitalizations/intubations  Depression  Hyperlipidemia  HTN (hypertension)  Carcinoma of pancreas: s/p whipple  History of lung surgery: right VATS, wedge resection (August 2017) benign neoplasm  History of lumpectomy: right (1996)  Port-a-cath in place  Status post right breast lumpectomy: malignant treated with radiation  H/O abdominal hysterectomy  H/O umbilical hernia repair: with mesh      SOCIAL HISTORY  Alcohol:  Tobacco:  Illicit substance use:    FAMILY HISTORY:    REVIEW OF SYSTEMS:  CONSTITUTIONAL: No fever, weight loss, or fatigue  EYES: No eye pain, visual disturbances, or discharge  ENMT:  No difficulty hearing, tinnitus, vertigo; No sinus or throat pain  NECK: No pain or stiffness  RESPIRATORY: No cough, wheezing, chills or hemoptysis; No shortness of breath  CARDIOVASCULAR: No chest pain, palpitations, dizziness, or leg swelling  GASTROINTESTINAL: No abdominal or epigastric pain. No nausea, vomiting, or hematemesis; No diarrhea or constipation. No melena or hematochezia.  GENITOURINARY: No dysuria, frequency, hematuria, or incontinence  NEUROLOGICAL: No headaches, memory loss, loss of strength, numbness, or tremors  SKIN: No itching, burning, rashes, or lesions   LYMPH NODES: No enlarged glands  ENDOCRINE: No heat or cold intolerance; No hair loss  MUSCULOSKELETAL: No joint pain or swelling; No muscle, back, or extremity pain  PSYCHIATRIC: No depression, anxiety, mood swings, or difficulty sleeping  HEME/LYMPH: No easy bruising, or bleeding gums  ALLERY AND IMMUNOLOGIC: No hives or eczema    RADIOLOGY & ADDITIONAL TESTS:    Imaging Personally Reviewed:  [ ] YES  [ ] NO    Consultant(s) Notes Reviewed:  [ ] YES  [ ] NO    PHYSICAL EXAM:  GENERAL: NAD, well-groomed, well-developed  HEAD:  Atraumatic, Normocephalic  EYES: EOMI, PERRLA, conjunctiva and sclera clear  ENMT: No tonsillar erythema, exudates, or enlargement; Moist mucous membranes, Good dentition, No lesions  NECK: Supple, No JVD, Normal thyroid  NERVOUS SYSTEM:  Alert & Oriented X3, Good concentration; Motor Strength 5/5 B/L upper and lower extremities; DTRs 2+ intact and symmetric  CHEST/LUNG: Clear to percussion bilaterally; No rales, rhonchi, wheezing, or rubs  HEART: Regular rate and rhythm; No murmurs, rubs, or gallops  ABDOMEN: Soft, Nontender, Nondistended; Bowel sounds present  EXTREMITIES:  2+ Peripheral Pulses, No clubbing, cyanosis, or edema  LYMPH: No lymphadenopathy noted  SKIN: No rashes or lesions    LABS:                        10.4   4.57  )-----------( 201      ( 10 Mar 2018 05:04 )             29.8     03-10    141  |  105  |  7   ----------------------------<  87  3.1<L>   |  26  |  0.61    Ca    7.3<L>      10 Mar 2018 05:04  Phos  2.7     03-10  Mg     1.5     03-10    TPro  5.3<L>  /  Alb  2.1<L>  /  TBili  1.9<H>  /  DBili  x   /  AST  146<H>  /  ALT  52<H>  /  AlkPhos  139<H>  03-10        CAPILLARY BLOOD GLUCOSE      POCT Blood Glucose.: 144 mg/dL (10 Mar 2018 21:14)            MEDICATIONS  (STANDING):  atorvastatin 10 milliGRAM(s) Oral at bedtime  buDESOnide 160 MICROgram(s)/formoterol 4.5 MICROgram(s) Inhaler 2 Puff(s) Inhalation two times a day  ciprofloxacin   IVPB 400 milliGRAM(s) IV Intermittent every 12 hours  enoxaparin Injectable 40 milliGRAM(s) SubCutaneous every 24 hours  furosemide    Tablet 40 milliGRAM(s) Oral daily  losartan 100 milliGRAM(s) Oral daily  metroNIDAZOLE  IVPB      metroNIDAZOLE  IVPB 500 milliGRAM(s) IV Intermittent every 8 hours  oxybutynin 5 milliGRAM(s) Oral at bedtime  pantoprazole    Tablet 40 milliGRAM(s) Oral before breakfast  sertraline 75 milliGRAM(s) Oral daily    MEDICATIONS  (PRN):  acetaminophen   Tablet. 650 milliGRAM(s) Oral every 6 hours PRN Mild to moderate pain  ALBUTerol    90 MICROgram(s) HFA Inhaler 2 Puff(s) Inhalation every 6 hours PRN Shortness of Breath and/or Wheezing  magnesium hydroxide Suspension 30 milliLiter(s) Oral daily PRN Constipation  ondansetron Injectable 4 milliGRAM(s) IV Push every 6 hours PRN Nausea and/or Vomiting  traZODone 100 milliGRAM(s) Oral at bedtime PRN Insomnia      Care Discussed with Consultants/Other Providers [ ] YES  [ ] NO
Patient is a 72y old  Female who presents with a chief complaint of Abdominal Distention (07 Mar 2018 07:34)    pt. 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  Insomnia  Hiatal hernia with GERD: no changes  Colitis: due to chemo 1/2017  Malignant neoplasm of pancreas, unspecified location of malignancy: started chemo 10/2016, has mediport - left chest, now s/p whipple 11/2017  Asthma: denies any recent hospitalizations/intubations  Depression  Hyperlipidemia  HTN (hypertension)  Carcinoma of pancreas: s/p whipple  History of lung surgery: right VATS, wedge resection (August 2017) benign neoplasm  History of lumpectomy: right (1996)  Port-a-cath in place  Status post right breast lumpectomy: malignant treated with radiation  H/O abdominal hysterectomy  H/O umbilical hernia repair: with mesh      SOCIAL HISTORY  Alcohol:  Tobacco:  Illicit substance use:    FAMILY HISTORY:    REVIEW OF SYSTEMS:  CONSTITUTIONAL: No fever, weight loss, or fatigue  EYES: No eye pain, visual disturbances, or discharge  ENMT:  No difficulty hearing, tinnitus, vertigo; No sinus or throat pain  NECK: No pain or stiffness  RESPIRATORY: No cough, wheezing, chills or hemoptysis; No shortness of breath  CARDIOVASCULAR: No chest pain, palpitations, dizziness, or leg swelling  GASTROINTESTINAL: No abdominal or epigastric pain. No nausea, vomiting, or hematemesis; No diarrhea or constipation. No melena or hematochezia.  GENITOURINARY: No dysuria, frequency, hematuria, or incontinence  NEUROLOGICAL: No headaches, memory loss, loss of strength, numbness, or tremors  SKIN: No itching, burning, rashes, or lesions   LYMPH NODES: No enlarged glands  ENDOCRINE: No heat or cold intolerance; No hair loss  MUSCULOSKELETAL: No joint pain or swelling; No muscle, back, or extremity pain  PSYCHIATRIC: No depression, anxiety, mood swings, or difficulty sleeping  HEME/LYMPH: No easy bruising, or bleeding gums  ALLERY AND IMMUNOLOGIC: No hives or eczema    RADIOLOGY & ADDITIONAL TESTS:    Imaging Personally Reviewed:  [ ] YES  [ ] NO    Consultant(s) Notes Reviewed:  [ ] YES  [ ] NO    PHYSICAL EXAM:  GENERAL: NAD, well-groomed, well-developed  HEAD:  Atraumatic, Normocephalic  EYES: EOMI, PERRLA, conjunctiva and sclera clear  ENMT: No tonsillar erythema, exudates, or enlargement; Moist mucous membranes, Good dentition, No lesions  NECK: Supple, No JVD, Normal thyroid  NERVOUS SYSTEM:  Alert & Oriented X3, Good concentration; Motor Strength 5/5 B/L upper and lower extremities; DTRs 2+ intact and symmetric  CHEST/LUNG: Clear to percussion bilaterally; No rales, rhonchi, wheezing, or rubs  HEART: Regular rate and rhythm; No murmurs, rubs, or gallops  ABDOMEN: Soft, Nontender, Nondistended; Bowel sounds present  EXTREMITIES:  2+ Peripheral Pulses, No clubbing, cyanosis, or edema  LYMPH: No lymphadenopathy noted  SKIN: No rashes or lesions    LABS:                        11.1   11.92 )-----------( 242      ( 21 Mar 2018 06:55 )             31.7     03-21    137  |  102  |  14  ----------------------------<  118<H>  4.4   |  20<L>  |  0.80    Ca    8.0<L>      21 Mar 2018 06:55  Phos  3.6     03-21  Mg     2.7     03-21    TPro  6.0  /  Alb  1.9<L>  /  TBili  2.4<H>  /  DBili  x   /  AST  72<H>  /  ALT  38<H>  /  AlkPhos  208<H>  03-21        CAPILLARY BLOOD GLUCOSE      POCT Blood Glucose.: 104 mg/dL (21 Mar 2018 12:35)            MEDICATIONS  (STANDING):  atorvastatin 10 milliGRAM(s) Oral at bedtime  buDESOnide 160 MICROgram(s)/formoterol 4.5 MICROgram(s) Inhaler 2 Puff(s) Inhalation two times a day  furosemide    Tablet 40 milliGRAM(s) Oral daily  lidocaine 1% Injectable 20 milliLiter(s) Local Injection once  losartan 100 milliGRAM(s) Oral daily  oxybutynin 5 milliGRAM(s) Oral at bedtime  pantoprazole    Tablet 40 milliGRAM(s) Oral before breakfast  sertraline 75 milliGRAM(s) Oral daily  spironolactone 25 milliGRAM(s) Oral daily    MEDICATIONS  (PRN):  acetaminophen   Tablet. 650 milliGRAM(s) Oral every 6 hours PRN Mild to moderate pain  ALBUTerol    90 MICROgram(s) HFA Inhaler 2 Puff(s) Inhalation every 6 hours PRN Shortness of Breath and/or Wheezing  magnesium hydroxide Suspension 30 milliLiter(s) Oral daily PRN Constipation  ondansetron Injectable 4 milliGRAM(s) IV Push every 6 hours PRN Nausea and/or Vomiting  traZODone 100 milliGRAM(s) Oral at bedtime PRN Insomnia      Care Discussed with Consultants/Other Providers [ ] YES  [ ] NO
Patient is a 72y old  Female who presents with a chief complaint of Abdominal Distention (07 Mar 2018 07:34)    pt. 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  Insomnia  Hiatal hernia with GERD: no changes  Colitis: due to chemo 1/2017  Malignant neoplasm of pancreas, unspecified location of malignancy: started chemo 10/2016, has mediport - left chest, now s/p whipple 11/2017  Asthma: denies any recent hospitalizations/intubations  Depression  Hyperlipidemia  HTN (hypertension)  Carcinoma of pancreas: s/p whipple  History of lung surgery: right VATS, wedge resection (August 2017) benign neoplasm  History of lumpectomy: right (1996)  Port-a-cath in place  Status post right breast lumpectomy: malignant treated with radiation  H/O abdominal hysterectomy  H/O umbilical hernia repair: with mesh      SOCIAL HISTORY  Alcohol:  Tobacco:  Illicit substance use:    FAMILY HISTORY:    REVIEW OF SYSTEMS:  CONSTITUTIONAL: No fever, weight loss, or fatigue  EYES: No eye pain, visual disturbances, or discharge  ENMT:  No difficulty hearing, tinnitus, vertigo; No sinus or throat pain  NECK: No pain or stiffness  RESPIRATORY: No cough, wheezing, chills or hemoptysis; No shortness of breath  CARDIOVASCULAR: No chest pain, palpitations, dizziness, or leg swelling  GASTROINTESTINAL: No abdominal or epigastric pain. No nausea, vomiting, or hematemesis; No diarrhea or constipation. No melena or hematochezia.  GENITOURINARY: No dysuria, frequency, hematuria, or incontinence  NEUROLOGICAL: No headaches, memory loss, loss of strength, numbness, or tremors  SKIN: No itching, burning, rashes, or lesions   LYMPH NODES: No enlarged glands  ENDOCRINE: No heat or cold intolerance; No hair loss  MUSCULOSKELETAL: No joint pain or swelling; No muscle, back, or extremity pain  PSYCHIATRIC: No depression, anxiety, mood swings, or difficulty sleeping  HEME/LYMPH: No easy bruising, or bleeding gums  ALLERY AND IMMUNOLOGIC: No hives or eczema    RADIOLOGY & ADDITIONAL TESTS:    Imaging Personally Reviewed:  [ ] YES  [ ] NO    Consultant(s) Notes Reviewed:  [ ] YES  [ ] NO    PHYSICAL EXAM:  GENERAL: NAD, well-groomed, well-developed  HEAD:  Atraumatic, Normocephalic  EYES: EOMI, PERRLA, conjunctiva and sclera clear  ENMT: No tonsillar erythema, exudates, or enlargement; Moist mucous membranes, Good dentition, No lesions  NECK: Supple, No JVD, Normal thyroid  NERVOUS SYSTEM:  Alert & Oriented X3, Good concentration; Motor Strength 5/5 B/L upper and lower extremities; DTRs 2+ intact and symmetric  CHEST/LUNG: Clear to percussion bilaterally; No rales, rhonchi, wheezing, or rubs  HEART: Regular rate and rhythm; No murmurs, rubs, or gallops  ABDOMEN: Soft, Nontender, Nondistended; Bowel sounds present  EXTREMITIES:  2+ Peripheral Pulses, No clubbing, cyanosis, or edema  LYMPH: No lymphadenopathy noted  SKIN: No rashes or lesions    LABS:                        9.9    7.30  )-----------( 209      ( 17 Mar 2018 07:29 )             28.5     03-18    133<L>  |  100  |  8   ----------------------------<  188<H>  3.9   |  21<L>  |  0.71    Ca    8.0<L>      18 Mar 2018 18:00  Phos  2.5     03-18  Mg     2.1     03-18    TPro  4.6<L>  /  Alb  1.6<L>  /  TBili  1.6<H>  /  DBili  x   /  AST  97<H>  /  ALT  41<H>  /  AlkPhos  171<H>  03-18        CAPILLARY BLOOD GLUCOSE                MEDICATIONS  (STANDING):  atorvastatin 10 milliGRAM(s) Oral at bedtime  buDESOnide 160 MICROgram(s)/formoterol 4.5 MICROgram(s) Inhaler 2 Puff(s) Inhalation two times a day  furosemide    Tablet 40 milliGRAM(s) Oral daily  lidocaine 1% Injectable 20 milliLiter(s) Local Injection once  losartan 100 milliGRAM(s) Oral daily  oxybutynin 5 milliGRAM(s) Oral at bedtime  pantoprazole    Tablet 40 milliGRAM(s) Oral before breakfast  potassium acid phosphate/sodium acid phosphate tablet (K-PHOS No. 2) 1 Tablet(s) Oral four times a day with meals  sertraline 75 milliGRAM(s) Oral daily  spironolactone 25 milliGRAM(s) Oral daily    MEDICATIONS  (PRN):  acetaminophen   Tablet. 650 milliGRAM(s) Oral every 6 hours PRN Mild to moderate pain  ALBUTerol    90 MICROgram(s) HFA Inhaler 2 Puff(s) Inhalation every 6 hours PRN Shortness of Breath and/or Wheezing  magnesium hydroxide Suspension 30 milliLiter(s) Oral daily PRN Constipation  ondansetron Injectable 4 milliGRAM(s) IV Push every 6 hours PRN Nausea and/or Vomiting  traZODone 100 milliGRAM(s) Oral at bedtime PRN Insomnia      Care Discussed with Consultants/Other Providers [ ] YES  [ ] NO
Patient is a 72y old  Female who presents with a chief complaint of Abdominal Distention (07 Mar 2018 07:34)    pt. 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  Insomnia  Hiatal hernia with GERD: no changes  Colitis: due to chemo 1/2017  Malignant neoplasm of pancreas, unspecified location of malignancy: started chemo 10/2016, has mediport - left chest, now s/p whipple 11/2017  Asthma: denies any recent hospitalizations/intubations  Depression  Hyperlipidemia  HTN (hypertension)  Carcinoma of pancreas: s/p whipple  History of lung surgery: right VATS, wedge resection (August 2017) benign neoplasm  History of lumpectomy: right (1996)  Port-a-cath in place  Status post right breast lumpectomy: malignant treated with radiation  H/O abdominal hysterectomy  H/O umbilical hernia repair: with mesh      SOCIAL HISTORY  Alcohol:  Tobacco:  Illicit substance use:    FAMILY HISTORY:    REVIEW OF SYSTEMS:  CONSTITUTIONAL: No fever, weight loss, or fatigue  EYES: No eye pain, visual disturbances, or discharge  ENMT:  No difficulty hearing, tinnitus, vertigo; No sinus or throat pain  NECK: No pain or stiffness  RESPIRATORY: No cough, wheezing, chills or hemoptysis; No shortness of breath  CARDIOVASCULAR: No chest pain, palpitations, dizziness, or leg swelling  GASTROINTESTINAL: No abdominal or epigastric pain. No nausea, vomiting, or hematemesis; No diarrhea or constipation. No melena or hematochezia.  GENITOURINARY: No dysuria, frequency, hematuria, or incontinence  NEUROLOGICAL: No headaches, memory loss, loss of strength, numbness, or tremors  SKIN: No itching, burning, rashes, or lesions   LYMPH NODES: No enlarged glands  ENDOCRINE: No heat or cold intolerance; No hair loss  MUSCULOSKELETAL: No joint pain or swelling; No muscle, back, or extremity pain  PSYCHIATRIC: No depression, anxiety, mood swings, or difficulty sleeping  HEME/LYMPH: No easy bruising, or bleeding gums  ALLERY AND IMMUNOLOGIC: No hives or eczema    RADIOLOGY & ADDITIONAL TESTS:    Imaging Personally Reviewed:  [ ] YES  [ ] NO    Consultant(s) Notes Reviewed:  [ ] YES  [ ] NO    PHYSICAL EXAM:  GENERAL: NAD, well-groomed, well-developed  HEAD:  Atraumatic, Normocephalic  EYES: EOMI, PERRLA, conjunctiva and sclera clear  ENMT: No tonsillar erythema, exudates, or enlargement; Moist mucous membranes, Good dentition, No lesions  NECK: Supple, No JVD, Normal thyroid  NERVOUS SYSTEM:  Alert & Oriented X3, Good concentration; Motor Strength 5/5 B/L upper and lower extremities; DTRs 2+ intact and symmetric  CHEST/LUNG: Clear to percussion bilaterally; No rales, rhonchi, wheezing, or rubs  HEART: Regular rate and rhythm; No murmurs, rubs, or gallops  ABDOMEN: Soft, Nontender, Nondistended; Bowel sounds present  EXTREMITIES:  2+ Peripheral Pulses, No clubbing, cyanosis, or edema  LYMPH: No lymphadenopathy noted  SKIN: No rashes or lesions    LABS:                        10.9   7.22  )-----------( 206      ( 14 Mar 2018 05:27 )             30.1     03-14    139  |  105  |  6<L>  ----------------------------<  106<H>  3.3<L>   |  24  |  0.64    Ca    7.1<L>      14 Mar 2018 05:27  Phos  2.5     03-14  Mg     1.6     03-14      PT/INR - ( 13 Mar 2018 10:45 )   PT: 18.8 SEC;   INR: 1.68              CAPILLARY BLOOD GLUCOSE                MEDICATIONS  (STANDING):  atorvastatin 10 milliGRAM(s) Oral at bedtime  buDESOnide 160 MICROgram(s)/formoterol 4.5 MICROgram(s) Inhaler 2 Puff(s) Inhalation two times a day  ciprofloxacin     Tablet 500 milliGRAM(s) Oral every 12 hours  furosemide    Tablet 40 milliGRAM(s) Oral daily  losartan 100 milliGRAM(s) Oral daily  metroNIDAZOLE    Tablet 500 milliGRAM(s) Oral every 8 hours  oxybutynin 5 milliGRAM(s) Oral at bedtime  pantoprazole    Tablet 40 milliGRAM(s) Oral before breakfast  sertraline 75 milliGRAM(s) Oral daily    MEDICATIONS  (PRN):  acetaminophen   Tablet. 650 milliGRAM(s) Oral every 6 hours PRN Mild to moderate pain  ALBUTerol    90 MICROgram(s) HFA Inhaler 2 Puff(s) Inhalation every 6 hours PRN Shortness of Breath and/or Wheezing  magnesium hydroxide Suspension 30 milliLiter(s) Oral daily PRN Constipation  ondansetron Injectable 4 milliGRAM(s) IV Push every 6 hours PRN Nausea and/or Vomiting  traZODone 100 milliGRAM(s) Oral at bedtime PRN Insomnia      Care Discussed with Consultants/Other Providers [ ] YES  [ ] NO
Patient is a 72y old  Female who presents with a chief complaint of Abdominal Distention (07 Mar 2018 07:34)    pt. 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  Insomnia  Hiatal hernia with GERD: no changes  Colitis: due to chemo 1/2017  Malignant neoplasm of pancreas, unspecified location of malignancy: started chemo 10/2016, has mediport - left chest, now s/p whipple 11/2017  Asthma: denies any recent hospitalizations/intubations  Depression  Hyperlipidemia  HTN (hypertension)  Carcinoma of pancreas: s/p whipple  History of lung surgery: right VATS, wedge resection (August 2017) benign neoplasm  History of lumpectomy: right (1996)  Port-a-cath in place  Status post right breast lumpectomy: malignant treated with radiation  H/O abdominal hysterectomy  H/O umbilical hernia repair: with mesh      SOCIAL HISTORY  Alcohol:  Tobacco:  Illicit substance use:    FAMILY HISTORY:    REVIEW OF SYSTEMS:  CONSTITUTIONAL: No fever, weight loss, or fatigue  EYES: No eye pain, visual disturbances, or discharge  ENMT:  No difficulty hearing, tinnitus, vertigo; No sinus or throat pain  NECK: No pain or stiffness  RESPIRATORY: No cough, wheezing, chills or hemoptysis; No shortness of breath  CARDIOVASCULAR: No chest pain, palpitations, dizziness, or leg swelling  GASTROINTESTINAL: No abdominal or epigastric pain. No nausea, vomiting, or hematemesis; No diarrhea or constipation. No melena or hematochezia.  GENITOURINARY: No dysuria, frequency, hematuria, or incontinence  NEUROLOGICAL: No headaches, memory loss, loss of strength, numbness, or tremors  SKIN: No itching, burning, rashes, or lesions   LYMPH NODES: No enlarged glands  ENDOCRINE: No heat or cold intolerance; No hair loss  MUSCULOSKELETAL: No joint pain or swelling; No muscle, back, or extremity pain  PSYCHIATRIC: No depression, anxiety, mood swings, or difficulty sleeping  HEME/LYMPH: No easy bruising, or bleeding gums  ALLERY AND IMMUNOLOGIC: No hives or eczema    RADIOLOGY & ADDITIONAL TESTS:    Imaging Personally Reviewed:  [ ] YES  [ ] NO    Consultant(s) Notes Reviewed:  [ ] YES  [ ] NO    PHYSICAL EXAM:  GENERAL: NAD, well-groomed, well-developed  HEAD:  Atraumatic, Normocephalic  EYES: EOMI, PERRLA, conjunctiva and sclera clear  ENMT: No tonsillar erythema, exudates, or enlargement; Moist mucous membranes, Good dentition, No lesions  NECK: Supple, No JVD, Normal thyroid  NERVOUS SYSTEM:  Alert & Oriented X3, Good concentration; Motor Strength 5/5 B/L upper and lower extremities; DTRs 2+ intact and symmetric  CHEST/LUNG: Clear to percussion bilaterally; No rales, rhonchi, wheezing, or rubs  HEART: Regular rate and rhythm; No murmurs, rubs, or gallops  ABDOMEN: Soft, Nontender, Nondistended; Bowel sounds present  EXTREMITIES:  2+ Peripheral Pulses, No clubbing, cyanosis, or edema  LYMPH: No lymphadenopathy noted  SKIN: No rashes or lesions    LABS:                        10.5   7.49  )-----------( 232      ( 19 Mar 2018 06:46 )             30.7     03-19    141  |  108<H>  |  8   ----------------------------<  109<H>  4.9   |  24  |  0.69    Ca    7.7<L>      19 Mar 2018 06:46  Phos  2.7     03-19  Mg     1.9     03-19    TPro  5.6<L>  /  Alb  1.9<L>  /  TBili  1.8<H>  /  DBili  x   /  AST  104<H>  /  ALT  44<H>  /  AlkPhos  201<H>  03-19        CAPILLARY BLOOD GLUCOSE                MEDICATIONS  (STANDING):  atorvastatin 10 milliGRAM(s) Oral at bedtime  buDESOnide 160 MICROgram(s)/formoterol 4.5 MICROgram(s) Inhaler 2 Puff(s) Inhalation two times a day  furosemide    Tablet 40 milliGRAM(s) Oral daily  lidocaine 1% Injectable 20 milliLiter(s) Local Injection once  losartan 100 milliGRAM(s) Oral daily  oxybutynin 5 milliGRAM(s) Oral at bedtime  pantoprazole    Tablet 40 milliGRAM(s) Oral before breakfast  sertraline 75 milliGRAM(s) Oral daily  spironolactone 25 milliGRAM(s) Oral daily    MEDICATIONS  (PRN):  acetaminophen   Tablet. 650 milliGRAM(s) Oral every 6 hours PRN Mild to moderate pain  ALBUTerol    90 MICROgram(s) HFA Inhaler 2 Puff(s) Inhalation every 6 hours PRN Shortness of Breath and/or Wheezing  magnesium hydroxide Suspension 30 milliLiter(s) Oral daily PRN Constipation  ondansetron Injectable 4 milliGRAM(s) IV Push every 6 hours PRN Nausea and/or Vomiting  traZODone 100 milliGRAM(s) Oral at bedtime PRN Insomnia      Care Discussed with Consultants/Other Providers [ ] YES  [ ] NO
Patient is a 72y old  Female who presents with a chief complaint of Abdominal Distention (07 Mar 2018 07:34)    pt. 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  Insomnia  Hiatal hernia with GERD: no changes  Colitis: due to chemo 1/2017  Malignant neoplasm of pancreas, unspecified location of malignancy: started chemo 10/2016, has mediport - left chest, now s/p whipple 11/2017  Asthma: denies any recent hospitalizations/intubations  Depression  Hyperlipidemia  HTN (hypertension)  Carcinoma of pancreas: s/p whipple  History of lung surgery: right VATS, wedge resection (August 2017) benign neoplasm  History of lumpectomy: right (1996)  Port-a-cath in place  Status post right breast lumpectomy: malignant treated with radiation  H/O abdominal hysterectomy  H/O umbilical hernia repair: with mesh      SOCIAL HISTORY  Alcohol:  Tobacco:  Illicit substance use:    FAMILY HISTORY:    REVIEW OF SYSTEMS:  CONSTITUTIONAL: No fever, weight loss, or fatigue  EYES: No eye pain, visual disturbances, or discharge  ENMT:  No difficulty hearing, tinnitus, vertigo; No sinus or throat pain  NECK: No pain or stiffness  RESPIRATORY: No cough, wheezing, chills or hemoptysis; No shortness of breath  CARDIOVASCULAR: No chest pain, palpitations, dizziness, or leg swelling  GASTROINTESTINAL: No abdominal or epigastric pain. No nausea, vomiting, or hematemesis; No diarrhea or constipation. No melena or hematochezia.  GENITOURINARY: No dysuria, frequency, hematuria, or incontinence  NEUROLOGICAL: No headaches, memory loss, loss of strength, numbness, or tremors  SKIN: No itching, burning, rashes, or lesions   LYMPH NODES: No enlarged glands  ENDOCRINE: No heat or cold intolerance; No hair loss  MUSCULOSKELETAL: No joint pain or swelling; No muscle, back, or extremity pain  PSYCHIATRIC: No depression, anxiety, mood swings, or difficulty sleeping  HEME/LYMPH: No easy bruising, or bleeding gums  ALLERY AND IMMUNOLOGIC: No hives or eczema    RADIOLOGY & ADDITIONAL TESTS:    Imaging Personally Reviewed:  [ ] YES  [ ] NO    Consultant(s) Notes Reviewed:  [ ] YES  [ ] NO    PHYSICAL EXAM:  GENERAL: NAD, well-groomed, well-developed  HEAD:  Atraumatic, Normocephalic  EYES: EOMI, PERRLA, conjunctiva and sclera clear  ENMT: No tonsillar erythema, exudates, or enlargement; Moist mucous membranes, Good dentition, No lesions  NECK: Supple, No JVD, Normal thyroid  NERVOUS SYSTEM:  Alert & Oriented X3, Good concentration; Motor Strength 5/5 B/L upper and lower extremities; DTRs 2+ intact and symmetric  CHEST/LUNG: Clear to percussion bilaterally; No rales, rhonchi, wheezing, or rubs  HEART: Regular rate and rhythm; No murmurs, rubs, or gallops  ABDOMEN: Soft,distension decrease  EXTREMITIES:  2+ Peripheral Pulses, No clubbing, cyanosis, or edema  LYMPH: No lymphadenopathy noted  SKIN: No rashes or lesions    LABS:                        9.9    3.87  )-----------( 176      ( 09 Mar 2018 05:18 )             28.0     03-09    139  |  103  |  8   ----------------------------<  102<H>  3.8   |  27  |  0.67    Ca    7.6<L>      09 Mar 2018 05:18  Phos  3.3     03-08  Mg     1.6     03-08    TPro  5.4<L>  /  Alb  2.3<L>  /  TBili  1.9<H>  /  DBili  x   /  AST  109<H>  /  ALT  47<H>  /  AlkPhos  117  03-09        CAPILLARY BLOOD GLUCOSE                MEDICATIONS  (STANDING):  atorvastatin 10 milliGRAM(s) Oral at bedtime  buDESOnide 160 MICROgram(s)/formoterol 4.5 MICROgram(s) Inhaler 2 Puff(s) Inhalation two times a day  ciprofloxacin   IVPB 400 milliGRAM(s) IV Intermittent every 12 hours  enoxaparin Injectable 40 milliGRAM(s) SubCutaneous every 24 hours  furosemide    Tablet 40 milliGRAM(s) Oral daily  losartan 100 milliGRAM(s) Oral daily  metroNIDAZOLE  IVPB      metroNIDAZOLE  IVPB 500 milliGRAM(s) IV Intermittent every 8 hours  oxybutynin 5 milliGRAM(s) Oral at bedtime  pantoprazole    Tablet 40 milliGRAM(s) Oral before breakfast  sertraline 75 milliGRAM(s) Oral daily    MEDICATIONS  (PRN):  acetaminophen   Tablet. 650 milliGRAM(s) Oral every 6 hours PRN Mild to moderate pain  ALBUTerol    90 MICROgram(s) HFA Inhaler 2 Puff(s) Inhalation every 6 hours PRN Shortness of Breath and/or Wheezing  magnesium hydroxide Suspension 30 milliLiter(s) Oral daily PRN Constipation  ondansetron Injectable 4 milliGRAM(s) IV Push every 6 hours PRN Nausea and/or Vomiting  traZODone 100 milliGRAM(s) Oral at bedtime PRN Insomnia      Care Discussed with Consultants/Other Providers [ ] YES  [ ] NO
Patient is a 72y old  Female who presents with a chief complaint of Abdominal Distention (07 Mar 2018 07:34)    pt. 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  Insomnia  Hiatal hernia with GERD: no changes  Colitis: due to chemo 1/2017  Malignant neoplasm of pancreas, unspecified location of malignancy: started chemo 10/2016, has mediport - left chest, now s/p whipple 11/2017  Asthma: denies any recent hospitalizations/intubations  Depression  Hyperlipidemia  HTN (hypertension)  Carcinoma of pancreas: s/p whipple  History of lung surgery: right VATS, wedge resection (August 2017) benign neoplasm  History of lumpectomy: right (1996)  Port-a-cath in place  Status post right breast lumpectomy: malignant treated with radiation  H/O abdominal hysterectomy  H/O umbilical hernia repair: with mesh      SOCIAL HISTORY  Alcohol:  Tobacco:  Illicit substance use:    FAMILY HISTORY:    REVIEW OF SYSTEMS:  CONSTITUTIONAL: No fever, weight loss, or fatigue  EYES: No eye pain, visual disturbances, or discharge  ENMT:  No difficulty hearing, tinnitus, vertigo; No sinus or throat pain  NECK: No pain or stiffness  RESPIRATORY: No cough, wheezing, chills or hemoptysis; No shortness of breath  CARDIOVASCULAR: No chest pain, palpitations, dizziness, or leg swelling  GASTROINTESTINAL: No abdominal or epigastric pain. No nausea, vomiting, or hematemesis; No diarrhea or constipation. No melena or hematochezia.  GENITOURINARY: No dysuria, frequency, hematuria, or incontinence  NEUROLOGICAL: No headaches, memory loss, loss of strength, numbness, or tremors  SKIN: No itching, burning, rashes, or lesions   LYMPH NODES: No enlarged glands  ENDOCRINE: No heat or cold intolerance; No hair loss  MUSCULOSKELETAL: No joint pain or swelling; No muscle, back, or extremity pain  PSYCHIATRIC: No depression, anxiety, mood swings, or difficulty sleeping  HEME/LYMPH: No easy bruising, or bleeding gums  ALLERY AND IMMUNOLOGIC: No hives or eczema    RADIOLOGY & ADDITIONAL TESTS:    Imaging Personally Reviewed:  [ ] YES  [ ] NO    Consultant(s) Notes Reviewed:  [ ] YES  [ ] NO    PHYSICAL EXAM:  GENERAL: NAD, well-groomed, well-developed  HEAD:  Atraumatic, Normocephalic  EYES: EOMI, PERRLA, conjunctiva and sclera clear  ENMT: No tonsillar erythema, exudates, or enlargement; Moist mucous membranes, Good dentition, No lesions  NECK: Supple, No JVD, Normal thyroid  NERVOUS SYSTEM:  Alert & Oriented X3, Good concentration; Motor Strength 5/5 B/L upper and lower extremities; DTRs 2+ intact and symmetric  CHEST/LUNG: Clear to percussion bilaterally; No rales, rhonchi, wheezing, or rubs  HEART: Regular rate and rhythm; No murmurs, rubs, or gallops  ABDOMEN: Soft, Nontender, Nondistended; Bowel sounds present  EXTREMITIES:  2+ Peripheral Pulses, No clubbing, cyanosis, or edema  LYMPH: No lymphadenopathy noted  SKIN: No rashes or lesions    LABS:                        9.9    7.30  )-----------( 209      ( 17 Mar 2018 07:29 )             28.5     03-17    136  |  102  |  7   ----------------------------<  89  3.4<L>   |  22  |  0.72    Ca    7.4<L>      17 Mar 2018 07:29  Phos  2.9     03-17  Mg     1.8     03-17    TPro  5.3<L>  /  Alb  1.7<L>  /  TBili  1.8<H>  /  DBili  x   /  AST  107<H>  /  ALT  45<H>  /  AlkPhos  195<H>  03-17        CAPILLARY BLOOD GLUCOSE                MEDICATIONS  (STANDING):  atorvastatin 10 milliGRAM(s) Oral at bedtime  buDESOnide 160 MICROgram(s)/formoterol 4.5 MICROgram(s) Inhaler 2 Puff(s) Inhalation two times a day  furosemide    Tablet 40 milliGRAM(s) Oral daily  lidocaine 1% Injectable 20 milliLiter(s) Local Injection once  losartan 100 milliGRAM(s) Oral daily  oxybutynin 5 milliGRAM(s) Oral at bedtime  pantoprazole    Tablet 40 milliGRAM(s) Oral before breakfast  sertraline 75 milliGRAM(s) Oral daily  spironolactone 25 milliGRAM(s) Oral daily    MEDICATIONS  (PRN):  acetaminophen   Tablet. 650 milliGRAM(s) Oral every 6 hours PRN Mild to moderate pain  ALBUTerol    90 MICROgram(s) HFA Inhaler 2 Puff(s) Inhalation every 6 hours PRN Shortness of Breath and/or Wheezing  magnesium hydroxide Suspension 30 milliLiter(s) Oral daily PRN Constipation  ondansetron Injectable 4 milliGRAM(s) IV Push every 6 hours PRN Nausea and/or Vomiting  traZODone 100 milliGRAM(s) Oral at bedtime PRN Insomnia      Care Discussed with Consultants/Other Providers [ ] YES  [ ] NO
Patient is a 72y old  Female who presents with a chief complaint of Abdominal Distention (07 Mar 2018 07:34)    pt. seen and examined, no fever, wbc increased. 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  Insomnia  Hiatal hernia with GERD: no changes  Colitis: due to chemo 1/2017  Malignant neoplasm of pancreas, unspecified location of malignancy: started chemo 10/2016, has mediport - left chest, now s/p whipple 11/2017  Asthma: denies any recent hospitalizations/intubations  Depression  Hyperlipidemia  HTN (hypertension)  Carcinoma of pancreas: s/p whipple  History of lung surgery: right VATS, wedge resection (August 2017) benign neoplasm  History of lumpectomy: right (1996)  Port-a-cath in place  Status post right breast lumpectomy: malignant treated with radiation  H/O abdominal hysterectomy  H/O umbilical hernia repair: with mesh      SOCIAL HISTORY  Alcohol:  Tobacco:  Illicit substance use:    FAMILY HISTORY:    REVIEW OF SYSTEMS:  CONSTITUTIONAL: No fever, weight loss, or fatigue  EYES: No eye pain, visual disturbances, or discharge  ENMT:  No difficulty hearing, tinnitus, vertigo; No sinus or throat pain  NECK: No pain or stiffness  RESPIRATORY: No cough, wheezing, chills or hemoptysis; No shortness of breath  CARDIOVASCULAR: No chest pain, palpitations, dizziness, or leg swelling  GASTROINTESTINAL: No abdominal or epigastric pain. No nausea, vomiting, or hematemesis; No diarrhea or constipation. No melena or hematochezia.  GENITOURINARY: No dysuria, frequency, hematuria, or incontinence  NEUROLOGICAL: No headaches, memory loss, loss of strength, numbness, or tremors  SKIN: No itching, burning, rashes, or lesions   LYMPH NODES: No enlarged glands  ENDOCRINE: No heat or cold intolerance; No hair loss  MUSCULOSKELETAL: No joint pain or swelling; No muscle, back, or extremity pain  PSYCHIATRIC: No depression, anxiety, mood swings, or difficulty sleeping  HEME/LYMPH: No easy bruising, or bleeding gums  ALLERY AND IMMUNOLOGIC: No hives or eczema    RADIOLOGY & ADDITIONAL TESTS:    Imaging Personally Reviewed:  [ ] YES  [ ] NO    Consultant(s) Notes Reviewed:  [ ] YES  [ ] NO    PHYSICAL EXAM:  GENERAL: NAD, well-groomed, well-developed  HEAD:  Atraumatic, Normocephalic  EYES: EOMI, PERRLA, conjunctiva and sclera clear  ENMT: No tonsillar erythema, exudates, or enlargement; Moist mucous membranes, Good dentition, No lesions  NECK: Supple, No JVD, Normal thyroid  NERVOUS SYSTEM:  Alert & Oriented X3, Good concentration; Motor Strength 5/5 B/L upper and lower extremities; DTRs 2+ intact and symmetric  CHEST/LUNG: Clear to percussion bilaterally; No rales, rhonchi, wheezing, or rubs  HEART: Regular rate and rhythm; No murmurs, rubs, or gallops  ABDOMEN: Soft, Nontender, Nondistended; Bowel sounds present  EXTREMITIES:  2+ Peripheral Pulses, No clubbing, cyanosis, or edema  LYMPH: No lymphadenopathy noted  SKIN: No rashes or lesions    LABS:                        11.3   13.86 )-----------( 222      ( 20 Mar 2018 20:05 )             31.4     03-20    138  |  106  |  9   ----------------------------<  124<H>  4.3   |  24  |  0.72    Ca    7.6<L>      20 Mar 2018 04:40  Phos  2.9     03-20  Mg     1.7     03-20    TPro  5.4<L>  /  Alb  1.9<L>  /  TBili  2.1<H>  /  DBili  x   /  AST  77<H>  /  ALT  41<H>  /  AlkPhos  194<H>  03-20        CAPILLARY BLOOD GLUCOSE                MEDICATIONS  (STANDING):  atorvastatin 10 milliGRAM(s) Oral at bedtime  buDESOnide 160 MICROgram(s)/formoterol 4.5 MICROgram(s) Inhaler 2 Puff(s) Inhalation two times a day  furosemide    Tablet 40 milliGRAM(s) Oral daily  lidocaine 1% Injectable 20 milliLiter(s) Local Injection once  losartan 100 milliGRAM(s) Oral daily  oxybutynin 5 milliGRAM(s) Oral at bedtime  pantoprazole    Tablet 40 milliGRAM(s) Oral before breakfast  sertraline 75 milliGRAM(s) Oral daily  spironolactone 25 milliGRAM(s) Oral daily    MEDICATIONS  (PRN):  acetaminophen   Tablet. 650 milliGRAM(s) Oral every 6 hours PRN Mild to moderate pain  ALBUTerol    90 MICROgram(s) HFA Inhaler 2 Puff(s) Inhalation every 6 hours PRN Shortness of Breath and/or Wheezing  magnesium hydroxide Suspension 30 milliLiter(s) Oral daily PRN Constipation  ondansetron Injectable 4 milliGRAM(s) IV Push every 6 hours PRN Nausea and/or Vomiting  traZODone 100 milliGRAM(s) Oral at bedtime PRN Insomnia      Care Discussed with Consultants/Other Providers [ ] YES  [ ] NO
Patient is a 72y old  Female who presents with a chief complaint of Abdominal Distention (07 Mar 2018 07:34)    pt. 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  Insomnia  Hiatal hernia with GERD: no changes  Colitis: due to chemo 1/2017  Malignant neoplasm of pancreas, unspecified location of malignancy: started chemo 10/2016, has mediport - left chest, now s/p whipple 11/2017  Asthma: denies any recent hospitalizations/intubations  Depression  Hyperlipidemia  HTN (hypertension)  Carcinoma of pancreas: s/p whipple  History of lung surgery: right VATS, wedge resection (August 2017) benign neoplasm  History of lumpectomy: right (1996)  Port-a-cath in place  Status post right breast lumpectomy: malignant treated with radiation  H/O abdominal hysterectomy  H/O umbilical hernia repair: with mesh      SOCIAL HISTORY  Alcohol:  Tobacco:  Illicit substance use:    FAMILY HISTORY:    REVIEW OF SYSTEMS:  CONSTITUTIONAL: No fever, weight loss, or fatigue  EYES: No eye pain, visual disturbances, or discharge  ENMT:  No difficulty hearing, tinnitus, vertigo; No sinus or throat pain  NECK: No pain or stiffness  RESPIRATORY: No cough, wheezing, chills or hemoptysis; No shortness of breath  CARDIOVASCULAR: No chest pain, palpitations, dizziness, or leg swelling  GASTROINTESTINAL: ascitis, distension decrease, NT  GENITOURINARY: No dysuria, frequency, hematuria, or incontinence  NEUROLOGICAL: No headaches, memory loss, loss of strength, numbness, or tremors  SKIN: No itching, burning, rashes, or lesions   LYMPH NODES: No enlarged glands  ENDOCRINE: No heat or cold intolerance; No hair loss  MUSCULOSKELETAL: No joint pain or swelling; No muscle, back, or extremity pain  PSYCHIATRIC: No depression, anxiety, mood swings, or difficulty sleeping  HEME/LYMPH: No easy bruising, or bleeding gums  ALLERY AND IMMUNOLOGIC: No hives or eczema    RADIOLOGY & ADDITIONAL TESTS:    Imaging Personally Reviewed:  [ ] YES  [ ] NO    Consultant(s) Notes Reviewed:  [ ] YES  [ ] NO    PHYSICAL EXAM:  GENERAL: NAD, well-groomed, well-developed  HEAD:  Atraumatic, Normocephalic  EYES: EOMI, PERRLA, conjunctiva and sclera clear  ENMT: No tonsillar erythema, exudates, or enlargement; Moist mucous membranes, Good dentition, No lesions  NECK: Supple, No JVD, Normal thyroid  NERVOUS SYSTEM:  Alert & Oriented X3, Good concentration; Motor Strength 5/5 B/L upper and lower extremities; DTRs 2+ intact and symmetric  CHEST/LUNG: Clear to percussion bilaterally; No rales, rhonchi, wheezing, or rubs  HEART: Regular rate and rhythm; No murmurs, rubs, or gallops  ABDOMEN: Soft, Nontender, Nondistended; Bowel sounds present  EXTREMITIES:  2+ Peripheral Pulses, No clubbing, cyanosis, or edema  LYMPH: No lymphadenopathy noted  SKIN: No rashes or lesions    LABS:                        10.8   6.03  )-----------( 226      ( 08 Mar 2018 05:31 )             31.5     03-08    140  |  102  |  10  ----------------------------<  141<H>  3.1<L>   |  25  |  0.75    Ca    7.7<L>      08 Mar 2018 05:31  Phos  3.3     03-08  Mg     1.6     03-08    TPro  5.8<L>  /  Alb  2.1<L>  /  TBili  2.3<H>  /  DBili  1.3<H>  /  AST  121<H>  /  ALT  52<H>  /  AlkPhos  136<H>  03-08    PT/INR - ( 06 Mar 2018 20:40 )   PT: 16.2 SEC;   INR: 1.45          PTT - ( 06 Mar 2018 20:40 )  PTT:40.2 SEC  Urinalysis Basic - ( 07 Mar 2018 13:23 )    Color: YELLOW / Appearance: CLEAR / SG: > 1.040 / pH: 7.5  Gluc: NEGATIVE / Ketone: NEGATIVE  / Bili: SMALL / Urobili: 4 mg/dL   Blood: NEGATIVE / Protein: 30 mg/dL / Nitrite: NEGATIVE   Leuk Esterase: NEGATIVE / RBC: 5-10 / WBC 2-5   Sq Epi: MOD / Non Sq Epi: x / Bacteria: x      CAPILLARY BLOOD GLUCOSE            Urinalysis Basic - ( 07 Mar 2018 13:23 )    Color: YELLOW / Appearance: CLEAR / SG: > 1.040 / pH: 7.5  Gluc: NEGATIVE / Ketone: NEGATIVE  / Bili: SMALL / Urobili: 4 mg/dL   Blood: NEGATIVE / Protein: 30 mg/dL / Nitrite: NEGATIVE   Leuk Esterase: NEGATIVE / RBC: 5-10 / WBC 2-5   Sq Epi: MOD / Non Sq Epi: x / Bacteria: x        MEDICATIONS  (STANDING):  albumin human 25% IVPB 50 milliLiter(s) IV Intermittent every 6 hours  atorvastatin 10 milliGRAM(s) Oral at bedtime  buDESOnide 160 MICROgram(s)/formoterol 4.5 MICROgram(s) Inhaler 2 Puff(s) Inhalation two times a day  ciprofloxacin   IVPB 400 milliGRAM(s) IV Intermittent every 12 hours  enoxaparin Injectable 40 milliGRAM(s) SubCutaneous every 24 hours  furosemide    Tablet 40 milliGRAM(s) Oral daily  lidocaine 1% Injectable 20 milliLiter(s) Local Injection once  losartan 100 milliGRAM(s) Oral daily  metroNIDAZOLE  IVPB      metroNIDAZOLE  IVPB 500 milliGRAM(s) IV Intermittent every 8 hours  oxybutynin 5 milliGRAM(s) Oral at bedtime  pantoprazole    Tablet 40 milliGRAM(s) Oral before breakfast  potassium chloride    Tablet ER 40 milliEquivalent(s) Oral every 4 hours  sertraline 75 milliGRAM(s) Oral daily  sodium chloride 0.9%. 1000 milliLiter(s) (50 mL/Hr) IV Continuous <Continuous>    MEDICATIONS  (PRN):  acetaminophen   Tablet. 650 milliGRAM(s) Oral every 6 hours PRN Mild to moderate pain  ALBUTerol    90 MICROgram(s) HFA Inhaler 2 Puff(s) Inhalation every 6 hours PRN Shortness of Breath and/or Wheezing  ondansetron Injectable 4 milliGRAM(s) IV Push every 6 hours PRN Nausea and/or Vomiting  traZODone 100 milliGRAM(s) Oral at bedtime PRN Insomnia      Care Discussed with Consultants/Other Providers [ ] YES  [ ] NO
SURGICAL ONCOLOGY DAILY PROGRESS NOTE  PAGER: [phone]     S: feels comfortable, no flatus or bowel movement yesterday.     O: Vital Signs Last 24 Hrs  T(C): 36.9 (08 Mar 2018 04:52), Max: 37 (07 Mar 2018 10:34)  T(F): 98.4 (08 Mar 2018 04:52), Max: 98.6 (07 Mar 2018 10:34)  HR: 91 (08 Mar 2018 04:52) (91 - 98)  BP: 123/68 (08 Mar 2018 04:52) (118/73 - 137/92)  BP(mean): --  RR: 18 (08 Mar 2018 04:52) (16 - 18)  SpO2: 97% (08 Mar 2018 04:52) (94% - 98%)    General: NAD  Neurology: A&Ox3  Respiratory: CTA B/L  CV: RRR, S1S2, no murmur  Abdominal: Soft, moderately distended, not tender ot palpation, incision well healed.   MSK: mild edema, + peripheral pulses    12.1   6.96  )-----------( 252      ( 06 Mar 2018 20:40 )             34.7     03-06    142  |  106  |  10  ----------------------------<  100<H>  3.7   |  25  |  0.69    Ca    8.2<L>      06 Mar 2018 20:40    TPro  6.5  /  Alb  2.3<L>  /  TBili  3.0<H>  /  DBili  x   /  AST  82<H>  /  ALT  49<H>  /  AlkPhos  143<H>  03-06    PT/INR - ( 06 Mar 2018 20:40 )   PT: 16.2 SEC;   INR: 1.45          PTT - ( 06 Mar 2018 20:40 )  PTT:40.2 SEC
SURGICAL ONCOLOGY DAILY PROGRESS NOTE  PAGER: [phone]     S: feels comfortable, underwent paracentesis yesterday, fluid sent for analysis, + flatus, denies nausea or vomiting. Denies abdominal pain       O: Vital Signs Last 24 Hrs  T(C): 36.9 (09 Mar 2018 05:02), Max: 37.1 (08 Mar 2018 21:23)  T(F): 98.4 (09 Mar 2018 05:02), Max: 98.7 (08 Mar 2018 21:23)  HR: 70 (09 Mar 2018 05:02) (70 - 80)  BP: 133/66 (09 Mar 2018 05:02) (123/68 - 133/66)  BP(mean): --  RR: 18 (09 Mar 2018 05:02) (17 - 18)  SpO2: 96% (09 Mar 2018 05:02) (94% - 96%)    General: NAD  Neurology: A&Ox3  Respiratory: CTA B/L  CV: RRR, S1S2, no murmur  Abdominal: Soft, moderately distended, not tender ot palpation, incision well healed.   MSK: mild edema, + peripheral pulses    LABS:                          9.9    3.87  )-----------( 176      ( 09 Mar 2018 05:18 )             28.0       03-08    140  |  102  |  10  ----------------------------<  141<H>  3.1<L>   |  25  |  0.75    Ca    7.7<L>      08 Mar 2018 05:31  Phos  3.3     03-08  Mg     1.6     03-08    TPro  5.8<L>  /  Alb  2.1<L>  /  TBili  2.3<H>  /  DBili  1.3<H>  /  AST  121<H>  /  ALT  52<H>  /  AlkPhos  136<H>  03-08          Urinalysis Basic - ( 07 Mar 2018 13:23 )    Color: YELLOW / Appearance: CLEAR / SG: > 1.040 / pH: 7.5  Gluc: NEGATIVE / Ketone: NEGATIVE  / Bili: SMALL / Urobili: 4 mg/dL   Blood: NEGATIVE / Protein: 30 mg/dL / Nitrite: NEGATIVE   Leuk Esterase: NEGATIVE / RBC: 5-10 / WBC 2-5   Sq Epi: MOD / Non Sq Epi: x / Bacteria: x          Assessment: Samia is a very pleasant 72 year-old female with a medical history significant for Whipple procedure with portal vein resection and repair and feeding jejunostomy on 11/7/17 for pancreatic cancer presenting with abdominal distension, found to have new large volume ascites and abnormal enhancement along the course of the intrahepatic portal vein concerning for metastatic disease.     Plant:     -  Follow results of peritoneal fluid (cytology, amylase, culture etc)  - Appreciate medical oncology input  - Discussed with Dr. Madan Titus  - Full support in place.       John Roca   Surgical Oncology   D Team   Pager: [phone]
SURGICAL ONCOLOGY PROGRESS NOTE    Feels distended once again    Vital Signs Last 24 Hrs  T(C): 36.8 (12 Mar 2018 04:54), Max: 36.8 (12 Mar 2018 04:54)  T(F): 98.3 (12 Mar 2018 04:54), Max: 98.3 (12 Mar 2018 04:54)  HR: 84 (12 Mar 2018 04:54) (71 - 93)  BP: 130/83 (12 Mar 2018 04:54) (106/59 - 130/83)  BP(mean): --  RR: 18 (12 Mar 2018 04:54) (18 - 18)  SpO2: 98% (12 Mar 2018 04:54) (97% - 98%)  I&O's Detail      PE:    A&A  NAD    soft, NT, No peritoneal signs   (+) fluid wave, distended                           11.0   5.26  )-----------( 233      ( 12 Mar 2018 06:15 )             31.4     03-12    137  |  102  |  7   ----------------------------<  116<H>  Test not performed SPECIMEN GROSSLY HEMOLYZED   |  23  |  0.68    Ca    7.3<L>      12 Mar 2018 06:15  Phos  2.4     03-12  Mg     1.5     03-12    TPro  5.7<L>  /  Alb  2.0<L>  /  TBili  1.7<H>  /  DBili  x   /  AST  159<H>  /  ALT  61<H>  /  AlkPhos  153<H>  03-12
Surgical Oncology    Abdominal pain overnight, now improved slightly. No nausea or emesis, still passing flatus and having bowel movements    1Vital Signs Last 24 Hrs  T(C): 37.1 (03-20-18 @ 04:50), Max: 37.1 (03-20-18 @ 04:50)  T(F): 98.7 (03-20-18 @ 04:50), Max: 98.7 (03-20-18 @ 04:50)  HR: 95 (03-20-18 @ 04:50) (90 - 95)  BP: 116/60 (03-20-18 @ 04:50) (108/67 - 116/60)  BP(mean): --  RR: 18 (03-20-18 @ 04:50) (18 - 18)  SpO2: 100% (03-20-18 @ 04:50) (99% - 100%)  I&O's Detail    PE  Gen: NAD  Abd: soft, slightly tender in b/l lower quadrants, nondistended                        10.7   13.27 )-----------( 226      ( 20 Mar 2018 04:40 )             30.5     03-20    138  |  106  |  9   ----------------------------<  124<H>  4.3   |  24  |  0.72    Ca    7.6<L>      20 Mar 2018 04:40  Phos  2.9     03-20  Mg     1.7     03-20    TPro  5.4<L>  /  Alb  1.9<L>  /  TBili  2.1<H>  /  DBili  x   /  AST  77<H>  /  ALT  41<H>  /  AlkPhos  194<H>  03-20      CAPILLARY BLOOD GLUCOSE          MEDICATIONS  (STANDING):  atorvastatin 10 milliGRAM(s) Oral at bedtime  buDESOnide 160 MICROgram(s)/formoterol 4.5 MICROgram(s) Inhaler 2 Puff(s) Inhalation two times a day  furosemide    Tablet 40 milliGRAM(s) Oral daily  lidocaine 1% Injectable 20 milliLiter(s) Local Injection once  losartan 100 milliGRAM(s) Oral daily  oxybutynin 5 milliGRAM(s) Oral at bedtime  pantoprazole    Tablet 40 milliGRAM(s) Oral before breakfast  sertraline 75 milliGRAM(s) Oral daily  spironolactone 25 milliGRAM(s) Oral daily    MEDICATIONS  (PRN):  acetaminophen   Tablet. 650 milliGRAM(s) Oral every 6 hours PRN Mild to moderate pain  ALBUTerol    90 MICROgram(s) HFA Inhaler 2 Puff(s) Inhalation every 6 hours PRN Shortness of Breath and/or Wheezing  magnesium hydroxide Suspension 30 milliLiter(s) Oral daily PRN Constipation  ondansetron Injectable 4 milliGRAM(s) IV Push every 6 hours PRN Nausea and/or Vomiting  traZODone 100 milliGRAM(s) Oral at bedtime PRN Insomnia
Surgical Oncology    Complaining of some abdominal distention. Still having bowel function.    Vital Signs Last 24 Hrs  T(C): 37.1 (03-21-18 @ 05:06), Max: 37.1 (03-21-18 @ 05:06)  T(F): 98.7 (03-21-18 @ 05:06), Max: 98.7 (03-21-18 @ 05:06)  HR: 72 (03-21-18 @ 05:06) (72 - 93)  BP: 114/68 (03-21-18 @ 05:06) (101/59 - 128/54)  BP(mean): --  RR: 18 (03-21-18 @ 05:06) (18 - 18)  SpO2: 100% (03-21-18 @ 05:06) (100% - 100%)  I&O's Detail    PE  Gen: NAD  Abd: softly distended, nontender                        11.1   11.92 )-----------( 242      ( 21 Mar 2018 06:55 )             31.7     03-20    138  |  106  |  9   ----------------------------<  124<H>  4.3   |  24  |  0.72    Ca    7.6<L>      20 Mar 2018 04:40  Phos  2.9     03-20  Mg     1.7     03-20    TPro  5.4<L>  /  Alb  1.9<L>  /  TBili  2.1<H>  /  DBili  x   /  AST  77<H>  /  ALT  41<H>  /  AlkPhos  194<H>  03-20      CAPILLARY BLOOD GLUCOSE          MEDICATIONS  (STANDING):  atorvastatin 10 milliGRAM(s) Oral at bedtime  buDESOnide 160 MICROgram(s)/formoterol 4.5 MICROgram(s) Inhaler 2 Puff(s) Inhalation two times a day  furosemide    Tablet 40 milliGRAM(s) Oral daily  lidocaine 1% Injectable 20 milliLiter(s) Local Injection once  losartan 100 milliGRAM(s) Oral daily  oxybutynin 5 milliGRAM(s) Oral at bedtime  pantoprazole    Tablet 40 milliGRAM(s) Oral before breakfast  sertraline 75 milliGRAM(s) Oral daily  spironolactone 25 milliGRAM(s) Oral daily    MEDICATIONS  (PRN):  acetaminophen   Tablet. 650 milliGRAM(s) Oral every 6 hours PRN Mild to moderate pain  ALBUTerol    90 MICROgram(s) HFA Inhaler 2 Puff(s) Inhalation every 6 hours PRN Shortness of Breath and/or Wheezing  magnesium hydroxide Suspension 30 milliLiter(s) Oral daily PRN Constipation  ondansetron Injectable 4 milliGRAM(s) IV Push every 6 hours PRN Nausea and/or Vomiting  traZODone 100 milliGRAM(s) Oral at bedtime PRN Insomnia
Surgical Oncology    Feeling better after paracentesis. Tolerating diet, having bowel function.    Vital Signs Last 24 Hrs  T(C): 36.9 (03-16-18 @ 05:17), Max: 36.9 (03-16-18 @ 05:17)  T(F): 98.4 (03-16-18 @ 05:17), Max: 98.4 (03-16-18 @ 05:17)  HR: 78 (03-16-18 @ 05:17) (78 - 86)  BP: 115/69 (03-16-18 @ 05:17) (114/62 - 122/77)  BP(mean): --  RR: 18 (03-16-18 @ 05:17) (17 - 18)  SpO2: 98% (03-16-18 @ 05:17) (97% - 98%)  I&O's Detail    PE  Gen: NAD  Abd: soft, nt, nd                        10.6   7.34  )-----------( 221      ( 15 Mar 2018 04:52 )             28.8     03-15    141  |  104  |  7   ----------------------------<  86  3.0<L>   |  23  |  0.70    Ca    7.3<L>      15 Mar 2018 04:52  Phos  2.7     03-15  Mg     1.5     03-15      PT/INR - ( 15 Mar 2018 04:52 )   PT: 18.9 SEC;   INR: 1.63          PTT - ( 15 Mar 2018 04:52 )  PTT:31.8 SEC  CAPILLARY BLOOD GLUCOSE          MEDICATIONS  (STANDING):  atorvastatin 10 milliGRAM(s) Oral at bedtime  buDESOnide 160 MICROgram(s)/formoterol 4.5 MICROgram(s) Inhaler 2 Puff(s) Inhalation two times a day  furosemide    Tablet 40 milliGRAM(s) Oral daily  lidocaine 1% Injectable 20 milliLiter(s) Local Injection once  losartan 100 milliGRAM(s) Oral daily  metroNIDAZOLE    Tablet 500 milliGRAM(s) Oral every 8 hours  oxybutynin 5 milliGRAM(s) Oral at bedtime  pantoprazole    Tablet 40 milliGRAM(s) Oral before breakfast  phytonadione   Solution 5 milliGRAM(s) Oral daily  sertraline 75 milliGRAM(s) Oral daily  spironolactone 25 milliGRAM(s) Oral daily    MEDICATIONS  (PRN):  acetaminophen   Tablet. 650 milliGRAM(s) Oral every 6 hours PRN Mild to moderate pain  ALBUTerol    90 MICROgram(s) HFA Inhaler 2 Puff(s) Inhalation every 6 hours PRN Shortness of Breath and/or Wheezing  magnesium hydroxide Suspension 30 milliLiter(s) Oral daily PRN Constipation  ondansetron Injectable 4 milliGRAM(s) IV Push every 6 hours PRN Nausea and/or Vomiting  traZODone 100 milliGRAM(s) Oral at bedtime PRN Insomnia
Surgical Oncology    Feeling well. Tolerating diet, no nausea or emesis. Having bowel function. Does not feel distended today.    Vital Signs Last 24 Hrs  T(C): 36.7 (03-19-18 @ 05:10), Max: 36.7 (03-19-18 @ 05:10)  T(F): 98 (03-19-18 @ 05:10), Max: 98 (03-19-18 @ 05:10)  HR: 75 (03-19-18 @ 05:10) (75 - 91)  BP: 112/64 (03-19-18 @ 05:10) (109/60 - 118/67)  BP(mean): --  RR: 18 (03-19-18 @ 05:10) (18 - 18)  SpO2: 100% (03-19-18 @ 05:10) (97% - 100%)  I&O's Detail    PE  Gen: NAD  Abd: soft, nt, nd                        9.9    7.30  )-----------( 209      ( 17 Mar 2018 07:29 )             28.5     03-18    133<L>  |  100  |  8   ----------------------------<  188<H>  3.9   |  21<L>  |  0.71    Ca    8.0<L>      18 Mar 2018 18:00  Phos  2.5     03-18  Mg     2.1     03-18    TPro  4.6<L>  /  Alb  1.6<L>  /  TBili  1.6<H>  /  DBili  x   /  AST  97<H>  /  ALT  41<H>  /  AlkPhos  171<H>  03-18      CAPILLARY BLOOD GLUCOSE          MEDICATIONS  (STANDING):  atorvastatin 10 milliGRAM(s) Oral at bedtime  buDESOnide 160 MICROgram(s)/formoterol 4.5 MICROgram(s) Inhaler 2 Puff(s) Inhalation two times a day  furosemide    Tablet 40 milliGRAM(s) Oral daily  lidocaine 1% Injectable 20 milliLiter(s) Local Injection once  losartan 100 milliGRAM(s) Oral daily  oxybutynin 5 milliGRAM(s) Oral at bedtime  pantoprazole    Tablet 40 milliGRAM(s) Oral before breakfast  potassium acid phosphate/sodium acid phosphate tablet (K-PHOS No. 2) 1 Tablet(s) Oral four times a day with meals  sertraline 75 milliGRAM(s) Oral daily  spironolactone 25 milliGRAM(s) Oral daily    MEDICATIONS  (PRN):  acetaminophen   Tablet. 650 milliGRAM(s) Oral every 6 hours PRN Mild to moderate pain  ALBUTerol    90 MICROgram(s) HFA Inhaler 2 Puff(s) Inhalation every 6 hours PRN Shortness of Breath and/or Wheezing  magnesium hydroxide Suspension 30 milliLiter(s) Oral daily PRN Constipation  ondansetron Injectable 4 milliGRAM(s) IV Push every 6 hours PRN Nausea and/or Vomiting  traZODone 100 milliGRAM(s) Oral at bedtime PRN Insomnia
Surgical Oncology    Patient complaining of bloating. Otherwise tolerating diet and having bowel function.    Vital Signs Last 24 Hrs  T(C): 36.6 (03-15-18 @ 05:12), Max: 36.7 (03-14-18 @ 20:29)  T(F): 97.8 (03-15-18 @ 05:12), Max: 98 (03-14-18 @ 20:29)  HR: 85 (03-15-18 @ 05:12) (85 - 95)  BP: 115/64 (03-15-18 @ 05:12) (115/64 - 134/87)  BP(mean): --  RR: 18 (03-15-18 @ 05:12) (17 - 18)  SpO2: 97% (03-15-18 @ 05:12) (97% - 100%)  I&O's Detail      PE  Gen: NAD  Abd: softly distended, nontender                        10.6   7.34  )-----------( 221      ( 15 Mar 2018 04:52 )             28.8     03-15    141  |  104  |  7   ----------------------------<  86  3.0<L>   |  23  |  0.70    Ca    7.3<L>      15 Mar 2018 04:52  Phos  2.7     03-15  Mg     1.5     03-15      PT/INR - ( 15 Mar 2018 04:52 )   PT: 18.9 SEC;   INR: 1.63          PTT - ( 15 Mar 2018 04:52 )  PTT:31.8 SEC  CAPILLARY BLOOD GLUCOSE          MEDICATIONS  (STANDING):  atorvastatin 10 milliGRAM(s) Oral at bedtime  buDESOnide 160 MICROgram(s)/formoterol 4.5 MICROgram(s) Inhaler 2 Puff(s) Inhalation two times a day  ciprofloxacin     Tablet 500 milliGRAM(s) Oral every 12 hours  furosemide    Tablet 40 milliGRAM(s) Oral daily  lidocaine 1% Injectable 20 milliLiter(s) Local Injection once  losartan 100 milliGRAM(s) Oral daily  magnesium sulfate  IVPB 1 Gram(s) IV Intermittent once  metroNIDAZOLE    Tablet 500 milliGRAM(s) Oral every 8 hours  oxybutynin 5 milliGRAM(s) Oral at bedtime  pantoprazole    Tablet 40 milliGRAM(s) Oral before breakfast  phytonadione   Solution 5 milliGRAM(s) Oral daily  potassium chloride    Tablet ER 20 milliEquivalent(s) Oral every 2 hours  potassium chloride  10 mEq/100 mL IVPB 10 milliEquivalent(s) IV Intermittent every 1 hour  sertraline 75 milliGRAM(s) Oral daily    MEDICATIONS  (PRN):  acetaminophen   Tablet. 650 milliGRAM(s) Oral every 6 hours PRN Mild to moderate pain  ALBUTerol    90 MICROgram(s) HFA Inhaler 2 Puff(s) Inhalation every 6 hours PRN Shortness of Breath and/or Wheezing  magnesium hydroxide Suspension 30 milliLiter(s) Oral daily PRN Constipation  ondansetron Injectable 4 milliGRAM(s) IV Push every 6 hours PRN Nausea and/or Vomiting  traZODone 100 milliGRAM(s) Oral at bedtime PRN Insomnia
Surgical Oncology    Pt feeling tired. Some abdominal pain after pleurex placement. Tolerating diet.    Vital Signs Last 24 Hrs  T(C): 36.7 (03-23-18 @ 05:29), Max: 36.7 (03-23-18 @ 05:29)  T(F): 98.1 (03-23-18 @ 05:29), Max: 98.1 (03-23-18 @ 05:29)  HR: 67 (03-23-18 @ 05:29) (67 - 96)  BP: 100/53 (03-23-18 @ 05:29) (91/55 - 106/60)  BP(mean): --  RR: 17 (03-23-18 @ 05:29) (17 - 18)  SpO2: 100% (03-23-18 @ 05:29) (100% - 100%)  I&O's Detail    PE  Gen: NAD  Abd: soft, nd, nt, catheter c/d/i                        9.7    6.99  )-----------( 177      ( 23 Mar 2018 04:44 )             27.1     03-23    136  |  103  |  17  ----------------------------<  104<H>  3.8   |  24  |  0.75    Ca    7.8<L>      23 Mar 2018 04:44  Mg     1.9     03-23    TPro  5.2<L>  /  Alb  1.6<L>  /  TBili  1.7<H>  /  DBili  x   /  AST  63<H>  /  ALT  34<H>  /  AlkPhos  202<H>  03-23      CAPILLARY BLOOD GLUCOSE          MEDICATIONS  (STANDING):  atorvastatin 10 milliGRAM(s) Oral at bedtime  buDESOnide 160 MICROgram(s)/formoterol 4.5 MICROgram(s) Inhaler 2 Puff(s) Inhalation two times a day  furosemide    Tablet 40 milliGRAM(s) Oral daily  losartan 100 milliGRAM(s) Oral daily  oxybutynin 5 milliGRAM(s) Oral at bedtime  pantoprazole    Tablet 40 milliGRAM(s) Oral before breakfast  sertraline 75 milliGRAM(s) Oral daily  spironolactone 25 milliGRAM(s) Oral daily    MEDICATIONS  (PRN):  acetaminophen   Tablet. 650 milliGRAM(s) Oral every 6 hours PRN Mild to moderate pain  ALBUTerol    90 MICROgram(s) HFA Inhaler 2 Puff(s) Inhalation every 6 hours PRN Shortness of Breath and/or Wheezing  magnesium hydroxide Suspension 30 milliLiter(s) Oral daily PRN Constipation  ondansetron Injectable 4 milliGRAM(s) IV Push every 6 hours PRN Nausea and/or Vomiting  traZODone 100 milliGRAM(s) Oral at bedtime PRN Insomnia
HPI:  This is a 72F with history of Pancreatic cancer s/p chemo and surgery (whipple in Nov 2017), R Breast cancer s/p RT and R lumpectomy, Pulm nodule s/p R VATS (Bx showing necrotizing granulomas), HTN, HLD, Asthma, and Depression who presents to the hospital with complaints of worsening abdominal distention for the past 4 weeks. Said that she had last followed up with Dr. Titus near the end of February and at that had been sent for some imaging tests which the patient said she completed. She had not heard any results from the doctor and was noting her distention worsening which concerned her and she therefore called Dr. Titus who recommended that she come to the hospital for evaluation. She said that she has some associated epigastric/umbilical pain with the distention, also said that it feels like fluid is moving around inside her stomach. She also has noted worsening b/l LE swelling and as a result has noted some increasing difficulty in her walking and in her b/l knee pain. She also endorsed a change in her urine over this time with it become more red in color. She denies any nausea, vomiting, diarrhea, fevers, chills, or changes in her appetite. No other complaints.    In the ED, her initial vitals were T 98.3, P 83, /81, R 16, O2 sat 100% RA. Her lab work here was most significant for coagulopathy and transaminitis. She had a CT A/P that showed large volume ascites, new hepatic mets, possible colitis of the cecum and ascending colon, and a loculated pleural effusion on the L side. She was started on cipro/flagyl and admitted to medicine. (07 Mar 2018 07:34)     Pt is seen and examined  pt is awake and lying in bed/out of bed to chair  pt seems comfortable and denies any complaints at this time    ROS:  Negative except for: mild distention of the abdomen    MEDICATIONS  (STANDING):  atorvastatin 10 milliGRAM(s) Oral at bedtime  buDESOnide 160 MICROgram(s)/formoterol 4.5 MICROgram(s) Inhaler 2 Puff(s) Inhalation two times a day  furosemide    Tablet 40 milliGRAM(s) Oral daily  lidocaine 1% Injectable 20 milliLiter(s) Local Injection once  losartan 100 milliGRAM(s) Oral daily  oxybutynin 5 milliGRAM(s) Oral at bedtime  pantoprazole    Tablet 40 milliGRAM(s) Oral before breakfast  sertraline 75 milliGRAM(s) Oral daily  spironolactone 25 milliGRAM(s) Oral daily    MEDICATIONS  (PRN):  acetaminophen   Tablet. 650 milliGRAM(s) Oral every 6 hours PRN Mild to moderate pain  ALBUTerol    90 MICROgram(s) HFA Inhaler 2 Puff(s) Inhalation every 6 hours PRN Shortness of Breath and/or Wheezing  magnesium hydroxide Suspension 30 milliLiter(s) Oral daily PRN Constipation  ondansetron Injectable 4 milliGRAM(s) IV Push every 6 hours PRN Nausea and/or Vomiting  traZODone 100 milliGRAM(s) Oral at bedtime PRN Insomnia      Allergies    No Known Drug Allergies  SteriStrips (Blisters)    Intolerances        Vital Signs Last 24 Hrs  T(C): 36.4 (19 Mar 2018 14:10), Max: 36.7 (19 Mar 2018 05:10)  T(F): 97.5 (19 Mar 2018 14:10), Max: 98 (19 Mar 2018 05:10)  HR: 90 (19 Mar 2018 14:10) (75 - 90)  BP: 108/67 (19 Mar 2018 14:10) (108/67 - 112/64)  BP(mean): --  RR: 18 (19 Mar 2018 14:10) (18 - 18)  SpO2: 99% (19 Mar 2018 14:10) (99% - 100%)    PHYSICAL EXAM  General: adult in NAD  HEENT: clear oropharynx, anicteric sclera, pink conjunctiva  Neck: supple  CV: normal S1/S2 with no murmur rubs or gallops  Lungs: positive air movement b/l ant lungs,clear to auscultation, no wheezes, no rales  Abdomen: soft non-tender ,mild abdominal distention  Ext: edema  Skin: no rashes and no petechiae  Neuro: alert and oriented X 4, no focal deficits  LABS:                          10.5   7.49  )-----------( 232      ( 19 Mar 2018 06:46 )             30.7         Mean Cell Volume : 89.5 fL  Mean Cell Hemoglobin : 30.6 pg  Mean Cell Hemoglobin Concentration : 34.2 %  Auto Neutrophil # : x  Auto Lymphocyte # : x  Auto Monocyte # : x  Auto Eosinophil # : x  Auto Basophil # : x  Auto Neutrophil % : x  Auto Lymphocyte % : x  Auto Monocyte % : x  Auto Eosinophil % : x  Auto Basophil % : x    Serial CBC's  03-19 @ 06:46  Hct-30.7 / Hgb-10.5 / Plat-232 / RBC-3.43 / WBC-7.49          Serial CBC's  03-17 @ 07:29  Hct-28.5 / Hgb-9.9 / Plat-209 / RBC-3.20 / WBC-7.30          Serial CBC's  03-16 @ 05:45  Hct-27.9 / Hgb-10.2 / Plat-201 / RBC-3.15 / WBC-5.71            03-19    141  |  108<H>  |  8   ----------------------------<  109<H>  4.9   |  24  |  0.69    Ca    7.7<L>      19 Mar 2018 06:46  Phos  2.7     03-19  Mg     1.9     03-19    TPro  5.6<L>  /  Alb  1.9<L>  /  TBili  1.8<H>  /  DBili  x   /  AST  104<H>  /  ALT  44<H>  /  AlkPhos  201<H>  03-19                    BLOOD SMEAR INTERPRETATION:       RADIOLOGY & ADDITIONAL STUDIES:
HPI:  This is a 72F with history of Pancreatic cancer s/p chemo and surgery (whipple in Nov 2017), R Breast cancer s/p RT and R lumpectomy, Pulm nodule s/p R VATS (Bx showing necrotizing granulomas), HTN, HLD, Asthma, and Depression who presents to the hospital with complaints of worsening abdominal distention for the past 4 weeks. Said that she had last followed up with Dr. Titus near the end of February and at that had been sent for some imaging tests which the patient said she completed. She had not heard any results from the doctor and was noting her distention worsening which concerned her and she therefore called Dr. Titus who recommended that she come to the hospital for evaluation. She said that she has some associated epigastric/umbilical pain with the distention, also said that it feels like fluid is moving around inside her stomach. She also has noted worsening b/l LE swelling and as a result has noted some increasing difficulty in her walking and in her b/l knee pain. She also endorsed a change in her urine over this time with it become more red in color. She denies any nausea, vomiting, diarrhea, fevers, chills, or changes in her appetite. No other complaints.    In the ED, her initial vitals were T 98.3, P 83, /81, R 16, O2 sat 100% RA. Her lab work here was most significant for coagulopathy and transaminitis. She had a CT A/P that showed large volume ascites, new hepatic mets, possible colitis of the cecum and ascending colon, and a loculated pleural effusion on the L side. She was started on cipro/flagyl and admitted to medicine. (07 Mar 2018 07:34)     Pt is seen and examined  pt is awake and lying in bed/out of bed to chair  pt seems comfortable and denies any complaints at this time    ROS:  Negative except for:right abdominal wall pleurex    MEDICATIONS  (STANDING):  atorvastatin 10 milliGRAM(s) Oral at bedtime  buDESOnide 160 MICROgram(s)/formoterol 4.5 MICROgram(s) Inhaler 2 Puff(s) Inhalation two times a day  furosemide    Tablet 40 milliGRAM(s) Oral daily  losartan 100 milliGRAM(s) Oral daily  oxybutynin 5 milliGRAM(s) Oral at bedtime  pantoprazole    Tablet 40 milliGRAM(s) Oral before breakfast  sertraline 75 milliGRAM(s) Oral daily  spironolactone 25 milliGRAM(s) Oral daily    MEDICATIONS  (PRN):  acetaminophen   Tablet. 650 milliGRAM(s) Oral every 6 hours PRN Mild to moderate pain  ALBUTerol    90 MICROgram(s) HFA Inhaler 2 Puff(s) Inhalation every 6 hours PRN Shortness of Breath and/or Wheezing  magnesium hydroxide Suspension 30 milliLiter(s) Oral daily PRN Constipation  ondansetron Injectable 4 milliGRAM(s) IV Push every 6 hours PRN Nausea and/or Vomiting  traZODone 100 milliGRAM(s) Oral at bedtime PRN Insomnia      Allergies    No Known Drug Allergies  SteriStrips (Blisters)    Intolerances        Vital Signs Last 24 Hrs  T(C): 36.2 (22 Mar 2018 12:33), Max: 36.6 (21 Mar 2018 21:22)  T(F): 97.2 (22 Mar 2018 12:33), Max: 97.9 (21 Mar 2018 21:22)  HR: 96 (22 Mar 2018 12:33) (70 - 96)  BP: 91/55 (22 Mar 2018 12:33) (91/55 - 106/57)  BP(mean): --  RR: 18 (22 Mar 2018 12:33) (17 - 18)  SpO2: 100% (22 Mar 2018 12:33) (98% - 100%)    PHYSICAL EXAM  General: adult in NAD  HEENT: clear oropharynx, anicteric sclera, pink conjunctiva  Neck: supple  CV: normal S1/S2 with no murmur rubs or gallops  Lungs: positive air movement b/l ant lungs,clear to auscultation, no wheezes, no rales  Abdomen: soft non-tender non-distended, no hepatosplenomegaly  Ext: no clubbing cyanosis or edema  Skin: no rashes and no petechiae  Neuro: alert and oriented X 4, no focal deficits  LABS:                          10.1   8.68  )-----------( 187      ( 22 Mar 2018 04:50 )             29.0         Mean Cell Volume : 91.2 fL  Mean Cell Hemoglobin : 31.8 pg  Mean Cell Hemoglobin Concentration : 34.8 %  Auto Neutrophil # : x  Auto Lymphocyte # : x  Auto Monocyte # : x  Auto Eosinophil # : x  Auto Basophil # : x  Auto Neutrophil % : x  Auto Lymphocyte % : x  Auto Monocyte % : x  Auto Eosinophil % : x  Auto Basophil % : x    Serial CBC's  03-22 @ 04:50  Hct-29.0 / Hgb-10.1 / Plat-187 / RBC-3.18 / WBC-8.68          Serial CBC's  03-21 @ 06:55  Hct-31.7 / Hgb-11.1 / Plat-242 / RBC-3.47 / WBC-11.92          Serial CBC's  03-20 @ 20:05  Hct-31.4 / Hgb-11.3 / Plat-222 / RBC-3.46 / WBC-13.86          Serial CBC's  03-20 @ 04:40  Hct-30.5 / Hgb-10.7 / Plat-226 / RBC-3.43 / WBC-13.27          Serial CBC's  03-19 @ 06:46  Hct-30.7 / Hgb-10.5 / Plat-232 / RBC-3.43 / WBC-7.49            03-22    135  |  101  |  18  ----------------------------<  111<H>  3.8   |  25  |  0.87    Ca    8.1<L>      22 Mar 2018 04:50  Phos  3.6     03-21  Mg     2.7     03-21    TPro  5.7<L>  /  Alb  1.8<L>  /  TBili  1.9<H>  /  DBili  x   /  AST  65<H>  /  ALT  34<H>  /  AlkPhos  210<H>  03-22                    BLOOD SMEAR INTERPRETATION:       RADIOLOGY & ADDITIONAL STUDIES:
Patient is a 72y old  Female who presents with a chief complaint of Abdominal Distention (07 Mar 2018 07:34)    pt. 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  Insomnia  Hiatal hernia with GERD: no changes  Colitis: due to chemo 1/2017  Malignant neoplasm of pancreas, unspecified location of malignancy: started chemo 10/2016, has mediport - left chest, now s/p whipple 11/2017  Asthma: denies any recent hospitalizations/intubations  Depression  Hyperlipidemia  HTN (hypertension)  Carcinoma of pancreas: s/p whipple  History of lung surgery: right VATS, wedge resection (August 2017) benign neoplasm  History of lumpectomy: right (1996)  Port-a-cath in place  Status post right breast lumpectomy: malignant treated with radiation  H/O abdominal hysterectomy  H/O umbilical hernia repair: with mesh      SOCIAL HISTORY  Alcohol:  Tobacco:  Illicit substance use:    FAMILY HISTORY:    REVIEW OF SYSTEMS:  CONSTITUTIONAL: No fever, weight loss, or fatigue  EYES: No eye pain, visual disturbances, or discharge  ENMT:  No difficulty hearing, tinnitus, vertigo; No sinus or throat pain  NECK: No pain or stiffness  RESPIRATORY: No cough, wheezing, chills or hemoptysis; No shortness of breath  CARDIOVASCULAR: No chest pain, palpitations, dizziness, or leg swelling  GASTROINTESTINAL: No abdominal or epigastric pain. No nausea, vomiting, or hematemesis; No diarrhea or constipation. No melena or hematochezia.  GENITOURINARY: No dysuria, frequency, hematuria, or incontinence  NEUROLOGICAL: No headaches, memory loss, loss of strength, numbness, or tremors  SKIN: No itching, burning, rashes, or lesions   LYMPH NODES: No enlarged glands  ENDOCRINE: No heat or cold intolerance; No hair loss  MUSCULOSKELETAL: No joint pain or swelling; No muscle, back, or extremity pain  PSYCHIATRIC: No depression, anxiety, mood swings, or difficulty sleeping  HEME/LYMPH: No easy bruising, or bleeding gums  ALLERY AND IMMUNOLOGIC: No hives or eczema    RADIOLOGY & ADDITIONAL TESTS:    Imaging Personally Reviewed:  [ ] YES  [ ] NO    Consultant(s) Notes Reviewed:  [ ] YES  [ ] NO    PHYSICAL EXAM:  GENERAL: NAD, well-groomed, well-developed  HEAD:  Atraumatic, Normocephalic  EYES: EOMI, PERRLA, conjunctiva and sclera clear  ENMT: No tonsillar erythema, exudates, or enlargement; Moist mucous membranes, Good dentition, No lesions  NECK: Supple, No JVD, Normal thyroid  NERVOUS SYSTEM:  Alert & Oriented X3, Good concentration; Motor Strength 5/5 B/L upper and lower extremities; DTRs 2+ intact and symmetric  CHEST/LUNG: Clear to percussion bilaterally; No rales, rhonchi, wheezing, or rubs  HEART: Regular rate and rhythm; No murmurs, rubs, or gallops  ABDOMEN: Soft, Nontender, Nondistended; Bowel sounds present  EXTREMITIES:  2+ Peripheral Pulses, No clubbing, cyanosis, or edema  LYMPH: No lymphadenopathy noted  SKIN: No rashes or lesions    LABS:                        10.6   7.34  )-----------( 221      ( 15 Mar 2018 04:52 )             28.8     03-15    141  |  104  |  7   ----------------------------<  86  3.0<L>   |  23  |  0.70    Ca    7.3<L>      15 Mar 2018 04:52  Phos  2.7     03-15  Mg     1.5     03-15      PT/INR - ( 15 Mar 2018 04:52 )   PT: 18.9 SEC;   INR: 1.63          PTT - ( 15 Mar 2018 04:52 )  PTT:31.8 SEC    CAPILLARY BLOOD GLUCOSE                MEDICATIONS  (STANDING):  atorvastatin 10 milliGRAM(s) Oral at bedtime  buDESOnide 160 MICROgram(s)/formoterol 4.5 MICROgram(s) Inhaler 2 Puff(s) Inhalation two times a day  ciprofloxacin     Tablet 500 milliGRAM(s) Oral every 12 hours  furosemide    Tablet 40 milliGRAM(s) Oral daily  lidocaine 1% Injectable 20 milliLiter(s) Local Injection once  losartan 100 milliGRAM(s) Oral daily  metroNIDAZOLE    Tablet 500 milliGRAM(s) Oral every 8 hours  oxybutynin 5 milliGRAM(s) Oral at bedtime  pantoprazole    Tablet 40 milliGRAM(s) Oral before breakfast  phytonadione   Solution 5 milliGRAM(s) Oral daily  sertraline 75 milliGRAM(s) Oral daily  spironolactone 25 milliGRAM(s) Oral daily    MEDICATIONS  (PRN):  acetaminophen   Tablet. 650 milliGRAM(s) Oral every 6 hours PRN Mild to moderate pain  ALBUTerol    90 MICROgram(s) HFA Inhaler 2 Puff(s) Inhalation every 6 hours PRN Shortness of Breath and/or Wheezing  magnesium hydroxide Suspension 30 milliLiter(s) Oral daily PRN Constipation  ondansetron Injectable 4 milliGRAM(s) IV Push every 6 hours PRN Nausea and/or Vomiting  traZODone 100 milliGRAM(s) Oral at bedtime PRN Insomnia      Care Discussed with Consultants/Other Providers [ ] YES  [ ] NO

## 2018-03-27 LAB — BACTERIA FLD CULT: SIGNIFICANT CHANGE UP

## 2018-04-03 RX ORDER — FAMOTIDINE 10 MG/ML
1 INJECTION INTRAVENOUS
Qty: 0 | Refills: 0 | COMMUNITY
Start: 2018-04-03

## 2018-04-04 RX ORDER — NYSTATIN 500MM UNIT
5 POWDER (EA) MISCELLANEOUS
Qty: 0 | Refills: 0 | COMMUNITY
Start: 2018-04-04

## 2018-04-05 ENCOUNTER — INPATIENT (INPATIENT)
Facility: HOSPITAL | Age: 73
LOS: 7 days | Discharge: HOSPICE | End: 2018-04-13
Attending: INTERNAL MEDICINE | Admitting: INTERNAL MEDICINE
Payer: MEDICARE

## 2018-04-05 VITALS
HEART RATE: 77 BPM | OXYGEN SATURATION: 100 % | DIASTOLIC BLOOD PRESSURE: 87 MMHG | RESPIRATION RATE: 26 BRPM | SYSTOLIC BLOOD PRESSURE: 110 MMHG | TEMPERATURE: 99 F

## 2018-04-05 DIAGNOSIS — R41.82 ALTERED MENTAL STATUS, UNSPECIFIED: ICD-10-CM

## 2018-04-05 DIAGNOSIS — Z98.89 OTHER SPECIFIED POSTPROCEDURAL STATES: Chronic | ICD-10-CM

## 2018-04-05 DIAGNOSIS — Z98.890 OTHER SPECIFIED POSTPROCEDURAL STATES: Chronic | ICD-10-CM

## 2018-04-05 DIAGNOSIS — Z95.828 PRESENCE OF OTHER VASCULAR IMPLANTS AND GRAFTS: Chronic | ICD-10-CM

## 2018-04-05 DIAGNOSIS — C25.9 MALIGNANT NEOPLASM OF PANCREAS, UNSPECIFIED: Chronic | ICD-10-CM

## 2018-04-05 LAB
ALBUMIN SERPL ELPH-MCNC: 1.9 G/DL — LOW (ref 3.3–5)
ALP SERPL-CCNC: 356 U/L — HIGH (ref 40–120)
ALT FLD-CCNC: 48 U/L — HIGH (ref 4–33)
APPEARANCE UR: SIGNIFICANT CHANGE UP
AST SERPL-CCNC: 81 U/L — HIGH (ref 4–32)
BACTERIA # UR AUTO: SIGNIFICANT CHANGE UP
BASE EXCESS BLDV CALC-SCNC: 1.4 MMOL/L — SIGNIFICANT CHANGE UP
BASE EXCESS BLDV CALC-SCNC: 3.1 MMOL/L — SIGNIFICANT CHANGE UP
BASOPHILS # BLD AUTO: 0.03 K/UL — SIGNIFICANT CHANGE UP (ref 0–0.2)
BASOPHILS NFR BLD AUTO: 0.2 % — SIGNIFICANT CHANGE UP (ref 0–2)
BILIRUB SERPL-MCNC: 1.4 MG/DL — HIGH (ref 0.2–1.2)
BILIRUB UR-MCNC: NEGATIVE — SIGNIFICANT CHANGE UP
BLOOD GAS VENOUS - CREATININE: 0.73 MG/DL — SIGNIFICANT CHANGE UP (ref 0.5–1.3)
BLOOD GAS VENOUS - CREATININE: 0.8 MG/DL — SIGNIFICANT CHANGE UP (ref 0.5–1.3)
BLOOD UR QL VISUAL: NEGATIVE — SIGNIFICANT CHANGE UP
BUN SERPL-MCNC: 24 MG/DL — HIGH (ref 7–23)
CALCIUM SERPL-MCNC: 8.1 MG/DL — LOW (ref 8.4–10.5)
CHLORIDE BLDV-SCNC: 105 MMOL/L — SIGNIFICANT CHANGE UP (ref 96–108)
CHLORIDE BLDV-SCNC: 106 MMOL/L — SIGNIFICANT CHANGE UP (ref 96–108)
CHLORIDE SERPL-SCNC: 100 MMOL/L — SIGNIFICANT CHANGE UP (ref 98–107)
CO2 SERPL-SCNC: 24 MMOL/L — SIGNIFICANT CHANGE UP (ref 22–31)
COLOR SPEC: YELLOW — SIGNIFICANT CHANGE UP
CREAT SERPL-MCNC: 0.8 MG/DL — SIGNIFICANT CHANGE UP (ref 0.5–1.3)
EOSINOPHIL # BLD AUTO: 0 K/UL — SIGNIFICANT CHANGE UP (ref 0–0.5)
EOSINOPHIL NFR BLD AUTO: 0 % — SIGNIFICANT CHANGE UP (ref 0–6)
GAS PNL BLDV: 132 MMOL/L — LOW (ref 136–146)
GAS PNL BLDV: 134 MMOL/L — LOW (ref 136–146)
GLUCOSE BLDV-MCNC: 173 — HIGH (ref 70–99)
GLUCOSE BLDV-MCNC: 190 — HIGH (ref 70–99)
GLUCOSE SERPL-MCNC: 168 MG/DL — HIGH (ref 70–99)
GLUCOSE UR-MCNC: NEGATIVE — SIGNIFICANT CHANGE UP
HCO3 BLDV-SCNC: 26 MMOL/L — SIGNIFICANT CHANGE UP (ref 20–27)
HCO3 BLDV-SCNC: 27 MMOL/L — SIGNIFICANT CHANGE UP (ref 20–27)
HCT VFR BLD CALC: 33.2 % — LOW (ref 34.5–45)
HCT VFR BLDV CALC: 32.8 % — LOW (ref 34.5–45)
HCT VFR BLDV CALC: 35.3 % — SIGNIFICANT CHANGE UP (ref 34.5–45)
HGB BLD-MCNC: 11.4 G/DL — LOW (ref 11.5–15.5)
HGB BLDV-MCNC: 10.6 G/DL — LOW (ref 11.5–15.5)
HGB BLDV-MCNC: 11.5 G/DL — SIGNIFICANT CHANGE UP (ref 11.5–15.5)
IMM GRANULOCYTES # BLD AUTO: 0.07 # — SIGNIFICANT CHANGE UP
IMM GRANULOCYTES NFR BLD AUTO: 0.4 % — SIGNIFICANT CHANGE UP (ref 0–1.5)
KETONES UR-MCNC: NEGATIVE — SIGNIFICANT CHANGE UP
LACTATE BLDV-MCNC: 2.5 MMOL/L — HIGH (ref 0.5–2)
LACTATE BLDV-MCNC: 2.6 MMOL/L — HIGH (ref 0.5–2)
LEUKOCYTE ESTERASE UR-ACNC: NEGATIVE — SIGNIFICANT CHANGE UP
LYMPHOCYTES # BLD AUTO: 0.68 K/UL — LOW (ref 1–3.3)
LYMPHOCYTES # BLD AUTO: 3.9 % — LOW (ref 13–44)
MCHC RBC-ENTMCNC: 31.9 PG — SIGNIFICANT CHANGE UP (ref 27–34)
MCHC RBC-ENTMCNC: 34.3 % — SIGNIFICANT CHANGE UP (ref 32–36)
MCV RBC AUTO: 93 FL — SIGNIFICANT CHANGE UP (ref 80–100)
MONOCYTES # BLD AUTO: 1.45 K/UL — HIGH (ref 0–0.9)
MONOCYTES NFR BLD AUTO: 8.2 % — SIGNIFICANT CHANGE UP (ref 2–14)
MUCOUS THREADS # UR AUTO: SIGNIFICANT CHANGE UP
NEUTROPHILS # BLD AUTO: 15.42 K/UL — HIGH (ref 1.8–7.4)
NEUTROPHILS NFR BLD AUTO: 87.3 % — HIGH (ref 43–77)
NITRITE UR-MCNC: NEGATIVE — SIGNIFICANT CHANGE UP
NRBC # FLD: 0 — SIGNIFICANT CHANGE UP
PCO2 BLDV: 32 MMHG — LOW (ref 41–51)
PCO2 BLDV: 35 MMHG — LOW (ref 41–51)
PH BLDV: 7.49 PH — HIGH (ref 7.32–7.43)
PH BLDV: 7.5 PH — HIGH (ref 7.32–7.43)
PH UR: 6.5 — SIGNIFICANT CHANGE UP (ref 4.6–8)
PLATELET # BLD AUTO: 305 K/UL — SIGNIFICANT CHANGE UP (ref 150–400)
PMV BLD: 10.1 FL — SIGNIFICANT CHANGE UP (ref 7–13)
PO2 BLDV: 159 MMHG — HIGH (ref 35–40)
PO2 BLDV: 59 MMHG — HIGH (ref 35–40)
POTASSIUM BLDV-SCNC: 3.6 MMOL/L — SIGNIFICANT CHANGE UP (ref 3.4–4.5)
POTASSIUM BLDV-SCNC: 3.7 MMOL/L — SIGNIFICANT CHANGE UP (ref 3.4–4.5)
POTASSIUM SERPL-MCNC: 4.3 MMOL/L — SIGNIFICANT CHANGE UP (ref 3.5–5.3)
POTASSIUM SERPL-SCNC: 4.3 MMOL/L — SIGNIFICANT CHANGE UP (ref 3.5–5.3)
PROT SERPL-MCNC: 6.8 G/DL — SIGNIFICANT CHANGE UP (ref 6–8.3)
PROT UR-MCNC: 20 MG/DL — SIGNIFICANT CHANGE UP
RBC # BLD: 3.57 M/UL — LOW (ref 3.8–5.2)
RBC # FLD: 16.7 % — HIGH (ref 10.3–14.5)
RBC CASTS # UR COMP ASSIST: SIGNIFICANT CHANGE UP (ref 0–?)
SAO2 % BLDV: 90.2 % — HIGH (ref 60–85)
SAO2 % BLDV: 99.5 % — HIGH (ref 60–85)
SODIUM SERPL-SCNC: 136 MMOL/L — SIGNIFICANT CHANGE UP (ref 135–145)
SP GR SPEC: 1.02 — SIGNIFICANT CHANGE UP (ref 1–1.04)
SQUAMOUS # UR AUTO: SIGNIFICANT CHANGE UP
UROBILINOGEN FLD QL: NORMAL MG/DL — SIGNIFICANT CHANGE UP
WBC # BLD: 17.65 K/UL — HIGH (ref 3.8–10.5)
WBC # FLD AUTO: 17.65 K/UL — HIGH (ref 3.8–10.5)
WBC UR QL: SIGNIFICANT CHANGE UP (ref 0–?)

## 2018-04-05 PROCEDURE — 70450 CT HEAD/BRAIN W/O DYE: CPT | Mod: 26

## 2018-04-05 PROCEDURE — 71045 X-RAY EXAM CHEST 1 VIEW: CPT | Mod: 26

## 2018-04-05 RX ORDER — CEFEPIME 1 G/1
1000 INJECTION, POWDER, FOR SOLUTION INTRAMUSCULAR; INTRAVENOUS ONCE
Qty: 0 | Refills: 0 | Status: COMPLETED | OUTPATIENT
Start: 2018-04-05 | End: 2018-04-05

## 2018-04-05 RX ORDER — VANCOMYCIN HCL 1 G
1000 VIAL (EA) INTRAVENOUS ONCE
Qty: 0 | Refills: 0 | Status: COMPLETED | OUTPATIENT
Start: 2018-04-05 | End: 2018-04-05

## 2018-04-05 RX ORDER — FAMOTIDINE 10 MG/ML
1 INJECTION INTRAVENOUS
Qty: 0 | Refills: 0 | COMMUNITY

## 2018-04-05 RX ORDER — CIPROFLOXACIN LACTATE 400MG/40ML
400 VIAL (ML) INTRAVENOUS ONCE
Qty: 0 | Refills: 0 | Status: COMPLETED | OUTPATIENT
Start: 2018-04-05 | End: 2018-04-05

## 2018-04-05 RX ORDER — SODIUM CHLORIDE 9 MG/ML
500 INJECTION INTRAMUSCULAR; INTRAVENOUS; SUBCUTANEOUS ONCE
Qty: 0 | Refills: 0 | Status: COMPLETED | OUTPATIENT
Start: 2018-04-05 | End: 2018-04-05

## 2018-04-05 RX ADMIN — SODIUM CHLORIDE 500 MILLILITER(S): 9 INJECTION INTRAMUSCULAR; INTRAVENOUS; SUBCUTANEOUS at 21:17

## 2018-04-05 RX ADMIN — Medication 250 MILLIGRAM(S): at 23:01

## 2018-04-05 RX ADMIN — CEFEPIME 100 MILLIGRAM(S): 1 INJECTION, POWDER, FOR SOLUTION INTRAMUSCULAR; INTRAVENOUS at 21:17

## 2018-04-05 RX ADMIN — Medication 200 MILLIGRAM(S): at 21:51

## 2018-04-05 NOTE — ED ADULT NURSE REASSESSMENT NOTE - NS ED NURSE REASSESS COMMENT FT1
Pt. turned and repositioned. Lauren placed on stage 2 pressure ulcers on sacrum. Will continue to monitor.

## 2018-04-05 NOTE — ED ADULT NURSE NOTE - OBJECTIVE STATEMENT
73 y/o female presents to ED from [facility] for change in mental status. Per EMS pt has been having a decline in mental status for the past 4 days and had fever today.  Pt rec'd to ED lethargic but moans to noxious stimuli, pt with hx of pancreatic cancer and abd ascites, has paracentesis today with approx 500ml removed, developed fever today and given Tylenol PTA.  IV established labs drawnand sent, urine obtained, Providers evaluating.

## 2018-04-05 NOTE — ED PROVIDER NOTE - PROGRESS NOTE DETAILS
Call placed to Dr. Cardenas's answering service at [phone]. Awaiting callback. Spoke with DR. Cardenas, does not admit to [facility], recommends readmission to prior attending. Spoke with Dr. Sp Armendariz who accepts pt for readmission. MAR textpage sent.

## 2018-04-05 NOTE — ED ADULT TRIAGE NOTE - CHIEF COMPLAINT QUOTE
Pt with change in mental status as per the nursing home pt has hx of pancreatic cancer she is noted with increase lethargy abdominal ascites. Pt was in by [facility] rehabilitation

## 2018-04-05 NOTE — ED ADULT NURSE REASSESSMENT NOTE - NS ED NURSE REASSESS COMMENT FT1
Report received from CRISELDA Gibson. Pt. received with 18 gauge IV in right wrist and 18 gauge IV in right ac, and on 3L O2 nasal cannula. Respirations are even and unlabored on room air. Will continue to monitor. Report received from CRISELDA Gibson. Pt. received non-verbal and nonambulatory, with 18 gauge IV in right wrist and 18 gauge IV in right ac, and on 3L O2 nasal cannula. Respirations are even and unlabored on room air. Will continue to monitor. Report received from CRISELDA Gibson. Pt. received non-verbal and nonambulatory, with 18 gauge IV in right wrist and 18 gauge IV in right ac, and on 3L O2 nasal cannula. Respirations are even and unlabored. Will continue to monitor.

## 2018-04-05 NOTE — ED ADULT NURSE NOTE - CHIEF COMPLAINT QUOTE
Pt with change in mental status as per the nursing home pt has hx of pancreatic cancer she is noted with increase lethargy abdominal ascites. Pt was in by [facility] rehabilitation

## 2018-04-05 NOTE — ED PROVIDER NOTE - MEDICAL DECISION MAKING DETAILS
73 y/o female with history of Pancreatic CA with asitis and recent paracentesis presenting for AMS X 4 days. Concern for infection vs met to brain. Labs, EKG, CT, UA.

## 2018-04-05 NOTE — ED PROVIDER NOTE - OBJECTIVE STATEMENT
73 y/o female with PMHx of 71 y/o female with PMHx of Pancreatic CA with ascitics at [facility] with increased lethargy X 4 days. Pt was noted to have increased lethargy over the past 4 days. Pt has 500cc of ascites drained earlier today. Pt is awake but not verbal. Pt is more verbal at baseline.

## 2018-04-06 DIAGNOSIS — Z29.9 ENCOUNTER FOR PROPHYLACTIC MEASURES, UNSPECIFIED: ICD-10-CM

## 2018-04-06 DIAGNOSIS — K72.90 HEPATIC FAILURE, UNSPECIFIED WITHOUT COMA: ICD-10-CM

## 2018-04-06 DIAGNOSIS — G93.41 METABOLIC ENCEPHALOPATHY: ICD-10-CM

## 2018-04-06 DIAGNOSIS — C25.9 MALIGNANT NEOPLASM OF PANCREAS, UNSPECIFIED: ICD-10-CM

## 2018-04-06 DIAGNOSIS — B37.0 CANDIDAL STOMATITIS: ICD-10-CM

## 2018-04-06 DIAGNOSIS — R63.8 OTHER SYMPTOMS AND SIGNS CONCERNING FOOD AND FLUID INTAKE: ICD-10-CM

## 2018-04-06 DIAGNOSIS — D72.829 ELEVATED WHITE BLOOD CELL COUNT, UNSPECIFIED: ICD-10-CM

## 2018-04-06 DIAGNOSIS — C50.319 MALIGNANT NEOPLASM OF LOWER-INNER QUADRANT OF UNSPECIFIED FEMALE BREAST: ICD-10-CM

## 2018-04-06 DIAGNOSIS — G93.40 ENCEPHALOPATHY, UNSPECIFIED: ICD-10-CM

## 2018-04-06 DIAGNOSIS — R10.84 GENERALIZED ABDOMINAL PAIN: ICD-10-CM

## 2018-04-06 DIAGNOSIS — R41.82 ALTERED MENTAL STATUS, UNSPECIFIED: ICD-10-CM

## 2018-04-06 DIAGNOSIS — A41.9 SEPSIS, UNSPECIFIED ORGANISM: ICD-10-CM

## 2018-04-06 LAB
24R-OH-CALCIDIOL SERPL-MCNC: 12.6 NG/ML — LOW (ref 30–80)
ALBUMIN SERPL ELPH-MCNC: 2 G/DL — LOW (ref 3.3–5)
ALP SERPL-CCNC: 339 U/L — HIGH (ref 40–120)
ALT FLD-CCNC: 47 U/L — HIGH (ref 4–33)
AMMONIA BLD-MCNC: 129 UMOL/L — HIGH (ref 11–55)
AMMONIA BLD-MCNC: 148 UMOL/L — HIGH (ref 11–55)
AMMONIA BLD-MCNC: 82 UMOL/L — HIGH (ref 11–55)
AST SERPL-CCNC: 73 U/L — HIGH (ref 4–32)
B PERT DNA SPEC QL NAA+PROBE: SIGNIFICANT CHANGE UP
BASOPHILS # BLD AUTO: 0.03 K/UL — SIGNIFICANT CHANGE UP (ref 0–0.2)
BASOPHILS NFR BLD AUTO: 0.2 % — SIGNIFICANT CHANGE UP (ref 0–2)
BILIRUB SERPL-MCNC: 1.3 MG/DL — HIGH (ref 0.2–1.2)
BUN SERPL-MCNC: 20 MG/DL — SIGNIFICANT CHANGE UP (ref 7–23)
C PNEUM DNA SPEC QL NAA+PROBE: NOT DETECTED — SIGNIFICANT CHANGE UP
CALCIUM SERPL-MCNC: 7.7 MG/DL — LOW (ref 8.4–10.5)
CHLORIDE SERPL-SCNC: 102 MMOL/L — SIGNIFICANT CHANGE UP (ref 98–107)
CO2 SERPL-SCNC: 24 MMOL/L — SIGNIFICANT CHANGE UP (ref 22–31)
CREAT SERPL-MCNC: 0.76 MG/DL — SIGNIFICANT CHANGE UP (ref 0.5–1.3)
EOSINOPHIL # BLD AUTO: 0 K/UL — SIGNIFICANT CHANGE UP (ref 0–0.5)
EOSINOPHIL NFR BLD AUTO: 0 % — SIGNIFICANT CHANGE UP (ref 0–6)
FLUAV H1 2009 PAND RNA SPEC QL NAA+PROBE: NOT DETECTED — SIGNIFICANT CHANGE UP
FLUAV H1 RNA SPEC QL NAA+PROBE: NOT DETECTED — SIGNIFICANT CHANGE UP
FLUAV H3 RNA SPEC QL NAA+PROBE: NOT DETECTED — SIGNIFICANT CHANGE UP
FLUAV SUBTYP SPEC NAA+PROBE: SIGNIFICANT CHANGE UP
FLUBV RNA SPEC QL NAA+PROBE: NOT DETECTED — SIGNIFICANT CHANGE UP
FOLATE SERPL-MCNC: 15.3 NG/ML — SIGNIFICANT CHANGE UP (ref 4.7–20)
GLUCOSE SERPL-MCNC: 141 MG/DL — HIGH (ref 70–99)
HADV DNA SPEC QL NAA+PROBE: NOT DETECTED — SIGNIFICANT CHANGE UP
HCOV 229E RNA SPEC QL NAA+PROBE: NOT DETECTED — SIGNIFICANT CHANGE UP
HCOV HKU1 RNA SPEC QL NAA+PROBE: NOT DETECTED — SIGNIFICANT CHANGE UP
HCOV NL63 RNA SPEC QL NAA+PROBE: NOT DETECTED — SIGNIFICANT CHANGE UP
HCOV OC43 RNA SPEC QL NAA+PROBE: NOT DETECTED — SIGNIFICANT CHANGE UP
HCT VFR BLD CALC: 31.5 % — LOW (ref 34.5–45)
HGB BLD-MCNC: 10.9 G/DL — LOW (ref 11.5–15.5)
HMPV RNA SPEC QL NAA+PROBE: NOT DETECTED — SIGNIFICANT CHANGE UP
HPIV1 RNA SPEC QL NAA+PROBE: NOT DETECTED — SIGNIFICANT CHANGE UP
HPIV2 RNA SPEC QL NAA+PROBE: NOT DETECTED — SIGNIFICANT CHANGE UP
HPIV3 RNA SPEC QL NAA+PROBE: NOT DETECTED — SIGNIFICANT CHANGE UP
HPIV4 RNA SPEC QL NAA+PROBE: NOT DETECTED — SIGNIFICANT CHANGE UP
IMM GRANULOCYTES # BLD AUTO: 0.08 # — SIGNIFICANT CHANGE UP
IMM GRANULOCYTES NFR BLD AUTO: 0.5 % — SIGNIFICANT CHANGE UP (ref 0–1.5)
LACTATE SERPL-SCNC: 2.3 MMOL/L — HIGH (ref 0.5–2)
LYMPHOCYTES # BLD AUTO: 0.7 K/UL — LOW (ref 1–3.3)
LYMPHOCYTES # BLD AUTO: 4.6 % — LOW (ref 13–44)
M PNEUMO DNA SPEC QL NAA+PROBE: NOT DETECTED — SIGNIFICANT CHANGE UP
MAGNESIUM SERPL-MCNC: 2 MG/DL — SIGNIFICANT CHANGE UP (ref 1.6–2.6)
MCHC RBC-ENTMCNC: 32.2 PG — SIGNIFICANT CHANGE UP (ref 27–34)
MCHC RBC-ENTMCNC: 34.6 % — SIGNIFICANT CHANGE UP (ref 32–36)
MCV RBC AUTO: 93.2 FL — SIGNIFICANT CHANGE UP (ref 80–100)
MONOCYTES # BLD AUTO: 1.44 K/UL — HIGH (ref 0–0.9)
MONOCYTES NFR BLD AUTO: 9.4 % — SIGNIFICANT CHANGE UP (ref 2–14)
NEUTROPHILS # BLD AUTO: 13 K/UL — HIGH (ref 1.8–7.4)
NEUTROPHILS NFR BLD AUTO: 85.3 % — HIGH (ref 43–77)
NRBC # FLD: 0 — SIGNIFICANT CHANGE UP
PHOSPHATE SERPL-MCNC: 2.2 MG/DL — LOW (ref 2.5–4.5)
PLATELET # BLD AUTO: 261 K/UL — SIGNIFICANT CHANGE UP (ref 150–400)
PMV BLD: 9.9 FL — SIGNIFICANT CHANGE UP (ref 7–13)
POTASSIUM SERPL-MCNC: 4.1 MMOL/L — SIGNIFICANT CHANGE UP (ref 3.5–5.3)
POTASSIUM SERPL-SCNC: 4.1 MMOL/L — SIGNIFICANT CHANGE UP (ref 3.5–5.3)
PROT SERPL-MCNC: 6.3 G/DL — SIGNIFICANT CHANGE UP (ref 6–8.3)
RBC # BLD: 3.38 M/UL — LOW (ref 3.8–5.2)
RBC # FLD: 16 % — HIGH (ref 10.3–14.5)
RSV RNA SPEC QL NAA+PROBE: NOT DETECTED — SIGNIFICANT CHANGE UP
RV+EV RNA SPEC QL NAA+PROBE: NOT DETECTED — SIGNIFICANT CHANGE UP
SODIUM SERPL-SCNC: 137 MMOL/L — SIGNIFICANT CHANGE UP (ref 135–145)
SPECIMEN SOURCE: SIGNIFICANT CHANGE UP
SPECIMEN SOURCE: SIGNIFICANT CHANGE UP
TSH SERPL-MCNC: 2.5 UIU/ML — SIGNIFICANT CHANGE UP (ref 0.27–4.2)
VIT B12 SERPL-MCNC: 827 PG/ML — SIGNIFICANT CHANGE UP (ref 200–900)
WBC # BLD: 15.25 K/UL — HIGH (ref 3.8–10.5)
WBC # FLD AUTO: 15.25 K/UL — HIGH (ref 3.8–10.5)

## 2018-04-06 PROCEDURE — 99223 1ST HOSP IP/OBS HIGH 75: CPT

## 2018-04-06 PROCEDURE — 74018 RADEX ABDOMEN 1 VIEW: CPT | Mod: 26

## 2018-04-06 RX ORDER — ACETAMINOPHEN 500 MG
1000 TABLET ORAL ONCE
Qty: 0 | Refills: 0 | Status: COMPLETED | OUTPATIENT
Start: 2018-04-06 | End: 2018-04-06

## 2018-04-06 RX ORDER — SODIUM CHLORIDE 9 MG/ML
500 INJECTION INTRAMUSCULAR; INTRAVENOUS; SUBCUTANEOUS ONCE
Qty: 0 | Refills: 0 | Status: COMPLETED | OUTPATIENT
Start: 2018-04-06 | End: 2018-04-06

## 2018-04-06 RX ORDER — VANCOMYCIN HCL 1 G
1000 VIAL (EA) INTRAVENOUS EVERY 24 HOURS
Qty: 0 | Refills: 0 | Status: DISCONTINUED | OUTPATIENT
Start: 2018-04-06 | End: 2018-04-12

## 2018-04-06 RX ORDER — SPIRONOLACTONE 25 MG/1
25 TABLET, FILM COATED ORAL DAILY
Qty: 0 | Refills: 0 | Status: DISCONTINUED | OUTPATIENT
Start: 2018-04-06 | End: 2018-04-13

## 2018-04-06 RX ORDER — LACTULOSE 10 G/15ML
200 SOLUTION ORAL ONCE
Qty: 0 | Refills: 0 | Status: COMPLETED | OUTPATIENT
Start: 2018-04-06 | End: 2018-04-06

## 2018-04-06 RX ORDER — KETOROLAC TROMETHAMINE 30 MG/ML
15 SYRINGE (ML) INJECTION ONCE
Qty: 0 | Refills: 0 | Status: DISCONTINUED | OUTPATIENT
Start: 2018-04-06 | End: 2018-04-06

## 2018-04-06 RX ORDER — MORPHINE SULFATE 50 MG/1
1 CAPSULE, EXTENDED RELEASE ORAL EVERY 6 HOURS
Qty: 0 | Refills: 0 | Status: DISCONTINUED | OUTPATIENT
Start: 2018-04-06 | End: 2018-04-07

## 2018-04-06 RX ORDER — NYSTATIN 500MM UNIT
500000 POWDER (EA) MISCELLANEOUS THREE TIMES A DAY
Qty: 0 | Refills: 0 | Status: DISCONTINUED | OUTPATIENT
Start: 2018-04-06 | End: 2018-04-13

## 2018-04-06 RX ORDER — ATORVASTATIN CALCIUM 80 MG/1
10 TABLET, FILM COATED ORAL AT BEDTIME
Qty: 0 | Refills: 0 | Status: DISCONTINUED | OUTPATIENT
Start: 2018-04-06 | End: 2018-04-11

## 2018-04-06 RX ORDER — ACETAMINOPHEN 500 MG
650 TABLET ORAL EVERY 6 HOURS
Qty: 0 | Refills: 0 | Status: DISCONTINUED | OUTPATIENT
Start: 2018-04-06 | End: 2018-04-07

## 2018-04-06 RX ORDER — ENOXAPARIN SODIUM 100 MG/ML
40 INJECTION SUBCUTANEOUS EVERY 24 HOURS
Qty: 0 | Refills: 0 | Status: DISCONTINUED | OUTPATIENT
Start: 2018-04-06 | End: 2018-04-13

## 2018-04-06 RX ORDER — LACTULOSE 10 G/15ML
20 SOLUTION ORAL ONCE
Qty: 0 | Refills: 0 | Status: DISCONTINUED | OUTPATIENT
Start: 2018-04-06 | End: 2018-04-06

## 2018-04-06 RX ORDER — CHOLECALCIFEROL (VITAMIN D3) 125 MCG
2000 CAPSULE ORAL DAILY
Qty: 0 | Refills: 0 | Status: DISCONTINUED | OUTPATIENT
Start: 2018-04-06 | End: 2018-04-11

## 2018-04-06 RX ORDER — CIPROFLOXACIN LACTATE 400MG/40ML
400 VIAL (ML) INTRAVENOUS EVERY 12 HOURS
Qty: 0 | Refills: 0 | Status: DISCONTINUED | OUTPATIENT
Start: 2018-04-06 | End: 2018-04-09

## 2018-04-06 RX ORDER — CEFEPIME 1 G/1
1000 INJECTION, POWDER, FOR SOLUTION INTRAMUSCULAR; INTRAVENOUS EVERY 12 HOURS
Qty: 0 | Refills: 0 | Status: DISCONTINUED | OUTPATIENT
Start: 2018-04-06 | End: 2018-04-13

## 2018-04-06 RX ORDER — MAGNESIUM HYDROXIDE 400 MG/1
30 TABLET, CHEWABLE ORAL DAILY
Qty: 0 | Refills: 0 | Status: DISCONTINUED | OUTPATIENT
Start: 2018-04-06 | End: 2018-04-13

## 2018-04-06 RX ORDER — FAMOTIDINE 10 MG/ML
40 INJECTION INTRAVENOUS DAILY
Qty: 0 | Refills: 0 | Status: DISCONTINUED | OUTPATIENT
Start: 2018-04-06 | End: 2018-04-11

## 2018-04-06 RX ORDER — OXYBUTYNIN CHLORIDE 5 MG
5 TABLET ORAL AT BEDTIME
Qty: 0 | Refills: 0 | Status: DISCONTINUED | OUTPATIENT
Start: 2018-04-06 | End: 2018-04-06

## 2018-04-06 RX ORDER — SODIUM CHLORIDE 9 MG/ML
1000 INJECTION INTRAMUSCULAR; INTRAVENOUS; SUBCUTANEOUS
Qty: 0 | Refills: 0 | Status: DISCONTINUED | OUTPATIENT
Start: 2018-04-06 | End: 2018-04-09

## 2018-04-06 RX ORDER — ALBUTEROL 90 UG/1
2 AEROSOL, METERED ORAL EVERY 6 HOURS
Qty: 0 | Refills: 0 | Status: DISCONTINUED | OUTPATIENT
Start: 2018-04-06 | End: 2018-04-13

## 2018-04-06 RX ORDER — BUDESONIDE AND FORMOTEROL FUMARATE DIHYDRATE 160; 4.5 UG/1; UG/1
2 AEROSOL RESPIRATORY (INHALATION)
Qty: 0 | Refills: 0 | Status: DISCONTINUED | OUTPATIENT
Start: 2018-04-06 | End: 2018-04-13

## 2018-04-06 RX ADMIN — SPIRONOLACTONE 25 MILLIGRAM(S): 25 TABLET, FILM COATED ORAL at 05:04

## 2018-04-06 RX ADMIN — SODIUM CHLORIDE 1000 MILLILITER(S): 9 INJECTION INTRAMUSCULAR; INTRAVENOUS; SUBCUTANEOUS at 04:33

## 2018-04-06 RX ADMIN — Medication 500000 UNIT(S): at 05:04

## 2018-04-06 RX ADMIN — BUDESONIDE AND FORMOTEROL FUMARATE DIHYDRATE 2 PUFF(S): 160; 4.5 AEROSOL RESPIRATORY (INHALATION) at 22:51

## 2018-04-06 RX ADMIN — Medication 1000 MILLIGRAM(S): at 18:39

## 2018-04-06 RX ADMIN — ENOXAPARIN SODIUM 40 MILLIGRAM(S): 100 INJECTION SUBCUTANEOUS at 05:05

## 2018-04-06 RX ADMIN — LACTULOSE 200 GRAM(S): 10 SOLUTION ORAL at 17:48

## 2018-04-06 RX ADMIN — LACTULOSE 200 GRAM(S): 10 SOLUTION ORAL at 20:52

## 2018-04-06 RX ADMIN — MORPHINE SULFATE 1 MILLIGRAM(S): 50 CAPSULE, EXTENDED RELEASE ORAL at 22:17

## 2018-04-06 RX ADMIN — Medication 62.5 MILLIMOLE(S): at 09:46

## 2018-04-06 RX ADMIN — Medication 400 MILLIGRAM(S): at 18:07

## 2018-04-06 RX ADMIN — CEFEPIME 100 MILLIGRAM(S): 1 INJECTION, POWDER, FOR SOLUTION INTRAMUSCULAR; INTRAVENOUS at 09:46

## 2018-04-06 RX ADMIN — Medication 200 MILLIGRAM(S): at 22:49

## 2018-04-06 RX ADMIN — LACTULOSE 200 GRAM(S): 10 SOLUTION ORAL at 05:04

## 2018-04-06 RX ADMIN — SODIUM CHLORIDE 50 MILLILITER(S): 9 INJECTION INTRAMUSCULAR; INTRAVENOUS; SUBCUTANEOUS at 01:58

## 2018-04-06 RX ADMIN — MORPHINE SULFATE 1 MILLIGRAM(S): 50 CAPSULE, EXTENDED RELEASE ORAL at 22:02

## 2018-04-06 RX ADMIN — Medication 200 MILLIGRAM(S): at 08:28

## 2018-04-06 NOTE — CONSULT NOTE ADULT - ASSESSMENT
73y/o female with h/o Whipple (11/7/17) & hepatic failure s/p pleurex catheter; now with hyperammonemia & AMS    - Recommend evaluation of peritoneal fluid for cell count/cytology & culture  - Would obtain CT abd/pelvis if can be done when patient is able to tolerate procedure without motion to evaluate for progression of disease  - Agree with plan for palliative consult/goals of care discussion  -D/w Dr. Tacho Clark, PGY-4  p [phone]

## 2018-04-06 NOTE — CONSULT NOTE ADULT - SUBJECTIVE AND OBJECTIVE BOX
CC: Patient is a 72y old  Female who presents with a chief complaint of Altered mental status, lethargy. (2018 01:17)        Patient is a 72y year old female with PMHx of   HPI:  73y/o female with extensive PMH, s/p Whipple (17) for invasive ductal adenocarcinoma, s/p paracentesis for ascites with pleurex catheter left in place. As per family patient has been able to have a conversation & ambulate within rehab with assistance. 2 days prior patient had removal of peritoneal ascites. Since this time she has c/o weakness & her mental status has declined. She is currently non-verbal & unable to participate in her care/express her needs. On exam she appears in distress, with incomprehensible moaning sounds. Her abdomen is soft, seemingly non-tender with no rebound/guarding. Paracentesis catheter in place. No surrounding erythema/cellulitis.     PMH  Pancreatic cancer  Breast cancer  Solitary pulmonary nodule  Insomnia  Hiatal hernia with GERD  Colitis  Malignant neoplasm of pancreas, unspecified location of malignancy  Malignant neoplasm of right female breast, unspecified site of breast  Asthma  Depression  Hyperlipidemia  HTN (hypertension)    PSH  Carcinoma of pancreas  History of lung surgery  History of lumpectomy  Port-a-cath in place  Status post right breast lumpectomy  H/O abdominal hysterectomy  H/O umbilical hernia repair    MEDS  MEDICATIONS  (STANDING):  atorvastatin 10 milliGRAM(s) Oral at bedtime  buDESOnide 160 MICROgram(s)/formoterol 4.5 MICROgram(s) Inhaler 2 Puff(s) Inhalation two times a day  cefepime  IVPB 1000 milliGRAM(s) IV Intermittent every 12 hours  cholecalciferol 2000 Unit(s) Oral daily  ciprofloxacin   IVPB 400 milliGRAM(s) IV Intermittent every 12 hours  enoxaparin Injectable 40 milliGRAM(s) SubCutaneous every 24 hours  famotidine    Tablet 40 milliGRAM(s) Oral daily  lactulose Retention Enema 200 Gram(s) Rectal once  multivitamin 1 Tablet(s) Oral daily  nystatin    Suspension 063338 Unit(s) Oral three times a day  oseltamivir 30 milliGRAM(s) Oral at bedtime  sodium chloride 0.9%. 1000 milliLiter(s) (50 mL/Hr) IV Continuous <Continuous>  spironolactone 25 milliGRAM(s) Oral daily  vancomycin  IVPB 1000 milliGRAM(s) IV Intermittent every 24 hours    MEDICATIONS  (PRN):  acetaminophen   Tablet. 650 milliGRAM(s) Oral every 6 hours PRN Mild to moderate pain  ALBUTerol    90 MICROgram(s) HFA Inhaler 2 Puff(s) Inhalation every 6 hours PRN Shortness of Breath and/or Wheezing  magnesium hydroxide Suspension 30 milliLiter(s) Oral daily PRN Constipation  morphine  - Injectable 1 milliGRAM(s) IV Push every 6 hours PRN Moderate Pain (4 - 6)      Allergies  No Known Drug Allergies  SteriStrips (Blisters)      Physical Exam  T(C): 36.7 (18 @ 14:09), Max: 36.7 (18 @ 14:09)  HR: 96 (18 @ 17:46) (91 - 122)  BP: 151/94 (18 @ 14:09) (129/74 - 151/94)  RR: 17 (18 @ 14:09) (17 - 20)  SpO2: 99% (18 @ 14:09) (99% - 100%)  Wt(kg): --  Tmax: T(C): , Max: 36.7 (18 @ 14:09)  Wt(kg): --    Gen: appears in distress, with incomprehensible moaning sounds  HEENT: normocephalic, atraumatic, no scleral icterus  CV: S1, S2, RRR  Pulm: CTA B/L  Abd: soft, seemingly non-tender with no rebound/guarding. Paracentesis catheter in place. No surrounding erythema/cellulitis  Ext: warm, no edema, palp dp/pt          Labs:                        10.9   15.25 )-----------( 261      ( 2018 05:28 )             31.5     04-06    137  |  102  |  20  ----------------------------<  141<H>  4.1   |  24  |  0.76    Ca    7.7<L>      2018 05:28  Phos  2.2     04-06  Mg     2.0     04-06    TPro  6.3  /  Alb  2.0<L>  /  TBili  1.3<H>  /  DBili  x   /  AST  73<H>  /  ALT  47<H>  /  AlkPhos  339<H>  -      Urinalysis Basic - ( 2018 19:50 )    Color: YELLOW / Appearance: HAZY / S.023 / pH: 6.5  Gluc: NEGATIVE / Ketone: NEGATIVE  / Bili: NEGATIVE / Urobili: NORMAL mg/dL   Blood: NEGATIVE / Protein: 20 mg/dL / Nitrite: NEGATIVE   Leuk Esterase: NEGATIVE / RBC: 0-2 / WBC 2-5   Sq Epi: OCC / Non Sq Epi: x / Bacteria: FEW      Imaging    < from: CT Head No Cont (18 @ 21:27) >  IMPRESSION:     Severe motion degradation. No large intracranial hemorrhage. Consider   repeating when the patient can be sedated.    < end of copied text >

## 2018-04-06 NOTE — CONSULT NOTE ADULT - PROBLEM SELECTOR PROBLEM 3
Malignant neoplasm of lower-inner quadrant of female breast, unspecified estrogen receptor status, unspecified laterality
Functional quadriplegia

## 2018-04-06 NOTE — ED ADULT NURSE REASSESSMENT NOTE - NS ED NURSE REASSESS COMMENT FT1
Report received from break coverage CRISELDA Estevez. Pt. is resting comfortably. Awaiting lactulose to be sent up from pharmacy. Pt. is admitted to Medicine bed 725B, report given to CRISELDA Joy. Awaiting transport at present.

## 2018-04-06 NOTE — CONSULT NOTE ADULT - PROBLEM SELECTOR RECOMMENDATION 3
PPSV2 at this point is 20-30%.  Patient is very agitated secondary to hepatic encephalopathy and requires assist with all ADLS>

## 2018-04-06 NOTE — H&P ADULT - COMMENTS
Patient unable to provide adequate information, aside from responding "yes" to the presence of pain in the abdomen.

## 2018-04-06 NOTE — CONSULT NOTE ADULT - PROBLEM SELECTOR RECOMMENDATION 4
Surgery followup.
Given patients history of pancreatic cancer, poor functional status and new acute encephalopathy, initiated goals of care discussion with son at bedside.  Although very tearful and emotional, he is trying to cope with the rapid decline of his mothers health.  Will need a full family meeting once imaging is obtained and cause of mental status change is confirmed.  Overall prognosis is very guarded.

## 2018-04-06 NOTE — CONSULT NOTE ADULT - SUBJECTIVE AND OBJECTIVE BOX
Patient is a 72y old  Female who presents with a chief complaint of Altered mental status, lethargy. (06 Apr 2018 01:17)      HPI:  72 year old female, with past history significant for Asthma, Breast cancer, Depression, GERD, HTN, Insomnia, HLD, Pancreatic cancer, Whipple procedure, Ascites, Umbilical hernia with mesh, Lung surgery (VATS), Port-a-cath, presented to the ED secondary to lethargy.  Seen and evaluated at bedside; apparent intermittent distress due to painful abdominal discomfort.  Patient is somnolent at times (during periods of decreased pain), but able to respond to sound by turning head and attempt somewhat unintelligible monosyllabic responses.  Per son, patient has been in the rehab center due to deconditioning and need to gain strength prior to undergoing PET scan.  Son indicates that patient had been doing well in the rehab center (ambulating, able to verbalize needs and interact with others), until approximately 3 days ago when she began to appear weak.  This continued to progress to patient becoming unable to ambulate and speech became sparse, with pronounced weakness/lethargy to the extent of not being able to ring the call bell.  Eventually, patient stopped talking, only uttering one word to son prior to being transported to the Hospital.  Patient is noted by son, to be having difficulty passing gas, even though she is having bowel movements (had one in the ED).  Son notes significant borborygmi each time patient writhes around in bed; occurs approximately every 15 minutes.  Reported to be occurring over the past few days only.  Patient underwent drainage of ascitic fluid (~ 500 mL) on the day prior to admission.  Unknown if any fevers, chills, sweating.    At the time of being seen, son reports that patient was a bit more alert than on initial presentation.  Direct questioning re pain confirmed presence of abdominal pain (by patient).      Vital signs upon ED presentation as follows: BP = 110/87, HR = 77, RR = 26, T = 37 C (98.6 F), o2 Sat = 100% on RA.  Diagnosed with Altered Mental Status.  Prescribed Ciprofloxacin 400 mg IV, Vancomycin 1 gram IV, Cefepime 1 gram IV and NaCl 500 mL in the ED. (06 Apr 2018 01:17)       ROS:  Negative except for:Confused    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  Insomnia  Hiatal hernia with GERD: no changes  Colitis: due to chemo 1/2017  Malignant neoplasm of pancreas, unspecified location of malignancy: started chemo 10/2016, has mediport - left chest, now s/p whipple 11/2017  Asthma: denies any recent hospitalizations/intubations  Depression  Hyperlipidemia  HTN (hypertension)  Carcinoma of pancreas: s/p whipple  History of lung surgery: right VATS, wedge resection (August 2017) benign neoplasm  History of lumpectomy: right (1996)  Port-a-cath in place  Status post right breast lumpectomy: malignant treated with radiation  H/O abdominal hysterectomy  H/O umbilical hernia repair: with mesh      SOCIAL HISTORY:    FAMILY HISTORY:  Family history of kidney cancer (Sibling): brother  Family history of brain cancer (Sibling): sister  Family history of ovarian cancer: mother      MEDICATIONS  (STANDING):  acetaminophen  IVPB. 1000 milliGRAM(s) IV Intermittent once  atorvastatin 10 milliGRAM(s) Oral at bedtime  buDESOnide 160 MICROgram(s)/formoterol 4.5 MICROgram(s) Inhaler 2 Puff(s) Inhalation two times a day  cefepime  IVPB 1000 milliGRAM(s) IV Intermittent every 12 hours  cholecalciferol 2000 Unit(s) Oral daily  ciprofloxacin   IVPB 400 milliGRAM(s) IV Intermittent every 12 hours  enoxaparin Injectable 40 milliGRAM(s) SubCutaneous every 24 hours  famotidine    Tablet 40 milliGRAM(s) Oral daily  lactulose Retention Enema 200 Gram(s) Rectal once  lactulose Retention Enema 200 Gram(s) Rectal once  multivitamin 1 Tablet(s) Oral daily  nystatin    Suspension [id number] Unit(s) Oral three times a day  oseltamivir 30 milliGRAM(s) Oral at bedtime  sodium chloride 0.9%. 1000 milliLiter(s) (50 mL/Hr) IV Continuous <Continuous>  spironolactone 25 milliGRAM(s) Oral daily  vancomycin  IVPB 1000 milliGRAM(s) IV Intermittent every 24 hours    MEDICATIONS  (PRN):  acetaminophen   Tablet. 650 milliGRAM(s) Oral every 6 hours PRN Mild to moderate pain  ALBUTerol    90 MICROgram(s) HFA Inhaler 2 Puff(s) Inhalation every 6 hours PRN Shortness of Breath and/or Wheezing  magnesium hydroxide Suspension 30 milliLiter(s) Oral daily PRN Constipation      Allergies    No Known Drug Allergies  SteriStrips (Blisters)    Intolerances        Vital Signs Last 24 Hrs  T(C): 36.7 (06 Apr 2018 14:09), Max: 37.7 (05 Apr 2018 19:56)  T(F): 98.1 (06 Apr 2018 14:09), Max: 99.8 (05 Apr 2018 19:56)  HR: 122 (06 Apr 2018 14:09) (77 - 122)  BP: 151/94 (06 Apr 2018 14:09) (110/87 - 151/94)  BP(mean): --  RR: 17 (06 Apr 2018 14:09) (17 - 26)  SpO2: 99% (06 Apr 2018 14:09) (99% - 100%)    PHYSICAL EXAM  General: confusedHEENT: clear oropharynx, anicteric sclera, pink conjunctiva  Neck: supple  CV: normal S1/S2 with no murmur rubs or gallops  Lungs: positive air movement b/l ant lungs,clear to auscultation, no wheezes, no rales  Abdomen: soft ,distended Right sided pleurex in place  Ext: no clubbing cyanosis or edema  Skin: no rashes and no petechiae  Neuro: Drawsy but arousable and confused    LABS:                          10.9   15.25 )-----------( 261      ( 06 Apr 2018 05:28 )             31.5         Mean Cell Volume : 93.2 fL  Mean Cell Hemoglobin : 32.2 pg  Mean Cell Hemoglobin Concentration : 34.6 %  Auto Neutrophil # : 13.00 K/uL  Auto Lymphocyte # : 0.70 K/uL  Auto Monocyte # : 1.44 K/uL  Auto Eosinophil # : 0.00 K/uL  Auto Basophil # : 0.03 K/uL  Auto Neutrophil % : 85.3 %  Auto Lymphocyte % : 4.6 %  Auto Monocyte % : 9.4 %  Auto Eosinophil % : 0.0 %  Auto Basophil % : 0.2 %      Serial CBC's  04-06 @ 05:28  Hct-31.5 / Hgb-10.9 / Plat-261 / RBC-3.38 / WBC-15.25  Serial CBC's  04-05 @ 19:30  Hct-33.2 / Hgb-11.4 / Plat-305 / RBC-3.57 / WBC-17.65      04-06    137  |  102  |  20  ----------------------------<  141<H>  4.1   |  24  |  0.76    Ca    7.7<L>      06 Apr 2018 05:28  Phos  2.2     04-06  Mg     2.0     04-06    TPro  6.3  /  Alb  2.0<L>  /  TBili  1.3<H>  /  DBili  x   /  AST  73<H>  /  ALT  47<H>  /  AlkPhos  339<H>  04-06          Vitamin B12, Serum: 827 pg/mL (04-06 @ 05:28)  Folate, Serum: 15.3 ng/mL (04-06 @ 05:28)      < from: CT Head No Cont (04.05.18 @ 21:27) >  EXAM:  CT BRAIN        PROCEDURE DATE:  Apr 5 2018         INTERPRETATION:  CLINICAL INFORMATION: Altered mental status. Evaluate   for intracranial bleed.    TECHNIQUE: Noncontrast axial CT images were acquired through the head.   Two-dimensionalsagittal and coronal reformats were generated.    COMPARISON STUDY: None    FINDINGS:     Evaluation is limited secondary to significant motion artifact. There is   no CT evidence of large intracranial hemorrhage, midline shift, or large   acute territorial infarct. There is mild cerebral volume loss. There is   mild patchy white matter hypoattenuation which is nonspecific in etiology   but likely related to microvascular disease.    The visualized paranasal sinuses are clear. The mastoid air cells and   middle ear cavities are clear.    The soft tissues of the scalp are unremarkable. The calvarium is intact.    IMPRESSION:     Severe motion degradation. No large intracranial hemorrhage. Consider   repeating when the patient can be sedated.                < end of copied text >          BLOOD SMEAR INTERPRETATION:       RADIOLOGY & ADDITIONAL STUDIES:

## 2018-04-06 NOTE — CONSULT NOTE ADULT - ASSESSMENT
72 year old female PMH significant for Asthma, Breast cancer, Depression, GERD, HTN, Insomnia, HLD, Pancreatic cancer, Whipple procedure, Ascites, Umbilical hernia with mesh, Lung surgery (VATS), Port-a-cath admitted for altered mental status and lethargy. Patient has an ammonia level of 129 and presumed hepatic encephalopathy. Neurology consulted because CTH done and was inconclusive due to severe motion artifact and primary team wants to know if any other imaging should be done to r/o other causes for patients AMS.     Most likely altered due to hepatic encephalopathy with Ammonia level at 129.    Plan:  -continue to treat hyperammonemia as per primary team  -Can obtain MRI brain w/ and w/o contrast to r/o mets to brain as patient has pancreatic cancer  -Neurology attending to see in AM 72 year old female PMH significant for Asthma, Breast cancer, Depression, GERD, HTN, Insomnia, HLD, Pancreatic cancer, Whipple procedure, Ascites, Umbilical hernia with mesh, Lung surgery (VATS), Port-a-cath admitted for altered mental status and lethargy. Patient has an ammonia level of 129 and presumed hepatic encephalopathy. Neurology consulted because CTH done and was inconclusive due to severe motion artifact and primary team wants to know if any other imaging should be done to r/o other causes for patients AMS.     Most likely altered due to hepatic encephalopathy with Ammonia level at 129.    Plan:  -continue to treat hyperammonemia as per primary team  -Can obtain MRI brain w/ and w/o contrast to r/o mets to brain as patient has pancreatic cancer but will need sedation before imaging to avoid motion artifact as seen on CTH  -Neurology attending to see in AM

## 2018-04-06 NOTE — H&P ADULT - PROBLEM SELECTOR PLAN 1
- patient with rapidly worsening lethargy/AMS over 3 days; no longer ambulating and non-verbal (dissimilar to baseline)  - likely of multifactorial etiology (Hyperammonemia, infection, dehydration, possibly complicated by medication s/e)  - ammonia = 148; lactulose rectally prescribed (suggest PO when mental status begins to improve)  - WBC = 17.65; prescribed cefepime, vancomycin and cipro in the ED; will continue  - ensure adequate hydration; prescribed IVF  mL in the ED; additional 500 mL prescribed with subsequent maintenance at 50 mL/Hr (may need uptitration)  - sertraline, trazodone and oxybutynin held for now - patient with rapidly worsening lethargy/AMS over 3 days; no longer ambulating and non-verbal (dissimilar to baseline)  - likely of multifactorial etiology (Hyperammonemia, infection [?aspiration], dehydration, possibly complicated by medication s/e)  - ammonia = 148; lactulose rectally prescribed (suggest PO when mental status begins to improve)  - WBC = 17.65; prescribed cefepime, vancomycin and cipro in the ED; will continue  - ensure adequate hydration; prescribed IVF  mL in the ED; additional 500 mL prescribed with subsequent maintenance at 50 mL/Hr (may need up-titration)  - sertraline, trazodone and oxybutynin held for now  - continues on spironolactone   - f/u abdominal X-ray  - f/u repeat ammonia level  - f/u cultures  - HOB elevation  - follow pulse oximetry, VS - patient with rapidly worsening lethargy/AMS over 3 days; no longer ambulating and non-verbal (dissimilar to baseline)  - likely of multifactorial etiology (Hyperammonemia, infection [?aspiration], dehydration, possibly complicated by medication s/e)  - unlikely tumor lysis  - ammonia = 148; lactulose rectally prescribed (suggest PO when mental status begins to improve)  - WBC = 17.65; prescribed cefepime, vancomycin and cipro in the ED; will continue  - ensure adequate hydration; prescribed IVF  mL in the ED; additional 500 mL prescribed with subsequent maintenance at 50 mL/Hr (may need up-titration)  - sertraline, trazodone and oxybutynin held for now  - continues on spironolactone   - f/u abdominal X-ray  - f/u repeat ammonia level  - f/u cultures  - HOB elevation  - follow pulse oximetry, VS

## 2018-04-06 NOTE — CONSULT NOTE ADULT - PROBLEM SELECTOR RECOMMENDATION 9
At present not a candidate for further chemotherapy due to worsening  overall condition. Seen by palliative care to address the advanced directives. D/W son at the bedside.Will follow her closely to check the progress.CT head negative for bleed but a repeat suggested due to movement artifact.
Patient is s/p Whipple.  Awaiting imaging to evaluate for metastatic focus vs causes of encephalopathy.  At this point, patient would not be an optimal candidate for disease modifying interventions secondary to poor ECOG, and mental status.

## 2018-04-06 NOTE — H&P ADULT - ASSESSMENT
72 year old female, with past history significant for Asthma, Breast cancer, Depression, GERD, HTN, Insomnia, HLD, Pancreatic cancer, Whipple procedure, Ascites, Umbilical hernia with mesh, Lung surgery (VATS), Port-a-cath, presented to the ED secondary to lethargy.  Vital signs upon ED presentation as follows: BP = 110/87, HR = 77, RR = 26, T = 37 C (98.6 F), o2 Sat = 100% on RA.  Diagnosed with Altered Mental Status.  Admitted for further management of:

## 2018-04-06 NOTE — H&P ADULT - PROBLEM SELECTOR PLAN 7
- NPO for now (except ice chips and sips of water - since mentation improving)  - IVF hydration as above  - f/u AM lab-work

## 2018-04-06 NOTE — H&P ADULT - NSHPLABSRESULTS_GEN_ALL_CORE
11.4   17.65 )-----------( 305      ( 2018 19:30 )             33.2           136  |  100  |  24<H>  ----------------------------<  168<H>  4.3   |  24  |  0.80    Ca    8.1<L>      2018 19:30    TPro  6.8  /  Alb  1.9<L>  /  TBili  1.4<H>  /  DBili  x   /  AST  81<H>  /  ALT  48<H>  /  AlkPhos  356<H>                Urinalysis Basic - ( 2018 19:50 )    Color: YELLOW / Appearance: HAZY / S.023 / pH: 6.5  Gluc: NEGATIVE / Ketone: NEGATIVE  / Bili: NEGATIVE / Urobili: NORMAL mg/dL   Blood: NEGATIVE / Protein: 20 mg/dL / Nitrite: NEGATIVE   Leuk Esterase: NEGATIVE / RBC: 0-2 / WBC 2-5   Sq Epi: OCC / Non Sq Epi: x / Bacteria: FEW      Lactate Trend      CAPILLARY BLOOD GLUCOSE      POCT Blood Glucose.: 141 mg/dL (2018 19:06)

## 2018-04-06 NOTE — CONSULT NOTE ADULT - ASSESSMENT
72 year old female, with past history significant for Asthma, Breast cancer, Depression, GERD, HTN, Insomnia, HLD, Pancreatic cancer, Whipple procedure, Ascites, Umbilical hernia with mesh, Lung surgery (VATS), Port-a-cath, presented to the ED secondary to lethargy.  She was recently hospitalised with ascitis and had paracentesis followed by pleurex due to repeated accumulation of fluid. She was then discharged to rehab and was suppose to follow as outpt.   This admission was brought in from rehab with change in mental status and abdominal pain. She is confused but gets uncomfortable on palpation of her abdomen.

## 2018-04-06 NOTE — CONSULT NOTE ADULT - PROBLEM SELECTOR RECOMMENDATION 2
? Metabolic, neurology input.
Recommend Lactulose retention enemas to bring down Ammonia.   Supportive care.  Monitor for improvement in mental status.

## 2018-04-06 NOTE — CONSULT NOTE ADULT - SUBJECTIVE AND OBJECTIVE BOX
HPI:  72 year old female, with past history significant for Asthma, Breast cancer, Depression, GERD, HTN, Insomnia, HLD, Pancreatic cancer, Whipple procedure, Ascites, Umbilical hernia with mesh, Lung surgery (VATS), Port-a-cath, presented to the ED secondary to lethargy.  Seen and evaluated at bedside; apparent intermittent distress due to painful abdominal discomfort.  Patient is somnolent at times (during periods of decreased pain), but able to respond to sound by turning head and attempt somewhat unintelligible monosyllabic responses.  Per son, patient has been in the rehab center due to deconditioning and need to gain strength prior to undergoing PET scan.  Son indicates that patient had been doing well in the rehab center (ambulating, able to verbalize needs and interact with others), until approximately 3 days ago when she began to appear weak.  This continued to progress to patient becoming unable to ambulate and speech became sparse, with pronounced weakness/lethargy to the extent of not being able to ring the call bell.  Eventually, patient stopped talking, only uttering one word to son prior to being transported to the Hospital.  Patient is noted by son, to be having difficulty passing gas, even though she is having bowel movements (had one in the ED).  Son notes significant borborygmi each time patient writhes around in bed; occurs approximately every 15 minutes.  Reported to be occurring over the past few days only.  Patient underwent drainage of ascitic fluid (~ 500 mL) on the day prior to admission.  Unknown if any fevers, chills, sweating.    At the time of being seen, son reports that patient was a bit more alert than on initial presentation.  Direct questioning re pain confirmed presence of abdominal pain (by patient).      Vital signs upon ED presentation as follows: BP = 110/87, HR = 77, RR = 26, T = 37 C (98.6 F), o2 Sat = 100% on RA.  Diagnosed with Altered Mental Status.  Prescribed Ciprofloxacin 400 mg IV, Vancomycin 1 gram IV, Cefepime 1 gram IV and NaCl 500 mL in the ED. (2018 01:17)      PERTINENT PMH REVIEWED:  [ ] YES [ ] NO           SOCIAL HISTORY:  Significant other/partner:  [ ] YES  [ ] NO            Children:  [ ] YES  [ ] NO                   Pentecostal/Spirituality:  Subtance hx:  [ ] YES   [ ] NO           Tobacco hx:  [ ] YES  [ ] NO             Alcohol hx: [ ] YES  [ ] NO        Home Opiod hx:  [ ] YES  [ ] NO   Living Situation: [ ] Home  [ ] Long term care  [ ] Rehab    REFERRALS:   [ ] Chaplaincy  [ ] Hospice  [ ] Child Life  [ ] Social Work  [ ] Case management [ ] Holistic Therapy     FAMILY HISTORY:  Family history of kidney cancer (Sibling): brother  Family history of brain cancer (Sibling): sister  Family history of ovarian cancer: mother    [ ] Family history non contributory     BASELINE ADLs (prior to admission):  Independent [ ] moderately [ ] fully   Dependent   [ ] moderately [ ] fully    ADVANCE DIRECTIVES:  [ ] YES [ ] NO   DNR [ ] YES [ ] NO                      MOLST  [ ] YES [ ] NO    Living Will  [ ] YES [ ] NO    Health Care Proxy [ ] YES  [ ] NO      [ ] Surrogate  [ ] HCP  [ ] Guardian:                                                                  Phone#:    Allergies    No Known Drug Allergies  SteriStrips (Blisters)    Intolerances        MEDICATIONS  (STANDING):  atorvastatin 10 milliGRAM(s) Oral at bedtime  buDESOnide 160 MICROgram(s)/formoterol 4.5 MICROgram(s) Inhaler 2 Puff(s) Inhalation two times a day  cefepime  IVPB 1000 milliGRAM(s) IV Intermittent every 12 hours  cholecalciferol 2000 Unit(s) Oral daily  ciprofloxacin   IVPB 400 milliGRAM(s) IV Intermittent every 12 hours  enoxaparin Injectable 40 milliGRAM(s) SubCutaneous every 24 hours  famotidine    Tablet 40 milliGRAM(s) Oral daily  multivitamin 1 Tablet(s) Oral daily  nystatin    Suspension 951451 Unit(s) Oral three times a day  oseltamivir 30 milliGRAM(s) Oral at bedtime  sodium chloride 0.9%. 1000 milliLiter(s) (50 mL/Hr) IV Continuous <Continuous>  spironolactone 25 milliGRAM(s) Oral daily  vancomycin  IVPB 1000 milliGRAM(s) IV Intermittent every 24 hours    MEDICATIONS  (PRN):  acetaminophen   Tablet. 650 milliGRAM(s) Oral every 6 hours PRN Mild to moderate pain  ALBUTerol    90 MICROgram(s) HFA Inhaler 2 Puff(s) Inhalation every 6 hours PRN Shortness of Breath and/or Wheezing  magnesium hydroxide Suspension 30 milliLiter(s) Oral daily PRN Constipation  morphine  - Injectable 1 milliGRAM(s) IV Push every 6 hours PRN Moderate Pain (4 - 6)      PRESENT SYMPTOMS:  Source: [ ] Patient   [ ] Family   [ ] Team     Pain: [ ] YES [ ] NO  OLDCARTS:     Dyspnea: [ ] YES [ ] NO   Anxiety: [ ] YES [ ] NO  Fatigue: [ ] YES [ ] NO   Nausea: [ ] YES [ ] NO  Loss of appetite: [ ] YES [ ] NO   Constipation: [ ] YES [ ] NO     Other Symptoms:  [ ] All other review of systems negative   [ ] Unable to obtain due to poor mentation     Karnofsky Performance Score/Palliative Performance Status Version 2:         %  Protein Calorie Malutrition:  [ ] Mild   [ ] Moderate   [ ] Severe     Vital Signs Last 24 Hrs  T(C): 36.4 (2018 04:42), Max: 36.7 (2018 14:09)  T(F): 97.6 (2018 04:42), Max: 98.1 (2018 14:09)  HR: 92 (2018 04:56) (86 - 122)  BP: 129/81 (2018 04:56) (107/68 - 151/94)  BP(mean): --  RR: 18 (2018 04:42) (17 - 18)  SpO2: 100% (2018 04:42) (99% - 100%)    Physical Exam:    General: [ ] Alert,  A&O x     [ ] lethargic   [ ] Agitated   [ ] Cachexia   HEENT: [ ] Normal   [ ] Dry mouth   [ ] ET Tube    [ ] Trach   Lungs: [ ] Clear [ ] Rhonchi  [ ] Crackles [ ] Wheezing [ ] Tachypnea  [ ] Audible excessive secretions   Cardiovascular:  [ ] Regular rate and rhythm  [ ] Irregular [ ] Tachycardia   [ ] Bradycardia   Abdomen: [ ] Soft  [ ] Distended  [ ]  [ ] +BS  [ ] Non tender [ ] Tender  [ ]PEG   [ ] NGT   Last BM:     Genitourinary: [ ] Normal [ ] Incontinent   [ ] Oliguria/Anuria   [ ] Stoner  Musculoskeletal:  [ ] Normal   [ ] Generalized weakness  [ ] Bedbound   Neurological: [ ] No focal deficits  [ ] Cognitive impairment     Skin: [ ] Normal   [ ] Pressure ulcers     LABS:                        9.9    13.36 )-----------( 251      ( 2018 06:30 )             28.8     04-07    139  |  105  |  19  ----------------------------<  155<H>  3.4<L>   |  22  |  0.79    Ca    7.7<L>      2018 06:30  Phos  2.9       Mg     2.0         TPro  6.3  /  Alb  2.0<L>  /  TBili  1.3<H>  /  DBili  x   /  AST  73<H>  /  ALT  47<H>  /  AlkPhos  339<H>        Urinalysis Basic - ( 2018 19:50 )    Color: YELLOW / Appearance: HAZY / S.023 / pH: 6.5  Gluc: NEGATIVE / Ketone: NEGATIVE  / Bili: NEGATIVE / Urobili: NORMAL mg/dL   Blood: NEGATIVE / Protein: 20 mg/dL / Nitrite: NEGATIVE   Leuk Esterase: NEGATIVE / RBC: 0-2 / WBC 2-5   Sq Epi: OCC / Non Sq Epi: x / Bacteria: FEW      I&O's Summary    2018 07:01  -  2018 07:00  --------------------------------------------------------  IN: 0 mL / OUT: 700 mL / NET: -700 mL        RADIOLOGY & ADDITIONAL STUDIES: HPI:  72 year old female, with past history significant for Asthma, Breast cancer, Depression, GERD, HTN, Insomnia, HLD, Pancreatic cancer, Whipple procedure, Ascites, Umbilical hernia with mesh, Lung surgery (VATS), Port-a-cath, presented to the ED secondary to lethargy.  Seen and evaluated at bedside; apparent intermittent distress due to painful abdominal discomfort.  Patient is somnolent at times (during periods of decreased pain), but able to respond to sound by turning head and attempt somewhat unintelligible monosyllabic responses.  Per son, patient has been in the rehab center due to deconditioning and need to gain strength prior to undergoing PET scan.  Son indicates that patient had been doing well in the rehab center (ambulating, able to verbalize needs and interact with others), until approximately 3 days ago when she began to appear weak.  This continued to progress to patient becoming unable to ambulate and speech became sparse, with pronounced weakness/lethargy to the extent of not being able to ring the call bell.  Eventually, patient stopped talking, only uttering one word to son prior to being transported to the Hospital.  Patient is noted by son, to be having difficulty passing gas, even though she is having bowel movements (had one in the ED).  Son notes significant borborygmi each time patient writhes around in bed; occurs approximately every 15 minutes.  Reported to be occurring over the past few days only.  Patient underwent drainage of ascitic fluid (~ 500 mL) on the day prior to admission.  Unknown if any fevers, chills, sweating.  )    PERTINENT PMH REVIEWED:  [x ] YES [ ] NO           SOCIAL HISTORY:  Significant other/partner:  [ ] YES  [x ] NO            Children:  [x ] YES  [ ] NO                   Mu-ism/Spirituality: Baptist  Subtance hx:  [ ] YES   [x ] NO           Tobacco hx:  [ ] YES  [x ] NO             Alcohol hx: [ ] YES  [x ] NO        Home Opiod hx:  [ ] YES  [x ] NO   Living Situation: [x ] Home  [ ] Long term care  [ ] Rehab    REFERRALS:   [x ] Chaplaincy  [ ] Hospice  [ ] Child Life  [ ] Social Work  [ ] Case management [ ] Holistic Therapy     FAMILY HISTORY:  Family history of kidney cancer (Sibling): brother  Family history of brain cancer (Sibling): sister  Family history of ovarian cancer: mother    [x ] Family history non contributory     BASELINE ADLs (prior to admission):  Independent [ ] moderately [ ] fully   Dependent   [x ] moderately [ ] fully    ADVANCE DIRECTIVES:  [ ] YES [x ] NO   DNR [ ] YES [x ] NO                      MOLST  [ ] YES x[ ] NO    Living Will  [ ] YES [x ] NO    Health Care Proxy [ ] YES  [x ] NO      [x ] Surrogate  [ ] HCP  [ ] Guardian:         Richie Eli (son)               Phone#: [phone]    Allergies    No Known Drug Allergies  SteriStrips (Blisters)    Intolerances        MEDICATIONS  (STANDING):  atorvastatin 10 milliGRAM(s) Oral at bedtime  buDESOnide 160 MICROgram(s)/formoterol 4.5 MICROgram(s) Inhaler 2 Puff(s) Inhalation two times a day  cefepime  IVPB 1000 milliGRAM(s) IV Intermittent every 12 hours  cholecalciferol 2000 Unit(s) Oral daily  ciprofloxacin   IVPB 400 milliGRAM(s) IV Intermittent every 12 hours  enoxaparin Injectable 40 milliGRAM(s) SubCutaneous every 24 hours  famotidine    Tablet 40 milliGRAM(s) Oral daily  multivitamin 1 Tablet(s) Oral daily  nystatin    Suspension [id number] Unit(s) Oral three times a day  oseltamivir 30 milliGRAM(s) Oral at bedtime  sodium chloride 0.9%. 1000 milliLiter(s) (50 mL/Hr) IV Continuous <Continuous>  spironolactone 25 milliGRAM(s) Oral daily  vancomycin  IVPB 1000 milliGRAM(s) IV Intermittent every 24 hours    MEDICATIONS  (PRN):  acetaminophen   Tablet. 650 milliGRAM(s) Oral every 6 hours PRN Mild to moderate pain  ALBUTerol    90 MICROgram(s) HFA Inhaler 2 Puff(s) Inhalation every 6 hours PRN Shortness of Breath and/or Wheezing  magnesium hydroxide Suspension 30 milliLiter(s) Oral daily PRN Constipation  morphine  - Injectable 1 milliGRAM(s) IV Push every 6 hours PRN Moderate Pain (4 - 6)      PRESENT SYMPTOMS:  Source: [ ] Patient   [x ] Family   [x ] Team     Pain: [ ] YES [x ] NO  OLDCARTS:     Dyspnea: [ ] YES [ ] NO   Anxiety: [ ] YES [ ] NO  Fatigue: [ ] YES [ ] NO   Nausea: [ ] YES [ ] NO  Loss of appetite: [ ] YES [ ] NO   Constipation: [ ] YES [ ] NO     Other Symptoms:  [ ] All other review of systems negative   [x ] Unable to obtain due to poor mentation     Karnofsky Performance Score/Palliative Performance Status Version 2: 30        %  Protein Calorie Malutrition:  [ ] Mild   [ ] Moderate   [ ] Severe     Vital Signs Last 24 Hrs  T(C): 36.4 (2018 04:42), Max: 36.7 (2018 14:09)  T(F): 97.6 (2018 04:42), Max: 98.1 (2018 14:09)  HR: 92 (2018 04:56) (86 - 122)  BP: 129/81 (2018 04:56) (107/68 - 151/94)  BP(mean): --  RR: 18 (2018 04:42) (17 - 18)  SpO2: 100% (2018 04:42) (99% - 100%)    Physical Exam:    General: [x ] Alert,  A&O x 0  [ ] lethargic   [x ] Agitated   [ ] Cachexia   HEENT: [ ] Normal   [x ] Dry mouth   [ ] ET Tube    [ ] Trach   Lungs: [x ] Clear [ ] Rhonchi  [ ] Crackles [ ] Wheezing [ ] Tachypnea  [ ] Audible excessive secretions   Cardiovascular:  [x ] Regular rate and rhythm  [ ] Irregular [ ] Tachycardia   [ ] Bradycardia   Abdomen: [ ] Soft  [x ] Distended  [ ]  [x ] +BS  [x ] Non tender [ ] Tender  [ ]PEG   [ ] NGT   Last BM:     Genitourinary: [ ] Normal [ ] Incontinent   [ ] Oliguria/Anuria   [x ] Stoner  Musculoskeletal:  [ ] Normal   [ ] Generalized weakness  [x ] Bedbound   Neurological: [ ] No focal deficits  [x ] Cognitive impairment     Skin: [x ] Normal   [ ] Pressure ulcers     LABS:                        9.9    13.36 )-----------( 251      ( 2018 06:30 )             28.8     -07    139  |  105  |  19  ----------------------------<  155<H>  3.4<L>   |  22  |  0.79    Ca    7.7<L>      2018 06:30  Phos  2.9     -  Mg     2.0     04-07    TPro  6.3  /  Alb  2.0<L>  /  TBili  1.3<H>  /  DBili  x   /  AST  73<H>  /  ALT  47<H>  /  AlkPhos  339<H>        Urinalysis Basic - ( 2018 19:50 )    Color: YELLOW / Appearance: HAZY / S.023 / pH: 6.5  Gluc: NEGATIVE / Ketone: NEGATIVE  / Bili: NEGATIVE / Urobili: NORMAL mg/dL   Blood: NEGATIVE / Protein: 20 mg/dL / Nitrite: NEGATIVE   Leuk Esterase: NEGATIVE / RBC: 0-2 / WBC 2-5   Sq Epi: OCC / Non Sq Epi: x / Bacteria: FEW      I&O's Summary    2018 07:  -  2018 07:00  --------------------------------------------------------  IN: 0 mL / OUT: 700 mL / NET: -700 mL        RADIOLOGY & ADDITIONAL STUDIES:

## 2018-04-06 NOTE — H&P ADULT - PROBLEM SELECTOR PLAN 4
- significantly dry areas of visualized oral mucosa (minimal area)  - being treated for same from rehab center (started one day of 14 days); continued for now  - f/u for improvement

## 2018-04-06 NOTE — H&P ADULT - PROBLEM SELECTOR PLAN 3
- WBC = 17.65  - temperature to 99.8  - unclear site of infection, but might be abdomen (RVP neg, CXR w/out any acute findings, UA neg)  - started on cipro, vancomycin and cefepime; will continue for now

## 2018-04-06 NOTE — H&P ADULT - PROBLEM SELECTOR PLAN 2
- possibly secondary to partial obstruction since patient had bowel movement earlier  - no significant tenderness on mild to moderate palpation, but patient tenses/writhes in response to pain at intervals  - bowel sounds hyperactive at LUQ with tympany across upper abdomen and dullness at lower abdomen  - f/u abdominal X-ray now; may need CT of Abd/Pelvis  - analgesic PRN  - consider GI eval (if PMD agrees and per PMD's designation)  - consider surgery eval (if PMD agrees and per PMD's designation)

## 2018-04-06 NOTE — H&P ADULT - GASTROINTESTINAL COMMENTS
no significant tenderness to mild-moderate palpation appreciated, hyperactive BS on LUQ, dullness over lower abdomen w/ tympany at LUQ & RUQ

## 2018-04-06 NOTE — H&P ADULT - FAMILY HISTORY
Family history of ovarian cancer, mother     Sibling  Still living? Unknown  Family history of brain cancer, Age at diagnosis: Age Unknown  Family history of kidney cancer, Age at diagnosis: Age Unknown

## 2018-04-06 NOTE — CONSULT NOTE ADULT - ATTENDING COMMENTS
Seen and examined on rounds with housestaff.  73 yo W with encephalopathy in the setting of an elevated ammonia level, now downtrending, asterixis in the setting of multiple malignancies.  CT head was unrevealing and was complicated by motion artifact.  Symptoms likely represent a metabolic encephalopathy.  If will  or provide further prognostic significance, can check an MRI brain w/wo, otherwise can continue monitoring as ammonia corrects and underlying sepsis is treated.

## 2018-04-06 NOTE — H&P ADULT - PROBLEM SELECTOR PLAN 5
- history of same and has undergone chemotherapy  - pending PET scan, per son  - consider oncology consult - per PMD's designation

## 2018-04-06 NOTE — CONSULT NOTE ADULT - ASSESSMENT
72 F with metastatic pancreatic cancer, hepatic encephalopathy, functional quadriplegia, encounter for palliative care.

## 2018-04-06 NOTE — H&P ADULT - HISTORY OF PRESENT ILLNESS
72 year old female, with past history significant for Asthma, Breast cancer, Depression, GERD, HTN, Insomnia, HLD, Pancreatic cancer, Whipple procedure, Ascites, Umbilical hernia with mesh, Lung surgery (VATS), Port-a-cath, presented to the ED secondary to lethargy.  Seen and evaluated at bedside;    Vital signs upon ED presentation as follows: BP = 110/87, HR = 77, RR = 26, T = 37 C (98.6 F), o2 Sat = 100% on RA.  Diagnosed with Altered Mental Status.  Prescribed Ciprofloxacin 400 mg IV, Vancomycin 1 gram IV, Cefepime 1 gram IV and NaCl 500 mL in the ED. 72 year old female, with past history significant for Asthma, Breast cancer, Depression, GERD, HTN, Insomnia, HLD, Pancreatic cancer, Whipple procedure, Ascites, Umbilical hernia with mesh, Lung surgery (VATS), Port-a-cath, presented to the ED secondary to lethargy.  Seen and evaluated at bedside; apparent intermittent distress due to painful abdominal discomfort.  Patient is somnolent at times (during periods of decreased pain), but able to respond to sound by turning head and attempt somewhat unintelligible monosyllabic responses.  Per son, patient has been in the rehab center due to deconditioning and need to gain strength prior to undergoing PET scan.  Son indicates that patient had been doing well in the rehab center (ambulating, able to verbalize needs and interact with others), until approximately 3 days ago when she began to appear weak.  This continued to progress to patient becoming unable to ambulate and speech became sparse, with pronounced weakness/lethargy to the extent of not being able to ring the call bell.  Eventually, patient stopped talking, only uttering one word to son prior to being transported to the Hospital.  Patient is noted by son, to be having difficulty passing gas, even though she is having bowel movements (had one in the ED).  Son notes significant borborygmi each time patient writhes around in bed; occurs approximately every 15 minutes.  Reported to be occurring over the past few days only.  Patient underwent drainage of ascitic fluid (~ 500 mL) on the day prior to admission.  Unknown if any fevers, chills, sweating.    At the time of being seen, son reports that patient was a bit more alert than on initial presentation.  Direct questioning re pain confirmed presence of abdominal pain (by patient).      Vital signs upon ED presentation as follows: BP = 110/87, HR = 77, RR = 26, T = 37 C (98.6 F), o2 Sat = 100% on RA.  Diagnosed with Altered Mental Status.  Prescribed Ciprofloxacin 400 mg IV, Vancomycin 1 gram IV, Cefepime 1 gram IV and NaCl 500 mL in the ED.

## 2018-04-06 NOTE — CONSULT NOTE ADULT - SUBJECTIVE AND OBJECTIVE BOX
Neurology Consult    Name  INGRID JOSEPH    HPI:  72 year old female PMH significant for Asthma, Breast cancer, Depression, GERD, HTN, Insomnia, HLD, Pancreatic cancer, Whipple procedure, Ascites, Umbilical hernia with mesh, Lung surgery (VATS), Port-a-cath admitted for altered mental status and lethargy. Patient has an ammonia level of 129 and presumed hepatic encephalopathy. Neurology consulted because CTH done and was inconclusive due to severe motion artifact and primary team wants to know if any other imaging should be done to r/o other causes for patients AMS. Son at bedside states that patient was doing well in rehab center and then a few days ago became more weak and then stopped ambulating and talking. Currently not a candidate for chemotherapy due to worsening overall condition as per Heme/onc notes.       MEDICATIONS  (STANDING):  atorvastatin 10 milliGRAM(s) Oral at bedtime  buDESOnide 160 MICROgram(s)/formoterol 4.5 MICROgram(s) Inhaler 2 Puff(s) Inhalation two times a day  cefepime  IVPB 1000 milliGRAM(s) IV Intermittent every 12 hours  cholecalciferol 2000 Unit(s) Oral daily  ciprofloxacin   IVPB 400 milliGRAM(s) IV Intermittent every 12 hours  enoxaparin Injectable 40 milliGRAM(s) SubCutaneous every 24 hours  famotidine    Tablet 40 milliGRAM(s) Oral daily  multivitamin 1 Tablet(s) Oral daily  nystatin    Suspension [id number] Unit(s) Oral three times a day  oseltamivir 30 milliGRAM(s) Oral at bedtime  sodium chloride 0.9%. 1000 milliLiter(s) (50 mL/Hr) IV Continuous <Continuous>  spironolactone 25 milliGRAM(s) Oral daily  vancomycin  IVPB 1000 milliGRAM(s) IV Intermittent every 24 hours    MEDICATIONS  (PRN):  acetaminophen   Tablet. 650 milliGRAM(s) Oral every 6 hours PRN Mild to moderate pain  ALBUTerol    90 MICROgram(s) HFA Inhaler 2 Puff(s) Inhalation every 6 hours PRN Shortness of Breath and/or Wheezing  magnesium hydroxide Suspension 30 milliLiter(s) Oral daily PRN Constipation  morphine  - Injectable 1 milliGRAM(s) IV Push every 6 hours PRN Moderate Pain (4 - 6)      Allergies    No Known Drug Allergies  SteriStrips (Blisters)    Intolerances        Objective:   Vital Signs Last 24 Hrs  T(C): 36.4 (06 Apr 2018 20:33), Max: 36.7 (06 Apr 2018 14:09)  T(F): 97.6 (06 Apr 2018 20:33), Max: 98.1 (06 Apr 2018 14:09)  HR: 107 (06 Apr 2018 20:33) (91 - 122)  BP: 147/82 (06 Apr 2018 20:33) (130/58 - 151/94)  BP(mean): --  RR: 18 (06 Apr 2018 20:33) (17 - 19)  SpO2: 100% (06 Apr 2018 20:33) (99% - 100%)    General Exam:   General appearance: No acute distress                   Neurological Exam:  Mental Status: AAOx0, awake, non-verbal, does not follow any commands    Cranial Nerves: EOMI, PERRL, facial symmetry grossly intact    Motor: moves all extremities spontaneously    Sensation: withdraws all extremities to painful stimuli    asterixes noted b/l     Labs:    04-06    137  |  102  |  20  ----------------------------<  141<H>  4.1   |  24  |  0.76    Ca    7.7<L>      06 Apr 2018 05:28  Phos  2.2     04-06  Mg     2.0     04-06    TPro  6.3  /  Alb  2.0<L>  /  TBili  1.3<H>  /  DBili  x   /  AST  73<H>  /  ALT  47<H>  /  AlkPhos  339<H>  04-06    LIVER FUNCTIONS - ( 06 Apr 2018 05:28 )  Alb: 2.0 g/dL / Pro: 6.3 g/dL / ALK PHOS: 339 u/L / ALT: 47 u/L / AST: 73 u/L / GGT: x           CBC Full  -  ( 06 Apr 2018 05:28 )  WBC Count : 15.25 K/uL  Hemoglobin : 10.9 g/dL  Hematocrit : 31.5 %  Platelet Count - Automated : 261 K/uL  Mean Cell Volume : 93.2 fL  Mean Cell Hemoglobin : 32.2 pg  Mean Cell Hemoglobin Concentration : 34.6 %  Auto Neutrophil # : 13.00 K/uL  Auto Lymphocyte # : 0.70 K/uL  Auto Monocyte # : 1.44 K/uL  Auto Eosinophil # : 0.00 K/uL  Auto Basophil # : 0.03 K/uL  Auto Neutrophil % : 85.3 %  Auto Lymphocyte % : 4.6 %  Auto Monocyte % : 9.4 %  Auto Eosinophil % : 0.0 %  Auto Basophil % : 0.2 %      Radiology

## 2018-04-07 LAB
AMMONIA BLD-MCNC: 64 UMOL/L — HIGH (ref 11–55)
BACTERIA UR CULT: SIGNIFICANT CHANGE UP
BUN SERPL-MCNC: 19 MG/DL — SIGNIFICANT CHANGE UP (ref 7–23)
CALCIUM SERPL-MCNC: 7.7 MG/DL — LOW (ref 8.4–10.5)
CHLORIDE SERPL-SCNC: 105 MMOL/L — SIGNIFICANT CHANGE UP (ref 98–107)
CO2 SERPL-SCNC: 22 MMOL/L — SIGNIFICANT CHANGE UP (ref 22–31)
CREAT SERPL-MCNC: 0.79 MG/DL — SIGNIFICANT CHANGE UP (ref 0.5–1.3)
GLUCOSE SERPL-MCNC: 155 MG/DL — HIGH (ref 70–99)
HCT VFR BLD CALC: 28.8 % — LOW (ref 34.5–45)
HGB BLD-MCNC: 9.9 G/DL — LOW (ref 11.5–15.5)
MAGNESIUM SERPL-MCNC: 2 MG/DL — SIGNIFICANT CHANGE UP (ref 1.6–2.6)
MCHC RBC-ENTMCNC: 31.4 PG — SIGNIFICANT CHANGE UP (ref 27–34)
MCHC RBC-ENTMCNC: 34.4 % — SIGNIFICANT CHANGE UP (ref 32–36)
MCV RBC AUTO: 91.4 FL — SIGNIFICANT CHANGE UP (ref 80–100)
NRBC # FLD: 0 — SIGNIFICANT CHANGE UP
PHOSPHATE SERPL-MCNC: 2.9 MG/DL — SIGNIFICANT CHANGE UP (ref 2.5–4.5)
PLATELET # BLD AUTO: 251 K/UL — SIGNIFICANT CHANGE UP (ref 150–400)
PMV BLD: 10.4 FL — SIGNIFICANT CHANGE UP (ref 7–13)
POTASSIUM SERPL-MCNC: 3.4 MMOL/L — LOW (ref 3.5–5.3)
POTASSIUM SERPL-SCNC: 3.4 MMOL/L — LOW (ref 3.5–5.3)
RBC # BLD: 3.15 M/UL — LOW (ref 3.8–5.2)
RBC # FLD: 16.2 % — HIGH (ref 10.3–14.5)
SODIUM SERPL-SCNC: 139 MMOL/L — SIGNIFICANT CHANGE UP (ref 135–145)
SPECIMEN SOURCE: SIGNIFICANT CHANGE UP
WBC # BLD: 13.36 K/UL — HIGH (ref 3.8–10.5)
WBC # FLD AUTO: 13.36 K/UL — HIGH (ref 3.8–10.5)

## 2018-04-07 RX ORDER — MORPHINE SULFATE 50 MG/1
1 CAPSULE, EXTENDED RELEASE ORAL EVERY 4 HOURS
Qty: 0 | Refills: 0 | Status: DISCONTINUED | OUTPATIENT
Start: 2018-04-07 | End: 2018-04-07

## 2018-04-07 RX ORDER — POTASSIUM CHLORIDE 20 MEQ
10 PACKET (EA) ORAL
Qty: 0 | Refills: 0 | Status: COMPLETED | OUTPATIENT
Start: 2018-04-07 | End: 2018-04-07

## 2018-04-07 RX ORDER — MORPHINE SULFATE 50 MG/1
1 CAPSULE, EXTENDED RELEASE ORAL EVERY 4 HOURS
Qty: 0 | Refills: 0 | Status: DISCONTINUED | OUTPATIENT
Start: 2018-04-07 | End: 2018-04-11

## 2018-04-07 RX ORDER — ACETAMINOPHEN 500 MG
650 TABLET ORAL EVERY 6 HOURS
Qty: 0 | Refills: 0 | Status: DISCONTINUED | OUTPATIENT
Start: 2018-04-07 | End: 2018-04-13

## 2018-04-07 RX ADMIN — MORPHINE SULFATE 1 MILLIGRAM(S): 50 CAPSULE, EXTENDED RELEASE ORAL at 05:02

## 2018-04-07 RX ADMIN — Medication 250 MILLIGRAM(S): at 00:59

## 2018-04-07 RX ADMIN — CEFEPIME 100 MILLIGRAM(S): 1 INJECTION, POWDER, FOR SOLUTION INTRAMUSCULAR; INTRAVENOUS at 10:34

## 2018-04-07 RX ADMIN — MORPHINE SULFATE 1 MILLIGRAM(S): 50 CAPSULE, EXTENDED RELEASE ORAL at 16:50

## 2018-04-07 RX ADMIN — Medication 200 MILLIGRAM(S): at 09:32

## 2018-04-07 RX ADMIN — Medication 100 MILLIEQUIVALENT(S): at 19:45

## 2018-04-07 RX ADMIN — CEFEPIME 100 MILLIGRAM(S): 1 INJECTION, POWDER, FOR SOLUTION INTRAMUSCULAR; INTRAVENOUS at 00:19

## 2018-04-07 RX ADMIN — Medication 200 MILLIGRAM(S): at 22:37

## 2018-04-07 RX ADMIN — Medication 100 MILLIEQUIVALENT(S): at 19:04

## 2018-04-07 RX ADMIN — MORPHINE SULFATE 1 MILLIGRAM(S): 50 CAPSULE, EXTENDED RELEASE ORAL at 04:47

## 2018-04-07 RX ADMIN — Medication 100 MILLIEQUIVALENT(S): at 17:43

## 2018-04-07 RX ADMIN — Medication 250 MILLIGRAM(S): at 22:37

## 2018-04-07 RX ADMIN — MORPHINE SULFATE 1 MILLIGRAM(S): 50 CAPSULE, EXTENDED RELEASE ORAL at 11:15

## 2018-04-07 RX ADMIN — ENOXAPARIN SODIUM 40 MILLIGRAM(S): 100 INJECTION SUBCUTANEOUS at 04:47

## 2018-04-07 RX ADMIN — CEFEPIME 100 MILLIGRAM(S): 1 INJECTION, POWDER, FOR SOLUTION INTRAMUSCULAR; INTRAVENOUS at 21:37

## 2018-04-07 RX ADMIN — MORPHINE SULFATE 1 MILLIGRAM(S): 50 CAPSULE, EXTENDED RELEASE ORAL at 16:30

## 2018-04-07 RX ADMIN — MORPHINE SULFATE 1 MILLIGRAM(S): 50 CAPSULE, EXTENDED RELEASE ORAL at 10:57

## 2018-04-07 NOTE — CHART NOTE - NSCHARTNOTEFT_GEN_A_CORE
Notified by RN that patient’s right arm is swollen  and redness  above right fore arm peripheral IV line .Peripheral Iv line was  removed by removed by RN.   Seen patient at bedside . Right arm noted to be swollen and erythema noted above prior IV site  . no redness noted at the iv insertion site. site warm to touch. Site marked to monitor for increasing  erythema. Mild erythema noted above right antecubital fossa also.  Will elevate right arm. Patient is currently on IV antibiotics vancomycin, cefepime and cipro. Continue to monitor .

## 2018-04-07 NOTE — PROVIDER CONTACT NOTE (OTHER) - SITUATION
noted pt rt arm little above iv site is red and swollen during change of shift,but iv with good blood return,and flushes good

## 2018-04-07 NOTE — PROGRESS NOTE ADULT - ASSESSMENT
72 year old female, with past history significant for Asthma, Breast cancer, Depression, GERD, HTN, Insomnia, HLD, Pancreatic cancer, Whipple procedure, Ascites, Umbilical hernia with mesh, Lung surgery (VATS), Port-a-cath, presented to the ED secondary to lethargy.  She was recently hospitalised with ascitis and had paracentesis followed by pleurex due to repeated accumulation of fluid. She was then discharged to rehab and was suppose to follow as outpt.   This admission was brought in from rehab with change in mental status and abdominal pain. She is confused but gets uncomfortable on palpation of her abdomen.    Problem/Recommendation - 1:  Problem: Malignant neoplasm of pancreas, unspecified location of malignancy. Recommendation: At present not a candidate for further chemotherapy due to worsening  overall condition. Seen by palliative care to address the advanced directives. D/W son at the bedside.Will follow her closely to check the progress.CT head negative for bleed but a repeat suggested due to movement artifact.  4/7 Seen today remains confused ,family at bedside aware of overall condition. Ammonia levels slowly improving.  Problem/Recommendation - 2:  ·  Problem: Altered mental status, unspecified altered mental status type.  Recommendation: ? Metabolic, neurology input. 4/7/18 Patient was seen by neurology cause likely metabolic.MRI not possible as patient difficult to stay still.    Problem/Recommendation - 3:  ·  Problem: Malignant neoplasm of lower-inner quadrant of female breast, unspecified estrogen receptor status, unspecified laterality.     Problem/Recommendation - 4:  ·  Problem: Generalized abdominal pain.  Recommendation: Surgery followup.

## 2018-04-08 LAB
AMMONIA BLD-MCNC: 70 UMOL/L — HIGH (ref 11–55)
BUN SERPL-MCNC: 18 MG/DL — SIGNIFICANT CHANGE UP (ref 7–23)
CALCIUM SERPL-MCNC: 7.9 MG/DL — LOW (ref 8.4–10.5)
CHLORIDE SERPL-SCNC: 104 MMOL/L — SIGNIFICANT CHANGE UP (ref 98–107)
CO2 SERPL-SCNC: 22 MMOL/L — SIGNIFICANT CHANGE UP (ref 22–31)
CREAT SERPL-MCNC: 0.72 MG/DL — SIGNIFICANT CHANGE UP (ref 0.5–1.3)
GLUCOSE SERPL-MCNC: 127 MG/DL — HIGH (ref 70–99)
HCT VFR BLD CALC: 30.4 % — LOW (ref 34.5–45)
HGB BLD-MCNC: 10.5 G/DL — LOW (ref 11.5–15.5)
MAGNESIUM SERPL-MCNC: 2 MG/DL — SIGNIFICANT CHANGE UP (ref 1.6–2.6)
MCHC RBC-ENTMCNC: 32 PG — SIGNIFICANT CHANGE UP (ref 27–34)
MCHC RBC-ENTMCNC: 34.5 % — SIGNIFICANT CHANGE UP (ref 32–36)
MCV RBC AUTO: 92.7 FL — SIGNIFICANT CHANGE UP (ref 80–100)
NRBC # FLD: 0 — SIGNIFICANT CHANGE UP
PHOSPHATE SERPL-MCNC: 2.4 MG/DL — LOW (ref 2.5–4.5)
PLATELET # BLD AUTO: 244 K/UL — SIGNIFICANT CHANGE UP (ref 150–400)
PMV BLD: 10.3 FL — SIGNIFICANT CHANGE UP (ref 7–13)
POTASSIUM SERPL-MCNC: 3.5 MMOL/L — SIGNIFICANT CHANGE UP (ref 3.5–5.3)
POTASSIUM SERPL-SCNC: 3.5 MMOL/L — SIGNIFICANT CHANGE UP (ref 3.5–5.3)
RBC # BLD: 3.28 M/UL — LOW (ref 3.8–5.2)
RBC # FLD: 16.3 % — HIGH (ref 10.3–14.5)
SODIUM SERPL-SCNC: 138 MMOL/L — SIGNIFICANT CHANGE UP (ref 135–145)
WBC # BLD: 11.81 K/UL — HIGH (ref 3.8–10.5)
WBC # FLD AUTO: 11.81 K/UL — HIGH (ref 3.8–10.5)

## 2018-04-08 RX ORDER — SODIUM CHLORIDE 9 MG/ML
1000 INJECTION, SOLUTION INTRAVENOUS
Qty: 0 | Refills: 0 | Status: DISCONTINUED | OUTPATIENT
Start: 2018-04-08 | End: 2018-04-09

## 2018-04-08 RX ADMIN — MORPHINE SULFATE 1 MILLIGRAM(S): 50 CAPSULE, EXTENDED RELEASE ORAL at 09:12

## 2018-04-08 RX ADMIN — Medication 500000 UNIT(S): at 06:58

## 2018-04-08 RX ADMIN — Medication 200 MILLIGRAM(S): at 09:34

## 2018-04-08 RX ADMIN — SPIRONOLACTONE 25 MILLIGRAM(S): 25 TABLET, FILM COATED ORAL at 06:58

## 2018-04-08 RX ADMIN — Medication 200 MILLIGRAM(S): at 22:00

## 2018-04-08 RX ADMIN — Medication 500000 UNIT(S): at 13:00

## 2018-04-08 RX ADMIN — BUDESONIDE AND FORMOTEROL FUMARATE DIHYDRATE 2 PUFF(S): 160; 4.5 AEROSOL RESPIRATORY (INHALATION) at 22:00

## 2018-04-08 RX ADMIN — CEFEPIME 100 MILLIGRAM(S): 1 INJECTION, POWDER, FOR SOLUTION INTRAMUSCULAR; INTRAVENOUS at 22:00

## 2018-04-08 RX ADMIN — Medication 2000 UNIT(S): at 13:01

## 2018-04-08 RX ADMIN — MORPHINE SULFATE 1 MILLIGRAM(S): 50 CAPSULE, EXTENDED RELEASE ORAL at 16:53

## 2018-04-08 RX ADMIN — MORPHINE SULFATE 1 MILLIGRAM(S): 50 CAPSULE, EXTENDED RELEASE ORAL at 10:00

## 2018-04-08 RX ADMIN — FAMOTIDINE 40 MILLIGRAM(S): 10 INJECTION INTRAVENOUS at 13:01

## 2018-04-08 RX ADMIN — Medication 30 MILLIGRAM(S): at 22:00

## 2018-04-08 RX ADMIN — MORPHINE SULFATE 1 MILLIGRAM(S): 50 CAPSULE, EXTENDED RELEASE ORAL at 17:50

## 2018-04-08 RX ADMIN — Medication 1 TABLET(S): at 13:00

## 2018-04-08 RX ADMIN — BUDESONIDE AND FORMOTEROL FUMARATE DIHYDRATE 2 PUFF(S): 160; 4.5 AEROSOL RESPIRATORY (INHALATION) at 09:06

## 2018-04-08 RX ADMIN — MORPHINE SULFATE 1 MILLIGRAM(S): 50 CAPSULE, EXTENDED RELEASE ORAL at 01:14

## 2018-04-08 RX ADMIN — Medication 250 MILLIGRAM(S): at 23:30

## 2018-04-08 RX ADMIN — ENOXAPARIN SODIUM 40 MILLIGRAM(S): 100 INJECTION SUBCUTANEOUS at 06:59

## 2018-04-08 RX ADMIN — ATORVASTATIN CALCIUM 10 MILLIGRAM(S): 80 TABLET, FILM COATED ORAL at 22:00

## 2018-04-08 RX ADMIN — MORPHINE SULFATE 1 MILLIGRAM(S): 50 CAPSULE, EXTENDED RELEASE ORAL at 00:59

## 2018-04-08 RX ADMIN — Medication 500000 UNIT(S): at 22:00

## 2018-04-08 RX ADMIN — SODIUM CHLORIDE 50 MILLILITER(S): 9 INJECTION, SOLUTION INTRAVENOUS at 16:12

## 2018-04-08 RX ADMIN — CEFEPIME 100 MILLIGRAM(S): 1 INJECTION, POWDER, FOR SOLUTION INTRAMUSCULAR; INTRAVENOUS at 09:06

## 2018-04-08 NOTE — CHART NOTE - NSCHARTNOTEFT_GEN_A_CORE
ADS NIGHT COVERAGE:    Notified by RN that pt's bladder scan showed 600+mL urine. Instructed RN to straight cath.     Will continue to monitor.   LIMA FORD PA-C  [id number]

## 2018-04-09 DIAGNOSIS — Z51.5 ENCOUNTER FOR PALLIATIVE CARE: ICD-10-CM

## 2018-04-09 DIAGNOSIS — R53.2 FUNCTIONAL QUADRIPLEGIA: ICD-10-CM

## 2018-04-09 LAB
AMMONIA BLD-MCNC: 104 UMOL/L — HIGH (ref 11–55)
BUN SERPL-MCNC: 18 MG/DL — SIGNIFICANT CHANGE UP (ref 7–23)
CALCIUM SERPL-MCNC: 7.8 MG/DL — LOW (ref 8.4–10.5)
CHLORIDE SERPL-SCNC: 103 MMOL/L — SIGNIFICANT CHANGE UP (ref 98–107)
CO2 SERPL-SCNC: 21 MMOL/L — LOW (ref 22–31)
CREAT SERPL-MCNC: 0.72 MG/DL — SIGNIFICANT CHANGE UP (ref 0.5–1.3)
GLUCOSE SERPL-MCNC: 161 MG/DL — HIGH (ref 70–99)
HCT VFR BLD CALC: 30.5 % — LOW (ref 34.5–45)
HGB BLD-MCNC: 10.8 G/DL — LOW (ref 11.5–15.5)
MAGNESIUM SERPL-MCNC: 2 MG/DL — SIGNIFICANT CHANGE UP (ref 1.6–2.6)
MCHC RBC-ENTMCNC: 32.8 PG — SIGNIFICANT CHANGE UP (ref 27–34)
MCHC RBC-ENTMCNC: 35.4 % — SIGNIFICANT CHANGE UP (ref 32–36)
MCV RBC AUTO: 92.7 FL — SIGNIFICANT CHANGE UP (ref 80–100)
NRBC # FLD: 0 — SIGNIFICANT CHANGE UP
PHOSPHATE SERPL-MCNC: 2 MG/DL — LOW (ref 2.5–4.5)
PLATELET # BLD AUTO: 258 K/UL — SIGNIFICANT CHANGE UP (ref 150–400)
PMV BLD: 10.4 FL — SIGNIFICANT CHANGE UP (ref 7–13)
POTASSIUM SERPL-MCNC: 3.8 MMOL/L — SIGNIFICANT CHANGE UP (ref 3.5–5.3)
POTASSIUM SERPL-SCNC: 3.8 MMOL/L — SIGNIFICANT CHANGE UP (ref 3.5–5.3)
RBC # BLD: 3.29 M/UL — LOW (ref 3.8–5.2)
RBC # FLD: 16.2 % — HIGH (ref 10.3–14.5)
SODIUM SERPL-SCNC: 136 MMOL/L — SIGNIFICANT CHANGE UP (ref 135–145)
VANCOMYCIN TROUGH SERPL-MCNC: 9.3 UG/ML — LOW (ref 10–20)
WBC # BLD: 12.01 K/UL — HIGH (ref 3.8–10.5)
WBC # FLD AUTO: 12.01 K/UL — HIGH (ref 3.8–10.5)

## 2018-04-09 PROCEDURE — 99222 1ST HOSP IP/OBS MODERATE 55: CPT

## 2018-04-09 RX ORDER — LACTULOSE 10 G/15ML
10 SOLUTION ORAL EVERY 8 HOURS
Qty: 0 | Refills: 0 | Status: DISCONTINUED | OUTPATIENT
Start: 2018-04-09 | End: 2018-04-11

## 2018-04-09 RX ADMIN — FAMOTIDINE 40 MILLIGRAM(S): 10 INJECTION INTRAVENOUS at 13:17

## 2018-04-09 RX ADMIN — BUDESONIDE AND FORMOTEROL FUMARATE DIHYDRATE 2 PUFF(S): 160; 4.5 AEROSOL RESPIRATORY (INHALATION) at 22:42

## 2018-04-09 RX ADMIN — Medication 500000 UNIT(S): at 06:18

## 2018-04-09 RX ADMIN — SODIUM CHLORIDE 50 MILLILITER(S): 9 INJECTION, SOLUTION INTRAVENOUS at 08:32

## 2018-04-09 RX ADMIN — Medication 1 TABLET(S): at 13:17

## 2018-04-09 RX ADMIN — Medication 30 MILLIGRAM(S): at 22:42

## 2018-04-09 RX ADMIN — BUDESONIDE AND FORMOTEROL FUMARATE DIHYDRATE 2 PUFF(S): 160; 4.5 AEROSOL RESPIRATORY (INHALATION) at 08:33

## 2018-04-09 RX ADMIN — CEFEPIME 100 MILLIGRAM(S): 1 INJECTION, POWDER, FOR SOLUTION INTRAMUSCULAR; INTRAVENOUS at 22:41

## 2018-04-09 RX ADMIN — ENOXAPARIN SODIUM 40 MILLIGRAM(S): 100 INJECTION SUBCUTANEOUS at 06:17

## 2018-04-09 RX ADMIN — Medication 2000 UNIT(S): at 13:17

## 2018-04-09 RX ADMIN — Medication 62.5 MILLIMOLE(S): at 13:17

## 2018-04-09 RX ADMIN — SPIRONOLACTONE 25 MILLIGRAM(S): 25 TABLET, FILM COATED ORAL at 06:18

## 2018-04-09 RX ADMIN — Medication 500000 UNIT(S): at 13:17

## 2018-04-09 RX ADMIN — ATORVASTATIN CALCIUM 10 MILLIGRAM(S): 80 TABLET, FILM COATED ORAL at 22:42

## 2018-04-09 RX ADMIN — LACTULOSE 10 GRAM(S): 10 SOLUTION ORAL at 14:51

## 2018-04-09 RX ADMIN — Medication 500000 UNIT(S): at 22:42

## 2018-04-09 RX ADMIN — Medication 200 MILLIGRAM(S): at 09:18

## 2018-04-09 RX ADMIN — CEFEPIME 100 MILLIGRAM(S): 1 INJECTION, POWDER, FOR SOLUTION INTRAMUSCULAR; INTRAVENOUS at 08:33

## 2018-04-09 RX ADMIN — Medication 250 MILLIGRAM(S): at 22:42

## 2018-04-09 RX ADMIN — MORPHINE SULFATE 1 MILLIGRAM(S): 50 CAPSULE, EXTENDED RELEASE ORAL at 18:27

## 2018-04-09 NOTE — PROGRESS NOTE ADULT - ASSESSMENT
72 year old female, with past history significant for Asthma, Breast cancer, Depression, GERD, HTN, Insomnia, HLD, Pancreatic cancer, Whipple procedure, Ascites, Umbilical hernia with mesh, Lung surgery (VATS), Port-a-cath, presented to the ED secondary to lethargy.  She was recently hospitalised with ascitis and had paracentesis followed by pleurex due to repeated accumulation of fluid. She was then discharged to rehab and was suppose to follow as outpt.   This admission was brought in from rehab with change in mental status and abdominal pain. She is confused but gets uncomfortable on palpation of her abdomen.    Problem/Recommendation - 1:  Problem: Malignant neoplasm of pancreas, unspecified location of malignancy. Recommendation: At present not a candidate for further chemotherapy due to worsening  overall condition. Seen by palliative care to address the advanced directives. D/W son at the bedside. Will follow her closely to check the progress.CT head negative for bleed but a repeat suggested due to movement artifact.  4/7 Seen today remains confused ,family at bedside aware of overall condition. Ammonia levels slowly improving.  4/9/18 Alert and responding appropriately, comfortable.Followed by palliative care.  Problem/Recommendation - 2:  ·  Problem: Altered mental status, unspecified altered mental status type.  Recommendation: ? Metabolic, neurology input. 4/7/18 Patient was seen by neurology cause likely metabolic. MRI not possible as patient difficult to stay still.  4/9/18 More alert and responsive today. Ammonia level still high.  Problem/Recommendation - 3:  ·  Problem: Malignant neoplasm of lower-inner quadrant of female breast, unspecified estrogen receptor status, unspecified laterality.

## 2018-04-09 NOTE — CHART NOTE - NSCHARTNOTEFT_GEN_A_CORE
ADS NIGHT COVERAGE:    Notified by RN that pt has not been voiding, had bladder scan with retention of greater than 300+mL each time, along with being straight cathed 3 times.   Stoner catheter ordered.     LIMA ABDULC  [id number]

## 2018-04-09 NOTE — CHART NOTE - NSCHARTNOTEFT_GEN_A_CORE
ADS NIGHT COVERAGE:     Labs for ascitic fluid analysis as per ID ordered to r/o SBP.    As per attending, pt is currently full code at this time. Family (son is HCP) will return in AM to continue discussion/make decision.    LIMA FORD PA-C  [id number]

## 2018-04-09 NOTE — CONSULT NOTE ADULT - CONSULT REASON
AMS
Goals of care
H/O pancreatic cancer admitted with change in mental status.
S/p Vamshi (Nov 2017)
hepatic encephalopathy, leukocytosis, ?SBP

## 2018-04-09 NOTE — PROVIDER CONTACT NOTE (OTHER) - BACKGROUND
Patient received a total of 500cc of .45%NS+D5W IV fluids throughout the shift (overnight and from 7am- 1600). 150cc urine output noted. Stoner cath in place

## 2018-04-09 NOTE — CONSULT NOTE ADULT - ASSESSMENT
2 f with HTN, HLD, GERD, Asthma, Breast cancer, Pancreatic cancer, Whipple procedure, Ascites, Lung nodule s/p 9/2017 VATS with necrotizing granuloma and negative AFB, umbilical hernia with mesh, ascites with cytology of reactive mesothelial cell, recent admission for abd distension s/p pigtail placement for ascites fluid 3/21/18 was s/o SBP but cultures negative, now brought  in for  lethargy, AMS and abd pain.  pt had elevated ammonia levels and AMS was considerer due to hepatic encephalopathy, no ascitic fluid analysis was done but pt was started on vanco, cefepime and cipro. Her blood and urine cx were negative and CXR with improving pleural effusion, now afebrile but WBC 12.1 from 11.8 and on 4 of antibiotics.  pt also has an erythema on one of her IV sites that happened in the hospital      hepatic encephalopathy, ?SBP  right arm thrombophlebitis    ascitic fluid analysis  DC cipro  c/w zosyn for ?SBP  c/w vanco for thrombophlebitis

## 2018-04-09 NOTE — PROVIDER CONTACT NOTE (OTHER) - ACTION/TREATMENT ORDERED:
NP notified she came and seen the site,she marked the site,will continue to monitor
notified NP,she ordered to straight cath pt ,order carried out and noted 700cc urine
As per ADS NP, patient will cont to be monitored at this time.

## 2018-04-09 NOTE — CONSULT NOTE ADULT - SUBJECTIVE AND OBJECTIVE BOX
HPI:  72 f with HTN, HLD, GERD, Asthma, Breast cancer, Pancreatic cancer, Whipple procedure, Ascites, Umbilical hernia with mesh, Lung surgery (VATS) with necrotizing granuloma and negative AFB stain, ascites with cytology of reactive mesothelial cell, recent admission for abd distension s/p pigtail placement for ascites fluid 3/21/18 was s/o SBP but cultures negative, now brought  in for  lethargy, AMS and abd pain.  pt had elevated ammonia levels and AMS was considerer due to hepatic encephalopathy, no ascitic fluid analysis was done but pt was started on vanco, cefepime and cipro. Her blood and urine cx were negative and CXR with improving pleural effusion, now afebrile but WBC 12.1 from 11.8 and on 4 of antibiotics.  pt also has an erythema on one of her IV sites that happened in the hospital      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  Insomnia  Hiatal hernia with GERD: no changes  Colitis: due to chemo 1/2017  Malignant neoplasm of pancreas, unspecified location of malignancy: started chemo 10/2016, has mediport - left chest, now s/p whipple 11/2017  Asthma: denies any recent hospitalizations/intubations  Depression  Hyperlipidemia  HTN (hypertension)  Carcinoma of pancreas: s/p whipple  History of lung surgery: right VATS, wedge resection (August 2017) benign neoplasm  History of lumpectomy: right (1996)  Port-a-cath in place  Status post right breast lumpectomy: malignant treated with radiation  H/O abdominal hysterectomy  H/O umbilical hernia repair: with mesh      Allergies    No Known Drug Allergies  SteriStrips (Blisters)    Intolerances        ANTIMICROBIALS:  cefepime  IVPB 1000 every 12 hours  ciprofloxacin   IVPB 400 every 12 hours  nystatin    Suspension [id number] three times a day  oseltamivir 30 at bedtime  rifaximin 200 three times a day  vancomycin  IVPB 1000 every 24 hours      OTHER MEDS:  acetaminophen  Suppository. 650 milliGRAM(s) Rectal every 6 hours PRN  ALBUTerol    90 MICROgram(s) HFA Inhaler 2 Puff(s) Inhalation every 6 hours PRN  atorvastatin 10 milliGRAM(s) Oral at bedtime  buDESOnide 160 MICROgram(s)/formoterol 4.5 MICROgram(s) Inhaler 2 Puff(s) Inhalation two times a day  cholecalciferol 2000 Unit(s) Oral daily  dextrose 5% + sodium chloride 0.45%. 1000 milliLiter(s) IV Continuous <Continuous>  enoxaparin Injectable 40 milliGRAM(s) SubCutaneous every 24 hours  famotidine    Tablet 40 milliGRAM(s) Oral daily  lactulose Syrup 10 Gram(s) Oral every 8 hours  magnesium hydroxide Suspension 30 milliLiter(s) Oral daily PRN  morphine  - Injectable 1 milliGRAM(s) IV Push every 4 hours PRN  multivitamin 1 Tablet(s) Oral daily  sodium chloride 0.9%. 1000 milliLiter(s) IV Continuous <Continuous>  spironolactone 25 milliGRAM(s) Oral daily      SOCIAL HISTORY:    pt from [place], lives with her sons but now came from NH, no smoking, alcohol or drug abuse history    FAMILY HISTORY:  Family history of kidney cancer (Sibling): brother  Family history of brain cancer (Sibling): sister  Family history of ovarian cancer: mother      ROS:     All other systems negative     Constitutional: no fever, no chills, no weight loss, no night sweats  Eye: no eye pain, no redness, no vision changes  ENT:  no sore throat, no rhinorrhea  Cardiovascular:  no chest pain, no palpitation  Respiratory:  no SOB, no cough  GI:  + abd pain, no vomiting, no diarrhea  urinary: no dysuria, no hematuria, no flank pain  : no  discharge or bleeding  musculoskeletal:  no joint pain, no joint swelling  skin:  no rash, right antecubital area with erythema and pain  neurology:  no headache, no seizure, + change in mental status  psych: no anxiety, no depression     Physical Exam:    General:    NAD, non toxic  Head: atraumatic, normocephalic  Eyes: normal sclera and conjunctiva  ENT:   no oropharyngeal lesions, no LAD, neck supple  Cardio:    regular S1,S2, no murmur  Respiratory:   clear b/l, no wheezing  abd:   soft, BS +, slight diffuse tenderness, pigtail catheter in place with no erythema or drainage  :     no CVAT, no spurapubic tenderness, no brown  Musculoskeletal : no joint swelling, no edema  Skin:    no rash  vascular: no lines, normal pulses  Neurologic:   awake, oriented x 2, but confused, no focal deficits        Drug Dosing Weight  Height (cm): 167.64 (06 Apr 2018 04:02)  Weight (kg): 72.6 (06 Apr 2018 04:02)  BMI (kg/m2): 25.8 (06 Apr 2018 04:02)  BSA (m2): 1.82 (06 Apr 2018 04:02)    Vital Signs Last 24 Hrs  T(F): 97.6 (04-09-18 @ 14:41), Max: 99.8 (04-05-18 @ 19:56)    Vital Signs Last 24 Hrs  HR: 101 (04-09-18 @ 14:41) (80 - 101)  BP: 154/100 (04-09-18 @ 14:41) (133/73 - 154/100)  RR: 18 (04-09-18 @ 14:41)  SpO2: 99% (04-09-18 @ 14:41) (99% - 100%)  Wt(kg): --                          10.8   12.01 )-----------( 258      ( 09 Apr 2018 05:40 )             30.5       04-09    136  |  103  |  18  ----------------------------<  161<H>  3.8   |  21<L>  |  0.72    Ca    7.8<L>      09 Apr 2018 05:40  Phos  2.0     04-09  Mg     2.0     04-09            MICROBIOLOGY:  Vancomycin Level, Trough: 9.3 ug/mL (04-08-18 @ 23:43)  v  BLOOD  04-05-18 --  --  --      URINE CATHETER  04-05-18 --  --  --      ASCITIC FLUID  03-21-18 --  --    WBC^White Blood Cells  QNTY CELLS IN GRAM STAIN: MANY (4+)  NOS^No Organisms Seen                  RADIOLOGY:  < from: Xray Abdomen 1 View PORTABLE -Urgent (04.06.18 @ 04:02) >    IMPRESSION:  No obstruction or ileus.      < from: Xray Chest 1 View- PORTABLE-Urgent (04.05.18 @ 19:38) >    IMPRESSION: Decreasing left pleural effusion. HPI:  72 f with HTN, HLD, GERD, Asthma, Breast cancer, Pancreatic cancer, Whipple procedure, Ascites, Umbilical hernia with mesh, Lung surgery (VATS) with necrotizing granuloma and negative AFB stain, ascites with cytology of reactive mesothelial cell, recent admission for abd distension s/p pigtail placement for ascites fluid 3/21/18 was s/o SBP but cultures negative, now brought  in for  lethargy, AMS and abd pain.  pt had elevated ammonia levels and AMS was considerer due to hepatic encephalopathy, no ascitic fluid analysis was done but pt was started on vanco, cefepime and cipro. Her blood and urine cx were negative and CXR with improving pleural effusion, now afebrile but WBC 12.1 from 11.8 and on 4 of antibiotics.  pt also has an erythema on one of her IV sites that happened in the hospital      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  Insomnia  Hiatal hernia with GERD: no changes  Colitis: due to chemo 1/2017  Malignant neoplasm of pancreas, unspecified location of malignancy: started chemo 10/2016, has mediport - left chest, now s/p whipple 11/2017  Asthma: denies any recent hospitalizations/intubations  Depression  Hyperlipidemia  HTN (hypertension)  Carcinoma of pancreas: s/p whipple  History of lung surgery: right VATS, wedge resection (August 2017) benign neoplasm  History of lumpectomy: right (1996)  Port-a-cath in place  Status post right breast lumpectomy: malignant treated with radiation  H/O abdominal hysterectomy  H/O umbilical hernia repair: with mesh      Allergies    No Known Drug Allergies  SteriStrips (Blisters)    Intolerances        ANTIMICROBIALS:  cefepime  IVPB 1000 every 12 hours  ciprofloxacin   IVPB 400 every 12 hours  nystatin    Suspension [id number] three times a day  oseltamivir 30 at bedtime  rifaximin 200 three times a day  vancomycin  IVPB 1000 every 24 hours      OTHER MEDS:  acetaminophen  Suppository. 650 milliGRAM(s) Rectal every 6 hours PRN  ALBUTerol    90 MICROgram(s) HFA Inhaler 2 Puff(s) Inhalation every 6 hours PRN  atorvastatin 10 milliGRAM(s) Oral at bedtime  buDESOnide 160 MICROgram(s)/formoterol 4.5 MICROgram(s) Inhaler 2 Puff(s) Inhalation two times a day  cholecalciferol 2000 Unit(s) Oral daily  dextrose 5% + sodium chloride 0.45%. 1000 milliLiter(s) IV Continuous <Continuous>  enoxaparin Injectable 40 milliGRAM(s) SubCutaneous every 24 hours  famotidine    Tablet 40 milliGRAM(s) Oral daily  lactulose Syrup 10 Gram(s) Oral every 8 hours  magnesium hydroxide Suspension 30 milliLiter(s) Oral daily PRN  morphine  - Injectable 1 milliGRAM(s) IV Push every 4 hours PRN  multivitamin 1 Tablet(s) Oral daily  sodium chloride 0.9%. 1000 milliLiter(s) IV Continuous <Continuous>  spironolactone 25 milliGRAM(s) Oral daily      SOCIAL HISTORY:    pt from [place], lives with her sons but now came from NH, no smoking, alcohol or drug abuse history    FAMILY HISTORY:  Family history of kidney cancer (Sibling): brother  Family history of brain cancer (Sibling): sister  Family history of ovarian cancer: mother      ROS:     All other systems negative     Constitutional: no fever, no chills, no weight loss, no night sweats  Eye: no eye pain, no redness, no vision changes  ENT:  no sore throat, no rhinorrhea  Cardiovascular:  no chest pain, no palpitation  Respiratory:  no SOB, no cough  GI:  + abd pain, no vomiting, no diarrhea  urinary: no dysuria, no hematuria, no flank pain  : no  discharge or bleeding  musculoskeletal:  no joint pain, no joint swelling  skin:  no rash, right antecubital area with erythema and pain  neurology:  no headache, no seizure, + change in mental status  psych: no anxiety, no depression     Physical Exam:    General:    NAD, non toxic  Head: atraumatic, normocephalic  Eyes: normal sclera and conjunctiva  ENT:   no oropharyngeal lesions, no LAD, neck supple  Cardio:    regular S1,S2, no murmur  Respiratory:   clear b/l, no wheezing  abd:   soft, BS +, slight diffuse tenderness, pigtail catheter in place with no erythema or drainage  :     no CVAT, no spurapubic tenderness, no brown  Musculoskeletal : no joint swelling, no edema  Skin:    no rash  vascular: left chest port, normal pulses  Neurologic:   awake, oriented x 2, but confused, no focal deficits        Drug Dosing Weight  Height (cm): 167.64 (06 Apr 2018 04:02)  Weight (kg): 72.6 (06 Apr 2018 04:02)  BMI (kg/m2): 25.8 (06 Apr 2018 04:02)  BSA (m2): 1.82 (06 Apr 2018 04:02)    Vital Signs Last 24 Hrs  T(F): 97.6 (04-09-18 @ 14:41), Max: 99.8 (04-05-18 @ 19:56)    Vital Signs Last 24 Hrs  HR: 101 (04-09-18 @ 14:41) (80 - 101)  BP: 154/100 (04-09-18 @ 14:41) (133/73 - 154/100)  RR: 18 (04-09-18 @ 14:41)  SpO2: 99% (04-09-18 @ 14:41) (99% - 100%)  Wt(kg): --                          10.8   12.01 )-----------( 258      ( 09 Apr 2018 05:40 )             30.5       04-09    136  |  103  |  18  ----------------------------<  161<H>  3.8   |  21<L>  |  0.72    Ca    7.8<L>      09 Apr 2018 05:40  Phos  2.0     04-09  Mg     2.0     04-09            MICROBIOLOGY:  Vancomycin Level, Trough: 9.3 ug/mL (04-08-18 @ 23:43)  v  BLOOD  04-05-18 --  --  --      URINE CATHETER  04-05-18 --  --  --      ASCITIC FLUID  03-21-18 --  --    WBC^White Blood Cells  QNTY CELLS IN GRAM STAIN: MANY (4+)  NOS^No Organisms Seen                  RADIOLOGY:  < from: Xray Abdomen 1 View PORTABLE -Urgent (04.06.18 @ 04:02) >    IMPRESSION:  No obstruction or ileus.      < from: Xray Chest 1 View- PORTABLE-Urgent (04.05.18 @ 19:38) >    IMPRESSION: Decreasing left pleural effusion.

## 2018-04-10 ENCOUNTER — RESULT REVIEW (OUTPATIENT)
Age: 73
End: 2018-04-10

## 2018-04-10 LAB
ALBUMIN FLD-MCNC: < 1 G/DL — SIGNIFICANT CHANGE UP
ALBUMIN SERPL ELPH-MCNC: 1.8 G/DL — LOW (ref 3.3–5)
ALP SERPL-CCNC: 681 U/L — HIGH (ref 40–120)
ALT FLD-CCNC: 127 U/L — HIGH (ref 4–33)
AMMONIA BLD-MCNC: 74 UMOL/L — HIGH (ref 11–55)
AMYLASE FLD-CCNC: < 3 U/L — SIGNIFICANT CHANGE UP
APPEARANCE UR: SIGNIFICANT CHANGE UP
AST SERPL-CCNC: 183 U/L — HIGH (ref 4–32)
BACTERIA # UR AUTO: SIGNIFICANT CHANGE UP
BACTERIA BLD CULT: SIGNIFICANT CHANGE UP
BACTERIA BLD CULT: SIGNIFICANT CHANGE UP
BILIRUB SERPL-MCNC: 1.1 MG/DL — SIGNIFICANT CHANGE UP (ref 0.2–1.2)
BILIRUB UR-MCNC: NEGATIVE — SIGNIFICANT CHANGE UP
BLOOD UR QL VISUAL: HIGH
BODY FLUID TYPE: SIGNIFICANT CHANGE UP
BUN SERPL-MCNC: 16 MG/DL — SIGNIFICANT CHANGE UP (ref 7–23)
CALCIUM SERPL-MCNC: 7.9 MG/DL — LOW (ref 8.4–10.5)
CANCER AG19-9 SERPL-ACNC: 107.7 U/ML — HIGH
CHLORIDE SERPL-SCNC: 102 MMOL/L — SIGNIFICANT CHANGE UP (ref 98–107)
CLARITY SPEC: CLEAR — SIGNIFICANT CHANGE UP
CO2 SERPL-SCNC: 22 MMOL/L — SIGNIFICANT CHANGE UP (ref 22–31)
COLOR FLD: SIGNIFICANT CHANGE UP
COLOR SPEC: YELLOW — SIGNIFICANT CHANGE UP
CREAT SERPL-MCNC: 0.76 MG/DL — SIGNIFICANT CHANGE UP (ref 0.5–1.3)
CULTURE - ACID FAST SMEAR CONCENTRATED: SIGNIFICANT CHANGE UP
EOSINOPHIL # FLD: 1 % — SIGNIFICANT CHANGE UP
GLUCOSE SERPL-MCNC: 133 MG/DL — HIGH (ref 70–99)
GLUCOSE UR-MCNC: NEGATIVE — SIGNIFICANT CHANGE UP
GRAM STN FLD: SIGNIFICANT CHANGE UP
HCT VFR BLD CALC: 31 % — LOW (ref 34.5–45)
HGB BLD-MCNC: 10.9 G/DL — LOW (ref 11.5–15.5)
KETONES UR-MCNC: SIGNIFICANT CHANGE UP
LDH SERPL L TO P-CCNC: 38 U/L — SIGNIFICANT CHANGE UP
LEUKOCYTE ESTERASE UR-ACNC: HIGH
LYMPHOCYTES NFR FLD: 48 % — SIGNIFICANT CHANGE UP
MACROPHAGES # FLD: 12 % — SIGNIFICANT CHANGE UP
MAGNESIUM SERPL-MCNC: 1.8 MG/DL — SIGNIFICANT CHANGE UP (ref 1.6–2.6)
MCHC RBC-ENTMCNC: 32.1 PG — SIGNIFICANT CHANGE UP (ref 27–34)
MCHC RBC-ENTMCNC: 35.2 % — SIGNIFICANT CHANGE UP (ref 32–36)
MCV RBC AUTO: 91.2 FL — SIGNIFICANT CHANGE UP (ref 80–100)
MONOCYTES # FLD: 2 % — SIGNIFICANT CHANGE UP
MUCOUS THREADS # UR AUTO: SIGNIFICANT CHANGE UP
NEUTS SEG NFR FLD MANUAL: 37 % — SIGNIFICANT CHANGE UP
NITRITE UR-MCNC: NEGATIVE — SIGNIFICANT CHANGE UP
NRBC # FLD: 0.02 — SIGNIFICANT CHANGE UP
PH UR: 6 — SIGNIFICANT CHANGE UP (ref 4.6–8)
PHOSPHATE SERPL-MCNC: 2 MG/DL — LOW (ref 2.5–4.5)
PLATELET # BLD AUTO: 310 K/UL — SIGNIFICANT CHANGE UP (ref 150–400)
PMV BLD: 10.5 FL — SIGNIFICANT CHANGE UP (ref 7–13)
POTASSIUM SERPL-MCNC: 3.2 MMOL/L — LOW (ref 3.5–5.3)
POTASSIUM SERPL-SCNC: 3.2 MMOL/L — LOW (ref 3.5–5.3)
PROT FLD-MCNC: 0.5 G/DL — SIGNIFICANT CHANGE UP
PROT SERPL-MCNC: 5.9 G/DL — LOW (ref 6–8.3)
PROT UR-MCNC: 100 MG/DL — HIGH
RBC # BLD: 3.4 M/UL — LOW (ref 3.8–5.2)
RBC # FLD: 16 % — HIGH (ref 10.3–14.5)
RBC CASTS # UR COMP ASSIST: >50 — HIGH (ref 0–?)
RCV VOL RI: 600 CELL/UL — HIGH (ref 0–5)
SODIUM SERPL-SCNC: 135 MMOL/L — SIGNIFICANT CHANGE UP (ref 135–145)
SP GR SPEC: 1.02 — SIGNIFICANT CHANGE UP (ref 1–1.04)
SPECIMEN SOURCE: SIGNIFICANT CHANGE UP
SPECIMEN SOURCE: SIGNIFICANT CHANGE UP
SQUAMOUS # UR AUTO: SIGNIFICANT CHANGE UP
TOTAL NUCLEATED CELL COUNT, BODY FLUID: 98 CELL/UL — HIGH (ref 0–5)
UROBILINOGEN FLD QL: NORMAL MG/DL — SIGNIFICANT CHANGE UP
WBC # BLD: 13.12 K/UL — HIGH (ref 3.8–10.5)
WBC # FLD AUTO: 13.12 K/UL — HIGH (ref 3.8–10.5)
WBC UR QL: SIGNIFICANT CHANGE UP (ref 0–?)

## 2018-04-10 PROCEDURE — 99232 SBSQ HOSP IP/OBS MODERATE 35: CPT

## 2018-04-10 PROCEDURE — 93010 ELECTROCARDIOGRAM REPORT: CPT

## 2018-04-10 PROCEDURE — 88341 IMHCHEM/IMCYTCHM EA ADD ANTB: CPT | Mod: 26

## 2018-04-10 PROCEDURE — 88305 TISSUE EXAM BY PATHOLOGIST: CPT | Mod: 26

## 2018-04-10 PROCEDURE — 88112 CYTOPATH CELL ENHANCE TECH: CPT | Mod: 26

## 2018-04-10 PROCEDURE — 88342 IMHCHEM/IMCYTCHM 1ST ANTB: CPT | Mod: 26

## 2018-04-10 RX ORDER — ONDANSETRON 8 MG/1
4 TABLET, FILM COATED ORAL ONCE
Qty: 0 | Refills: 0 | Status: COMPLETED | OUTPATIENT
Start: 2018-04-10 | End: 2018-04-10

## 2018-04-10 RX ORDER — PROPRANOLOL HCL 160 MG
20 CAPSULE, EXTENDED RELEASE 24HR ORAL
Qty: 0 | Refills: 0 | Status: DISCONTINUED | OUTPATIENT
Start: 2018-04-10 | End: 2018-04-13

## 2018-04-10 RX ORDER — POTASSIUM CHLORIDE 20 MEQ
40 PACKET (EA) ORAL ONCE
Qty: 0 | Refills: 0 | Status: COMPLETED | OUTPATIENT
Start: 2018-04-10 | End: 2018-04-10

## 2018-04-10 RX ORDER — SODIUM CHLORIDE 9 MG/ML
500 INJECTION INTRAMUSCULAR; INTRAVENOUS; SUBCUTANEOUS ONCE
Qty: 0 | Refills: 0 | Status: DISCONTINUED | OUTPATIENT
Start: 2018-04-10 | End: 2018-04-10

## 2018-04-10 RX ADMIN — Medication 20 MILLIGRAM(S): at 14:39

## 2018-04-10 RX ADMIN — SPIRONOLACTONE 25 MILLIGRAM(S): 25 TABLET, FILM COATED ORAL at 05:50

## 2018-04-10 RX ADMIN — Medication 250 MILLIGRAM(S): at 23:04

## 2018-04-10 RX ADMIN — Medication 500000 UNIT(S): at 05:50

## 2018-04-10 RX ADMIN — Medication 500000 UNIT(S): at 21:24

## 2018-04-10 RX ADMIN — LACTULOSE 10 GRAM(S): 10 SOLUTION ORAL at 21:24

## 2018-04-10 RX ADMIN — LACTULOSE 10 GRAM(S): 10 SOLUTION ORAL at 00:37

## 2018-04-10 RX ADMIN — ENOXAPARIN SODIUM 40 MILLIGRAM(S): 100 INJECTION SUBCUTANEOUS at 05:50

## 2018-04-10 RX ADMIN — Medication 2000 UNIT(S): at 14:39

## 2018-04-10 RX ADMIN — Medication 30 MILLIGRAM(S): at 21:22

## 2018-04-10 RX ADMIN — BUDESONIDE AND FORMOTEROL FUMARATE DIHYDRATE 2 PUFF(S): 160; 4.5 AEROSOL RESPIRATORY (INHALATION) at 21:23

## 2018-04-10 RX ADMIN — LACTULOSE 10 GRAM(S): 10 SOLUTION ORAL at 05:49

## 2018-04-10 RX ADMIN — ATORVASTATIN CALCIUM 10 MILLIGRAM(S): 80 TABLET, FILM COATED ORAL at 21:22

## 2018-04-10 RX ADMIN — Medication 500000 UNIT(S): at 14:40

## 2018-04-10 RX ADMIN — Medication 40 MILLIEQUIVALENT(S): at 10:51

## 2018-04-10 RX ADMIN — FAMOTIDINE 40 MILLIGRAM(S): 10 INJECTION INTRAVENOUS at 14:40

## 2018-04-10 RX ADMIN — Medication 1 TABLET(S): at 14:39

## 2018-04-10 RX ADMIN — CEFEPIME 100 MILLIGRAM(S): 1 INJECTION, POWDER, FOR SOLUTION INTRAMUSCULAR; INTRAVENOUS at 21:22

## 2018-04-10 RX ADMIN — ONDANSETRON 4 MILLIGRAM(S): 8 TABLET, FILM COATED ORAL at 14:40

## 2018-04-10 RX ADMIN — Medication 62.5 MILLIMOLE(S): at 10:50

## 2018-04-10 RX ADMIN — BUDESONIDE AND FORMOTEROL FUMARATE DIHYDRATE 2 PUFF(S): 160; 4.5 AEROSOL RESPIRATORY (INHALATION) at 10:49

## 2018-04-10 RX ADMIN — CEFEPIME 100 MILLIGRAM(S): 1 INJECTION, POWDER, FOR SOLUTION INTRAMUSCULAR; INTRAVENOUS at 10:51

## 2018-04-10 RX ADMIN — LACTULOSE 10 GRAM(S): 10 SOLUTION ORAL at 14:39

## 2018-04-10 NOTE — PROGRESS NOTE ADULT - ASSESSMENT
72 year old female, with past history significant for Asthma, Breast cancer, Depression, GERD, HTN, Insomnia, HLD, Pancreatic cancer, Whipple procedure, Ascites, Umbilical hernia with mesh, Lung surgery (VATS), Port-a-cath, presented to the ED secondary to lethargy.  She was recently hospitalised with ascitis and had paracentesis followed by pleurex due to repeated accumulation of fluid. She was then discharged to rehab and was suppose to follow as outpt.   This admission was brought in from rehab with change in mental status and abdominal pain. She is confused but gets uncomfortable on palpation of her abdomen.    Problem/Recommendation - 1:  Problem: Malignant neoplasm of pancreas, unspecified location of malignancy. Recommendation: At present not a candidate for further chemotherapy due to worsening  overall condition. Seen by palliative care to address the advanced directives. D/W son at the bedside. Will follow her closely to check the progress.CT head negative for bleed but a repeat suggested due to movement artifact.4/7 Seen today remains confused ,family at bedside aware of overall condition. Ammonia levels slowly improving.  4/9/18 Alert and responding appropriately, comfortable.Followed by palliative care.  4/10 confused again.Overall doing poorly,advanced directives and placement plan to be discussed by palliative.It was clarified to her son that she should be under palliative care and is not a candidate for further chemotherapy.  Problem/Recommendation - 2:  ·  Problem: Altered mental status, unspecified altered mental status type.  Recommendation: ? Metabolic, neurology input. 4/7/18 Patient was seen by neurology cause likely metabolic. MRI not possible as patient difficult to stay still.  4/9/18 More alert and responsive today. Ammonia level still high.  Problem/Recommendation - 3:  ·  Problem: Malignant neoplasm of lower-inner quadrant of female breast, unspecified estrogen receptor status, unspecified laterality.

## 2018-04-10 NOTE — CHART NOTE - NSCHARTNOTEFT_GEN_A_CORE
Spoke with son (Richie Eli) at bedside. Pt's son is interested in pursing palliative care meeting to discuss code status as well as GOC. Palliative made aware. Meeting set up for tomorrow 4/11 @ 3pm. Med attg, Onc attg and pt's son made aware. Will follow.

## 2018-04-10 NOTE — CHART NOTE - NSCHARTNOTEFT_GEN_A_CORE
notified by RN that pt was tachycardic (). Pt seen and evaluated at bedside. EKG shows sinus tachycardia. Pt denies chills, chest pain, SOB, cough, N/V/D/C, abdominal pain.   PE:   Vitals: P: 116, RR: 19, O2 sat: 100% on O2 NC, /102, T: 100.0F rectal   General: NAD, resting comfortably   Skin: warm and dry  chest/lungs: non-labored breathing, no accessory muscle use, lungs CTA b/l, +left chest port in place  heart: +tachy   abdomen: soft, NT/ND, +pigtail cath in place with clean dressing. no erythema, drainage or bleeding   MS: is able to move all extremities   Neuro: A&Ox1-2, is able to answer basic questions and follow commands. +Mild asterixis   Ext: No edema or TTP of LE b/l. Distal pulses intact.       Discussed case with Dr. Snowden whom recommended starting patient on Inderal 20mg PO BID. Will continue to monitor pt accordingly.

## 2018-04-10 NOTE — CHART NOTE - NSCHARTNOTEFT_GEN_A_CORE
Attempted a follow up visit today  The patient was obtaining an EKG for reported tachycardia  Will reattempt assessment

## 2018-04-10 NOTE — PROGRESS NOTE ADULT - ASSESSMENT
72 f with HTN, HLD, GERD, Asthma, Breast cancer, Pancreatic cancer, Whipple procedure, Ascites, Lung nodule s/p 9/2017 VATS with necrotizing granuloma and negative AFB, umbilical hernia with mesh, ascites with cytology of reactive mesothelial cell, recent admission for abd distension s/p pigtail placement for ascites fluid 3/21/18 was s/o SBP but cultures negative, now brought  in for  lethargy, AMS and abd pain.  pt had elevated ammonia levels and AMS was considerer due to hepatic encephalopathy, no ascitic fluid analysis was done but pt was started on vanco, cefepime and cipro. Her blood and urine cx were negative and CXR with improving pleural effusion, WBC slightly increasing now 13, the ascitic fluid analysis not s/o SBP but was done on day 4 of antibiotics  pt also has an erythema on one of her IV sites that happened in the hospital      hepatic encephalopathy, ?SBP now day 5 of antibiotics but leukocytosis worsening  right arm thrombophlebitis    c/w cefepime for now  c/w vanco for thrombophlebitis  repeat blood cultures 72 f with HTN, HLD, GERD, Asthma, Breast cancer, Pancreatic cancer, Whipple procedure, Ascites, Lung nodule s/p 9/2017 VATS with necrotizing granuloma and negative AFB, umbilical hernia with mesh, ascites with cytology of reactive mesothelial cell, recent admission for abd distension s/p pigtail placement for ascites fluid 3/21/18 was s/o SBP but cultures negative, now brought  in for  lethargy, AMS and abd pain.  pt had elevated ammonia levels and AMS was considerer due to hepatic encephalopathy, no ascitic fluid analysis was done but pt was started on vanco, cefepime and cipro. Her blood and urine cx were negative and CXR with improving pleural effusion, WBC slightly increasing now 13, the ascitic fluid analysis not s/o SBP but was done on day 4 of antibiotics  pt also has an erythema on one of her IV sites that happened in the hospital      hepatic encephalopathy, ?SBP now day 5 of antibiotics but leukocytosis worsening  right arm thrombophlebitis    c/w cefepime for now  c/w vanco for thrombophlebitis  repeat blood cultures and u/a

## 2018-04-11 DIAGNOSIS — Z71.89 OTHER SPECIFIED COUNSELING: ICD-10-CM

## 2018-04-11 LAB
ALBUMIN SERPL ELPH-MCNC: 1.5 G/DL — LOW (ref 3.3–5)
ALBUMIN SERPL ELPH-MCNC: 1.8 G/DL — LOW (ref 3.3–5)
ALP SERPL-CCNC: 653 U/L — HIGH (ref 40–120)
ALP SERPL-CCNC: 659 U/L — HIGH (ref 40–120)
ALT FLD-CCNC: 117 U/L — HIGH (ref 4–33)
ALT FLD-CCNC: SIGNIFICANT CHANGE UP U/L (ref 4–33)
AST SERPL-CCNC: 131 U/L — HIGH (ref 4–32)
AST SERPL-CCNC: SIGNIFICANT CHANGE UP U/L (ref 4–32)
BILIRUB SERPL-MCNC: 1.4 MG/DL — HIGH (ref 0.2–1.2)
BILIRUB SERPL-MCNC: 1.6 MG/DL — HIGH (ref 0.2–1.2)
BUN SERPL-MCNC: 17 MG/DL — SIGNIFICANT CHANGE UP (ref 7–23)
BUN SERPL-MCNC: 18 MG/DL — SIGNIFICANT CHANGE UP (ref 7–23)
CALCIUM SERPL-MCNC: 7.6 MG/DL — LOW (ref 8.4–10.5)
CALCIUM SERPL-MCNC: 7.6 MG/DL — LOW (ref 8.4–10.5)
CHLORIDE SERPL-SCNC: 103 MMOL/L — SIGNIFICANT CHANGE UP (ref 98–107)
CHLORIDE SERPL-SCNC: 104 MMOL/L — SIGNIFICANT CHANGE UP (ref 98–107)
CO2 SERPL-SCNC: 21 MMOL/L — LOW (ref 22–31)
CO2 SERPL-SCNC: 22 MMOL/L — SIGNIFICANT CHANGE UP (ref 22–31)
CREAT SERPL-MCNC: 0.72 MG/DL — SIGNIFICANT CHANGE UP (ref 0.5–1.3)
CREAT SERPL-MCNC: 0.79 MG/DL — SIGNIFICANT CHANGE UP (ref 0.5–1.3)
GLUCOSE SERPL-MCNC: 141 MG/DL — HIGH (ref 70–99)
GLUCOSE SERPL-MCNC: 156 MG/DL — HIGH (ref 70–99)
HCT VFR BLD CALC: 32.5 % — LOW (ref 34.5–45)
HGB BLD-MCNC: 11.7 G/DL — SIGNIFICANT CHANGE UP (ref 11.5–15.5)
MAGNESIUM SERPL-MCNC: 1.9 MG/DL — SIGNIFICANT CHANGE UP (ref 1.6–2.6)
MAGNESIUM SERPL-MCNC: 1.9 MG/DL — SIGNIFICANT CHANGE UP (ref 1.6–2.6)
MCHC RBC-ENTMCNC: 32.4 PG — SIGNIFICANT CHANGE UP (ref 27–34)
MCHC RBC-ENTMCNC: 36 % — SIGNIFICANT CHANGE UP (ref 32–36)
MCV RBC AUTO: 90 FL — SIGNIFICANT CHANGE UP (ref 80–100)
NRBC # FLD: 0.04 — SIGNIFICANT CHANGE UP
PHOSPHATE SERPL-MCNC: 2.2 MG/DL — LOW (ref 2.5–4.5)
PHOSPHATE SERPL-MCNC: SIGNIFICANT CHANGE UP MG/DL (ref 2.5–4.5)
PLATELET # BLD AUTO: 334 K/UL — SIGNIFICANT CHANGE UP (ref 150–400)
PMV BLD: 10.4 FL — SIGNIFICANT CHANGE UP (ref 7–13)
POTASSIUM SERPL-MCNC: 3.7 MMOL/L — SIGNIFICANT CHANGE UP (ref 3.5–5.3)
POTASSIUM SERPL-MCNC: SIGNIFICANT CHANGE UP MMOL/L (ref 3.5–5.3)
POTASSIUM SERPL-SCNC: 3.7 MMOL/L — SIGNIFICANT CHANGE UP (ref 3.5–5.3)
POTASSIUM SERPL-SCNC: SIGNIFICANT CHANGE UP MMOL/L (ref 3.5–5.3)
PROT SERPL-MCNC: 5.4 G/DL — LOW (ref 6–8.3)
PROT SERPL-MCNC: SIGNIFICANT CHANGE UP G/DL (ref 6–8.3)
RBC # BLD: 3.61 M/UL — LOW (ref 3.8–5.2)
RBC # FLD: 15.9 % — HIGH (ref 10.3–14.5)
SODIUM SERPL-SCNC: 136 MMOL/L — SIGNIFICANT CHANGE UP (ref 135–145)
SODIUM SERPL-SCNC: 136 MMOL/L — SIGNIFICANT CHANGE UP (ref 135–145)
SPECIMEN SOURCE: SIGNIFICANT CHANGE UP
SPECIMEN SOURCE: SIGNIFICANT CHANGE UP
WBC # BLD: 17.55 K/UL — HIGH (ref 3.8–10.5)
WBC # FLD AUTO: 17.55 K/UL — HIGH (ref 3.8–10.5)

## 2018-04-11 PROCEDURE — 99233 SBSQ HOSP IP/OBS HIGH 50: CPT

## 2018-04-11 PROCEDURE — 99232 SBSQ HOSP IP/OBS MODERATE 35: CPT

## 2018-04-11 RX ORDER — HALOPERIDOL DECANOATE 100 MG/ML
0.5 INJECTION INTRAMUSCULAR EVERY 6 HOURS
Qty: 0 | Refills: 0 | Status: DISCONTINUED | OUTPATIENT
Start: 2018-04-11 | End: 2018-04-13

## 2018-04-11 RX ORDER — POTASSIUM PHOSPHATE, MONOBASIC POTASSIUM PHOSPHATE, DIBASIC 236; 224 MG/ML; MG/ML
15 INJECTION, SOLUTION INTRAVENOUS ONCE
Qty: 0 | Refills: 0 | Status: COMPLETED | OUTPATIENT
Start: 2018-04-11 | End: 2018-04-12

## 2018-04-11 RX ORDER — LACTULOSE 10 G/15ML
200 SOLUTION ORAL ONCE
Qty: 0 | Refills: 0 | Status: COMPLETED | OUTPATIENT
Start: 2018-04-11 | End: 2018-04-11

## 2018-04-11 RX ORDER — HYDROMORPHONE HYDROCHLORIDE 2 MG/ML
0.3 INJECTION INTRAMUSCULAR; INTRAVENOUS; SUBCUTANEOUS
Qty: 0 | Refills: 0 | Status: DISCONTINUED | OUTPATIENT
Start: 2018-04-11 | End: 2018-04-13

## 2018-04-11 RX ORDER — LACTULOSE 10 G/15ML
200 SOLUTION ORAL DAILY
Qty: 0 | Refills: 0 | Status: DISCONTINUED | OUTPATIENT
Start: 2018-04-12 | End: 2018-04-13

## 2018-04-11 RX ADMIN — ENOXAPARIN SODIUM 40 MILLIGRAM(S): 100 INJECTION SUBCUTANEOUS at 07:23

## 2018-04-11 RX ADMIN — BUDESONIDE AND FORMOTEROL FUMARATE DIHYDRATE 2 PUFF(S): 160; 4.5 AEROSOL RESPIRATORY (INHALATION) at 10:02

## 2018-04-11 RX ADMIN — Medication 500000 UNIT(S): at 21:42

## 2018-04-11 RX ADMIN — SPIRONOLACTONE 25 MILLIGRAM(S): 25 TABLET, FILM COATED ORAL at 06:29

## 2018-04-11 RX ADMIN — LACTULOSE 200 GRAM(S): 10 SOLUTION ORAL at 18:30

## 2018-04-11 RX ADMIN — LACTULOSE 10 GRAM(S): 10 SOLUTION ORAL at 06:28

## 2018-04-11 RX ADMIN — Medication 20 MILLIGRAM(S): at 06:31

## 2018-04-11 RX ADMIN — Medication 30 MILLIGRAM(S): at 21:56

## 2018-04-11 RX ADMIN — Medication 500000 UNIT(S): at 06:28

## 2018-04-11 RX ADMIN — CEFEPIME 100 MILLIGRAM(S): 1 INJECTION, POWDER, FOR SOLUTION INTRAMUSCULAR; INTRAVENOUS at 21:42

## 2018-04-11 RX ADMIN — CEFEPIME 100 MILLIGRAM(S): 1 INJECTION, POWDER, FOR SOLUTION INTRAMUSCULAR; INTRAVENOUS at 10:02

## 2018-04-11 RX ADMIN — Medication 250 MILLIGRAM(S): at 22:51

## 2018-04-11 NOTE — CHART NOTE - NSCHARTNOTEFT_GEN_A_CORE
As per family meeting with palliative today, patient's code status was decided on DNR/DNI with hospice eval pending. DNR order entered by Dr. Orr, however FLORECITA Tian brought to ADS attention DNR not signed in chart. Son Richie Eli called at [phone] at 1010pm confirming DNR/DNI decision was accurate and form was signed. Dr. Snowden was made aware DNR being placed in chart and will need to be signed.

## 2018-04-11 NOTE — PROGRESS NOTE ADULT - ASSESSMENT
72 year old female, with past history significant for Asthma, Breast cancer, Depression, GERD, HTN, Insomnia, HLD, Pancreatic cancer, Whipple procedure, Ascites, Umbilical hernia with mesh, Lung surgery (VATS), Port-a-cath, presented to the ED secondary to lethargy.  She was recently hospitalised with ascitis and had paracentesis followed by pleurex due to repeated accumulation of fluid. She was then discharged to rehab and was suppose to follow as outpt.   This admission was brought in from rehab with change in mental status and abdominal pain. She is confused but gets uncomfortable on palpation of her abdomen.    Problem/Recommendation - 1:  Problem: Malignant neoplasm of pancreas, unspecified location of malignancy. Recommendation: At present not a candidate for further chemotherapy due to worsening  overall condition. Seen by palliative care to address the advanced directives. D/W son at the bedside. Will follow her closely to check the progress.CT head negative for bleed but a repeat suggested due to movement artifact.4/7 Seen today remains confused ,family at bedside aware of overall condition. Ammonia levels slowly improving.  4/9/18 Alert and responding appropriately, comfortable.Followed by palliative care.  4/10 confused again.Overall doing poorly, advanced directives and placement plan to be discussed by palliative.It was clarified to her son that she should be under palliative care and is not a candidate for further chemotherapy.  4/11 Overall condition remains the same. Palliative care followup.  Problem/Recommendation - 2:  Problem: Altered mental status, unspecified altered mental status type.  Recommendation: ? Metabolic, neurology input. 4/7/18 Patient was seen by neurology cause likely metabolic. MRI not possible as patient difficult to stay still.  4/9/18 More alert and responsive today. Ammonia level still high.  Problem/Recommendation - 3:  ·  Problem: Malignant neoplasm of lower-inner quadrant of female breast, unspecified estrogen receptor status, unspecified laterality.

## 2018-04-11 NOTE — PROGRESS NOTE ADULT - PROBLEM SELECTOR PLAN 5
Time spent 60  Code status discussed  Decision making in the context of a prognosis of days to weeks  hospice nurse present  medicine team present, providing consulting clinicians recommendations  DNAR/DNI Time spent 60  Code status discussed  Decision making in the context of a prognosis of days to weeks  hospice nurse present  medicine team present, providing consulting clinicians recommendations  DNAR/DNI  hospice referral made  likely not an inpatient hospice candidate  Goal is to have symptom control be the most important thing  Hospice care sought

## 2018-04-11 NOTE — PROGRESS NOTE ADULT - ASSESSMENT
72 f with HTN, HLD, GERD, Asthma, Breast cancer, Pancreatic cancer, Whipple procedure, Ascites, Lung nodule s/p 9/2017 VATS with necrotizing granuloma and negative AFB, umbilical hernia with mesh, ascites with cytology of reactive mesothelial cell, recent admission for abd distension s/p pigtail placement for ascites fluid 3/21/18 brought  in for  lethargy, AMS and abd pain.  pt had elevated ammonia levels and AMS was considerer due to hepatic encephalopathy. Her blood and urine cx were negative and CXR with improving pleural effusion, WBC slightly increasing now 13, the ascitic fluid analysis not c/o SBP   lt arm thrombophlebitis site improved.       hepatic encephalopathy, no SBP, but leukocytosis worsening  right arm thrombophlebitis  Worsening transaminitis especially ALP, concern for cholestasis.    Plan:   c/w cefepime for now  c/w vanco for thrombophlebitis  U/A with some pyuria but ? significance in presence of brown catheter.   urine cx in lab.  Would recommend CT abd/pelvis, per NP pt s/p palliative meeting and plan for hospice, further testing based on GOC.

## 2018-04-12 LAB
-  AMIKACIN: SIGNIFICANT CHANGE UP
-  AMPICILLIN/SULBACTAM: SIGNIFICANT CHANGE UP
-  CEFEPIME: SIGNIFICANT CHANGE UP
-  CEFTAZIDIME: SIGNIFICANT CHANGE UP
-  CIPROFLOXACIN: SIGNIFICANT CHANGE UP
-  GENTAMICIN: SIGNIFICANT CHANGE UP
-  LEVOFLOXACIN: SIGNIFICANT CHANGE UP
-  MEROPENEM: SIGNIFICANT CHANGE UP
-  TOBRAMYCIN: SIGNIFICANT CHANGE UP
ALBUMIN SERPL ELPH-MCNC: 1.6 G/DL — LOW (ref 3.3–5)
ALP SERPL-CCNC: 599 U/L — HIGH (ref 40–120)
ALT FLD-CCNC: 101 U/L — HIGH (ref 4–33)
AMMONIA BLD-MCNC: 38 UMOL/L — SIGNIFICANT CHANGE UP (ref 11–55)
AST SERPL-CCNC: 111 U/L — HIGH (ref 4–32)
BACTERIA FLD CULT: SIGNIFICANT CHANGE UP
BACTERIA UR CULT: SIGNIFICANT CHANGE UP
BILIRUB SERPL-MCNC: 1.4 MG/DL — HIGH (ref 0.2–1.2)
BUN SERPL-MCNC: 21 MG/DL — SIGNIFICANT CHANGE UP (ref 7–23)
CALCIUM SERPL-MCNC: 7.7 MG/DL — LOW (ref 8.4–10.5)
CHLORIDE SERPL-SCNC: 103 MMOL/L — SIGNIFICANT CHANGE UP (ref 98–107)
CO2 SERPL-SCNC: 22 MMOL/L — SIGNIFICANT CHANGE UP (ref 22–31)
CREAT SERPL-MCNC: 0.94 MG/DL — SIGNIFICANT CHANGE UP (ref 0.5–1.3)
GLUCOSE SERPL-MCNC: 160 MG/DL — HIGH (ref 70–99)
GRAM STN FLD: SIGNIFICANT CHANGE UP
HCT VFR BLD CALC: 31.8 % — LOW (ref 34.5–45)
HGB BLD-MCNC: 11 G/DL — LOW (ref 11.5–15.5)
MAGNESIUM SERPL-MCNC: 2 MG/DL — SIGNIFICANT CHANGE UP (ref 1.6–2.6)
MCHC RBC-ENTMCNC: 31.8 PG — SIGNIFICANT CHANGE UP (ref 27–34)
MCHC RBC-ENTMCNC: 34.6 % — SIGNIFICANT CHANGE UP (ref 32–36)
MCV RBC AUTO: 91.9 FL — SIGNIFICANT CHANGE UP (ref 80–100)
METHOD TYPE: SIGNIFICANT CHANGE UP
NRBC # FLD: 0.02 — SIGNIFICANT CHANGE UP
ORGANISM # SPEC MICROSCOPIC CNT: SIGNIFICANT CHANGE UP
ORGANISM # SPEC MICROSCOPIC CNT: SIGNIFICANT CHANGE UP
PHOSPHATE SERPL-MCNC: 3.7 MG/DL — SIGNIFICANT CHANGE UP (ref 2.5–4.5)
PLATELET # BLD AUTO: 317 K/UL — SIGNIFICANT CHANGE UP (ref 150–400)
PMV BLD: 10.6 FL — SIGNIFICANT CHANGE UP (ref 7–13)
POTASSIUM SERPL-MCNC: 3.5 MMOL/L — SIGNIFICANT CHANGE UP (ref 3.5–5.3)
POTASSIUM SERPL-SCNC: 3.5 MMOL/L — SIGNIFICANT CHANGE UP (ref 3.5–5.3)
PROT SERPL-MCNC: 5 G/DL — LOW (ref 6–8.3)
RBC # BLD: 3.46 M/UL — LOW (ref 3.8–5.2)
RBC # FLD: 16.2 % — HIGH (ref 10.3–14.5)
SODIUM SERPL-SCNC: 136 MMOL/L — SIGNIFICANT CHANGE UP (ref 135–145)
SPECIMEN SOURCE: SIGNIFICANT CHANGE UP
WBC # BLD: 17.11 K/UL — HIGH (ref 3.8–10.5)
WBC # FLD AUTO: 17.11 K/UL — HIGH (ref 3.8–10.5)

## 2018-04-12 PROCEDURE — 99232 SBSQ HOSP IP/OBS MODERATE 35: CPT

## 2018-04-12 RX ADMIN — Medication 500000 UNIT(S): at 14:05

## 2018-04-12 RX ADMIN — LACTULOSE 200 GRAM(S): 10 SOLUTION ORAL at 12:40

## 2018-04-12 RX ADMIN — CEFEPIME 100 MILLIGRAM(S): 1 INJECTION, POWDER, FOR SOLUTION INTRAMUSCULAR; INTRAVENOUS at 08:44

## 2018-04-12 RX ADMIN — CEFEPIME 100 MILLIGRAM(S): 1 INJECTION, POWDER, FOR SOLUTION INTRAMUSCULAR; INTRAVENOUS at 21:00

## 2018-04-12 RX ADMIN — HYDROMORPHONE HYDROCHLORIDE 0.3 MILLIGRAM(S): 2 INJECTION INTRAMUSCULAR; INTRAVENOUS; SUBCUTANEOUS at 16:45

## 2018-04-12 RX ADMIN — BUDESONIDE AND FORMOTEROL FUMARATE DIHYDRATE 2 PUFF(S): 160; 4.5 AEROSOL RESPIRATORY (INHALATION) at 21:39

## 2018-04-12 RX ADMIN — HYDROMORPHONE HYDROCHLORIDE 0.3 MILLIGRAM(S): 2 INJECTION INTRAMUSCULAR; INTRAVENOUS; SUBCUTANEOUS at 17:00

## 2018-04-12 RX ADMIN — ENOXAPARIN SODIUM 40 MILLIGRAM(S): 100 INJECTION SUBCUTANEOUS at 05:03

## 2018-04-12 RX ADMIN — Medication 20 MILLIGRAM(S): at 18:28

## 2018-04-12 RX ADMIN — Medication 30 MILLIGRAM(S): at 22:44

## 2018-04-12 RX ADMIN — Medication 500000 UNIT(S): at 05:04

## 2018-04-12 RX ADMIN — Medication 20 MILLIGRAM(S): at 05:04

## 2018-04-12 RX ADMIN — BUDESONIDE AND FORMOTEROL FUMARATE DIHYDRATE 2 PUFF(S): 160; 4.5 AEROSOL RESPIRATORY (INHALATION) at 08:06

## 2018-04-12 RX ADMIN — SPIRONOLACTONE 25 MILLIGRAM(S): 25 TABLET, FILM COATED ORAL at 05:04

## 2018-04-12 RX ADMIN — Medication 500000 UNIT(S): at 22:44

## 2018-04-12 RX ADMIN — POTASSIUM PHOSPHATE, MONOBASIC POTASSIUM PHOSPHATE, DIBASIC 62.5 MILLIMOLE(S): 236; 224 INJECTION, SOLUTION INTRAVENOUS at 01:29

## 2018-04-12 NOTE — PROGRESS NOTE ADULT - PROBLEM SELECTOR PLAN 1
pt. is not doing well, had pleurex cath placed last admission for recurrent ascitis   -seen by onchologist Dr. Warner: poor prognosis, palliative care appropriate at this time
onchology following  -at present poor candidate for any palliative chemo
No DMT being offered
onchology following  -at present poor candidate for any palliative chemo
pt. is not doing well, had pleurex cath placed last admission for recurrent ascitis   -seen by onchologist Dr. Warner: poor prognosis, palliative care appropriate at this time
pt. is not doing well, had pleurex cath placed last admission for recurrent ascitis   -seen by onchologist Dr. Warner: poor prognosis, palliative care appropriate at this time
No DMT being offered

## 2018-04-12 NOTE — PROGRESS NOTE ADULT - PROBLEM SELECTOR PLAN 4
on IV abx
on IV abx  -ID consult (house)  -improving clinically
Rapport building
on IV abx
on IV abx
on IV abx  -ID consult (house)  -improving clinically
Hospice referral made

## 2018-04-12 NOTE — PROGRESS NOTE ADULT - PROBLEM SELECTOR PROBLEM 3
Malignant neoplasm of lower-inner quadrant of female breast, unspecified estrogen receptor status, unspecified laterality
Malignant neoplasm of lower-inner quadrant of female breast, unspecified estrogen receptor status, unspecified laterality
Functional quadriplegia
Malignant neoplasm of lower-inner quadrant of female breast, unspecified estrogen receptor status, unspecified laterality
Functional quadriplegia

## 2018-04-12 NOTE — PROGRESS NOTE ADULT - PROBLEM SELECTOR PROBLEM 1
Malignant neoplasm of pancreas, unspecified location of malignancy
Pancreatic cancer
Pancreatic cancer
Malignant neoplasm of pancreas, unspecified location of malignancy

## 2018-04-12 NOTE — PROGRESS NOTE ADULT - PROBLEM SELECTOR PROBLEM 2
Hepatic encephalopathy syndrome

## 2018-04-12 NOTE — PROGRESS NOTE ADULT - PROBLEM SELECTOR PLAN 2
lactulose enema   ammonia level
improving mental status  -change to lactulose PO and add xifaxan
Persistent despite treatment, family is upset by unclear etiology
improving mental status  -change to lactulose PO and add xifaxan
lactulose enema   ammonia level
lactulose enema   ammonia level
Persistent  disease progression likely

## 2018-04-12 NOTE — PROGRESS NOTE ADULT - PROBLEM SELECTOR PROBLEM 4
Sepsis
Sepsis
Encounter for palliative care
Sepsis
Encounter for palliative care

## 2018-04-12 NOTE — PROGRESS NOTE ADULT - ATTENDING COMMENTS
Cont care per medical service.  Do not see any benefit to operative interventions at this time.
Elieser Galicia  Pager: [phone]. If no response or past 5 pm call [phone].
sign DNR form today . palliative care team f/u
tomorrow family meeting with palliative care team
pt. has very poor prognosis , d/w both son bedside in length , want her to be full code for now . palliative care team consulted   son want her to get some thing for pain : explained that this may worsen her mental status and may worsen her breathing, they are ok for now to get her medication and intubation if needed. d/w and updated  NP and updated nurse
pt. has very poor prognosis , d/w both son bedside in length , want her to be full code for now . palliative care team seen pateint.  family still not decided about advance directive and pt. remain full code
pt. remain full code, d/w HCP son who says : he is still undecided about advance directive
today family meeting with palliative care team, son agreed for home hospice
today family meeting with palliative care team, son agreed for home hospice

## 2018-04-12 NOTE — PROGRESS NOTE ADULT - ASSESSMENT
72 f with HTN, HLD, GERD, Asthma, Breast cancer, Pancreatic cancer, Whipple procedure, Ascites, Lung nodule s/p 9/2017 VATS with necrotizing granuloma and negative AFB, umbilical hernia with mesh, ascites with cytology of reactive mesothelial cell, recent admission for abd distension s/p pigtail placement for ascites fluid 3/21/18 was s/o SBP but cultures negative, now brought  in for  lethargy, AMS and abd pain.  pt had elevated ammonia levels and AMS was considerer due to hepatic encephalopathy, no ascitic fluid analysis was done but pt was started on vanco, cefepime and cipro. Her blood and urine cx were negative and CXR with improving pleural effusion, WBC slightly increasing now 13, the ascitic fluid analysis not s/o SBP but was done on day 4 of antibiotics  pt also has an erythema on one of her IV sites that happened in the hospital      hepatic encephalopathy, ?SBP now day 7 of antibiotics but leukocytosis still present, repeat blood and urine cx negative, peritoneal fluid cx also negative  right arm thrombophlebitis improved  pt was seen by palliative and referral of hospice was made    c/w cefepime for now  received 7 days of vanco for thrombophlebitis, can DC vanco  if within goals of care would monitor the CBC and recommend a CT of abd/pelvis 72 f with HTN, HLD, GERD, Asthma, Breast cancer, Pancreatic cancer, Whipple procedure, Ascites, Lung nodule s/p 9/2017 VATS with necrotizing granuloma and negative AFB, umbilical hernia with mesh, ascites with cytology of reactive mesothelial cell, recent admission for abd distension s/p pigtail placement for ascites fluid 3/21/18 was s/o SBP but cultures negative, now brought  in for  lethargy, AMS and abd pain.  pt had elevated ammonia levels and AMS was considerer due to hepatic encephalopathy, no ascitic fluid analysis was done but pt was started on vanco, cefepime and cipro. Her blood and urine cx were negative and CXR with improving pleural effusion, WBC slightly increasing now 13, the ascitic fluid analysis not s/o SBP but was done on day 4 of antibiotics  pt also has an erythema on one of her IV sites that happened in the hospital      hepatic encephalopathy, ?SBP now day 7 of antibiotics but leukocytosis still present, repeat blood and urine cx negative, peritoneal fluid cx with pan S acinetobacter baumani  right arm thrombophlebitis improved  pt was seen by palliative and referral of hospice was made    c/w cefepime for now but can switch to PO levofloxacin if that is more compatible with the goals of care  received 7 days of vanco for thrombophlebitis, can DC vanco  if within goals of care would monitor the CBC and recommend a CT of abd/pelvis

## 2018-04-13 ENCOUNTER — TRANSCRIPTION ENCOUNTER (OUTPATIENT)
Age: 73
End: 2018-04-13

## 2018-04-13 VITALS
OXYGEN SATURATION: 100 % | DIASTOLIC BLOOD PRESSURE: 66 MMHG | SYSTOLIC BLOOD PRESSURE: 116 MMHG | TEMPERATURE: 98 F | RESPIRATION RATE: 16 BRPM | HEART RATE: 57 BPM

## 2018-04-13 PROCEDURE — 99232 SBSQ HOSP IP/OBS MODERATE 35: CPT

## 2018-04-13 RX ORDER — MAGNESIUM HYDROXIDE 400 MG/1
30 TABLET, CHEWABLE ORAL
Qty: 400 | Refills: 0 | OUTPATIENT
Start: 2018-04-13 | End: 2018-05-12

## 2018-04-13 RX ORDER — FLUTICASONE PROPIONATE AND SALMETEROL 50; 250 UG/1; UG/1
1 POWDER ORAL; RESPIRATORY (INHALATION)
Qty: 1 | Refills: 0 | OUTPATIENT
Start: 2018-04-13 | End: 2018-05-12

## 2018-04-13 RX ORDER — CIPROFLOXACIN LACTATE 400MG/40ML
1 VIAL (ML) INTRAVENOUS
Qty: 5 | Refills: 0 | OUTPATIENT
Start: 2018-04-13 | End: 2018-04-17

## 2018-04-13 RX ORDER — LACTULOSE 10 G/15ML
200 SOLUTION ORAL
Qty: 6000 | Refills: 0 | OUTPATIENT
Start: 2018-04-13 | End: 2018-05-12

## 2018-04-13 RX ORDER — IPRATROPIUM/ALBUTEROL SULFATE 18-103MCG
3 AEROSOL WITH ADAPTER (GRAM) INHALATION
Qty: 100 | Refills: 0 | OUTPATIENT
Start: 2018-04-13 | End: 2018-05-12

## 2018-04-13 RX ORDER — SPIRONOLACTONE 25 MG/1
1 TABLET, FILM COATED ORAL
Qty: 30 | Refills: 0 | OUTPATIENT
Start: 2018-04-13 | End: 2018-05-12

## 2018-04-13 RX ORDER — NYSTATIN 500MM UNIT
5 POWDER (EA) MISCELLANEOUS
Qty: 450 | Refills: 0 | OUTPATIENT
Start: 2018-04-13 | End: 2018-05-12

## 2018-04-13 RX ORDER — CHOLECALCIFEROL (VITAMIN D3) 125 MCG
1 CAPSULE ORAL
Qty: 0 | Refills: 0 | COMMUNITY

## 2018-04-13 RX ORDER — FAMOTIDINE 10 MG/ML
1 INJECTION INTRAVENOUS
Qty: 30 | Refills: 0 | OUTPATIENT
Start: 2018-04-13 | End: 2018-05-12

## 2018-04-13 RX ORDER — PROPRANOLOL HCL 160 MG
1 CAPSULE, EXTENDED RELEASE 24HR ORAL
Qty: 60 | Refills: 0 | OUTPATIENT
Start: 2018-04-13 | End: 2018-05-12

## 2018-04-13 RX ORDER — LOVASTATIN 20 MG
1 TABLET ORAL
Qty: 0 | Refills: 0 | COMMUNITY

## 2018-04-13 RX ORDER — ACETAMINOPHEN 500 MG
1 TABLET ORAL
Qty: 120 | Refills: 0 | OUTPATIENT
Start: 2018-04-13 | End: 2018-05-12

## 2018-04-13 RX ORDER — IPRATROPIUM/ALBUTEROL SULFATE 18-103MCG
3 AEROSOL WITH ADAPTER (GRAM) INHALATION EVERY 6 HOURS
Qty: 0 | Refills: 0 | Status: DISCONTINUED | OUTPATIENT
Start: 2018-04-13 | End: 2018-04-13

## 2018-04-13 RX ADMIN — Medication 500000 UNIT(S): at 15:24

## 2018-04-13 RX ADMIN — LACTULOSE 200 GRAM(S): 10 SOLUTION ORAL at 15:25

## 2018-04-13 RX ADMIN — Medication 500000 UNIT(S): at 06:34

## 2018-04-13 RX ADMIN — CEFEPIME 100 MILLIGRAM(S): 1 INJECTION, POWDER, FOR SOLUTION INTRAMUSCULAR; INTRAVENOUS at 10:08

## 2018-04-13 RX ADMIN — Medication 3 MILLILITER(S): at 16:00

## 2018-04-13 RX ADMIN — ENOXAPARIN SODIUM 40 MILLIGRAM(S): 100 INJECTION SUBCUTANEOUS at 06:34

## 2018-04-13 RX ADMIN — SPIRONOLACTONE 25 MILLIGRAM(S): 25 TABLET, FILM COATED ORAL at 06:35

## 2018-04-13 RX ADMIN — Medication 20 MILLIGRAM(S): at 06:35

## 2018-04-13 RX ADMIN — BUDESONIDE AND FORMOTEROL FUMARATE DIHYDRATE 2 PUFF(S): 160; 4.5 AEROSOL RESPIRATORY (INHALATION) at 10:08

## 2018-04-13 NOTE — DISCHARGE NOTE ADULT - CARE PLAN
Principal Discharge DX:	Hepatic encephalopathy syndrome  Goal:	remain stable  Assessment and plan of treatment:	Please follow up with hospice care [facility]  Secondary Diagnosis:	Leukocytosis, unspecified type  Goal:	remain free of infection  Assessment and plan of treatment:	Please follow up with [facility].  Please complete antibiotics as prescribed  Secondary Diagnosis:	Pancreatic cancer  Goal:	comfort care  Assessment and plan of treatment:	Please follow up with [facility]  Secondary Diagnosis:	Oral candidiasis  Goal:	continue nystatin swish and spit  Assessment and plan of treatment:	Please follow up with [facility]  Secondary Diagnosis:	Urinary retention  Goal:	continue with brown  Assessment and plan of treatment:	Please continue brown care as per protocol.  Trial of void when indicated. Principal Discharge DX:	Hepatic encephalopathy syndrome  Goal:	remain stable  Assessment and plan of treatment:	Please follow up with hospice care network.  Please drain Pleurex Cath 3x week Mon, Wed, Friday not more than 500 cc at a time. Ok with Sx team  Secondary Diagnosis:	Leukocytosis, unspecified type  Goal:	remain free of infection  Assessment and plan of treatment:	Please follow up with hospice care network.  Please complete antibiotics as prescribed  Secondary Diagnosis:	Pancreatic cancer  Goal:	comfort care  Assessment and plan of treatment:	Please follow up with hospice care network  Secondary Diagnosis:	Oral candidiasis  Goal:	continue nystatin swish and spit  Assessment and plan of treatment:	Please follow up with hospice care network  Secondary Diagnosis:	Urinary retention  Goal:	continue with brown  Assessment and plan of treatment:	Please continue brown care as per protocol.  Trial of void when indicated.

## 2018-04-13 NOTE — DIETITIAN INITIAL EVALUATION ADULT. - NS AS NUTRI INTERV MEALS SNACK
1. Suggest: PO diet: Pleasure feeding;            2. RDN remains available, consult nutrition if/as needed;/Other (specify)

## 2018-04-13 NOTE — DISCHARGE NOTE ADULT - HOSPITAL COURSE
72 year old female, with past history significant for Asthma, Breast cancer, Depression, GERD, HTN, Insomnia, HLD, Pancreatic cancer, Whipple procedure, Ascites, Umbilical hernia with mesh, Lung surgery (VATS), Port-a-cath, presented to the ED secondary to lethargy.  Diagnosed with Altered Mental Status.      Encephalopathy.    - patient with rapidly worsening lethargy/AMS over 3 days; no longer ambulating and non-verbal (dissimilar to baseline)  - Ammonia- 148-->64; lactulose rectally prescribed then transitioned to oral  - Xifaxin initiated for elevated ammonia:now resolving   - WBC = 17.65;started Cefepime, Vancomycin  - ensure hydration; IVF NS   - Sertraline, Trazodone and Oxybutynin held for now  - on Spironolactone   - Abdominal X-ray: no obstruction or ileus  - BCx- neg, UCx- neg  - Neuro consulted  - continue to treat hyperammonemia as per primary team    Leukocytosis   - RVP neg, CXR w/out any acute findings, UA neg  - ID consulted: Cipro d/ant'ed   - Ascitic fluid analysis: neg  - 4/10: Repeat ua: positive (already on cefipime)  -4/5Blood and Urine Cx:NTD  - ?SBP now day 7 of antibiotics but leukocytosis still present  -4/10: repeat blood and urine cx negative  -peritoneal fluid cx with pan S acinetobacter baumani  -right arm thrombophlebitis improved  -pt was seen by palliative and referral of hospice was made  -c/w cefepime for now but can switch to PO levofloxacin if that is more compatible with the goals of care  received 7 days of vanco for thrombophlebitis, can DC vanco, dc cefipime.  Discharge on levaquin to complete 5 days     Pancreatic cancer.    - Currently not a candidate for chemotherapy due to worsening overall condition as per Heme/onc   - Dr. Mitchell consulted Heme/ Onc  - Surg onc Dr. Titus consulted ----  -Cont care per medical service.  Do not see any benefit to operative interventions at this time.  -Palliative following  -DNR/DNI   -Hopice : home with hospice     Generalized abdominal pain.    - Abd xray- no obstruction/no ileus    Oral candidiasis.    - significantly dry areas of visualized oral mucosa (minimal area)  - being treated for same from rehab center (started one day of 14 days); continued for now    Urinary retention  -Stoner placed    Nutrition, metabolism, and development symptoms.    - NPO for now (except ice chips and sips of water - since mentation improving)  - IVF hydration as above  - Pt mentating better, diet advanced to soft    DVT PPX   - lovenox    Dispo: Home with home hospice

## 2018-04-13 NOTE — DISCHARGE NOTE ADULT - PATIENT PORTAL LINK FT
You can access the [facility] Patient Portal, offered by [facility], by registering with the following website: [email or web address]

## 2018-04-13 NOTE — DISCHARGE NOTE ADULT - MEDICATION SUMMARY - MEDICATIONS TO TAKE
I will START or STAY ON the medications listed below when I get home from the hospital:    spironolactone 25 mg oral tablet  -- 1 tab(s) by mouth once a day  -- Indication: For Hepatic encephalopathy syndrome    acetaminophen 650 mg rectal suppository  -- 1 suppository(ies) rectally every 6 hours, As needed, Mild Pain (1 - 3)  -- Indication: For Pain    magnesium hydroxide 8% oral suspension  -- 30 milliliter(s) by mouth once a day, As needed, Constipation  -- Indication: For Generalized abdominal pain    propranolol 20 mg oral tablet  -- 1 tab(s) by mouth 2 times a day  -- Indication: For Hhtn    nystatin 100,000 units/mL oral suspension  -- 5 milliliter(s) by mouth 3 times a day  -- Indication: For Oral candidiasis    oseltamivir 30 mg oral capsule  -- 1 cap(s) by mouth once a day (at bedtime) for 14 days  -- Indication: For Exposure    ipratropium-albuterol 0.5 mg-2.5 mg/3 mLinhalation solution  -- 3 milliliter(s) inhaled every 6 hours  -- Indication: For Sob/wheezing    Advair Diskus 250 mcg-50 mcg inhalation powder  -- 1 puff(s) inhaled 2 times a day  -- Indication: For Sob/wheezing    famotidine 40 mg oral tablet  -- 1 tab(s) by mouth once a day (at bedtime)  -- Indication: For Gerd    lactulose 10 g/15 mL oral syrup  -- 200 gram(s) by mouth once a day (at bedtime)   -- Indication: For Hepatic encephalopathy syndrome    bisacodyl 5 mg oral delayed release tablet  -- 1 tab(s) by mouth every 12 hours, As needed, Constipation  -- Indication: For bowel regimen     rifAXIMin 200 mg oral tablet  -- 1 tab(s) by mouth 3 times a day  -- Indication: For Hepatic encephalopathy syndrome    Levaquin 500 mg oral tablet  -- 1 tab(s) by mouth once a day   -- Avoid prolonged or excessive exposure to direct and/or artificial sunlight while taking this medication.  Do not take dairy products, antacids, or iron preparations within one hour of this medication.  Finish all this medication unless otherwise directed by prescriber.  May cause drowsiness or dizziness.  Medication should be taken with plenty of water.    -- Indication: For Leukocytosis, unspecified type

## 2018-04-13 NOTE — DISCHARGE NOTE ADULT - PLAN OF CARE
remain stable Please follow up with hospice care network remain free of infection Please follow up with hospice care network.  Please complete antibiotics as prescribed comfort care continue nystatin swish and spit continue with brown Please continue brown care as per protocol.  Trial of void when indicated. Please follow up with hospice care network.  Please drain Pleurex Cath 3x week Mon, Wed, Friday not more than 500 cc at a time. Ok with Sx team

## 2018-04-13 NOTE — DISCHARGE NOTE ADULT - MEDICATION SUMMARY - MEDICATIONS TO STOP TAKING
I will STOP taking the medications listed below when I get home from the hospital:    lovastatin 40 mg oral tablet  -- 1 tab(s) by mouth once a day (at bedtime)    Vitamin D3 2000 intl units oral capsule  -- 1 cap(s) by mouth once a day    losartan 100 mg oral tablet  -- 1 tab(s) by mouth once a day    traZODone 100 mg oral tablet  -- 1 tab(s) by mouth once a day (at bedtime)    furosemide 40 mg oral tablet  -- 1 tab(s) by mouth once a day    Glucerna Nutritional Supplement  -- 237 milliliter(s) by mouth once a day    Multiple Vitamins oral tablet  -- 1 tab(s) by mouth once a day with food    oxybutynin 5 mg/24 hours oral tablet, extended release  -- 1 tab(s) by mouth once a day (at bedtime)    sertraline 25 mg oral tablet  -- 3 tab(s) by mouth once a day (at bedtime)

## 2018-04-13 NOTE — PROGRESS NOTE ADULT - SUBJECTIVE AND OBJECTIVE BOX
Patient is a 72y old  Female who presents with a chief complaint of Altered mental status, lethargy. (2018 01:17)    pt. seen and examined, more awake     INTERVAL HPI/OVERNIGHT EVENTS:  T(C): 37.2 (04-10-18 @ 21:58), Max: 37.8 (04-10-18 @ 12:44)  HR: 84 (04-10-18 @ 21:58) (84 - 116)  BP: 132/89 (04-10-18 @ 21:58) (132/89 - 146/98)  RR: 18 (04-10-18 @ 21:58) (18 - 20)  SpO2: 100% (04-10-18 @ 21:58) (100% - 100%)  Wt(kg): --  I&O's Summary    2018 07:01  -  10 Apr 2018 07:00  --------------------------------------------------------  IN: 900 mL / OUT: 490 mL / NET: 410 mL    10 Apr 2018 07:01  -  10 Apr 2018 23:50  --------------------------------------------------------  IN: 620 mL / OUT: 200 mL / NET: 420 mL        PAST MEDICAL & SURGICAL HISTORY:  Pancreatic cancer  Breast cancer: Dx  s/p RT and lumpectomy  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  Insomnia  Hiatal hernia with GERD: no changes  Colitis: due to chemo 2017  Malignant neoplasm of pancreas, unspecified location of malignancy: started chemo 10/2016, has mediport - left chest, now s/p whipple 2017  Asthma: denies any recent hospitalizations/intubations  Depression  Hyperlipidemia  HTN (hypertension)  Carcinoma of pancreas: s/p whipple  History of lung surgery: right VATS, wedge resection (2017) benign neoplasm  History of lumpectomy: right ()  Port-a-cath in place  Status post right breast lumpectomy: malignant treated with radiation  H/O abdominal hysterectomy  H/O umbilical hernia repair: with mesh      SOCIAL HISTORY  Alcohol:  Tobacco:  Illicit substance use:    FAMILY HISTORY:    REVIEW OF SYSTEMS:  CONSTITUTIONAL: No fever, weight loss, or fatigue  EYES: No eye pain, visual disturbances, or discharge  ENMT:  No difficulty hearing, tinnitus, vertigo; No sinus or throat pain  NECK: No pain or stiffness  RESPIRATORY: No cough, wheezing, chills or hemoptysis; No shortness of breath  CARDIOVASCULAR: No chest pain, palpitations, dizziness, or leg swelling  GASTROINTESTINAL: No abdominal or epigastric pain. No nausea, vomiting, or hematemesis; No diarrhea or constipation. No melena or hematochezia.  GENITOURINARY: No dysuria, frequency, hematuria, or incontinence  NEUROLOGICAL: No headaches, memory loss, loss of strength, numbness, or tremors  SKIN: No itching, burning, rashes, or lesions   LYMPH NODES: No enlarged glands  ENDOCRINE: No heat or cold intolerance; No hair loss  MUSCULOSKELETAL: No joint pain or swelling; No muscle, back, or extremity pain  PSYCHIATRIC: No depression, anxiety, mood swings, or difficulty sleeping  HEME/LYMPH: No easy bruising, or bleeding gums  ALLERY AND IMMUNOLOGIC: No hives or eczema    RADIOLOGY & ADDITIONAL TESTS:    Imaging Personally Reviewed:  [ ] YES  [ ] NO    Consultant(s) Notes Reviewed:  [ ] YES  [ ] NO    PHYSICAL EXAM:  GENERAL: NAD, well-groomed, well-developed  HEAD:  Atraumatic, Normocephalic  EYES: EOMI, PERRLA, conjunctiva and sclera clear  ENMT: No tonsillar erythema, exudates, or enlargement; Moist mucous membranes, Good dentition, No lesions  NECK: Supple, No JVD, Normal thyroid  NERVOUS SYSTEM:  Alert & Oriented X3, Good concentration; Motor Strength 5/5 B/L upper and lower extremities; DTRs 2+ intact and symmetric  CHEST/LUNG: Clear to percussion bilaterally; No rales, rhonchi, wheezing, or rubs  HEART: Regular rate and rhythm; No murmurs, rubs, or gallops  ABDOMEN: Soft, Nontender, Nondistended; Bowel sounds present  EXTREMITIES:  2+ Peripheral Pulses, No clubbing, cyanosis, or edema  LYMPH: No lymphadenopathy noted  SKIN: No rashes or lesions    LABS:                        10.9   13.12 )-----------( 310      ( 10 Apr 2018 05:50 )             31.0     04-10    135  |  102  |  16  ----------------------------<  133<H>  3.2<L>   |  22  |  0.76    Ca    7.9<L>      10 Apr 2018 05:30  Phos  2.0     04-10  Mg     1.8     04-10    TPro  5.9<L>  /  Alb  1.8<L>  /  TBili  1.1  /  DBili  x   /  AST  183<H>  /  ALT  127<H>  /  AlkPhos  681<H>  04-10      Urinalysis Basic - ( 10 Apr 2018 15:00 )    Color: YELLOW / Appearance: HAZY / S.025 / pH: 6.0  Gluc: NEGATIVE / Ketone: TRACE  / Bili: NEGATIVE / Urobili: NORMAL mg/dL   Blood: LARGE / Protein: 100 mg/dL / Nitrite: NEGATIVE   Leuk Esterase: MODERATE / RBC: >50 / WBC 10-25   Sq Epi: OCC / Non Sq Epi: x / Bacteria: FEW      CAPILLARY BLOOD GLUCOSE            Urinalysis Basic - ( 10 Apr 2018 15:00 )    Color: YELLOW / Appearance: HAZY / S.025 / pH: 6.0  Gluc: NEGATIVE / Ketone: TRACE  / Bili: NEGATIVE / Urobili: NORMAL mg/dL   Blood: LARGE / Protein: 100 mg/dL / Nitrite: NEGATIVE   Leuk Esterase: MODERATE / RBC: >50 / WBC 10-25   Sq Epi: OCC / Non Sq Epi: x / Bacteria: FEW        MEDICATIONS  (STANDING):  atorvastatin 10 milliGRAM(s) Oral at bedtime  buDESOnide 160 MICROgram(s)/formoterol 4.5 MICROgram(s) Inhaler 2 Puff(s) Inhalation two times a day  cefepime  IVPB 1000 milliGRAM(s) IV Intermittent every 12 hours  cholecalciferol 2000 Unit(s) Oral daily  enoxaparin Injectable 40 milliGRAM(s) SubCutaneous every 24 hours  famotidine    Tablet 40 milliGRAM(s) Oral daily  lactulose Syrup 10 Gram(s) Oral every 8 hours  multivitamin 1 Tablet(s) Oral daily  nystatin    Suspension 849864 Unit(s) Oral three times a day  oseltamivir 30 milliGRAM(s) Oral at bedtime  propranolol 20 milliGRAM(s) Oral two times a day  rifaximin 200 milliGRAM(s) Oral three times a day  spironolactone 25 milliGRAM(s) Oral daily  vancomycin  IVPB 1000 milliGRAM(s) IV Intermittent every 24 hours    MEDICATIONS  (PRN):  acetaminophen  Suppository. 650 milliGRAM(s) Rectal every 6 hours PRN Mild Pain (1 - 3)  ALBUTerol    90 MICROgram(s) HFA Inhaler 2 Puff(s) Inhalation every 6 hours PRN Shortness of Breath and/or Wheezing  magnesium hydroxide Suspension 30 milliLiter(s) Oral daily PRN Constipation  morphine  - Injectable 1 milliGRAM(s) IV Push every 4 hours PRN moderate and severe pain (4-10)      Care Discussed with Consultants/Other Providers [ ] YES  [ ] NO
pt is encephalopathic    T(C): 36.4 (18 @ 12:58), Max: 37.2 (04-10-18 @ 21:58)  HR: 76 (18 @ 12:58) (76 - 84)  BP: 120/79 (18 @ 12:58) (120/79 - 132/89)  RR: 22 (18 @ 12:58) (18 - 22)  SpO2: 100% (18 @ 12:58) (100% - 100%)  Wt(kg): --      10 Apr 2018 07:01  -  2018 07:00  --------------------------------------------------------  IN:    IV PiggyBack: 300 mL    Oral Fluid: 420 mL  Total IN: 720 mL    OUT:    Indwelling Catheter - Urethral: 700 mL    Voided: 200 mL  Total OUT: 900 mL    Total NET: -180 mL          04-10 @ 07:01  -   @ 07:00  --------------------------------------------------------  IN: 720 mL / OUT: 900 mL / NET: -180 mL      CAPILLARY BLOOD GLUCOSE            acetaminophen  Suppository. 650 milliGRAM(s) Rectal every 6 hours PRN  ALBUTerol    90 MICROgram(s) HFA Inhaler 2 Puff(s) Inhalation every 6 hours PRN  bisacodyl 5 milliGRAM(s) Oral every 12 hours PRN  buDESOnide 160 MICROgram(s)/formoterol 4.5 MICROgram(s) Inhaler 2 Puff(s) Inhalation two times a day  cefepime  IVPB 1000 milliGRAM(s) IV Intermittent every 12 hours  enoxaparin Injectable 40 milliGRAM(s) SubCutaneous every 24 hours  haloperidol    Injectable 0.5 milliGRAM(s) IV Push every 6 hours PRN  HYDROmorphone  Injectable 0.3 milliGRAM(s) IV Push every 3 hours PRN  lactulose Retention Enema 200 Gram(s) Rectal once  magnesium hydroxide Suspension 30 milliLiter(s) Oral daily PRN  nystatin    Suspension [id number] Unit(s) Oral three times a day  oseltamivir 30 milliGRAM(s) Oral at bedtime  potassium phosphate IVPB 15 milliMole(s) IV Intermittent once  propranolol 20 milliGRAM(s) Oral two times a day  rifaximin 200 milliGRAM(s) Oral three times a day  spironolactone 25 milliGRAM(s) Oral daily  vancomycin  IVPB 1000 milliGRAM(s) IV Intermittent every 24 hours              136  |  104  |  18  ----------------------------<  156<H>  3.7   |  21<L>  |  0.79    Ca    7.6<L>      2018 11:00  Phos  2.2       Mg     1.9         TPro  5.4<L>  /  Alb  1.5<L>  /  TBili  1.6<H>  /  DBili  x   /  AST  131<H>  /  ALT  117<H>  /  AlkPhos  653<H>        Procalc  BNP  ABG                          11.7   17.55 )-----------( 334      ( 2018 06:00 )             32.5         Urinalysis Basic - ( 10 Apr 2018 15:00 )    Color: YELLOW / Appearance: HAZY / S.025 / pH: 6.0  Gluc: NEGATIVE / Ketone: TRACE  / Bili: NEGATIVE / Urobili: NORMAL mg/dL   Blood: LARGE / Protein: 100 mg/dL / Nitrite: NEGATIVE   Leuk Esterase: MODERATE / RBC: >50 / WBC 10-25   Sq Epi: OCC / Non Sq Epi: x / Bacteria: FEW      blood and urine cultures        )    PERTINENT PMH REVIEWED:  [x ] YES [ ] NO           SOCIAL HISTORY:  Significant other/partner:  [ ] YES  [x ] NO            Children:  [x ] YES  [ ] NO                   Scientologist/Spirituality: Restoration  Subtance hx:  [ ] YES   [x ] NO           Tobacco hx:  [ ] YES  [x ] NO             Alcohol hx: [ ] YES  [x ] NO        Home Opiod hx:  [ ] YES  [x ] NO   Living Situation: [x ] Home  [ ] Long term care  [ ] Rehab    REFERRALS:   [x ] Chaplaincy  [ ] Hospice  [ ] Child Life  [ ] Social Work  [ ] Case management [ ] Holistic Therapy     FAMILY HISTORY:  Family history of kidney cancer (Sibling): brother  Family history of brain cancer (Sibling): sister  Family history of ovarian cancer: mother    [x ] Family history non contributory     BASELINE ADLs (prior to admission):  Independent [ ] moderately [ ] fully   Dependent   [x ] moderately [ ] fully    ADVANCE DIRECTIVES:  [ ] YES [x ] NO   DNR [ ] YES [x ] NO                      MOLST  [ ] YES x[ ] NO    Living Will  [ ] YES [x ] NO    Health Care Proxy [ ] YES  [x ] NO      [x ] Surrogate  [ ] HCP  [ ] Guardian:         Richie Eli (son)               Phone#: [phone]    Allergies    No Known Drug Allergies  SteriStrips (Blisters)    Intolerances        MEDICATIONS  (STANDING):  atorvastatin 10 milliGRAM(s) Oral at bedtime  buDESOnide 160 MICROgram(s)/formoterol 4.5 MICROgram(s) Inhaler 2 Puff(s) Inhalation two times a day  cefepime  IVPB 1000 milliGRAM(s) IV Intermittent every 12 hours  cholecalciferol 2000 Unit(s) Oral daily  ciprofloxacin   IVPB 400 milliGRAM(s) IV Intermittent every 12 hours  enoxaparin Injectable 40 milliGRAM(s) SubCutaneous every 24 hours  famotidine    Tablet 40 milliGRAM(s) Oral daily  multivitamin 1 Tablet(s) Oral daily  nystatin    Suspension 897906 Unit(s) Oral three times a day  oseltamivir 30 milliGRAM(s) Oral at bedtime  sodium chloride 0.9%. 1000 milliLiter(s) (50 mL/Hr) IV Continuous <Continuous>  spironolactone 25 milliGRAM(s) Oral daily  vancomycin  IVPB 1000 milliGRAM(s) IV Intermittent every 24 hours    MEDICATIONS  (PRN):  acetaminophen   Tablet. 650 milliGRAM(s) Oral every 6 hours PRN Mild to moderate pain  ALBUTerol    90 MICROgram(s) HFA Inhaler 2 Puff(s) Inhalation every 6 hours PRN Shortness of Breath and/or Wheezing  magnesium hydroxide Suspension 30 milliLiter(s) Oral daily PRN Constipation  morphine  - Injectable 1 milliGRAM(s) IV Push every 6 hours PRN Moderate Pain (4 - 6)      PRESENT SYMPTOMS:  Source: [ ] Patient   [x ] Family   [x ] Team     Pain: [ ] YES [x ] NO  OLDCARTS:     Dyspnea: [ ] YES [ ] NO   Anxiety: [ ] YES [ ] NO  Fatigue: [ ] YES [ ] NO   Nausea: [ ] YES [ ] NO  Loss of appetite: [ ] YES [ ] NO   Constipation: [ ] YES [ ] NO     Other Symptoms:  [ ] All other review of systems negative   [x ] Unable to obtain due to poor mentation     Karnofsky Performance Score/Palliative Performance Status Version 2: 30        %  Protein Calorie Malutrition:  [ ] Mild   [ ] Moderate   [ ] Severe     Vital Signs Last 24 Hrs  T(C): 36.4 (2018 04:42), Max: 36.7 (2018 14:09)  T(F): 97.6 (2018 04:42), Max: 98.1 (2018 14:09)  HR: 92 (2018 04:56) (86 - 122)  BP: 129/81 (2018 04:56) (107/68 - 151/94)  BP(mean): --  RR: 18 (2018 04:42) (17 - 18)  SpO2: 100% (2018 04:42) (99% - 100%)    Physical Exam:    General: [x ] Alert,  A&O x 0  [ ] lethargic   [x ] Agitated   [ ] Cachexia   HEENT: [ ] Normal   [x ] Dry mouth   [ ] ET Tube    [ ] Trach   Lungs: [x ] Clear [ ] Rhonchi  [ ] Crackles [ ] Wheezing [ ] Tachypnea  [ ] Audible excessive secretions   Cardiovascular:  [x ] Regular rate and rhythm  [ ] Irregular [ ] Tachycardia   [ ] Bradycardia   Abdomen: [ ] Soft  [x ] Distended  [ ]  [x ] +BS  [x ] Non tender [ ] Tender  [ ]PEG   [ ] NGT   Last BM:     Genitourinary: [ ] Normal [ ] Incontinent   [ ] Oliguria/Anuria   [x ] Stoner  Musculoskeletal:  [ ] Normal   [ ] Generalized weakness  [x ] Bedbound   Neurological: [ ] No focal deficits  [x ] Cognitive impairment     Skin: [x ] Normal   [ ] Pressure ulcers     LABS:                        9.9    13.36 )-----------( 251      ( 2018 06:30 )             28.8     04-07    139  |  105  |  19  ----------------------------<  155<H>  3.4<L>   |  22  |  0.79    Ca    7.7<L>      2018 06:30  Phos  2.9       Mg     2.0         TPro  6.3  /  Alb  2.0<L>  /  TBili  1.3<H>  /  DBili  x   /  AST  73<H>  /  ALT  47<H>  /  AlkPhos  339<H>        Urinalysis Basic - ( 2018 19:50 )    Color: YELLOW / Appearance: HAZY / S.023 / pH: 6.5  Gluc: NEGATIVE / Ketone: NEGATIVE  / Bili: NEGATIVE / Urobili: NORMAL mg/dL   Blood: NEGATIVE / Protein: 20 mg/dL / Nitrite: NEGATIVE   Leuk Esterase: NEGATIVE / RBC: 0-2 / WBC 2-5   Sq Epi: OCC / Non Sq Epi: x / Bacteria: FEW      I&O's Summary    2018 07:01  -  2018 07:00  --------------------------------------------------------  IN: 0 mL / OUT: 700 mL / NET: -700 mL        RADIOLOGY & ADDITIONAL STUDIES:
72y old  Female who presents with a chief complaint of Altered mental status, lethargy.       Interval history:  Afebrile, lethargic, was talking to the family members this morning, no diarrhea, no cough per RN.      Antimicrobials:    cefepime  IVPB 1000 milliGRAM(s) IV Intermittent every 12 hours  nystatin    Suspension 849418 Unit(s) Oral three times a day  oseltamivir 30 milliGRAM(s) Oral at bedtime  rifaximin 200 milliGRAM(s) Oral three times a day  vancomycin  IVPB 1000 milliGRAM(s) IV Intermittent every 24 hours    REVIEW OF SYSTEMS:  Unable to obtain, pt not communicative to me.     Vital Signs Last 24 Hrs  T(C): 36.4 (04-11-18 @ 12:58), Max: 37.2 (04-10-18 @ 21:58)  T(F): 97.5 (04-11-18 @ 12:58), Max: 98.9 (04-10-18 @ 21:58)  HR: 76 (04-11-18 @ 12:58) (76 - 84)  BP: 120/79 (04-11-18 @ 12:58) (120/79 - 132/89)  RR: 22 (04-11-18 @ 12:58) (18 - 22)  SpO2: 100% (04-11-18 @ 12:58) (100% - 100%)      PHYSICAL EXAM:  Patient lethargic, awake, not communicative.   Cardiovascular: S1S2 normal.  Lungs: + air entry B/L lung fields, limited auscultation due to poor effort.  Gastrointestinal: soft, nondistended, + pleurex catheter.   Extremities: lt upper extremity edema.  + port                           11.7   17.55 )-----------( 334      ( 11 Apr 2018 06:00 )             32.5   04-11    136  |  104  |  18  ----------------------------<  156<H>  3.7   |  21<L>  |  0.79    Ca    7.6<L>      11 Apr 2018 11:00  Phos  2.2     04-11  Mg     1.9     04-11    TPro  5.4<L>  /  Alb  1.5<L>  /  TBili  1.6<H>  /  DBili  x   /  AST  131<H>  /  ALT  117<H>  /  AlkPhos  653<H>  04-11      LIVER FUNCTIONS - ( 11 Apr 2018 11:00 )  Alb: 1.5 g/dL / Pro: 5.4 g/dL / ALK PHOS: 653 u/L / ALT: 117 u/L / AST: 131 u/L / GGT: x               Culture - Acid Fast - Body Fluid w/Smear (collected 10 Apr 2018 01:22)  Source: PERITONEAL FLUID  Final Report (10 Apr 2018 15:13):    AFB SMEAR= NO ACID FAST BACILLI SEEN    Culture - Body Fluid with Gram Stain (collected 10 Apr 2018 01:22)  Source: PERITONEAL FLUID  Gram Stain (10 Apr 2018 06:42):    WBC^White Blood Cells    QNTY CELLS IN GRAM STAIN: NO CELLS SEEN    NOS^No Organisms Seen
Follow Up:  hepatic encephalopathy, leukocytosis    Interval History: pt afebrile, but leukocytosis slightly worse, the ascitic fluid analysis not s/o SBP but today day 5 of antibiotics     ROS:      All other systems negative    Constitutional: no fever, no chills  Head: no trauma  Eyes: no vision changes, no eye pain  ENT:  no vision changes, no sore throat, no rhinorrhea  Cardiovascular:  no chest pain, no palpitation  Respiratory:  no SOB, no cough  GI:  + abd pain, no vomiting, no diarrhea  urinary: no dysuria, no hematuria, no flank pain  musculoskeletal:  no joint pain, no joint swelling  skin:  no rash  neurology:  no headache, no seizure, no change in mental status  psych: no anxiety, no depression         Allergies  No Known Drug Allergies  SteriStrips (Blisters)        ANTIMICROBIALS:  cefepime  IVPB 1000 every 12 hours  nystatin    Suspension [id number] three times a day  oseltamivir 30 at bedtime  rifaximin 200 three times a day  vancomycin  IVPB 1000 every 24 hours      OTHER MEDS:  acetaminophen  Suppository. 650 milliGRAM(s) Rectal every 6 hours PRN  ALBUTerol    90 MICROgram(s) HFA Inhaler 2 Puff(s) Inhalation every 6 hours PRN  atorvastatin 10 milliGRAM(s) Oral at bedtime  buDESOnide 160 MICROgram(s)/formoterol 4.5 MICROgram(s) Inhaler 2 Puff(s) Inhalation two times a day  cholecalciferol 2000 Unit(s) Oral daily  enoxaparin Injectable 40 milliGRAM(s) SubCutaneous every 24 hours  famotidine    Tablet 40 milliGRAM(s) Oral daily  lactulose Syrup 10 Gram(s) Oral every 8 hours  magnesium hydroxide Suspension 30 milliLiter(s) Oral daily PRN  morphine  - Injectable 1 milliGRAM(s) IV Push every 4 hours PRN  multivitamin 1 Tablet(s) Oral daily  ondansetron Injectable 4 milliGRAM(s) IV Push once  propranolol 20 milliGRAM(s) Oral two times a day  spironolactone 25 milliGRAM(s) Oral daily      Vital Signs Last 24 Hrs  T(C): 37.8 (10 Apr 2018 12:44), Max: 37.8 (10 Apr 2018 12:44)  T(F): 100 (10 Apr 2018 12:44), Max: 100 (10 Apr 2018 12:44)  HR: 116 (10 Apr 2018 12:44) (96 - 116)  BP: 145/102 (10 Apr 2018 12:44) (134/90 - 154/100)  BP(mean): --  RR: 19 (10 Apr 2018 12:44) (18 - 20)  SpO2: 100% (10 Apr 2018 12:44) (96% - 100%)    Physical Exam:  General:    NAD,  non toxic  Head: atraumatic, normocephalic  Eye: normal sclera and conjunctiva  ENT:    no oropharyngeal lesions,   no LAD,   neck supple  Cardio:     regular S1, S2,  no murmur  Respiratory:    clear b/l,    no wheezing  abd:     soft,   BS +,   ascitics slight diffuse tenderness, pigtail in place  :   no CVAT,  no suprapubic tenderness,   no  brown  Musculoskeletal:   no joint swelling,   no edema  vascular: no lines, normal pulses  Skin:  right forearm/antecubital area with improving erythema   Neurologic:   awake but confused                        10.9   13.12 )-----------( 310      ( 10 Apr 2018 05:50 )             31.0       04-10    135  |  102  |  16  ----------------------------<  133<H>  3.2<L>   |  22  |  0.76    Ca    7.9<L>      10 Apr 2018 05:30  Phos  2.0     04-10  Mg     1.8     04-10    TPro  5.9<L>  /  Alb  1.8<L>  /  TBili  1.1  /  DBili  x   /  AST  183<H>  /  ALT  127<H>  /  AlkPhos  681<H>  04-10          MICROBIOLOGY:  v  PERITONEAL FLUID  04-10-18 --  --    WBC^White Blood Cells  QNTY CELLS IN GRAM STAIN: NO CELLS SEEN  NOS^No Organisms Seen      BLOOD  04-05-18 --  --  --      URINE CATHETER  04-05-18 --  --  --      ASCITIC FLUID  03-21-18 --  --    WBC^White Blood Cells  QNTY CELLS IN GRAM STAIN: MANY (4+)  NOS^No Organisms Seen                RADIOLOGY:    < from: Xray Abdomen 1 View PORTABLE -Urgent (04.06.18 @ 04:02) >  IMPRESSION:  No obstruction or ileus.
Follow Up:  leukocytosis, ?SBP    Interval History:  pt stable and afebrile going to home hospice  ROS:      All other systems negative    Constitutional: no fever, no chills  Head: no trauma  Eyes: no vision changes, no eye pain  ENT:  no vision changes, no sore throat, no rhinorrhea  Cardiovascular:  no chest pain, no palpitation  Respiratory:  no SOB, no cough  GI:  no abd pain, no vomiting, no diarrhea  urinary: no dysuria, no hematuria, no flank pain  musculoskeletal:  no joint pain, no joint swelling  skin:  no rash  neurology:  no headache, no seizure, no change in mental status  psych: no anxiety, no depression         Allergies  No Known Drug Allergies  SteriStrips (Blisters)        ANTIMICROBIALS:  cefepime  IVPB 1000 every 12 hours  nystatin    Suspension [id number] three times a day  oseltamivir 30 at bedtime  rifaximin 200 three times a day      OTHER MEDS:  acetaminophen  Suppository. 650 milliGRAM(s) Rectal every 6 hours PRN  ALBUTerol/ipratropium for Nebulization 3 milliLiter(s) Nebulizer every 6 hours  bisacodyl 5 milliGRAM(s) Oral every 12 hours PRN  buDESOnide 160 MICROgram(s)/formoterol 4.5 MICROgram(s) Inhaler 2 Puff(s) Inhalation two times a day  enoxaparin Injectable 40 milliGRAM(s) SubCutaneous every 24 hours  haloperidol    Injectable 0.5 milliGRAM(s) IV Push every 6 hours PRN  HYDROmorphone  Injectable 0.3 milliGRAM(s) IV Push every 3 hours PRN  lactulose Retention Enema 200 Gram(s) Rectal daily  magnesium hydroxide Suspension 30 milliLiter(s) Oral daily PRN  propranolol 20 milliGRAM(s) Oral two times a day  spironolactone 25 milliGRAM(s) Oral daily      Vital Signs Last 24 Hrs  T(C): 36.8 (13 Apr 2018 13:09), Max: 36.8 (13 Apr 2018 13:09)  T(F): 98.2 (13 Apr 2018 13:09), Max: 98.2 (13 Apr 2018 13:09)  HR: 60 (13 Apr 2018 13:20) (52 - 76)  BP: 109/66 (13 Apr 2018 13:09) (106/61 - 130/89)  BP(mean): --  RR: 16 (13 Apr 2018 13:09) (16 - 17)  SpO2: 100% (13 Apr 2018 13:09) (99% - 100%)    Physical Exam:  General:    NAD,  non toxic  Head: atraumatic, normocephalic  Eye: normal sclera and conjunctiva  ENT:    no oropharyngeal lesions,   no LAD,   neck supple  Cardio:     regular S1, S2,  no murmur  Respiratory:    clear b/l,    no wheezing  abd:     soft,   BS +,   no tenderness,    pigtail cath with no erythema or tenderness  :   no CVAT,  no suprapubic tenderness,   no  brown  Musculoskeletal:   no joint swelling,   no edema  vascular: no lines, normal pulses  Skin:    no rash  Neurologic:     awake but oriented x 1 and confused  psych: normal affect, no suicidal ideation                          11.0   17.11 )-----------( 317      ( 12 Apr 2018 05:00 )             31.8       04-12    136  |  103  |  21  ----------------------------<  160<H>  3.5   |  22  |  0.94    Ca    7.7<L>      12 Apr 2018 05:00  Phos  3.7     04-12  Mg     2.0     04-12    TPro  5.0<L>  /  Alb  1.6<L>  /  TBili  1.4<H>  /  DBili  x   /  AST  111<H>  /  ALT  101<H>  /  AlkPhos  599<H>  04-12          MICROBIOLOGY:  v  BLOOD PERIPHERAL  04-10-18 --  --  --      URINE CATHETER  04-10-18 --  --  --      PERITONEAL FLUID  04-10-18 --  --  Acinetobacter baumannii      BLOOD  04-05-18 --  --  --      URINE CATHETER  04-05-18 --  --  --      ASCITIC FLUID  03-21-18 --  --    WBC^White Blood Cells  QNTY CELLS IN GRAM STAIN: MANY (4+)  NOS^No Organisms Seen                RADIOLOGY:  < from: Xray Abdomen 1 View PORTABLE -Urgent (04.06.18 @ 04:02) >  IMPRESSION:  No obstruction or ileus.
HPI:  72 year old female, with past history significant for Asthma, Breast cancer, Depression, GERD, HTN, Insomnia, HLD, Pancreatic cancer, Whipple procedure, Ascites, Umbilical hernia with mesh, Lung surgery (VATS), Port-a-cath, presented to the ED secondary to lethargy.  Seen and evaluated at bedside; apparent intermittent distress due to painful abdominal discomfort.  Patient is somnolent at times (during periods of decreased pain), but able to respond to sound by turning head and attempt somewhat unintelligible monosyllabic responses.  Per son, patient has been in the rehab center due to deconditioning and need to gain strength prior to undergoing PET scan.  Son indicates that patient had been doing well in the rehab center (ambulating, able to verbalize needs and interact with others), until approximately 3 days ago when she began to appear weak.  This continued to progress to patient becoming unable to ambulate and speech became sparse, with pronounced weakness/lethargy to the extent of not being able to ring the call bell.  Eventually, patient stopped talking, only uttering one word to son prior to being transported to the Hospital.  Patient is noted by son, to be having difficulty passing gas, even though she is having bowel movements (had one in the ED).  Son notes significant borborygmi each time patient writhes around in bed; occurs approximately every 15 minutes.  Reported to be occurring over the past few days only.  Patient underwent drainage of ascitic fluid (~ 500 mL) on the day prior to admission.  Unknown if any fevers, chills, sweating.    At the time of being seen, son reports that patient was a bit more alert than on initial presentation.  Direct questioning re pain confirmed presence of abdominal pain (by patient).      Vital signs upon ED presentation as follows: BP = 110/87, HR = 77, RR = 26, T = 37 C (98.6 F), o2 Sat = 100% on RA.  Diagnosed with Altered Mental Status.  Prescribed Ciprofloxacin 400 mg IV, Vancomycin 1 gram IV, Cefepime 1 gram IV and NaCl 500 mL in the ED. (06 Apr 2018 01:17)     Pt is seen and examined  Patient confused ,not responding to verbal commands.    MEDICATIONS  (STANDING):  atorvastatin 10 milliGRAM(s) Oral at bedtime  buDESOnide 160 MICROgram(s)/formoterol 4.5 MICROgram(s) Inhaler 2 Puff(s) Inhalation two times a day  cefepime  IVPB 1000 milliGRAM(s) IV Intermittent every 12 hours  cholecalciferol 2000 Unit(s) Oral daily  ciprofloxacin   IVPB 400 milliGRAM(s) IV Intermittent every 12 hours  enoxaparin Injectable 40 milliGRAM(s) SubCutaneous every 24 hours  famotidine    Tablet 40 milliGRAM(s) Oral daily  multivitamin 1 Tablet(s) Oral daily  nystatin    Suspension 303220 Unit(s) Oral three times a day  oseltamivir 30 milliGRAM(s) Oral at bedtime  potassium chloride  10 mEq/100 mL IVPB 10 milliEquivalent(s) IV Intermittent every 1 hour  sodium chloride 0.9%. 1000 milliLiter(s) (50 mL/Hr) IV Continuous <Continuous>  spironolactone 25 milliGRAM(s) Oral daily  vancomycin  IVPB 1000 milliGRAM(s) IV Intermittent every 24 hours    MEDICATIONS  (PRN):  acetaminophen  Suppository. 650 milliGRAM(s) Rectal every 6 hours PRN Mild Pain (1 - 3)  ALBUTerol    90 MICROgram(s) HFA Inhaler 2 Puff(s) Inhalation every 6 hours PRN Shortness of Breath and/or Wheezing  magnesium hydroxide Suspension 30 milliLiter(s) Oral daily PRN Constipation  morphine  - Injectable 1 milliGRAM(s) IV Push every 4 hours PRN moderate and severe pain (4-10)      Allergies    No Known Drug Allergies  SteriStrips (Blisters)    Intolerances        Vital Signs Last 24 Hrs  T(C): 36.8 (07 Apr 2018 14:44), Max: 36.8 (07 Apr 2018 14:44)  T(F): 98.2 (07 Apr 2018 14:44), Max: 98.2 (07 Apr 2018 14:44)  HR: 89 (07 Apr 2018 14:44) (86 - 109)  BP: 130/80 (07 Apr 2018 14:44) (107/68 - 147/82)  BP(mean): --  RR: 18 (07 Apr 2018 14:44) (18 - 18)  SpO2: 100% (07 Apr 2018 14:44) (100% - 100%)    PHYSICAL EXAM  General: adult in NAD  HEENT: clear oropharynx, anicteric sclera, pink conjunctiva  Neck: supple  CV: normal S1/S2 with no murmur rubs or gallops  Lungs: positive air movement b/l ant lungs,clear to auscultation, no wheezes, no rales  Abdomen: ascitis with pleurex  Ext: right arm cellulitis  Skin: no rashes and no petechiae  Neuro: alert and oriented X 4, no focal deficits  LABS:                          9.9    13.36 )-----------( 251      ( 07 Apr 2018 06:30 )             28.8         Mean Cell Volume : 91.4 fL  Mean Cell Hemoglobin : 31.4 pg  Mean Cell Hemoglobin Concentration : 34.4 %  Auto Neutrophil # : x  Auto Lymphocyte # : x  Auto Monocyte # : x  Auto Eosinophil # : x  Auto Basophil # : x  Auto Neutrophil % : x  Auto Lymphocyte % : x  Auto Monocyte % : x  Auto Eosinophil % : x  Auto Basophil % : x    Serial CBC's  04-07 @ 06:30  Hct-28.8 / Hgb-9.9 / Plat-251 / RBC-3.15 / WBC-13.36          Serial CBC's  04-06 @ 05:28  Hct-31.5 / Hgb-10.9 / Plat-261 / RBC-3.38 / WBC-15.25          Serial CBC's  04-05 @ 19:30  Hct-33.2 / Hgb-11.4 / Plat-305 / RBC-3.57 / WBC-17.65            04-07    139  |  105  |  19  ----------------------------<  155<H>  3.4<L>   |  22  |  0.79    Ca    7.7<L>      07 Apr 2018 06:30  Phos  2.9     04-07  Mg     2.0     04-07    TPro  6.3  /  Alb  2.0<L>  /  TBili  1.3<H>  /  DBili  x   /  AST  73<H>  /  ALT  47<H>  /  AlkPhos  339<H>  04-06          Vitamin B12, Serum: 827 pg/mL (04-06 @ 05:28)  Folate, Serum: 15.3 ng/mL (04-06 @ 05:28)            BLOOD SMEAR INTERPRETATION:       RADIOLOGY & ADDITIONAL STUDIES:
HPI:  72 year old female, with past history significant for Asthma, Breast cancer, Depression, GERD, HTN, Insomnia, HLD, Pancreatic cancer, Whipple procedure, Ascites, Umbilical hernia with mesh, Lung surgery (VATS), Port-a-cath, presented to the ED secondary to lethargy.  Seen and evaluated at bedside; apparent intermittent distress due to painful abdominal discomfort.  Patient is somnolent at times (during periods of decreased pain), but able to respond to sound by turning head and attempt somewhat unintelligible monosyllabic responses.  Per son, patient has been in the rehab center due to deconditioning and need to gain strength prior to undergoing PET scan.  Son indicates that patient had been doing well in the rehab center (ambulating, able to verbalize needs and interact with others), until approximately 3 days ago when she began to appear weak.  This continued to progress to patient becoming unable to ambulate and speech became sparse, with pronounced weakness/lethargy to the extent of not being able to ring the call bell.  Eventually, patient stopped talking, only uttering one word to son prior to being transported to the Hospital.  Patient is noted by son, to be having difficulty passing gas, even though she is having bowel movements (had one in the ED).  Son notes significant borborygmi each time patient writhes around in bed; occurs approximately every 15 minutes.  Reported to be occurring over the past few days only.  Patient underwent drainage of ascitic fluid (~ 500 mL) on the day prior to admission.  Unknown if any fevers, chills, sweating.    At the time of being seen, son reports that patient was a bit more alert than on initial presentation.  Direct questioning re pain confirmed presence of abdominal pain (by patient).      Vital signs upon ED presentation as follows: BP = 110/87, HR = 77, RR = 26, T = 37 C (98.6 F), o2 Sat = 100% on RA.  Diagnosed with Altered Mental Status.  Prescribed Ciprofloxacin 400 mg IV, Vancomycin 1 gram IV, Cefepime 1 gram IV and NaCl 500 mL in the ED. (06 Apr 2018 01:17)     Pt is seen and examined  pt is awake and lying in bed/out of bed to chair  pt seems comfortable and denies any complaints at this time    ROS:  Negative except for:    MEDICATIONS  (STANDING):  atorvastatin 10 milliGRAM(s) Oral at bedtime  buDESOnide 160 MICROgram(s)/formoterol 4.5 MICROgram(s) Inhaler 2 Puff(s) Inhalation two times a day  cefepime  IVPB 1000 milliGRAM(s) IV Intermittent every 12 hours  cholecalciferol 2000 Unit(s) Oral daily  enoxaparin Injectable 40 milliGRAM(s) SubCutaneous every 24 hours  famotidine    Tablet 40 milliGRAM(s) Oral daily  lactulose Syrup 10 Gram(s) Oral every 8 hours  multivitamin 1 Tablet(s) Oral daily  nystatin    Suspension 220256 Unit(s) Oral three times a day  oseltamivir 30 milliGRAM(s) Oral at bedtime  propranolol 20 milliGRAM(s) Oral two times a day  rifaximin 200 milliGRAM(s) Oral three times a day  spironolactone 25 milliGRAM(s) Oral daily  vancomycin  IVPB 1000 milliGRAM(s) IV Intermittent every 24 hours    MEDICATIONS  (PRN):  acetaminophen  Suppository. 650 milliGRAM(s) Rectal every 6 hours PRN Mild Pain (1 - 3)  ALBUTerol    90 MICROgram(s) HFA Inhaler 2 Puff(s) Inhalation every 6 hours PRN Shortness of Breath and/or Wheezing  magnesium hydroxide Suspension 30 milliLiter(s) Oral daily PRN Constipation  morphine  - Injectable 1 milliGRAM(s) IV Push every 4 hours PRN moderate and severe pain (4-10)      Allergies    No Known Drug Allergies  SteriStrips (Blisters)    Intolerances        Vital Signs Last 24 Hrs  T(C): 37.2 (10 Apr 2018 13:30), Max: 37.8 (10 Apr 2018 12:44)  T(F): 98.9 (10 Apr 2018 13:30), Max: 100 (10 Apr 2018 12:44)  HR: 105 (10 Apr 2018 13:30) (96 - 116)  BP: 145/102 (10 Apr 2018 12:44) (134/90 - 146/98)  BP(mean): --  RR: 19 (10 Apr 2018 12:44) (19 - 20)  SpO2: 100% (10 Apr 2018 12:44) (96% - 100%)    PHYSICAL EXAM  General: confused  HEENT: clear oropharynx, anicteric sclera, pink conjunctiva  Neck: supple  CV: normal S1/S2 with no murmur rubs or gallops  Lungs: positive air movement b/l ant lungs,clear to auscultation, no wheezes, no rales  Abdomen: soft non-tender non-distended, no hepatosplenomegaly  Ext: no clubbing cyanosis or edema  Skin: no rashes and no petechiae  Neuro:confused and drawsy  LABS:                          10.9   13.12 )-----------( 310      ( 10 Apr 2018 05:50 )             31.0         Mean Cell Volume : 91.2 fL  Mean Cell Hemoglobin : 32.1 pg  Mean Cell Hemoglobin Concentration : 35.2 %  Auto Neutrophil # : x  Auto Lymphocyte # : x  Auto Monocyte # : x  Auto Eosinophil # : x  Auto Basophil # : x  Auto Neutrophil % : x  Auto Lymphocyte % : x  Auto Monocyte % : x  Auto Eosinophil % : x  Auto Basophil % : x    Serial CBC's  04-10 @ 05:50  Hct-31.0 / Hgb-10.9 / Plat-310 / RBC-3.40 / WBC-13.12          Serial CBC's  04-09 @ 05:40  Hct-30.5 / Hgb-10.8 / Plat-258 / RBC-3.29 / WBC-12.01          Serial CBC's  04-08 @ 06:35  Hct-30.4 / Hgb-10.5 / Plat-244 / RBC-3.28 / WBC-11.81          Serial CBC's  04-07 @ 06:30  Hct-28.8 / Hgb-9.9 / Plat-251 / RBC-3.15 / WBC-13.36            04-10    135  |  102  |  16  ----------------------------<  133<H>  3.2<L>   |  22  |  0.76    Ca    7.9<L>      10 Apr 2018 05:30  Phos  2.0     04-10  Mg     1.8     04-10    TPro  5.9<L>  /  Alb  1.8<L>  /  TBili  1.1  /  DBili  x   /  AST  183<H>  /  ALT  127<H>  /  AlkPhos  681<H>  04-10          Vitamin B12, Serum: 827 pg/mL (04-06 @ 05:28)  Folate, Serum: 15.3 ng/mL (04-06 @ 05:28)            BLOOD SMEAR INTERPRETATION:       RADIOLOGY & ADDITIONAL STUDIES:
HPI:  72 year old female, with past history significant for Asthma, Breast cancer, Depression, GERD, HTN, Insomnia, HLD, Pancreatic cancer, Whipple procedure, Ascites, Umbilical hernia with mesh, Lung surgery (VATS), Port-a-cath, presented to the ED secondary to lethargy.  Seen and evaluated at bedside; apparent intermittent distress due to painful abdominal discomfort.  Patient is somnolent at times (during periods of decreased pain), but able to respond to sound by turning head and attempt somewhat unintelligible monosyllabic responses.  Per son, patient has been in the rehab center due to deconditioning and need to gain strength prior to undergoing PET scan.  Son indicates that patient had been doing well in the rehab center (ambulating, able to verbalize needs and interact with others), until approximately 3 days ago when she began to appear weak.  This continued to progress to patient becoming unable to ambulate and speech became sparse, with pronounced weakness/lethargy to the extent of not being able to ring the call bell.  Eventually, patient stopped talking, only uttering one word to son prior to being transported to the Hospital.  Patient is noted by son, to be having difficulty passing gas, even though she is having bowel movements (had one in the ED).  Son notes significant borborygmi each time patient writhes around in bed; occurs approximately every 15 minutes.  Reported to be occurring over the past few days only.  Patient underwent drainage of ascitic fluid (~ 500 mL) on the day prior to admission.  Unknown if any fevers, chills, sweating.    At the time of being seen, son reports that patient was a bit more alert than on initial presentation.  Direct questioning re pain confirmed presence of abdominal pain (by patient).      Vital signs upon ED presentation as follows: BP = 110/87, HR = 77, RR = 26, T = 37 C (98.6 F), o2 Sat = 100% on RA.  Diagnosed with Altered Mental Status.  Prescribed Ciprofloxacin 400 mg IV, Vancomycin 1 gram IV, Cefepime 1 gram IV and NaCl 500 mL in the ED. (06 Apr 2018 01:17)     Pt is seen and examined  pt is awake and lying in bed/out of bed to chair  pt seems comfortable and denies any complaints at this time    ROS:  Negative except for: drawsy and confused, not eating much    MEDICATIONS  (STANDING):  atorvastatin 10 milliGRAM(s) Oral at bedtime  buDESOnide 160 MICROgram(s)/formoterol 4.5 MICROgram(s) Inhaler 2 Puff(s) Inhalation two times a day  cefepime  IVPB 1000 milliGRAM(s) IV Intermittent every 12 hours  cholecalciferol 2000 Unit(s) Oral daily  enoxaparin Injectable 40 milliGRAM(s) SubCutaneous every 24 hours  famotidine    Tablet 40 milliGRAM(s) Oral daily  lactulose Retention Enema 200 Gram(s) Rectal once  multivitamin 1 Tablet(s) Oral daily  nystatin    Suspension 126500 Unit(s) Oral three times a day  oseltamivir 30 milliGRAM(s) Oral at bedtime  propranolol 20 milliGRAM(s) Oral two times a day  rifaximin 200 milliGRAM(s) Oral three times a day  spironolactone 25 milliGRAM(s) Oral daily  vancomycin  IVPB 1000 milliGRAM(s) IV Intermittent every 24 hours    MEDICATIONS  (PRN):  acetaminophen  Suppository. 650 milliGRAM(s) Rectal every 6 hours PRN Mild Pain (1 - 3)  ALBUTerol    90 MICROgram(s) HFA Inhaler 2 Puff(s) Inhalation every 6 hours PRN Shortness of Breath and/or Wheezing  bisacodyl 5 milliGRAM(s) Oral every 12 hours PRN Constipation  haloperidol    Injectable 0.5 milliGRAM(s) IV Push every 6 hours PRN Hyperactive delerium  HYDROmorphone  Injectable 0.3 milliGRAM(s) IV Push every 3 hours PRN Moderate Pain (4 - 6) and/or Severe pain (7-10)  magnesium hydroxide Suspension 30 milliLiter(s) Oral daily PRN Constipation      Allergies    No Known Drug Allergies  SteriStrips (Blisters)    Intolerances        Vital Signs Last 24 Hrs  T(C): 36.4 (11 Apr 2018 12:58), Max: 37.2 (10 Apr 2018 21:58)  T(F): 97.5 (11 Apr 2018 12:58), Max: 98.9 (10 Apr 2018 21:58)  HR: 76 (11 Apr 2018 12:58) (76 - 84)  BP: 120/79 (11 Apr 2018 12:58) (120/79 - 132/89)  BP(mean): --  RR: 22 (11 Apr 2018 12:58) (18 - 22)  SpO2: 100% (11 Apr 2018 12:58) (100% - 100%)    PHYSICAL EXAM  General: adult in NAD  HEENT: clear oropharynx, anicteric sclera, pink conjunctiva  Neck: supple  CV: normal S1/S2 with no murmur rubs or gallops  Lungs: positive air movement b/l ant lungs,clear to auscultation, no wheezes, no rales  Abdomen: soft non-tender non-distended, no hepatosplenomegaly  Ext: no clubbing cyanosis or edema  Skin: no rashes and no petechiae  Neuro: alert and oriented X 4, no focal deficits  LABS:                          11.7   17.55 )-----------( 334      ( 11 Apr 2018 06:00 )             32.5         Mean Cell Volume : 90.0 fL  Mean Cell Hemoglobin : 32.4 pg  Mean Cell Hemoglobin Concentration : 36.0 %  Auto Neutrophil # : x  Auto Lymphocyte # : x  Auto Monocyte # : x  Auto Eosinophil # : x  Auto Basophil # : x  Auto Neutrophil % : x  Auto Lymphocyte % : x  Auto Monocyte % : x  Auto Eosinophil % : x  Auto Basophil % : x    Serial CBC's  04-11 @ 06:00  Hct-32.5 / Hgb-11.7 / Plat-334 / RBC-3.61 / WBC-17.55          Serial CBC's  04-10 @ 05:50  Hct-31.0 / Hgb-10.9 / Plat-310 / RBC-3.40 / WBC-13.12          Serial CBC's  04-09 @ 05:40  Hct-30.5 / Hgb-10.8 / Plat-258 / RBC-3.29 / WBC-12.01          Serial CBC's  04-08 @ 06:35  Hct-30.4 / Hgb-10.5 / Plat-244 / RBC-3.28 / WBC-11.81            04-11    136  |  104  |  18  ----------------------------<  156<H>  3.7   |  21<L>  |  0.79    Ca    7.6<L>      11 Apr 2018 11:00  Phos  2.2     04-11  Mg     1.9     04-11    TPro  5.4<L>  /  Alb  1.5<L>  /  TBili  1.6<H>  /  DBili  x   /  AST  131<H>  /  ALT  117<H>  /  AlkPhos  653<H>  04-11          Vitamin B12, Serum: 827 pg/mL (04-06 @ 05:28)  Folate, Serum: 15.3 ng/mL (04-06 @ 05:28)            BLOOD SMEAR INTERPRETATION:       RADIOLOGY & ADDITIONAL STUDIES:
HPI:  72 year old female, with past history significant for Asthma, Breast cancer, Depression, GERD, HTN, Insomnia, HLD, Pancreatic cancer, Whipple procedure, Ascites, Umbilical hernia with mesh, Lung surgery (VATS), Port-a-cath, presented to the ED secondary to lethargy.  Seen and evaluated at bedside; apparent intermittent distress due to painful abdominal discomfort.  Patient is somnolent at times (during periods of decreased pain), but able to respond to sound by turning head and attempt somewhat unintelligible monosyllabic responses.  Per son, patient has been in the rehab center due to deconditioning and need to gain strength prior to undergoing PET scan.  Son indicates that patient had been doing well in the rehab center (ambulating, able to verbalize needs and interact with others), until approximately 3 days ago when she began to appear weak.  This continued to progress to patient becoming unable to ambulate and speech became sparse, with pronounced weakness/lethargy to the extent of not being able to ring the call bell.  Eventually, patient stopped talking, only uttering one word to son prior to being transported to the Hospital.  Patient is noted by son, to be having difficulty passing gas, even though she is having bowel movements (had one in the ED).  Son notes significant borborygmi each time patient writhes around in bed; occurs approximately every 15 minutes.  Reported to be occurring over the past few days only.  Patient underwent drainage of ascitic fluid (~ 500 mL) on the day prior to admission.  Unknown if any fevers, chills, sweating.    At the time of being seen, son reports that patient was a bit more alert than on initial presentation.  Direct questioning re pain confirmed presence of abdominal pain (by patient).      Vital signs upon ED presentation as follows: BP = 110/87, HR = 77, RR = 26, T = 37 C (98.6 F), o2 Sat = 100% on RA.  Diagnosed with Altered Mental Status.  Prescribed Ciprofloxacin 400 mg IV, Vancomycin 1 gram IV, Cefepime 1 gram IV and NaCl 500 mL in the ED. (06 Apr 2018 01:17)     Pt is seen and examined  pt is awake and lying in bed/out of bed to chair  pt seems comfortable and denies any complaints at this time    ROS:  Patient is more alert today with son at bedside.    MEDICATIONS  (STANDING):  atorvastatin 10 milliGRAM(s) Oral at bedtime  buDESOnide 160 MICROgram(s)/formoterol 4.5 MICROgram(s) Inhaler 2 Puff(s) Inhalation two times a day  cefepime  IVPB 1000 milliGRAM(s) IV Intermittent every 12 hours  cholecalciferol 2000 Unit(s) Oral daily  ciprofloxacin   IVPB 400 milliGRAM(s) IV Intermittent every 12 hours  dextrose 5% + sodium chloride 0.45%. 1000 milliLiter(s) (50 mL/Hr) IV Continuous <Continuous>  enoxaparin Injectable 40 milliGRAM(s) SubCutaneous every 24 hours  famotidine    Tablet 40 milliGRAM(s) Oral daily  lactulose Syrup 10 Gram(s) Oral every 8 hours  multivitamin 1 Tablet(s) Oral daily  nystatin    Suspension 954797 Unit(s) Oral three times a day  oseltamivir 30 milliGRAM(s) Oral at bedtime  rifaximin 200 milliGRAM(s) Oral three times a day  sodium chloride 0.9%. 1000 milliLiter(s) (50 mL/Hr) IV Continuous <Continuous>  spironolactone 25 milliGRAM(s) Oral daily  vancomycin  IVPB 1000 milliGRAM(s) IV Intermittent every 24 hours    MEDICATIONS  (PRN):  acetaminophen  Suppository. 650 milliGRAM(s) Rectal every 6 hours PRN Mild Pain (1 - 3)  ALBUTerol    90 MICROgram(s) HFA Inhaler 2 Puff(s) Inhalation every 6 hours PRN Shortness of Breath and/or Wheezing  magnesium hydroxide Suspension 30 milliLiter(s) Oral daily PRN Constipation  morphine  - Injectable 1 milliGRAM(s) IV Push every 4 hours PRN moderate and severe pain (4-10)      Allergies    No Known Drug Allergies  SteriStrips (Blisters)    Intolerances        Vital Signs Last 24 Hrs  T(C): 36.4 (09 Apr 2018 14:41), Max: 37 (08 Apr 2018 20:51)  T(F): 97.6 (09 Apr 2018 14:41), Max: 98.6 (08 Apr 2018 20:51)  HR: 101 (09 Apr 2018 14:41) (80 - 101)  BP: 154/100 (09 Apr 2018 14:41) (133/73 - 154/100)  BP(mean): --  RR: 18 (09 Apr 2018 14:41) (17 - 20)  SpO2: 99% (09 Apr 2018 14:41) (99% - 100%)    PHYSICAL EXAM  General: adult in NAD  HEENT: clear oropharynx, anicteric sclera, pink conjunctiva  Neck: supple  CV: normal S1/S2 with no murmur rubs or gallops  Lungs: positive air movement b/l ant lungs,clear to auscultation, no wheezes, no rales  Abdomen: soft non-tender ,right pleurex in place.  Ext: no clubbing cyanosis or edema  Skin: no rashes and no petechiae  Neuro: alert and oriented X 4, no focal deficits  LABS:                          10.8   12.01 )-----------( 258      ( 09 Apr 2018 05:40 )             30.5         Mean Cell Volume : 92.7 fL  Mean Cell Hemoglobin : 32.8 pg  Mean Cell Hemoglobin Concentration : 35.4 %  Auto Neutrophil # : x  Auto Lymphocyte # : x  Auto Monocyte # : x  Auto Eosinophil # : x  Auto Basophil # : x  Auto Neutrophil % : x  Auto Lymphocyte % : x  Auto Monocyte % : x  Auto Eosinophil % : x  Auto Basophil % : x    Serial CBC's  04-09 @ 05:40  Hct-30.5 / Hgb-10.8 / Plat-258 / RBC-3.29 / WBC-12.01          Serial CBC's  04-08 @ 06:35  Hct-30.4 / Hgb-10.5 / Plat-244 / RBC-3.28 / WBC-11.81          Serial CBC's  04-07 @ 06:30  Hct-28.8 / Hgb-9.9 / Plat-251 / RBC-3.15 / WBC-13.36          Serial CBC's  04-06 @ 05:28  Hct-31.5 / Hgb-10.9 / Plat-261 / RBC-3.38 / WBC-15.25          Serial CBC's  04-05 @ 19:30  Hct-33.2 / Hgb-11.4 / Plat-305 / RBC-3.57 / WBC-17.65            04-09    136  |  103  |  18  ----------------------------<  161<H>  3.8   |  21<L>  |  0.72    Ca    7.8<L>      09 Apr 2018 05:40  Phos  2.0     04-09  Mg     2.0     04-09            Vitamin B12, Serum: 827 pg/mL (04-06 @ 05:28)  Folate, Serum: 15.3 ng/mL (04-06 @ 05:28)            BLOOD SMEAR INTERPRETATION:       RADIOLOGY & ADDITIONAL STUDIES:
Patient is a 72y old  Female who presents with a chief complaint of Altered mental status, lethargy. (06 Apr 2018 01:17)    pt. is more awake and alert, denies any abd pain, answering simple question appropriately    INTERVAL HPI/OVERNIGHT EVENTS:  T(C): 36.4 (04-09-18 @ 14:41), Max: 37 (04-08-18 @ 20:51)  HR: 101 (04-09-18 @ 14:41) (80 - 101)  BP: 154/100 (04-09-18 @ 14:41) (133/73 - 154/100)  RR: 18 (04-09-18 @ 14:41) (17 - 20)  SpO2: 99% (04-09-18 @ 14:41) (99% - 100%)  Wt(kg): --  I&O's Summary    08 Apr 2018 07:01  -  09 Apr 2018 07:00  --------------------------------------------------------  IN: 1200 mL / OUT: 1000 mL / NET: 200 mL    09 Apr 2018 07:01  -  09 Apr 2018 20:39  --------------------------------------------------------  IN: 500 mL / OUT: 150 mL / NET: 350 mL        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  Insomnia  Hiatal hernia with GERD: no changes  Colitis: due to chemo 1/2017  Malignant neoplasm of pancreas, unspecified location of malignancy: started chemo 10/2016, has mediport - left chest, now s/p whipple 11/2017  Asthma: denies any recent hospitalizations/intubations  Depression  Hyperlipidemia  HTN (hypertension)  Carcinoma of pancreas: s/p whipple  History of lung surgery: right VATS, wedge resection (August 2017) benign neoplasm  History of lumpectomy: right (1996)  Port-a-cath in place  Status post right breast lumpectomy: malignant treated with radiation  H/O abdominal hysterectomy  H/O umbilical hernia repair: with mesh      SOCIAL HISTORY  Alcohol:  Tobacco:  Illicit substance use:    FAMILY HISTORY:    REVIEW OF SYSTEMS:  CONSTITUTIONAL: No fever, weight loss, or fatigue  EYES: No eye pain, visual disturbances, or discharge  ENMT:  No difficulty hearing, tinnitus, vertigo; No sinus or throat pain  NECK: No pain or stiffness  RESPIRATORY: No cough, wheezing, chills or hemoptysis; No shortness of breath  CARDIOVASCULAR: No chest pain, palpitations, dizziness, or leg swelling  GASTROINTESTINAL: No abdominal or epigastric pain. No nausea, vomiting, or hematemesis; No diarrhea or constipation. No melena or hematochezia.  GENITOURINARY: No dysuria, frequency, hematuria, or incontinence  NEUROLOGICAL: No headaches, memory loss, loss of strength, numbness, or tremors  SKIN: No itching, burning, rashes, or lesions   LYMPH NODES: No enlarged glands  ENDOCRINE: No heat or cold intolerance; No hair loss  MUSCULOSKELETAL: No joint pain or swelling; No muscle, back, or extremity pain  PSYCHIATRIC: No depression, anxiety, mood swings, or difficulty sleeping  HEME/LYMPH: No easy bruising, or bleeding gums  ALLERY AND IMMUNOLOGIC: No hives or eczema    RADIOLOGY & ADDITIONAL TESTS:    Imaging Personally Reviewed:  [ ] YES  [ ] NO    Consultant(s) Notes Reviewed:  [ ] YES  [ ] NO    PHYSICAL EXAM:  GENERAL: NAD, well-groomed, well-developed  HEAD:  Atraumatic, Normocephalic  EYES: EOMI, PERRLA, conjunctiva and sclera clear  ENMT: No tonsillar erythema, exudates, or enlargement; Moist mucous membranes, Good dentition, No lesions  NECK: Supple, No JVD, Normal thyroid  NERVOUS SYSTEM:  Alert & Oriented X3, Good concentration; Motor Strength 5/5 B/L upper and lower extremities; DTRs 2+ intact and symmetric  CHEST/LUNG: Clear to percussion bilaterally; No rales, rhonchi, wheezing, or rubs  HEART: Regular rate and rhythm; No murmurs, rubs, or gallops  ABDOMEN: Soft, Nontender, Nondistended; Bowel sounds present  EXTREMITIES:  2+ Peripheral Pulses, No clubbing, cyanosis, or edema  LYMPH: No lymphadenopathy noted  SKIN: No rashes or lesions    LABS:                        10.8   12.01 )-----------( 258      ( 09 Apr 2018 05:40 )             30.5     04-09    136  |  103  |  18  ----------------------------<  161<H>  3.8   |  21<L>  |  0.72    Ca    7.8<L>      09 Apr 2018 05:40  Phos  2.0     04-09  Mg     2.0     04-09          CAPILLARY BLOOD GLUCOSE                MEDICATIONS  (STANDING):  atorvastatin 10 milliGRAM(s) Oral at bedtime  buDESOnide 160 MICROgram(s)/formoterol 4.5 MICROgram(s) Inhaler 2 Puff(s) Inhalation two times a day  cefepime  IVPB 1000 milliGRAM(s) IV Intermittent every 12 hours  cholecalciferol 2000 Unit(s) Oral daily  enoxaparin Injectable 40 milliGRAM(s) SubCutaneous every 24 hours  famotidine    Tablet 40 milliGRAM(s) Oral daily  lactulose Syrup 10 Gram(s) Oral every 8 hours  multivitamin 1 Tablet(s) Oral daily  nystatin    Suspension 444001 Unit(s) Oral three times a day  oseltamivir 30 milliGRAM(s) Oral at bedtime  rifaximin 200 milliGRAM(s) Oral three times a day  spironolactone 25 milliGRAM(s) Oral daily  vancomycin  IVPB 1000 milliGRAM(s) IV Intermittent every 24 hours    MEDICATIONS  (PRN):  acetaminophen  Suppository. 650 milliGRAM(s) Rectal every 6 hours PRN Mild Pain (1 - 3)  ALBUTerol    90 MICROgram(s) HFA Inhaler 2 Puff(s) Inhalation every 6 hours PRN Shortness of Breath and/or Wheezing  magnesium hydroxide Suspension 30 milliLiter(s) Oral daily PRN Constipation  morphine  - Injectable 1 milliGRAM(s) IV Push every 4 hours PRN moderate and severe pain (4-10)      Care Discussed with Consultants/Other Providers [ ] YES  [ ] NO
Patient is a 72y old  Female who presents with a chief complaint of Altered mental status, lethargy. (06 Apr 2018 01:17)    pt. seen and examined, condition unchanged  INTERVAL HPI/OVERNIGHT EVENTS:  T(C): 36.2 (04-12-18 @ 13:15), Max: 36.6 (04-12-18 @ 05:00)  HR: 76 (04-12-18 @ 16:45) (72 - 76)  BP: 130/89 (04-12-18 @ 16:45) (124/75 - 131/84)  RR: 17 (04-12-18 @ 16:45) (16 - 17)  SpO2: 99% (04-12-18 @ 13:15) (99% - 100%)  Wt(kg): --  I&O's Summary    11 Apr 2018 07:01  -  12 Apr 2018 07:00  --------------------------------------------------------  IN: 0 mL / OUT: 650 mL / NET: -650 mL    12 Apr 2018 07:01  -  12 Apr 2018 21:37  --------------------------------------------------------  IN: 450 mL / OUT: 800 mL / NET: -350 mL        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  Insomnia  Hiatal hernia with GERD: no changes  Colitis: due to chemo 1/2017  Malignant neoplasm of pancreas, unspecified location of malignancy: started chemo 10/2016, has mediport - left chest, now s/p whipple 11/2017  Asthma: denies any recent hospitalizations/intubations  Depression  Hyperlipidemia  HTN (hypertension)  Carcinoma of pancreas: s/p whipple  History of lung surgery: right VATS, wedge resection (August 2017) benign neoplasm  History of lumpectomy: right (1996)  Port-a-cath in place  Status post right breast lumpectomy: malignant treated with radiation  H/O abdominal hysterectomy  H/O umbilical hernia repair: with mesh      SOCIAL HISTORY  Alcohol:  Tobacco:  Illicit substance use:    FAMILY HISTORY:    REVIEW OF SYSTEMS:  CONSTITUTIONAL: No fever, weight loss, or fatigue  EYES: No eye pain, visual disturbances, or discharge  ENMT:  No difficulty hearing, tinnitus, vertigo; No sinus or throat pain  NECK: No pain or stiffness  RESPIRATORY: No cough, wheezing, chills or hemoptysis; No shortness of breath  CARDIOVASCULAR: No chest pain, palpitations, dizziness, or leg swelling  GASTROINTESTINAL: No abdominal or epigastric pain. No nausea, vomiting, or hematemesis; No diarrhea or constipation. No melena or hematochezia.  GENITOURINARY: No dysuria, frequency, hematuria, or incontinence  NEUROLOGICAL: No headaches, memory loss, loss of strength, numbness, or tremors  SKIN: No itching, burning, rashes, or lesions   LYMPH NODES: No enlarged glands  ENDOCRINE: No heat or cold intolerance; No hair loss  MUSCULOSKELETAL: No joint pain or swelling; No muscle, back, or extremity pain  PSYCHIATRIC: No depression, anxiety, mood swings, or difficulty sleeping  HEME/LYMPH: No easy bruising, or bleeding gums  ALLERY AND IMMUNOLOGIC: No hives or eczema    RADIOLOGY & ADDITIONAL TESTS:    Imaging Personally Reviewed:  [ ] YES  [ ] NO    Consultant(s) Notes Reviewed:  [ ] YES  [ ] NO    PHYSICAL EXAM:  GENERAL: NAD, well-groomed, well-developed  HEAD:  Atraumatic, Normocephalic  EYES: EOMI, PERRLA, conjunctiva and sclera clear  ENMT: No tonsillar erythema, exudates, or enlargement; Moist mucous membranes, Good dentition, No lesions  NECK: Supple, No JVD, Normal thyroid  NERVOUS SYSTEM:  Alert & Oriented X3, Good concentration; Motor Strength 5/5 B/L upper and lower extremities; DTRs 2+ intact and symmetric  CHEST/LUNG: Clear to percussion bilaterally; No rales, rhonchi, wheezing, or rubs  HEART: Regular rate and rhythm; No murmurs, rubs, or gallops  ABDOMEN: Soft, Nontender, Nondistended; Bowel sounds present  EXTREMITIES:  2+ Peripheral Pulses, No clubbing, cyanosis, or edema  LYMPH: No lymphadenopathy noted  SKIN: No rashes or lesions    LABS:                        11.0   17.11 )-----------( 317      ( 12 Apr 2018 05:00 )             31.8     04-12    136  |  103  |  21  ----------------------------<  160<H>  3.5   |  22  |  0.94    Ca    7.7<L>      12 Apr 2018 05:00  Phos  3.7     04-12  Mg     2.0     04-12    TPro  5.0<L>  /  Alb  1.6<L>  /  TBili  1.4<H>  /  DBili  x   /  AST  111<H>  /  ALT  101<H>  /  AlkPhos  599<H>  04-12        CAPILLARY BLOOD GLUCOSE      POCT Blood Glucose.: 155 mg/dL (11 Apr 2018 21:42)            MEDICATIONS  (STANDING):  buDESOnide 160 MICROgram(s)/formoterol 4.5 MICROgram(s) Inhaler 2 Puff(s) Inhalation two times a day  cefepime  IVPB 1000 milliGRAM(s) IV Intermittent every 12 hours  enoxaparin Injectable 40 milliGRAM(s) SubCutaneous every 24 hours  lactulose Retention Enema 200 Gram(s) Rectal daily  nystatin    Suspension 865885 Unit(s) Oral three times a day  oseltamivir 30 milliGRAM(s) Oral at bedtime  propranolol 20 milliGRAM(s) Oral two times a day  rifaximin 200 milliGRAM(s) Oral three times a day  spironolactone 25 milliGRAM(s) Oral daily    MEDICATIONS  (PRN):  acetaminophen  Suppository. 650 milliGRAM(s) Rectal every 6 hours PRN Mild Pain (1 - 3)  ALBUTerol    90 MICROgram(s) HFA Inhaler 2 Puff(s) Inhalation every 6 hours PRN Shortness of Breath and/or Wheezing  bisacodyl 5 milliGRAM(s) Oral every 12 hours PRN Constipation  haloperidol    Injectable 0.5 milliGRAM(s) IV Push every 6 hours PRN Hyperactive delerium  HYDROmorphone  Injectable 0.3 milliGRAM(s) IV Push every 3 hours PRN Moderate Pain (4 - 6) and/or Severe pain (7-10)  magnesium hydroxide Suspension 30 milliLiter(s) Oral daily PRN Constipation      Care Discussed with Consultants/Other Providers [ ] YES  [ ] NO
Patient is a 72y old  Female who presents with a chief complaint of Altered mental status, lethargy. (2018 01:17)    pt. seen and examined, c/o abd pain, little more awake then yesterday   rt. arm swelling and erythema noted     INTERVAL HPI/OVERNIGHT EVENTS:  T(C): 36.4 (18 @ 04:42), Max: 36.7 (18 @ 14:09)  HR: 92 (18 @ 04:56) (86 - 122)  BP: 129/81 (18 @ 04:56) (107/68 - 151/94)  RR: 18 (18 @ 04:42) (17 - 18)  SpO2: 100% (18 @ 04:42) (99% - 100%)  Wt(kg): --  I&O's Summary    2018 07:01  -  2018 07:00  --------------------------------------------------------  IN: 0 mL / OUT: 700 mL / NET: -700 mL        PAST MEDICAL & SURGICAL HISTORY:  Pancreatic cancer  Breast cancer: Dx  s/p RT and lumpectomy  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  Insomnia  Hiatal hernia with GERD: no changes  Colitis: due to chemo 2017  Malignant neoplasm of pancreas, unspecified location of malignancy: started chemo 10/2016, has mediport - left chest, now s/p whipple 2017  Asthma: denies any recent hospitalizations/intubations  Depression  Hyperlipidemia  HTN (hypertension)  Carcinoma of pancreas: s/p whipple  History of lung surgery: right VATS, wedge resection (2017) benign neoplasm  History of lumpectomy: right ()  Port-a-cath in place  Status post right breast lumpectomy: malignant treated with radiation  H/O abdominal hysterectomy  H/O umbilical hernia repair: with mesh      SOCIAL HISTORY  Alcohol:  Tobacco:  Illicit substance use:    FAMILY HISTORY:    REVIEW OF SYSTEMS:  CONSTITUTIONAL: No fever, weight loss, or fatigue  EYES: No eye pain, visual disturbances, or discharge  ENMT:  No difficulty hearing, tinnitus, vertigo; No sinus or throat pain  NECK: No pain or stiffness  RESPIRATORY: No cough, wheezing, chills or hemoptysis; No shortness of breath  CARDIOVASCULAR: No chest pain, palpitations, dizziness, or leg swelling  GASTROINTESTINAL: No abdominal or epigastric pain. No nausea, vomiting, or hematemesis; No diarrhea or constipation. No melena or hematochezia.  GENITOURINARY: No dysuria, frequency, hematuria, or incontinence  NEUROLOGICAL: No headaches, memory loss, loss of strength, numbness, or tremors  SKIN: No itching, burning, rashes, or lesions   LYMPH NODES: No enlarged glands  ENDOCRINE: No heat or cold intolerance; No hair loss  MUSCULOSKELETAL: No joint pain or swelling; No muscle, back, or extremity pain  PSYCHIATRIC: No depression, anxiety, mood swings, or difficulty sleeping  HEME/LYMPH: No easy bruising, or bleeding gums  ALLERY AND IMMUNOLOGIC: No hives or eczema    RADIOLOGY & ADDITIONAL TESTS:    Imaging Personally Reviewed:  [ ] YES  [ ] NO    Consultant(s) Notes Reviewed:  [ ] YES  [ ] NO    PHYSICAL EXAM:  GENERAL: NAD, well-groomed, well-developed  HEAD:  Atraumatic, Normocephalic  EYES: EOMI, PERRLA, conjunctiva and sclera clear  ENMT: No tonsillar erythema, exudates, or enlargement; Moist mucous membranes, Good dentition, No lesions  NECK: Supple, No JVD, Normal thyroid  NERVOUS SYSTEM: awake, confused, lethargic  CHEST/LUNG: Clear to percussion bilaterally; No rales, rhonchi, wheezing, or rubs  HEART: Regular rate and rhythm; No murmurs, rubs, or gallops  ABDOMEN: Soft, distension, pleurex cath + , mild diffuse tenderness  EXTREMITIES:  2+ Peripheral Pulses, No clubbing, cyanosis, or edema  LYMPH: No lymphadenopathy noted  SKIN: No rashes or lesions    LABS:                        9.9    13.36 )-----------( 251      ( 2018 06:30 )             28.8     04-07    139  |  105  |  19  ----------------------------<  155<H>  3.4<L>   |  22  |  0.79    Ca    7.7<L>      2018 06:30  Phos  2.9     04-07  Mg     2.0     04-07    TPro  6.3  /  Alb  2.0<L>  /  TBili  1.3<H>  /  DBili  x   /  AST  73<H>  /  ALT  47<H>  /  AlkPhos  339<H>  -      Urinalysis Basic - ( 2018 19:50 )    Color: YELLOW / Appearance: HAZY / S.023 / pH: 6.5  Gluc: NEGATIVE / Ketone: NEGATIVE  / Bili: NEGATIVE / Urobili: NORMAL mg/dL   Blood: NEGATIVE / Protein: 20 mg/dL / Nitrite: NEGATIVE   Leuk Esterase: NEGATIVE / RBC: 0-2 / WBC 2-5   Sq Epi: OCC / Non Sq Epi: x / Bacteria: FEW      CAPILLARY BLOOD GLUCOSE            Urinalysis Basic - ( 2018 19:50 )    Color: YELLOW / Appearance: HAZY / S.023 / pH: 6.5  Gluc: NEGATIVE / Ketone: NEGATIVE  / Bili: NEGATIVE / Urobili: NORMAL mg/dL   Blood: NEGATIVE / Protein: 20 mg/dL / Nitrite: NEGATIVE   Leuk Esterase: NEGATIVE / RBC: 0-2 / WBC 2-5   Sq Epi: OCC / Non Sq Epi: x / Bacteria: FEW        MEDICATIONS  (STANDING):  atorvastatin 10 milliGRAM(s) Oral at bedtime  buDESOnide 160 MICROgram(s)/formoterol 4.5 MICROgram(s) Inhaler 2 Puff(s) Inhalation two times a day  cefepime  IVPB 1000 milliGRAM(s) IV Intermittent every 12 hours  cholecalciferol 2000 Unit(s) Oral daily  ciprofloxacin   IVPB 400 milliGRAM(s) IV Intermittent every 12 hours  enoxaparin Injectable 40 milliGRAM(s) SubCutaneous every 24 hours  famotidine    Tablet 40 milliGRAM(s) Oral daily  multivitamin 1 Tablet(s) Oral daily  nystatin    Suspension 017527 Unit(s) Oral three times a day  oseltamivir 30 milliGRAM(s) Oral at bedtime  sodium chloride 0.9%. 1000 milliLiter(s) (50 mL/Hr) IV Continuous <Continuous>  spironolactone 25 milliGRAM(s) Oral daily  vancomycin  IVPB 1000 milliGRAM(s) IV Intermittent every 24 hours    MEDICATIONS  (PRN):  acetaminophen   Tablet. 650 milliGRAM(s) Oral every 6 hours PRN Mild to moderate pain  ALBUTerol    90 MICROgram(s) HFA Inhaler 2 Puff(s) Inhalation every 6 hours PRN Shortness of Breath and/or Wheezing  magnesium hydroxide Suspension 30 milliLiter(s) Oral daily PRN Constipation  morphine  - Injectable 1 milliGRAM(s) IV Push every 6 hours PRN Moderate Pain (4 - 6)      Care Discussed with Consultants/Other Providers [ ] YES  [ ] NO
Patient is a 72y old  Female who presents with a chief complaint of Altered mental status, lethargy. (2018 01:17)    pt. seen and examined, moaning and confused   both son at bedside, d/w them in length regarding poor prognosis and use of medication for pain : which can make her more confused and lethargic, may need intubation, as pt. remain full code for now     INTERVAL HPI/OVERNIGHT EVENTS:  T(C): 36.7 (18 @ 14:09), Max: 37.7 (18 @ 19:56)  HR: 96 (18 @ 17:46) (77 - 122)  BP: 151/94 (18 @ 14:09) (110/87 - 151/94)  RR: 17 (18 @ 14:09) (17 - 26)  SpO2: 99% (18 @ 14:09) (99% - 100%)  Wt(kg): --  I&O's Summary      PAST MEDICAL & SURGICAL HISTORY:  Pancreatic cancer  Breast cancer: Dx  s/p RT and lumpectomy  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  Insomnia  Hiatal hernia with GERD: no changes  Colitis: due to chemo 2017  Malignant neoplasm of pancreas, unspecified location of malignancy: started chemo 10/2016, has mediport - left chest, now s/p whipple 2017  Asthma: denies any recent hospitalizations/intubations  Depression  Hyperlipidemia  HTN (hypertension)  Carcinoma of pancreas: s/p whipple  History of lung surgery: right VATS, wedge resection (2017) benign neoplasm  History of lumpectomy: right ()  Port-a-cath in place  Status post right breast lumpectomy: malignant treated with radiation  H/O abdominal hysterectomy  H/O umbilical hernia repair: with mesh      SOCIAL HISTORY  Alcohol:  Tobacco:  Illicit substance use:    FAMILY HISTORY:    REVIEW OF SYSTEMS:  CONSTITUTIONAL: No fever, weight loss, or fatigue  EYES: No eye pain, visual disturbances, or discharge  ENMT:  No difficulty hearing, tinnitus, vertigo; No sinus or throat pain  NECK: No pain or stiffness  RESPIRATORY: No cough, wheezing, chills or hemoptysis; No shortness of breath  CARDIOVASCULAR: No chest pain, palpitations, dizziness, or leg swelling  GASTROINTESTINAL: distension, pleurex +   GENITOURINARY: No dysuria, frequency, hematuria, or incontinence  NEUROLOGICAL: confused , moaning  SKIN: No itching, burning, rashes, or lesions   LYMPH NODES: No enlarged glands  ENDOCRINE: No heat or cold intolerance; No hair loss  MUSCULOSKELETAL: No joint pain or swelling; No muscle, back, or extremity pain  PSYCHIATRIC: No depression, anxiety, mood swings, or difficulty sleeping  HEME/LYMPH: No easy bruising, or bleeding gums  ALLERY AND IMMUNOLOGIC: No hives or eczema    RADIOLOGY & ADDITIONAL TESTS:    Imaging Personally Reviewed:  [ ] YES  [ ] NO    Consultant(s) Notes Reviewed:  [ ] YES  [ ] NO    PHYSICAL EXAM:  GENERAL: NAD, well-groomed, well-developed  HEAD:  Atraumatic, Normocephalic  EYES: EOMI, PERRLA, conjunctiva and sclera clear  ENMT: No tonsillar erythema, exudates, or enlargement; Moist mucous membranes, Good dentition, No lesions  NECK: Supple, No JVD, Normal thyroid  NERVOUS SYSTEM:  confused, moaning   CHEST/LUNG: Clear to percussion bilaterally; No rales, rhonchi, wheezing, or rubs  HEART: Regular rate and rhythm; No murmurs, rubs, or gallops  ABDOMEN: distension, pleurex +   EXTREMITIES:  2+ Peripheral Pulses, No clubbing, cyanosis, or edema  LYMPH: No lymphadenopathy noted  SKIN: No rashes or lesions    LABS:                        10.9   15.25 )-----------( 261      ( 2018 05:28 )             31.5     04-06    137  |  102  |  20  ----------------------------<  141<H>  4.1   |  24  |  0.76    Ca    7.7<L>      2018 05:28  Phos  2.2     04-06  Mg     2.0     04-06    TPro  6.3  /  Alb  2.0<L>  /  TBili  1.3<H>  /  DBili  x   /  AST  73<H>  /  ALT  47<H>  /  AlkPhos  339<H>  04-      Urinalysis Basic - ( 2018 19:50 )    Color: YELLOW / Appearance: HAZY / S.023 / pH: 6.5  Gluc: NEGATIVE / Ketone: NEGATIVE  / Bili: NEGATIVE / Urobili: NORMAL mg/dL   Blood: NEGATIVE / Protein: 20 mg/dL / Nitrite: NEGATIVE   Leuk Esterase: NEGATIVE / RBC: 0-2 / WBC 2-5   Sq Epi: OCC / Non Sq Epi: x / Bacteria: FEW      CAPILLARY BLOOD GLUCOSE      POCT Blood Glucose.: 141 mg/dL (2018 19:06)        Urinalysis Basic - ( 2018 19:50 )    Color: YELLOW / Appearance: HAZY / S.023 / pH: 6.5  Gluc: NEGATIVE / Ketone: NEGATIVE  / Bili: NEGATIVE / Urobili: NORMAL mg/dL   Blood: NEGATIVE / Protein: 20 mg/dL / Nitrite: NEGATIVE   Leuk Esterase: NEGATIVE / RBC: 0-2 / WBC 2-5   Sq Epi: OCC / Non Sq Epi: x / Bacteria: FEW        MEDICATIONS  (STANDING):  atorvastatin 10 milliGRAM(s) Oral at bedtime  buDESOnide 160 MICROgram(s)/formoterol 4.5 MICROgram(s) Inhaler 2 Puff(s) Inhalation two times a day  cefepime  IVPB 1000 milliGRAM(s) IV Intermittent every 12 hours  cholecalciferol 2000 Unit(s) Oral daily  ciprofloxacin   IVPB 400 milliGRAM(s) IV Intermittent every 12 hours  enoxaparin Injectable 40 milliGRAM(s) SubCutaneous every 24 hours  famotidine    Tablet 40 milliGRAM(s) Oral daily  lactulose Retention Enema 200 Gram(s) Rectal once  multivitamin 1 Tablet(s) Oral daily  nystatin    Suspension 064265 Unit(s) Oral three times a day  oseltamivir 30 milliGRAM(s) Oral at bedtime  sodium chloride 0.9%. 1000 milliLiter(s) (50 mL/Hr) IV Continuous <Continuous>  spironolactone 25 milliGRAM(s) Oral daily  vancomycin  IVPB 1000 milliGRAM(s) IV Intermittent every 24 hours    MEDICATIONS  (PRN):  acetaminophen   Tablet. 650 milliGRAM(s) Oral every 6 hours PRN Mild to moderate pain  ALBUTerol    90 MICROgram(s) HFA Inhaler 2 Puff(s) Inhalation every 6 hours PRN Shortness of Breath and/or Wheezing  magnesium hydroxide Suspension 30 milliLiter(s) Oral daily PRN Constipation      Care Discussed with Consultants/Other Providers [ ] YES  [ ] NO
Patient is a 72y old  Female who presents with a chief complaint of Altered mental status, lethargy. (2018 01:17)    pt. seen and examined, more awake, but still lethargic     INTERVAL HPI/OVERNIGHT EVENTS:  T(C): 36.4 (18 @ 12:58), Max: 37.2 (04-10-18 @ 21:58)  HR: 76 (18 @ 12:58) (76 - 84)  BP: 120/79 (18 @ 12:58) (120/79 - 132/89)  RR: 22 (18 @ 12:58) (18 - 22)  SpO2: 100% (18 @ 12:58) (100% - 100%)  Wt(kg): --  I&O's Summary    10 Apr 2018 07:01  -  2018 07:00  --------------------------------------------------------  IN: 720 mL / OUT: 900 mL / NET: -180 mL        PAST MEDICAL & SURGICAL HISTORY:  Pancreatic cancer  Breast cancer: Dx  s/p RT and lumpectomy  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  Insomnia  Hiatal hernia with GERD: no changes  Colitis: due to chemo 2017  Malignant neoplasm of pancreas, unspecified location of malignancy: started chemo 10/2016, has mediport - left chest, now s/p whipple 2017  Asthma: denies any recent hospitalizations/intubations  Depression  Hyperlipidemia  HTN (hypertension)  Carcinoma of pancreas: s/p whipple  History of lung surgery: right VATS, wedge resection (2017) benign neoplasm  History of lumpectomy: right ()  Port-a-cath in place  Status post right breast lumpectomy: malignant treated with radiation  H/O abdominal hysterectomy  H/O umbilical hernia repair: with mesh      SOCIAL HISTORY  Alcohol:  Tobacco:  Illicit substance use:    FAMILY HISTORY:    REVIEW OF SYSTEMS:  CONSTITUTIONAL: No fever, weight loss, or fatigue  EYES: No eye pain, visual disturbances, or discharge  ENMT:  No difficulty hearing, tinnitus, vertigo; No sinus or throat pain  NECK: No pain or stiffness  RESPIRATORY: No cough, wheezing, chills or hemoptysis; No shortness of breath  CARDIOVASCULAR: No chest pain, palpitations, dizziness, or leg swelling  GASTROINTESTINAL: No abdominal or epigastric pain. No nausea, vomiting, or hematemesis; No diarrhea or constipation. No melena or hematochezia.  GENITOURINARY: No dysuria, frequency, hematuria, or incontinence  NEUROLOGICAL: No headaches, memory loss, loss of strength, numbness, or tremors  SKIN: No itching, burning, rashes, or lesions   LYMPH NODES: No enlarged glands  ENDOCRINE: No heat or cold intolerance; No hair loss  MUSCULOSKELETAL: No joint pain or swelling; No muscle, back, or extremity pain  PSYCHIATRIC: No depression, anxiety, mood swings, or difficulty sleeping  HEME/LYMPH: No easy bruising, or bleeding gums  ALLERY AND IMMUNOLOGIC: No hives or eczema    RADIOLOGY & ADDITIONAL TESTS:    Imaging Personally Reviewed:  [ ] YES  [ ] NO    Consultant(s) Notes Reviewed:  [ ] YES  [ ] NO    PHYSICAL EXAM:  GENERAL: NAD, well-groomed, well-developed  HEAD:  Atraumatic, Normocephalic  EYES: EOMI, PERRLA, conjunctiva and sclera clear  ENMT: No tonsillar erythema, exudates, or enlargement; Moist mucous membranes, Good dentition, No lesions  NECK: Supple, No JVD, Normal thyroid  NERVOUS SYSTEM:  Alert & Oriented X3, Good concentration; Motor Strength 5/5 B/L upper and lower extremities; DTRs 2+ intact and symmetric  CHEST/LUNG: Clear to percussion bilaterally; No rales, rhonchi, wheezing, or rubs  HEART: Regular rate and rhythm; No murmurs, rubs, or gallops  ABDOMEN: Soft, Nontender, Nondistended; Bowel sounds present  EXTREMITIES:  2+ Peripheral Pulses, No clubbing, cyanosis, or edema  LYMPH: No lymphadenopathy noted  SKIN: No rashes or lesions    LABS:                        11.7   17.55 )-----------( 334      ( 2018 06:00 )             32.5     04-11    136  |  104  |  18  ----------------------------<  156<H>  3.7   |  21<L>  |  0.79    Ca    7.6<L>      2018 11:00  Phos  2.2     04-11  Mg     1.9         TPro  5.4<L>  /  Alb  1.5<L>  /  TBili  1.6<H>  /  DBili  x   /  AST  131<H>  /  ALT  117<H>  /  AlkPhos  653<H>        Urinalysis Basic - ( 10 Apr 2018 15:00 )    Color: YELLOW / Appearance: HAZY / S.025 / pH: 6.0  Gluc: NEGATIVE / Ketone: TRACE  / Bili: NEGATIVE / Urobili: NORMAL mg/dL   Blood: LARGE / Protein: 100 mg/dL / Nitrite: NEGATIVE   Leuk Esterase: MODERATE / RBC: >50 / WBC 10-25   Sq Epi: OCC / Non Sq Epi: x / Bacteria: FEW      CAPILLARY BLOOD GLUCOSE            Urinalysis Basic - ( 10 Apr 2018 15:00 )    Color: YELLOW / Appearance: HAZY / S.025 / pH: 6.0  Gluc: NEGATIVE / Ketone: TRACE  / Bili: NEGATIVE / Urobili: NORMAL mg/dL   Blood: LARGE / Protein: 100 mg/dL / Nitrite: NEGATIVE   Leuk Esterase: MODERATE / RBC: >50 / WBC 10-25   Sq Epi: OCC / Non Sq Epi: x / Bacteria: FEW        MEDICATIONS  (STANDING):  buDESOnide 160 MICROgram(s)/formoterol 4.5 MICROgram(s) Inhaler 2 Puff(s) Inhalation two times a day  cefepime  IVPB 1000 milliGRAM(s) IV Intermittent every 12 hours  enoxaparin Injectable 40 milliGRAM(s) SubCutaneous every 24 hours  lactulose Retention Enema 200 Gram(s) Rectal once  nystatin    Suspension 400989 Unit(s) Oral three times a day  oseltamivir 30 milliGRAM(s) Oral at bedtime  propranolol 20 milliGRAM(s) Oral two times a day  rifaximin 200 milliGRAM(s) Oral three times a day  spironolactone 25 milliGRAM(s) Oral daily  vancomycin  IVPB 1000 milliGRAM(s) IV Intermittent every 24 hours    MEDICATIONS  (PRN):  acetaminophen  Suppository. 650 milliGRAM(s) Rectal every 6 hours PRN Mild Pain (1 - 3)  ALBUTerol    90 MICROgram(s) HFA Inhaler 2 Puff(s) Inhalation every 6 hours PRN Shortness of Breath and/or Wheezing  bisacodyl 5 milliGRAM(s) Oral every 12 hours PRN Constipation  haloperidol    Injectable 0.5 milliGRAM(s) IV Push every 6 hours PRN Hyperactive delerium  HYDROmorphone  Injectable 0.3 milliGRAM(s) IV Push every 3 hours PRN Moderate Pain (4 - 6) and/or Severe pain (7-10)  magnesium hydroxide Suspension 30 milliLiter(s) Oral daily PRN Constipation      Care Discussed with Consultants/Other Providers [ ] YES  [ ] NO
Pt is still encephalopathic today  No           T(C): 36.2 (18 @ 13:15), Max: 36.8 (18 @ 21:12)  HR: 76 (18 @ 13:15) (72 - 77)  BP: 124/75 (18 @ 13:15) (124/75 - 131/84)  RR: 17 (18 @ 13:15) (16 - 18)  SpO2: 99% (18 @ 13:15) (99% - 100%)  Wt(kg): --      2018 07:  -  2018 07:00  --------------------------------------------------------  IN:  Total IN: 0 mL    OUT:    Drain: 500 mL    Indwelling Catheter - Urethral: 150 mL  Total OUT: 650 mL    Total NET: -650 mL           @ 07:01  -   @ 07:00  --------------------------------------------------------  IN: 0 mL / OUT: 650 mL / NET: -650 mL      CAPILLARY BLOOD GLUCOSE      POCT Blood Glucose.: 155 mg/dL (2018 21:42)        acetaminophen  Suppository. 650 milliGRAM(s) Rectal every 6 hours PRN  ALBUTerol    90 MICROgram(s) HFA Inhaler 2 Puff(s) Inhalation every 6 hours PRN  bisacodyl 5 milliGRAM(s) Oral every 12 hours PRN  buDESOnide 160 MICROgram(s)/formoterol 4.5 MICROgram(s) Inhaler 2 Puff(s) Inhalation two times a day  cefepime  IVPB 1000 milliGRAM(s) IV Intermittent every 12 hours  enoxaparin Injectable 40 milliGRAM(s) SubCutaneous every 24 hours  haloperidol    Injectable 0.5 milliGRAM(s) IV Push every 6 hours PRN  HYDROmorphone  Injectable 0.3 milliGRAM(s) IV Push every 3 hours PRN  lactulose Retention Enema 200 Gram(s) Rectal daily  magnesium hydroxide Suspension 30 milliLiter(s) Oral daily PRN  nystatin    Suspension 133528 Unit(s) Oral three times a day  oseltamivir 30 milliGRAM(s) Oral at bedtime  propranolol 20 milliGRAM(s) Oral two times a day  rifaximin 200 milliGRAM(s) Oral three times a day  spironolactone 25 milliGRAM(s) Oral daily  vancomycin  IVPB 1000 milliGRAM(s) IV Intermittent every 24 hours              136  |  103  |  21  ----------------------------<  160<H>  3.5   |  22  |  0.94    Ca    7.7<L>      2018 05:00  Phos  3.7     0412  Mg     2.0         TPro  5.0<L>  /  Alb  1.6<L>  /  TBili  1.4<H>  /  DBili  x   /  AST  111<H>  /  ALT  101<H>  /  AlkPhos  599<H>        Procalc  BNP  ABG                          11.0   17.11 )-----------( 317      ( 2018 05:00 )             31.8           blood and urine cultures              PERTINENT PMH REVIEWED:  [x ] YES [ ] NO           SOCIAL HISTORY:  Significant other/partner:  [ ] YES  [x ] NO            Children:  [x ] YES  [ ] NO                   Worship/Spirituality: Shinto  Subtance hx:  [ ] YES   [x ] NO           Tobacco hx:  [ ] YES  [x ] NO             Alcohol hx: [ ] YES  [x ] NO        Home Opiod hx:  [ ] YES  [x ] NO   Living Situation: [x ] Home  [ ] Long term care  [ ] Rehab    REFERRALS:   [x ] Chaplaincy  [ ] Hospice  [ ] Child Life  [ ] Social Work  [ ] Case management [ ] Holistic Therapy     FAMILY HISTORY:  Family history of kidney cancer (Sibling): brother  Family history of brain cancer (Sibling): sister  Family history of ovarian cancer: mother    [x ] Family history non contributory     BASELINE ADLs (prior to admission):  Independent [ ] moderately [ ] fully   Dependent   [x ] moderately [ ] fully    ADVANCE DIRECTIVES:  [ ] YES [x ] NO   DNR [ ] YES [x ] NO                      MOLST  [ ] YES x[ ] NO    Living Will  [ ] YES [x ] NO    Health Care Proxy [ ] YES  [x ] NO      [x ] Surrogate  [ ] HCP  [ ] Guardian:         Richie Eli (son)               Phone#: [phone]    Allergies    No Known Drug Allergies  SteriStrips (Blisters)    Intolerances        MEDICATIONS  (STANDING):  atorvastatin 10 milliGRAM(s) Oral at bedtime  buDESOnide 160 MICROgram(s)/formoterol 4.5 MICROgram(s) Inhaler 2 Puff(s) Inhalation two times a day  cefepime  IVPB 1000 milliGRAM(s) IV Intermittent every 12 hours  cholecalciferol 2000 Unit(s) Oral daily  ciprofloxacin   IVPB 400 milliGRAM(s) IV Intermittent every 12 hours  enoxaparin Injectable 40 milliGRAM(s) SubCutaneous every 24 hours  famotidine    Tablet 40 milliGRAM(s) Oral daily  multivitamin 1 Tablet(s) Oral daily  nystatin    Suspension 603960 Unit(s) Oral three times a day  oseltamivir 30 milliGRAM(s) Oral at bedtime  sodium chloride 0.9%. 1000 milliLiter(s) (50 mL/Hr) IV Continuous <Continuous>  spironolactone 25 milliGRAM(s) Oral daily  vancomycin  IVPB 1000 milliGRAM(s) IV Intermittent every 24 hours    MEDICATIONS  (PRN):  acetaminophen   Tablet. 650 milliGRAM(s) Oral every 6 hours PRN Mild to moderate pain  ALBUTerol    90 MICROgram(s) HFA Inhaler 2 Puff(s) Inhalation every 6 hours PRN Shortness of Breath and/or Wheezing  magnesium hydroxide Suspension 30 milliLiter(s) Oral daily PRN Constipation  morphine  - Injectable 1 milliGRAM(s) IV Push every 6 hours PRN Moderate Pain (4 - 6)      PRESENT SYMPTOMS:  Source: [ ] Patient   [x ] Family   [x ] Team     Pain: [ ] YES [x ] NO  OLDCARTS:     Dyspnea: [ ] YES [ ] NO   Anxiety: [ ] YES [ ] NO  Fatigue: [ ] YES [ ] NO   Nausea: [ ] YES [ ] NO  Loss of appetite: [ ] YES [ ] NO   Constipation: [ ] YES [ ] NO     Other Symptoms:  [ ] All other review of systems negative   [x ] Unable to obtain due to poor mentation     Karnofsky Performance Score/Palliative Performance Status Version 2: 30        %  Protein Calorie Malutrition:  [ ] Mild   [ ] Moderate   [ ] Severe     Vital Signs Last 24 Hrs  T(C): 36.4 (2018 04:42), Max: 36.7 (2018 14:09)  T(F): 97.6 (2018 04:42), Max: 98.1 (2018 14:09)  HR: 92 (2018 04:56) (86 - 122)  BP: 129/81 (2018 04:56) (107/68 - 151/94)  BP(mean): --  RR: 18 (2018 04:42) (17 - 18)  SpO2: 100% (2018 04:42) (99% - 100%)    Physical Exam:    General: [x ] Alert,  A&O x 0  [ ] lethargic   [x ] Agitated   [ ] Cachexia   HEENT: [ ] Normal   [x ] Dry mouth   [ ] ET Tube    [ ] Trach   Lungs: [x ] Clear [ ] Rhonchi  [ ] Crackles [ ] Wheezing [ ] Tachypnea  [ ] Audible excessive secretions   Cardiovascular:  [x ] Regular rate and rhythm  [ ] Irregular [ ] Tachycardia   [ ] Bradycardia   Abdomen: [ ] Soft  [x ] Distended  [ ]  [x ] +BS  [x ] Non tender [ ] Tender  [ ]PEG   [ ] NGT   Last BM:     Genitourinary: [ ] Normal [ ] Incontinent   [ ] Oliguria/Anuria   [x ] Stoner  Musculoskeletal:  [ ] Normal   [ ] Generalized weakness  [x ] Bedbound   Neurological: [ ] No focal deficits  [x ] Cognitive impairment     Skin: [x ] Normal   [ ] Pressure ulcers     LABS:                        9.9    13.36 )-----------( 251      ( 2018 06:30 )             28.8     04-07    139  |  105  |  19  ----------------------------<  155<H>  3.4<L>   |  22  |  0.79    Ca    7.7<L>      2018 06:30  Phos  2.9     -  Mg     2.0     -    TPro  6.3  /  Alb  2.0<L>  /  TBili  1.3<H>  /  DBili  x   /  AST  73<H>  /  ALT  47<H>  /  AlkPhos  339<H>        Urinalysis Basic - ( 2018 19:50 )    Color: YELLOW / Appearance: HAZY / S.023 / pH: 6.5  Gluc: NEGATIVE / Ketone: NEGATIVE  / Bili: NEGATIVE / Urobili: NORMAL mg/dL   Blood: NEGATIVE / Protein: 20 mg/dL / Nitrite: NEGATIVE   Leuk Esterase: NEGATIVE / RBC: 0-2 / WBC 2-5   Sq Epi: OCC / Non Sq Epi: x / Bacteria: FEW      I&O's Summary    2018 07:01  -  2018 07:00  --------------------------------------------------------  IN: 0 mL / OUT: 700 mL / NET: -700 mL        RADIOLOGY & ADDITIONAL STUDIES:
SURGICAL ONCOLOGY PROGRESS NOTE    Awake, responsive.  Seen this AM    Vital Signs Last 24 Hrs  T(C): 37.8 (10 Apr 2018 12:44), Max: 37.8 (10 Apr 2018 12:44)  T(F): 100 (10 Apr 2018 12:44), Max: 100 (10 Apr 2018 12:44)  HR: 116 (10 Apr 2018 12:44) (96 - 116)  BP: 145/102 (10 Apr 2018 12:44) (134/90 - 146/98)  BP(mean): --  RR: 19 (10 Apr 2018 12:44) (19 - 20)  SpO2: 100% (10 Apr 2018 12:44) (96% - 100%)  I&O's Detail    09 Apr 2018 07:01  -  10 Apr 2018 07:00  --------------------------------------------------------  IN:    dextrose 5% + sodium chloride 0.45%.: 500 mL    IV PiggyBack: 300 mL    Oral Fluid: 100 mL  Total IN: 900 mL    OUT:    Indwelling Catheter - Urethral: 400 mL    Intermittent Catheterization - Urethral: 90 mL  Total OUT: 490 mL    Total NET: 410 mL          PE:    A&A  NAD    soft, NT, ND, No peritoneal signs.  Ascites                          10.9   13.12 )-----------( 310      ( 10 Apr 2018 05:50 )             31.0     04-10    135  |  102  |  16  ----------------------------<  133<H>  3.2<L>   |  22  |  0.76    Ca    7.9<L>      10 Apr 2018 05:30  Phos  2.0     04-10  Mg     1.8     04-10    TPro  5.9<L>  /  Alb  1.8<L>  /  TBili  1.1  /  DBili  x   /  AST  183<H>  /  ALT  127<H>  /  AlkPhos  681<H>  04-10
Follow Up:  leukocytosis    Interval History: pt afebrile but still with WBC of 17, was seen by palliative and referral for hospice was made    ROS:    Unobtainable because: altered mental status        Allergies  No Known Drug Allergies  SteriStrips (Blisters)        ANTIMICROBIALS:  cefepime  IVPB 1000 every 12 hours  nystatin    Suspension 440791 three times a day  oseltamivir 30 at bedtime  rifaximin 200 three times a day  vancomycin  IVPB 1000 every 24 hours      OTHER MEDS:  acetaminophen  Suppository. 650 milliGRAM(s) Rectal every 6 hours PRN  ALBUTerol    90 MICROgram(s) HFA Inhaler 2 Puff(s) Inhalation every 6 hours PRN  bisacodyl 5 milliGRAM(s) Oral every 12 hours PRN  buDESOnide 160 MICROgram(s)/formoterol 4.5 MICROgram(s) Inhaler 2 Puff(s) Inhalation two times a day  enoxaparin Injectable 40 milliGRAM(s) SubCutaneous every 24 hours  haloperidol    Injectable 0.5 milliGRAM(s) IV Push every 6 hours PRN  HYDROmorphone  Injectable 0.3 milliGRAM(s) IV Push every 3 hours PRN  lactulose Retention Enema 200 Gram(s) Rectal daily  magnesium hydroxide Suspension 30 milliLiter(s) Oral daily PRN  propranolol 20 milliGRAM(s) Oral two times a day  spironolactone 25 milliGRAM(s) Oral daily      Vital Signs Last 24 Hrs  T(C): 36.2 (12 Apr 2018 13:15), Max: 36.8 (11 Apr 2018 21:12)  T(F): 97.2 (12 Apr 2018 13:15), Max: 98.2 (11 Apr 2018 21:12)  HR: 76 (12 Apr 2018 13:15) (72 - 77)  BP: 124/75 (12 Apr 2018 13:15) (124/75 - 131/84)  BP(mean): --  RR: 17 (12 Apr 2018 13:15) (16 - 18)  SpO2: 99% (12 Apr 2018 13:15) (99% - 100%)    Physical Exam:  General:    NAD,  non toxic,   Head: atraumatic, normocephalic  Eye: normal sclera and conjunctiva  ENT:    no oropharyngeal lesions,   no LAD,   neck supple  Cardio:     regular S1, S2,  no murmur  Respiratory:    poor air entry on the bases  abd:    ascites  soft,   BS +,   no tenderness, pigtail cath  :   no CVAT,  no suprapubic tenderness,   no  brown  Musculoskeletal:  resolved erythema and edema of the left forearm  vascular: left chest port, normal pulses  Skin:    no rash  Neurologic:  drowsy, Ox 1                            11.0   17.11 )-----------( 317      ( 12 Apr 2018 05:00 )             31.8       04-12    136  |  103  |  21  ----------------------------<  160<H>  3.5   |  22  |  0.94    Ca    7.7<L>      12 Apr 2018 05:00  Phos  3.7     04-12  Mg     2.0     04-12    TPro  5.0<L>  /  Alb  1.6<L>  /  TBili  1.4<H>  /  DBili  x   /  AST  111<H>  /  ALT  101<H>  /  AlkPhos  599<H>  04-12          MICROBIOLOGY:  v  BLOOD PERIPHERAL  04-10-18 --  --  --      URINE CATHETER  04-10-18 --  --  --      PERITONEAL FLUID  04-10-18 --  --  Acinetobacter baumannii      BLOOD  04-05-18 --  --  --      URINE CATHETER  04-05-18 --  --  --      ASCITIC FLUID  03-21-18 --  --    WBC^White Blood Cells  QNTY CELLS IN GRAM STAIN: MANY (4+)  NOS^No Organisms Seen                RADIOLOGY:    < from: Xray Abdomen 1 View PORTABLE -Urgent (04.06.18 @ 04:02) >    IMPRESSION:  No obstruction or ileus.      < from: Xray Chest 1 View- PORTABLE-Urgent (04.05.18 @ 19:38) >    IMPRESSION: Decreasing left pleural effusion.
Patient is a 72y old  Female who presents with a chief complaint of Altered mental status, lethargy. (06 Apr 2018 01:17)    pt. seen and examined, more awake and alert today     INTERVAL HPI/OVERNIGHT EVENTS:  T(C): 36.8 (04-08-18 @ 13:40), Max: 36.8 (04-08-18 @ 06:57)  HR: 94 (04-08-18 @ 13:40) (86 - 94)  BP: 150/90 (04-08-18 @ 13:40) (128/78 - 150/90)  RR: 18 (04-08-18 @ 13:40) (17 - 18)  SpO2: 100% (04-08-18 @ 13:40) (99% - 100%)  Wt(kg): --  I&O's Summary    07 Apr 2018 07:01  -  08 Apr 2018 07:00  --------------------------------------------------------  IN: 575 mL / OUT: 0 mL / NET: 575 mL    08 Apr 2018 07:01  -  08 Apr 2018 18:26  --------------------------------------------------------  IN: 0 mL / OUT: 500 mL / NET: -500 mL        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  Insomnia  Hiatal hernia with GERD: no changes  Colitis: due to chemo 1/2017  Malignant neoplasm of pancreas, unspecified location of malignancy: started chemo 10/2016, has mediport - left chest, now s/p whipple 11/2017  Asthma: denies any recent hospitalizations/intubations  Depression  Hyperlipidemia  HTN (hypertension)  Carcinoma of pancreas: s/p whipple  History of lung surgery: right VATS, wedge resection (August 2017) benign neoplasm  History of lumpectomy: right (1996)  Port-a-cath in place  Status post right breast lumpectomy: malignant treated with radiation  H/O abdominal hysterectomy  H/O umbilical hernia repair: with mesh      SOCIAL HISTORY  Alcohol:  Tobacco:  Illicit substance use:    FAMILY HISTORY:    REVIEW OF SYSTEMS:  CONSTITUTIONAL: No fever, weight loss, or fatigue  EYES: No eye pain, visual disturbances, or discharge  ENMT:  No difficulty hearing, tinnitus, vertigo; No sinus or throat pain  NECK: No pain or stiffness  RESPIRATORY: No cough, wheezing, chills or hemoptysis; No shortness of breath  CARDIOVASCULAR: No chest pain, palpitations, dizziness, or leg swelling  GASTROINTESTINAL: No abdominal or epigastric pain. No nausea, vomiting, or hematemesis; No diarrhea or constipation. No melena or hematochezia.  GENITOURINARY: No dysuria, frequency, hematuria, or incontinence  NEUROLOGICAL: No headaches, memory loss, loss of strength, numbness, or tremors  SKIN: No itching, burning, rashes, or lesions   LYMPH NODES: No enlarged glands  ENDOCRINE: No heat or cold intolerance; No hair loss  MUSCULOSKELETAL: No joint pain or swelling; No muscle, back, or extremity pain  PSYCHIATRIC: No depression, anxiety, mood swings, or difficulty sleeping  HEME/LYMPH: No easy bruising, or bleeding gums  ALLERY AND IMMUNOLOGIC: No hives or eczema    RADIOLOGY & ADDITIONAL TESTS:    Imaging Personally Reviewed:  [ ] YES  [ ] NO    Consultant(s) Notes Reviewed:  [ ] YES  [ ] NO    PHYSICAL EXAM:  GENERAL: NAD, well-groomed, well-developed  HEAD:  Atraumatic, Normocephalic  EYES: EOMI, PERRLA, conjunctiva and sclera clear  ENMT: No tonsillar erythema, exudates, or enlargement; Moist mucous membranes, Good dentition, No lesions  NECK: Supple, No JVD, Normal thyroid  NERVOUS SYSTEM:  Alert & Oriented X3, Good concentration; Motor Strength 5/5 B/L upper and lower extremities; DTRs 2+ intact and symmetric  CHEST/LUNG: Clear to percussion bilaterally; No rales, rhonchi, wheezing, or rubs  HEART: Regular rate and rhythm; No murmurs, rubs, or gallops  ABDOMEN: Soft, Nontender, Nondistended; Bowel sounds present  EXTREMITIES:  2+ Peripheral Pulses, No clubbing, cyanosis, or edema  LYMPH: No lymphadenopathy noted  SKIN: No rashes or lesions    LABS:                        10.5   11.81 )-----------( 244      ( 08 Apr 2018 06:35 )             30.4     04-08    138  |  104  |  18  ----------------------------<  127<H>  3.5   |  22  |  0.72    Ca    7.9<L>      08 Apr 2018 06:35  Phos  2.4     04-08  Mg     2.0     04-08          CAPILLARY BLOOD GLUCOSE                MEDICATIONS  (STANDING):  atorvastatin 10 milliGRAM(s) Oral at bedtime  buDESOnide 160 MICROgram(s)/formoterol 4.5 MICROgram(s) Inhaler 2 Puff(s) Inhalation two times a day  cefepime  IVPB 1000 milliGRAM(s) IV Intermittent every 12 hours  cholecalciferol 2000 Unit(s) Oral daily  ciprofloxacin   IVPB 400 milliGRAM(s) IV Intermittent every 12 hours  dextrose 5% + sodium chloride 0.45%. 1000 milliLiter(s) (50 mL/Hr) IV Continuous <Continuous>  enoxaparin Injectable 40 milliGRAM(s) SubCutaneous every 24 hours  famotidine    Tablet 40 milliGRAM(s) Oral daily  multivitamin 1 Tablet(s) Oral daily  nystatin    Suspension 539041 Unit(s) Oral three times a day  oseltamivir 30 milliGRAM(s) Oral at bedtime  sodium chloride 0.9%. 1000 milliLiter(s) (50 mL/Hr) IV Continuous <Continuous>  spironolactone 25 milliGRAM(s) Oral daily  vancomycin  IVPB 1000 milliGRAM(s) IV Intermittent every 24 hours    MEDICATIONS  (PRN):  acetaminophen  Suppository. 650 milliGRAM(s) Rectal every 6 hours PRN Mild Pain (1 - 3)  ALBUTerol    90 MICROgram(s) HFA Inhaler 2 Puff(s) Inhalation every 6 hours PRN Shortness of Breath and/or Wheezing  magnesium hydroxide Suspension 30 milliLiter(s) Oral daily PRN Constipation  morphine  - Injectable 1 milliGRAM(s) IV Push every 4 hours PRN moderate and severe pain (4-10)      Care Discussed with Consultants/Other Providers [ ] YES  [ ] NO

## 2018-04-13 NOTE — PROGRESS NOTE ADULT - PROVIDER SPECIALTY LIST ADULT
Heme/Onc
Infectious Disease
Internal Medicine
Palliative Care
Palliative Care
Surgery
Infectious Disease
Internal Medicine
Internal Medicine
Palliative Care

## 2018-04-13 NOTE — PROGRESS NOTE ADULT - ASSESSMENT
72 f with HTN, HLD, GERD, Asthma, Breast cancer, Pancreatic cancer, Whipple procedure, Ascites, Lung nodule s/p 9/2017 VATS with necrotizing granuloma and negative AFB, umbilical hernia with mesh, ascites with cytology of reactive mesothelial cell, recent admission for abd distension s/p pigtail placement for ascites fluid 3/21/18 was s/o SBP but cultures negative, now brought  in for  lethargy, AMS and abd pain.  pt had elevated ammonia levels and AMS was considerer due to hepatic encephalopathy, no ascitic fluid analysis was done but pt was started on vanco, cefepime and cipro. Her blood and urine cx were negative and CXR with improving pleural effusion, WBC slightly increasing now 13, the ascitic fluid analysis not s/o SBP but was done on day 4 of antibiotics  pt also has an erythema on one of her IV sites that happened in the hospital      hepatic encephalopathy, ?SBP now day 7 of antibiotics but leukocytosis still present, repeat blood and urine cx negative, peritoneal fluid cx with pan S acinetobacter baumani, ? colonization of the catheter or real SBP  right arm thrombophlebitis improved  pt was seen by palliative and referral of hospice was made    pt is going to home hospice, no labs for today  received 7-8 days of vanco and zosyn   DC cefepime  if within goals of care can switch to PO levofloxacin for another 5 days

## 2018-04-13 NOTE — DIETITIAN INITIAL EVALUATION ADULT. - PROBLEM SELECTOR PLAN 1
- patient with rapidly worsening lethargy/AMS over 3 days; no longer ambulating and non-verbal (dissimilar to baseline)  - likely of multifactorial etiology (Hyperammonemia, infection [?aspiration], dehydration, possibly complicated by medication s/e)  - unlikely tumor lysis  - ammonia = 148; lactulose rectally prescribed (suggest PO when mental status begins to improve)  - WBC = 17.65; prescribed cefepime, vancomycin and cipro in the ED; will continue  - ensure adequate hydration; prescribed IVF  mL in the ED; additional 500 mL prescribed with subsequent maintenance at 50 mL/Hr (may need up-titration)  - sertraline, trazodone and oxybutynin held for now  - continues on spironolactone   - f/u abdominal X-ray  - f/u repeat ammonia level  - f/u cultures  - HOB elevation  - follow pulse oximetry, VS

## 2018-04-13 NOTE — DIETITIAN INITIAL EVALUATION ADULT. - OTHER INFO
Pt seen for Length of stay. Pt 71 yo female awake, appears confused. No family @ bed side. Spoke to nurse. Per nurse Pt ate > 50% of breakfast today; Pt needs help with tray setup. No report of nausea, vomiting or diarrhea @ this time. Pt with pressure ulcer to Coccyx, stage II documented. Pt is DNR. Plan for Pt to be discharged with home hospice. RDN remains available.

## 2018-04-15 LAB
BACTERIA BLD CULT: SIGNIFICANT CHANGE UP
BACTERIA BLD CULT: SIGNIFICANT CHANGE UP

## 2018-04-19 LAB
ACID FAST STN FLD: SIGNIFICANT CHANGE UP
FUNGUS SPEC QL CULT: SIGNIFICANT CHANGE UP

## 2018-05-21 LAB — ACID FAST STN FLD: SIGNIFICANT CHANGE UP

## 2018-07-16 PROBLEM — R91.1 SOLITARY PULMONARY NODULE: Chronic | Status: ACTIVE | Noted: 2017-08-25

## 2019-06-24 NOTE — DISCHARGE NOTE ADULT - CARE PLAN
same name as above Principal Discharge DX:	Pancreatic cancer  Goal:	s/p whipple and j-tube placement  Instructions for follow-up, activity and diet:	WOUND CARE:  please keep incision clean and dry, pat dry when wet. Staples to be removed at follow up appointment. You are being discharged with a drain in place please empty, measure, and record outputs. Please bring record of measured outputs to follow up appointment   BATHING: Please do not submerge wound underwater. You may shower and/or sponge bathe.  ACTIVITY: No heavy lifting or straining. Otherwise, you may return to your usual level of physical activity. If you are taking narcotic pain medication (such as Percocet) DO NOT drive a car, operate machinery or make important decisions.  DIET: Regular diet and nocturnal tube feeds, glucerna 1.2, 50cc/hr x12 hrs  NOTIFY YOUR SURGEON IF: You have any bleeding that does not stop, any pus draining from your wound(s), any fever (over 100.4 F) or chills, persistent nausea/vomiting, persistent diarrhea, or if your pain is not controlled on your discharge pain medications.  FOLLOW-UP: Please follow up with your primary care physician in one week regarding your hospitalization  Please follow up with Dr Titus in 2 weeks, please call  [phone] to schedule appointment  Please follow up with Oncologist Dr Mitchell, please call [phone] to schedule appointment  Secondary Diagnosis:	Asthma  Instructions for follow-up, activity and diet:	please cont home meds and follow up with your PMD in 1 week  Secondary Diagnosis:	Hypertension, unspecified type  Instructions for follow-up, activity and diet:	please follow up with your PMD in 1 week  Secondary Diagnosis:	Breast cancer  Instructions for follow-up, activity and diet:	please have yearly mammograms performed Principal Discharge DX:	Pancreatic cancer  Goal:	s/p whipple and j-tube placement  Instructions for follow-up, activity and diet:	WOUND CARE:  please keep incision clean and dry, pat dry when wet. Staples to be removed at follow up appointment. You are being discharged with a drain in place please empty, measure, and record outputs. Please bring record of measured outputs to follow up appointment   BATHING: Please do not submerge wound underwater. You may shower and/or sponge bathe.  ACTIVITY: No heavy lifting or straining. Otherwise, you may return to your usual level of physical activity. If you are taking narcotic pain medication (such as Percocet) DO NOT drive a car, operate machinery or make important decisions.  DIET: Regular diet and nocturnal tube feeds, glucerna 1.2, 50cc/hr x12 hrs (6p-6a)  NOTIFY YOUR SURGEON IF: You have any bleeding that does not stop, any pus draining from your wound(s), any fever (over 100.4 F) or chills, persistent nausea/vomiting, persistent diarrhea, or if your pain is not controlled on your discharge pain medications.  FOLLOW-UP: Please follow up with your primary care physician in one week regarding your hospitalization  Please follow up with Dr Titus in 2 weeks, please call  [phone] to schedule appointment  Please follow up with Oncologist Dr Mitchell in 1-2 wks, please call [phone] to schedule appointment  You are being discharged with a new medication Lovenox to be taken for 2 weeks to prevent blood clots  Secondary Diagnosis:	Asthma  Instructions for follow-up, activity and diet:	please cont home meds and follow up with your PMD in 1 week  Secondary Diagnosis:	Hypertension, unspecified type  Instructions for follow-up, activity and diet:	while in the hospital the dose of your home medications were changed, please continue current dose and follow up with your PMD in 1 week  Secondary Diagnosis:	Breast cancer  Instructions for follow-up, activity and diet:	please have yearly mammograms performed

## 2020-08-21 NOTE — H&P ADULT - MOTOR
HOSPITALIST  PROGRESS NOTE:    Patient:  Florentino Magallanes Sr. Attending:  Latha Onofre MD   :  [id number] Date:  2020 11:29 AM   AGE:  63 year old Current Hospital Day: Hospital Day: 6   SEX:  male        Chief Complaint:    There are no active hospital problems to display for this patient.       Subjective:  Florentino Magallanes Sr. is a 63 year old male who was admitted on 2020 for acute renal failure insisting of liver cirrhosis, acute scrotal and penile edema with bilateral lower extremity edema.  Patient has history of mild dementia with neuroendocrine tumor status post partial gastrectomy, prostate cancer, hypertension and hyperlipidemia, CVA and alcoholic hepatitis with now cirrhosis, history of chronic opioid use.    Patient was seen and examined  Patient was seen and examined today.  He is more alert.  He denied pain for me however he has been complaining of abdominal pain.  He stated that he is feeling okay.  Later on in the morning, he become hypothermic.  Overnight also he was hypothermic.  No evidence of bleeding at this time.  No nausea or vomiting.  Denies chest pain for me.    Objective:       Intake/Output:      I&O Last shift:  I/O this shift:  In: -   Out: 350 [Urine:350]    I&O Last 24 hours:      Intake/Output Summary (Last 24 hours) at 2020 1129  Last data filed at 2020 0830  Gross per 24 hour   Intake 0 ml   Output 1630 ml   Net -1630 ml       Last Stool Occurrence:       Vitals 24 Hour Range Last Value   Temperature Temp  Min: 93.9 °F (34.4 °C)  Max: 98.2 °F (36.8 °C) (!) 94.9 °F (34.9 °C) (20 1035)   Pulse Pulse  Min: 87  Max: 111 88 (20 0830)   Respiratory Resp  Min: 16  Max: 16 16 (20 0830)   Non-Invasive Blood Pressure BP  Min: 121/86  Max: 155/93 (!) 145/93 (20 0830)   Pulse Oximetry SpO2  Min: 98 %  Max: 100 % 100 % (20 08)       Vital Most Recent Value First Value   Weight 52.4 kg Weight: 55 kg   Height 5' 9\" (175.3 cm) Height: 5' 9\" (175.3  cm)     Physical Exam:  General - patient is debilitated and acutely sick-looking   HEENT - Pupils equally round and reactive to light.  Sclerae are anicteric.  Extraocular motions are intact.    Neck is soft and supple.     CV - Regular rate and rhythm, normal S1 and S2, no S3 or S4, no murmurs or rubs.   Pulm -  Lungs are clear to auscultation bilaterally.  No wheezes, rales or rhonchi.  Abd - he has abdominal wall scar.  He has tenderness on the epigastric and the right upper quadrant area.  No rebound tenderness or guarding.  Hypoactive bowel sounds.  Abdomen is less distended today   NICOLASA- penis and scrotal swelling on examination but non-tender  Ext - he has 2+ bilateral leg edema with some tenderness in the calf area   Skin - No rashes or erythema.    Neuro - CNs II-XII are intact. No focal deficits.    Laboratory Results:  Recent Labs   Lab 08/21/20  0546  08/20/20  0428  08/17/20  0911   SODIUM 149*  --  148*   < > 144   POTASSIUM 3.5   < > 2.8*   < > 3.3*   CHLORIDE 122*  --  118*   < > 113*   CO2 21  --  21   < > 22   BUN 21*  --  21*   < > 39*   CREATININE 1.14  --  1.33*   < > 2.43*   GLUCOSE 133*  --  86   < > 82   ALBUMIN 2.0*  --  2.2*   < > 1.3*   PHOS  --   --  2.0*   < >  --    AST 18  --  17   < > 54*   BILIRUBIN 1.4*  --  1.6*   < > 1.2*   TSH  --   --   --   --  0.751    < > = values in this interval not displayed.     Recent Labs   Lab 08/21/20  0546  08/17/20  0911   WBC 10.0   < > 7.9   HGB 8.8*   < > 8.2*   HCT 25.2*   < > 22.8*   PLT 83*   < > 143   PST  --   --  50*    < > = values in this interval not displayed.       URINE  Recent Labs   Lab 08/20/20  0322 08/16/20  2149   USPG 1.012  --    UPH 5.5  --    UKET Negative  --    UPROT Negative  --    UGLU Negative  --    UBILI Negative  --    UROB 0.2  --    UWBC Negative  --    UNITR Negative  --    URBC Negative  --    WEI  --  108   UCR  --  22.90       Cultures:  Blood culture is pending.  He has significant elevated  procalcitonin.    Radiology Results:  XR ABDOMEN AP KUB   Final Result   IMPRESSION: Couple of distended loops of small bowel are evident. No   distention of colon. Findings are nonspecific. Could not exclude early or   partial small bowel obstruction. Gastric bypass changes.         NM Hepatobiliary w Quantitative Measure   Final Result   IMPRESSION:      1. Positive for acute cholecystitis.   2. Patent common bile duct.         US Lower Extremity Venous Duplex Bilat   Final Result   IMPRESSION:  Persistent acute deep vein thrombosis in the bilateral   peroneal veins. There is no evidence of progression since a previous   ultrasound study.         TECHNOLOGIST:  EDMOND      IR Paracentesis   Final Result   IMPRESSION: Paracentesis under ultrasound guidance, with removal of 0.45   liters of abdominal fluid.      This procedure was done under the direct supervision of Dr. Jose D Palafox M.D..      US Lower Extremity Venous Duplex Bilat   Final Result   IMPRESSION:  Bilateral peroneal veins clot. This appears to be acute or   subacute.         TECHNOLOGIST:  SWAPNIL      CT CHEST WO CONTRAST   Final Result   IMPRESSION: Large bilateral pleural effusions with associated moderate   compressive atelectasis both lower lobes..             US Testicles and Scrotum W Duplex   Final Result   IMPRESSION: Diffuse thickening of the wall of the scrotum.      Normal appearing testes.      Trace hydroceles..         US Abdomen Complete   Final Result   IMPRESSION:      1. Cholelithiasis, mild gallbladder wall thickening, gallbladder sludge,   and positive sonographic North sign. Findings could represent acute   cholecystitis in appropriate clinical setting although gallbladder wall   thickening could be related to cirrhosis.   2. Diffuse hepatic steatosis.   3. Mild to moderate volume ascites.   4. Cystic lesions of the right kidney measuring up to 7 mm and 10 mm   respectively.      XR CHEST AP OR PA - PORTABLE   Final Result    IMPRESSION:      Moderate patchy atelectasis/infiltrate at the left lung base with   associated mild to moderate left-sided pleural effusion. Possible mild   opacities are also identified at the right lung base.   Although the pulmonary vasculature is not overtly redistributed, a few   curly B lines raise the possibility of underlying mild vascular congestion.                CT ABDOMEN PELVIS WO CONTRAST   Final Result   IMPRESSION:   Diffuse edema involving the scrotal but no subcutaneous emphysema. No fluid   collection or abscess.   Moderate free fluid the pelvis of uncertain etiology.   Small bilateral pleural effusions. Extensive hepatic steatosis. No         IR Biliary Tube    (Results Pending)       Assessment and plan:   Severe sepsis not present on admission  -patient appears to be septic based on hypothermia and tachycardia.  His lactic acid was 6.6 which qualify for cryptic shock however it was trended down below 2.  -no leukocytosis but hypothermic overnight  -procalcitonin is elevated which is consistent with bacterial infection  -ultrasound of the gallbladder showed evidence of cholecystitis for which HIDA scan was done and confirmed acute cholecystitis  -case was discussed with surgery as well as IR.  Plan is to get biliary tube placement  -infectious disease was consulted and IV antibiotic adjusted to Zosyn  -blood cultures pending  -patient is critically sick with poor prognosis    Acute cholecystitis  -patient has ultrasound evidence of cholecystitis and now has abdominal pain with epigastric tenderness.  Cholecystitis confirmed with HIDA scan.  Case was discussed with surgery.  Plan is to get biliary tube today.  His INR is 1.9.  Discussed with IR.  Will give him 1 unit of fresh frozen plasma before patient get biliary tube as per IR recommendation  Plasma transfusion discussed with the patient wife as the patient cannot give consent at this time  Patient wife agreed with plasma transfusions for  the procedure  -patient to get biliary tube placement and will continue on Zosyn  -as per surgery is not a surgical candidate    Acute renal failure in the setting of liver cirrhosis  Creatinine today is 1.14- improving  -patient baseline creatinine is less than 1  -no hydronephrosis or obstruction noted on CT of abdomen and pelvis  -he has elevated urine sodium level  Continue to monitor renal function.    Nephrology is following the patient.  -Avoid nephrotoxin agent.      Hypokalemia- potassium today 3.5.  Continue to replace as per protocol.    Ascites,  Acute scrotal, penile edema with bilateral lower extremity edema  -BNP has been elevated but in the setting of ARF.  -he has also bilateral pleural effusion however he appears to be more comfortable  -chest CT showed bilateral pleural effusion without atelectasis.  -status post IR paracentesis today and showed 0.4 L of fluid.  No evidence of SBP at this time.  -given his coagulopathy with elevated INR, this could be consistent with cirrhosis    Folic acid deficiency.  His folic acid has been low.  Continue with folic acid supplementation    Severe anemia.  Hemoglobin dropped to 6.5 on 08/18/2020.  Transfused with 1 units of packed red blood cells.  Hemoglobin today is 8.8.  No evidence of bleeding at this time  Continue to monitor with daily CBC now.    Continue to hold anticoagulation    Elevated D-dimer and coagulopathy- patient has distal bilateral peroneal DVT- severe anemia.  Appreciate Oncology recommendation.  Repeat ultrasound showed existence of the DVT with no progression.    Patient INR today is 1.9  Discussed with Oncology.  To continue with DVT prophylaxis at this time.  Patient will receive fresh frozen plasma for IR procedure.  Patient both hypercoagulable and thrombotic.  Will avoid Kcentra or vitamin K supplementation    Thrombocytopenia.  Hit antibody is negative.  Platelet count continued to drop to 83. Likely from ongoing infection with  sepsis as well as cirrhosis.  Will continue with DVT prophylaxis heparin at this time    Hypertensive urgency.  Present on admission.  Improved    Elevated transaminase.  Secondary to chronic alcohol liver disease with cirrhosis.  CT showed hepatic steatosis.  -he has now coagulopathy which showed poor synthetic function of the liver.  His coagulopathy also from malnutrition.    Severe protein calorie malnutrition.  He has history of endocrine tumor status post gastrectomy.  Patient still has poor oral intake.  He has also elevated INR which could be nutritional in origin.  Will start him tube feeding if the patient cul tolerate.    Chronic opiate use  Lumbar degenerative disc disease.  He has been on Norco p.r.n.    History of prostate cancer.  Currently undergoing surveillance    Goal of care.  Discussed with the patient wife at the bedside.  At this time patient remains full code.  Discussed with the patient wife that the patient is critically sick.  Palliative Care, geriatric service is following the patient    Diet:  regular diet    Best practice:  DVT prophylaxis:SCDs and TEDs.  Heparin sq    L[place]aga in Place: No    Goals of Care:    Patient is decisional: No  Patient has POA documents in the chart: No  Code Status: Full Code  Rationale behind code status: wife decision       DISCHARGE PLANNING:  EXPECTED DISCHARGE DATE:  TBD  POSSIBLE BARRIERS TO DISCHARGE: n/A  TENTATIVE DISCHARGE DISPOSITION: ABRAHAM          PT and OT Orders Placed this Encounter   Procedures   • Occupational Therapy   • Physical Therapy         Care plan  discussed in detail with:  Patient, Family, RN and MD and palliative care      8/21/2020  11:29 AM    Latha Marrufo  [phone]  Hospitalist,[address] [address].   TEL. [phone]   FAX. [phone]     moves extremities (UE > LE)

## 2021-03-04 NOTE — H&P PST ADULT - VISION (WITH CORRECTIVE LENSES IF THE PATIENT USUALLY WEARS THEM):
[facility]  [facility]  [address] [place] [address], [address] [place] [id number]  Phone: [phone]  Fax: [phone]  Follow Up Time:    Normal vision: sees adequately in most situations; can see medication labels, newsprint/Reading glasses

## 2021-06-11 NOTE — ED ADULT TRIAGE NOTE - MEANS OF ARRIVAL
HR=50 bpm, TWSW=930/66 mmhg, SpO2=97.0 %, Resp=11 B/min, EtCO2=36 mmHg, Apnea=2 Seconds, Pain=0, Dan=2, Comment=gary stretcher

## 2022-03-02 NOTE — PATIENT PROFILE ADULT. - NS TRANSFER DENTURES
I reviewed the H&P, I examined the patient, and there are no changes in the patient's condition. Discussed the risks, benefits and alternatives of right upper extremity arteriovenous fistula transposition, possible graft. Risks discussed included and were not limited to infection, bleeding, thromboembolism, myocardial infarction, stroke, death, respiratory failure, renal failure, steal, limb loss, nerve injury. Patient wishes to proceed. Informed consent was obtained.    Dr. Michael Rolon  
Full

## 2023-01-24 NOTE — ED ADULT TRIAGE NOTE - AS TEMP SITE
[Clear] : right tympanic membrane clear [Erythematous Oropharynx] : erythematous oropharynx [NL] : supple, full passive range of motion [de-identified] : acute pharyngitis  [de-identified] : enlarged  tender right anterior cervical node   oral

## 2023-02-28 NOTE — DISCHARGE NOTE ADULT - FUNCTIONAL STATUS DATE
Partially impaired: cannot see medication labels or newsprint, but can see obstacles in path, and the surrounding layout; can count fingers at arm's length 23-Mar-2018

## 2023-08-17 NOTE — PATIENT PROFILE ADULT. - FALL HARM RISK TYPE OF ASSESSMENT
Follow-up - Radiation Oncology   Tere Griffith 1949 75 y.o. female [id number]      History of Present Illness   Cancer Staging   Adenocarcinoma of lung, stage 4 ([address])  Staging form: Lung, AJCC 7th Edition  - Clinical: T2 - Signed by Sarika Wu MD on 6/8/2023  Stage prefix: Initial diagnosis  Laterality: Left  Source of metastatic specimen: Lung, Liver, Bone, Distant Lymph Nodes, Mediastinal Lymph Nodes  Histologic grade (G): G3  Lymph-vascular invasion (LVI): LVI not present (absent)/not identified  Residual tumor (R): RX - Cannot be assessed  Pleural/elastic layer invasion: Not assessed  Stage used in treatment planning: Yes  National guidelines used in treatment planning: Yes  Type of national guideline used in treatment planning: NCCN  - Pathologic: Stage IV (M1b) - Signed by Sarika Wu MD on 6/8/2023  Biopsy of metastatic site performed: Yes  Source of metastatic specimen: Bone    Lung cancer [facility])  Staging form: Lung, AJCC 8th Edition  - Clinical: Stage IVB (cM1c) - Signed by José Miguel Esparza MD on 8/17/2023      Ms. Grant Rodarte is a 75year old woman with Stage IV NSCLC initially diagnosed in 2016, at that time with Stage IV disease. She has been managed with a combination of systemic therapies and has likewise undergone multiple courses of palliative RT (as listed below). She was last seen in clinic on 9/21/21 and returns today for further follow-up to review possible additional palliative RT. Radiation Summary:  T9-T11 spine to 3250 cGy/13 fx, completed 12/1/16  Left scapula to 3000 cGy/10 fx, completed 10/16/17  Left scalpula retreatment to 2000 cGy/10 fx, completed 11/26/18  Left Lung SBRT to 6000 cGy/8 fx, completed 4/21/21    Interval History:  Since her last visit the patient has continued medical therapy under the care of Dr. Rosemarie Simmonds and GERALDO Marinelli.  She has received multiple courses of therapy including carbo/pemetrexed/nivolumab (8/2021-9/2021), docetaxel/ramucirumab (11/2021-12/2021), vinorelbine (2/2022-9/2022), and sotorasib (9/2022-present). PET/CT demonstrated:   1. Hypermetabolic masslike consolidation involving the medial right lower lobe towards the right lung base/region of the diaphragm which has increased in size since April 18, 2022 and is more medial to the location of previously noted hypermetabolic right lower lobe malignancy seen on PET/CT dated 7/14/2021. The FDG avid nature, the difference in location from prior PET/CT, and increase in size since CT dated April 18, 2022 is highly suspicious for progression of hypermetabolic malignancy with   differential diagnosis including inflammatory consolidation. Progression of malignancy is the diagnosis of exclusion and further evaluation with tissue sampling is recommended as clinically indicated. 2. New mild FDG activity associated with soft tissue involving the left anterolateral chest wall of uncertain clinical significance. This finding appears adjacent and superficial to an irregular fracture of the left third rib and findings could reflect pathologic fracture or reactive inflammatory FDG activity. 3. Improving mildly FDG avid left upper lobe consolidation most consistent with posttreatment changes, see discussion above. 4. Scattered non-FDG avid bilateral lung nodules which were better characterized on recent dedicated CT of chest dated 6/1/2023. Continued short interval follow-up is recommended. She was seen last by medical oncology on 8/8/23 who recommended re-evaluation for possible RT to the right paraspinal thoracic mass. Currently the patient is doing fair overall. She has some thoracic back pain, which is exacerbated by eating specific (e.g. spicy) foods. She additionally has had some episodes of regurgitation without preceding nausea. No dysphagia noted, no odynophagia.        Historical Information   Oncology History   Adenocarcinoma of lung, stage 4 ([address])   10/18/2016 Initial Diagnosis Adenocarcinoma of lung, stage 4 ([address] [facility])     10/18/2016 Biopsy    Final Diagnosis  A. Bone, T10, biopsy:     - Non-small cell carcinoma with features suggesting adenocarcinoma; see note.          11/7/2016 - 12/1/2016 Radiation    Course 1: T9 - T11 SPINE  13 fractions   Total Dose = 3,250 cGy     11/17/2016 Biopsy    Final Diagnosis  Lymph Node, Level 7: Conclusive evidence of Malignancy  Poorly differentiated non-small cell carcinoma      Lung, right main stem bronchus, biopsy:              - Poorly differentiated adenocarcinoma       12/15/2016 - 6/1/2017 Chemotherapy    Pembrolizumab 200 mg IV flat dose every 3 weeks      9/13/2017 Biopsy    Bone, left scapula, biopsy:  -  Positive for malignancy, consistent with metastatic adenocarcinoma          10/3/2017 - 10/16/2017 Radiation    palliative course of radiation therapy to the left scapular lesion to 3000 cGy( metastatic from adenocarcinoma of the lung)          4/10/2018 - 10/9/2018 Chemotherapy    nivolumab with prednisone 10 mg p.o     11/6/2018 -  Chemotherapy    11/6/18   Alimta 500 milligram/meter squared, carboplatin AUC 5 every 3 weeks,     11/6/2018 - 3/1/2021 Chemotherapy    cyanocobalamin injection 1,000 mcg, 1,000 mcg, Intramuscular, Once, 6 of 10 cycles  Administration: 1,000 mcg (1/17/2020), 1,000 mcg (6/10/2020), 1,000 mcg (10/14/2020), 1,000 mcg (1/5/2021)  fosaprepitant (EMEND) 150 mg in sodium chloride 0.9 % 250 mL IVPB, 150 mg, Intravenous, Once, 1 of 6 cycles  Administration: 150 mg (2/2/2021)  PEMEtrexed (ALIMTA) 1,020 mg in sodium chloride 0.9 % 100 mL chemo infusion, 500 mg/m2 = 1,020 mg, Intravenous, Once, 32 of 37 cycles  Dose modification: 400 mg/m2 (original dose 500 mg/m2, Cycle 23, Reason: Other (See Comments), Comment: decreasing crcl), 300 mg/m2 (original dose 500 mg/m2, Cycle 26, Reason: Treatment Parameters Not Met), 300 mg/m2 (original dose 500 mg/m2, Cycle 30, Reason: Max Dose Reached)  Administration: 1,020 mg (5/24/2019), 1,000 mg (6/14/2019), 1,000 mg (7/5/2019), 1,000 mg (8/2/2019), 1,000 mg (8/23/2019), 1,000 mg (9/13/2019), 1,000 mg (10/4/2019), 1,000 mg (10/25/2019), 1,000 mg (11/15/2019), 1,000 mg (12/6/2019), 1,000 mg (12/27/2019), 1,000 mg (1/17/2020), 1,000 mg (2/7/2020), 800 mg (5/22/2020), 800 mg (6/10/2020), 800 mg (7/1/2020), 600 mg (7/29/2020), 800 mg (8/19/2020), 600 mg (11/11/2020), 600 mg (1/5/2021), 600 mg (2/2/2021), 600 mg (10/14/2020)     11/12/2018 - 11/26/2018 Radiation    Course: C3: Left Scapula retreat  10 fractions  2,000 cGy         4/5/2021 - 4/21/2021 Radiation    Course: C4 SBRT    Plan ID Energy Fractions Dose per Fraction (cGy) Dose Correction (cGy) Total Dose Delivered (cGy) Elapsed Days   SBRT LtLung 6X 8 / 8 750 0 6,000 16      Dr. [place]     8/2/2021 - 9/24/2021 Chemotherapy    cyanocobalamin, 1,000 mcg, Intramuscular, Once, 2 of 3 cycles  Administration: 1,000 mcg (7/19/2021)  fosaprepitant (EMEND) IVPB, 150 mg, Intravenous, Once, 3 of 6 cycles  Administration: 150 mg (8/2/2021), 150 mg (9/24/2021), 150 mg (8/23/2021)  nivolumab (OPDIVO) IVPB, 240 mg (100 % of original dose 240 mg), Intravenous, Once, 3 of 6 cycles  Dose modification: 240 mg (original dose 240 mg, Cycle 1)  Administration: 240 mg (8/2/2021), 240 mg (8/23/2021), 240 mg (9/24/2021)  CARBOplatin (PARAPLATIN) IVPB (GOG AUC DOSING), 275 mg, Intravenous, Once, 3 of 6 cycles  Dose modification: 304.5 mg (original dose 279.5 mg, Cycle 3, Reason: Other (Must fill in a comment), Comment:  to sign order),   (original dose 279.5 mg, Cycle 3, Reason: Treatment Parameters Not Met)  Administration: 275 mg (8/2/2021), 273.5 mg (9/24/2021), 304.5 mg (8/23/2021)  pemetrexed (ALIMTA) chemo infusion, 490 mg (50 % of original dose 500 mg/m2), Intravenous, Once, 3 of 6 cycles  Dose modification: 250 mg/m2 (original dose 500 mg/m2, Cycle 1, Reason: Anticipated Tolerance)  Administration: 500 mg (8/2/2021), 490 mg (8/23/2021)     10/15/2021 - 12/17/2021 Chemotherapy    pegfilgrastim (NEULASTA ONPRO), 6 mg, Subcutaneous, Once, 4 of 8 cycles  Administration: 6 mg (10/15/2021), 6 mg (11/5/2021), 6 mg (11/26/2021), 6 mg (12/17/2021)  fosaprepitant (EMEND) IVPB, 150 mg, Intravenous, Once, 4 of 8 cycles  Administration: 150 mg (10/15/2021), 150 mg (11/5/2021), 150 mg (11/26/2021), 150 mg (12/17/2021)  ramucirumab (CYRAMZA) IVPB, 10 mg/kg = 866 mg, Intravenous, Once, 3 of 3 cycles  Administration: 866 mg (11/5/2021), 900 mg (11/26/2021), 800 mg (12/17/2021)  DOCEtaxel (TAXOTERE) chemo infusion, 75 mg/m2 = 144 mg, Intravenous, Once, 4 of 8 cycles  Dose modification: 60 mg/m2 (original dose 75 mg/m2, Cycle 5, Reason: Anticipated Tolerance)  Administration: 144 mg (10/15/2021), 144 mg (11/5/2021), 144 mg (11/26/2021), 138 mg (12/17/2021)     2/21/2022 - 9/6/2022 Chemotherapy    vinORELBine (NAVELBINE) IVPB, 25 mg/m2 = 47 mg (83.3 % of original dose 30 mg/m2), Intravenous, Once, 7 of 10 cycles  Dose modification: 25 mg/m2 (original dose 30 mg/m2, Cycle 1, Reason: Dose modified as per discussion with consulting physician)  Administration: 47 mg (2/21/2022), 47 mg (3/7/2022), 47 mg (3/21/2022), 47 mg (4/4/2022), 47 mg (4/18/2022), 47 mg (5/2/2022), 47 mg (5/16/2022), 47 mg (5/31/2022), 47 mg (6/20/2022), 47 mg (7/5/2022), 47 mg (7/25/2022), 47 mg (8/8/2022), 47 mg (8/22/2022), 47 mg (9/6/2022)     6/8/2023 -  Cancer Staged    Staging form: Lung, AJCC 7th Edition  - Clinical: T2 - Signed by Susan Carranza MD on 6/8/2023  Stage prefix: Initial diagnosis  Laterality: Left  Source of metastatic specimen: Lung, Liver, Bone, Distant Lymph Nodes, Mediastinal Lymph Nodes  Histologic grade (G): G3  Lymph-vascular invasion (LVI): LVI not present (absent)/not identified  Residual tumor (R): RX - Cannot be assessed  Pleural/elastic layer invasion: Not assessed  Stage used in treatment planning: Yes  National guidelines used in treatment planning: Yes  Type of national guideline used in treatment planning: NCCN       6/8/2023 -  Cancer Staged    Staging form: Lung, AJCC 7th Edition  - Pathologic: Stage IV (M1b) - Signed by Adam Ledezma MD on 6/8/2023  Biopsy of metastatic site performed: Yes  Source of metastatic specimen: Bone       Malignant neoplasm of unspecified part of right bronchus or lung ([address]) (Resolved)   5/24/2017 Initial Diagnosis    Malignant neoplasm of unspecified part of right bronchus or lung ([facility]) (Resolved)     4/5/2021 - 4/21/2021 Radiation    Course: C4 SBRT    Plan ID Energy Fractions Dose per Fraction (cGy) Dose Correction (cGy) Total Dose Delivered (cGy) Elapsed Days   SBRT LtLung 6X 8 / 8 750 0 6,000 16      Dr. Diamond Wyatt     Malignant neoplasm of bone with metastases ([address])   5/24/2017 Initial Diagnosis    Malignant neoplasm of bone with metastases ([address])     4/5/2021 - 4/21/2021 Radiation    Course: C4 SBRT    Plan ID Energy Fractions Dose per Fraction (cGy) Dose Correction (cGy) Total Dose Delivered (cGy) Elapsed Days   SBRT LtLung 6X 8 / 8 750 0 6,000 16      Dr. Diamond Wyatt     10/15/2021 - 12/17/2021 Chemotherapy    pegfilgrastim (NEULASTA ONPRO), 6 mg, Subcutaneous, Once, 4 of 8 cycles  Administration: 6 mg (10/15/2021), 6 mg (11/5/2021), 6 mg (11/26/2021), 6 mg (12/17/2021)  fosaprepitant (EMEND) IVPB, 150 mg, Intravenous, Once, 4 of 8 cycles  Administration: 150 mg (10/15/2021), 150 mg (11/5/2021), 150 mg (11/26/2021), 150 mg (12/17/2021)  ramucirumab (CYRAMZA) IVPB, 10 mg/kg = 866 mg, Intravenous, Once, 3 of 3 cycles  Administration: 866 mg (11/5/2021), 900 mg (11/26/2021), 800 mg (12/17/2021)  DOCEtaxel (TAXOTERE) chemo infusion, 75 mg/m2 = 144 mg, Intravenous, Once, 4 of 8 cycles  Dose modification: 60 mg/m2 (original dose 75 mg/m2, Cycle 5, Reason: Anticipated Tolerance)  Administration: 144 mg (10/15/2021), 144 mg (11/5/2021), 144 mg (11/26/2021), 138 mg (12/17/2021)     2/21/2022 - 9/6/2022 Chemotherapy    vinORELBine (NAVELBINE) IVPB, 25 mg/m2 = 47 mg (83.3 % of original dose 30 mg/m2), Intravenous, Once, 7 of 10 cycles  Dose modification: 25 mg/m2 (original dose 30 mg/m2, Cycle 1, Reason: Dose modified as per discussion with consulting physician)  Administration: 47 mg (2/21/2022), 47 mg (3/7/2022), 47 mg (3/21/2022), 47 mg (4/4/2022), 47 mg (4/18/2022), 47 mg (5/2/2022), 47 mg (5/16/2022), 47 mg (5/31/2022), 47 mg (6/20/2022), 47 mg (7/5/2022), 47 mg (7/25/2022), 47 mg (8/8/2022), 47 mg (8/22/2022), 47 mg (9/6/2022)     Lung cancer ([address])   1/3/2022 Initial Diagnosis    Lung cancer ([address])     2/21/2022 - 9/6/2022 Chemotherapy    vinORELBine (NAVELBINE) IVPB, 25 mg/m2 = 47 mg (83.3 % of original dose 30 mg/m2), Intravenous, Once, 7 of 10 cycles  Dose modification: 25 mg/m2 (original dose 30 mg/m2, Cycle 1, Reason: Dose modified as per discussion with consulting physician)  Administration: 47 mg (2/21/2022), 47 mg (3/7/2022), 47 mg (3/21/2022), 47 mg (4/4/2022), 47 mg (4/18/2022), 47 mg (5/2/2022), 47 mg (5/16/2022), 47 mg (5/31/2022), 47 mg (6/20/2022), 47 mg (7/5/2022), 47 mg (7/25/2022), 47 mg (8/8/2022), 47 mg (8/22/2022), 47 mg (9/6/2022)      Chemotherapy    cyanocobalamin, 1,000 mcg, Intramuscular, Once, 6 of 10 cycles    Administration: 1,000 mcg (1/17/2020), 1,000 mcg (6/10/2020), 1,000 mcg (10/14/2020), 1,000 mcg (1/5/2021)        fosaprepitant (EMEND) IVPB, 150 mg, Intravenous, Once, 1 of 6 cycles    Administration: 150 mg (2/2/2021)        pemetrexed (ALIMTA) chemo infusion, 500 mg/m2 = 1,020 mg, Intravenous, Once, 32 of 37 cycles    Dose modification: 400 mg/m2 (original dose 500 mg/m2, Cycle 23, Reason: Other (Must fill in a comment), Comment: decreasing crcl), 300 mg/m2 (original dose 500 mg/m2, Cycle 26, Reason: Treatment Parameters Not Met), 300 mg/m2 (original dose 500 mg/m2, Cycle 30, Reason: Max Dose Reached)    Administration: 1,020 mg (5/24/2019), 1,000 mg (6/14/2019), 1,000 mg (7/5/2019), 1,000 mg (8/2/2019), 1,000 mg (8/23/2019), 1,000 mg (9/13/2019), 1,000 mg (10/4/2019), 1,000 mg (10/25/2019), 1,000 mg (11/15/2019), 1,000 mg (12/6/2019), 1,000 mg (12/27/2019), 1,000 mg (1/17/2020), 1,000 mg (2/7/2020), 800 mg (5/22/2020), 800 mg (6/10/2020), 800 mg (7/1/2020), 600 mg (7/29/2020), 800 mg (8/19/2020), 600 mg (11/11/2020), 600 mg (1/5/2021), 600 mg (2/2/2021), 600 mg (10/14/2020)    cyanocobalamin, 1,000 mcg, Intramuscular, Once, 2 of 3 cycles    Administration: 1,000 mcg (7/19/2021)        fosaprepitant (EMEND) IVPB, 150 mg, Intravenous, Once, 3 of 6 cycles    Administration: 150 mg (8/2/2021), 150 mg (9/24/2021), 150 mg (8/23/2021)        nivolumab (OPDIVO) IVPB, 240 mg (100 % of original dose 240 mg), Intravenous, Once, 3 of 6 cycles    Dose modification: 240 mg (original dose 240 mg, Cycle 1)    Administration: 240 mg (8/2/2021), 240 mg (8/23/2021), 240 mg (9/24/2021)        CARBOplatin (PARAPLATIN) IVPB (GOG AUC DOSING), 275 mg, Intravenous, Once, 3 of 6 cycles    Dose modification: 304.5 mg (original dose 279.5 mg, Cycle 3, Reason: Other (Must fill in a comment), Comment:  to sign order),   (original dose 279.5 mg, Cycle 3, Reason: Treatment Parameters Not Met)    Administration: 275 mg (8/2/2021), 273.5 mg (9/24/2021), 304.5 mg (8/23/2021)        pemetrexed (ALIMTA) chemo infusion, 490 mg (50 % of original dose 500 mg/m2), Intravenous, Once, 3 of 6 cycles    Dose modification: 250 mg/m2 (original dose 500 mg/m2, Cycle 1, Reason: Anticipated Tolerance)    Administration: 500 mg (8/2/2021), 490 mg (8/23/2021)    pegfilgrastim (Ren Pk), 6 mg, Subcutaneous, Once, 4 of 8 cycles    Administration: 6 mg (10/15/2021), 6 mg (11/5/2021), 6 mg (11/26/2021), 6 mg (12/17/2021)        fosaprepitant (EMEND) IVPB, 150 mg, Intravenous, Once, 4 of 8 cycles    Administration: 150 mg (10/15/2021), 150 mg (11/5/2021), 150 mg (11/26/2021), 150 mg (12/17/2021)        ramucirumab (CYRAMZA) IVPB, 10 mg/kg = 866 mg, Intravenous, Once, 3 of 3 cycles    Administration: 866 mg (11/5/2021), 900 mg (11/26/2021), 800 mg (12/17/2021)        DOCEtaxel (TAXOTERE) chemo infusion, 75 mg/m2 = 144 mg, Intravenous, Once, 4 of 8 cycles    Dose modification: 60 mg/m2 (original dose 75 mg/m2, Cycle 5, Reason: Anticipated Tolerance)    Administration: 144 mg (10/15/2021), 144 mg (11/5/2021), 144 mg (11/26/2021), 138 mg (12/17/2021)    vinORELBine (NAVELBINE) IVPB, 25 mg/m2 = 47 mg (83.3 % of original dose 30 mg/m2), Intravenous, Once, 7 of 10 cycles    Dose modification: 25 mg/m2 (original dose 30 mg/m2, Cycle 1, Reason: Dose modified as per discussion with consulting physician)    Administration: 47 mg (2/21/2022), 47 mg (3/7/2022), 47 mg (3/21/2022), 47 mg (4/4/2022), 47 mg (4/18/2022), 47 mg (5/2/2022), 47 mg (5/16/2022), 47 mg (5/31/2022), 47 mg (6/20/2022), 47 mg (7/5/2022), 47 mg (7/25/2022), 47 mg (8/8/2022), 47 mg (8/22/2022), 47 mg (9/6/2022)       8/17/2023 -  Cancer Staged    Staging form: Lung, AJCC 8th Edition  - Clinical: Stage IVB (cM1c) - Signed by Carmen Allred MD on 8/17/2023           Past Medical History:   Diagnosis Date   • A-fib [facility])    • Arthritis    • Atrial fibrillation ([address])    • Confusion    • Diabetes mellitus ([address])    • Diabetes mellitus type 2, uncomplicated ([address])     Last assessed: 8/17/17   • Encephalopathy 1/6/2022   • Essential hypertension    • Frozen shoulder     L shoulder   • GERD (gastroesophageal reflux disease)    • Hx of cancer of uterus     Last assessed: 8/21/15   • Hyperlipidemia    • Hypertension    • Hyponatremia 11/16/2016   • Lung mass     diagnosed 9/2016   • Malignant neoplasm without specification of site [facility])    • Skin cancer, basal cell     right eye area   • Stage 4 lung cancer ([address])    • Thrombocytopenia, unspecified ([address]) 1/20/2023     Past Surgical History:   Procedure Laterality Date   • APPENDECTOMY     • BRONCHOSCOPY N/A 11/17/2016    Procedure: BRONCHOSCOPY FLEXIBLE;  Surgeon: Julio Cesar Riddle MD; Location: [facility];  Service:    • CHOLECYSTECTOMY     • COLONOSCOPY     • COLONOSCOPY     • FL GUIDED CENTRAL VENOUS ACCESS DEVICE INSERTION  12/14/2021   • GALLBLADDER SURGERY     • HYSTERECTOMY  2000    Total abdominal   • JOINT REPLACEMENT     • LARYNGOSCOPY      Flexible Fiberoptic, (Therapeutic), Resolved: 11/17/16   • MOHS SURGERY      Micrographic Surgery Face   •  [address] INCL FLUOR GDNCE DX W/CELL WASHG SPX N/A 5/24/2017    Procedure: BRONCHOSCOPY FLEXIBLE;  Surgeon: Bharati Fung MD;  Location: [facility];   Service: Pulmonary   • WI BRONCHOSCOPY NEEDLE BX TRACHEA MAIN STEM&/BRON N/A 11/17/2016    Procedure: EBUS; FROZEN SECTION ;  Surgeon: Nicho Elliott MD;  Location: [facility];  Service: Thoracic   • WI INSJ TUNNELED CTR VAD W/SUBQ PORT AGE 5 YR/> N/A 12/14/2021    Procedure: INSERTION VENOUS PORT ( PORT-A-CATH) IR;  Surgeon: Maritza Wilks DO;  [facility];  Service: Interventional Radiology   • TONSILECTOMY AND ADNOIDECTOMY     • TONSILLECTOMY     • TOTAL KNEE ARTHROPLASTY Right 09/23/2014       Social History   Social History     Substance and Sexual Activity   Alcohol Use No     Social History     Substance and Sexual Activity   Drug Use No     Social History     Tobacco Use   Smoking Status Never   Smokeless Tobacco Never   Tobacco Comments    Quit in 2000         Meds/Allergies     Current Outpatient Medications:   •  acetaminophen (TYLENOL) 500 mg tablet, Take 1-2 tablets (500-1,000 mg total) by mouth 3 (three) times a day as needed for mild pain, headaches or fever, Disp: 540 tablet, Rfl: 3  •  apixaban (Eliquis) 5 mg, TAKE 1 TABLET TWICE A DAY, Disp: 180 tablet, Rfl: 3  •  aspirin (ECOTRIN LOW STRENGTH) 81 mg EC tablet, Take 1 tablet (81 mg total) by mouth daily, Disp: 30 tablet, Rfl: 0  •  atorvastatin (LIPITOR) 40 mg tablet, TAKE 1 TABLET BY MOUTH EVERY DAY, Disp: 90 tablet, Rfl: 1  •  busPIRone (BUSPAR) 7.5 mg tablet, Take 1 tablet (7.5 mg total) by mouth daily at bedtime, Disp: 90 tablet, Rfl: 2  •  cyanocobalamin (VITAMIN B-12) 100 mcg tablet, Take by mouth daily, Disp: , Rfl:   •  folic acid (FOLVITE) 1 mg tablet, Take 1 tablet (1 mg total) by mouth daily, Disp: 90 tablet, Rfl: 1  •  gabapentin (Neurontin) 100 mg capsule, Take 2 capsules (200 mg total) by mouth 2 (two) times a day, Disp: 360 capsule, Rfl: 0  •  linaGLIPtin (Tradjenta) 5 MG TABS, Take 5 mg by mouth daily, Disp: 90 tablet, Rfl: 3  •  loperamide (IMODIUM) 2 mg capsule, Take 2 mg by mouth 4 (four) times a day as needed for diarrhea, Disp: , Rfl:   •  loratadine (CLARITIN) 10 mg tablet, Take 10 mg by mouth as needed, Disp: , Rfl:   •  metFORMIN (GLUCOPHAGE) 500 mg tablet, TAKE 1 TABLET TWICE DAILY  WITH MEALS, Disp: 180 tablet, Rfl: 3  •  metoprolol tartrate (LOPRESSOR) 25 mg tablet, Take 1 tablet (25 mg total) by mouth every 12 (twelve) hours, Disp: 180 tablet, Rfl: 3  •  omeprazole (PriLOSEC) 20 mg delayed release capsule, Take 1 capsule (20 mg total) by mouth daily, Disp: 90 capsule, Rfl: 3  •  sotalol (BETAPACE) 80 mg tablet, Take 1 tablet (80 mg total) by mouth 2 (two) times a day, Disp: 180 tablet, Rfl: 3  •  Sotorasib 120 MG TABS, Take 960 mg by mouth daily, Disp: 240 tablet, Rfl: 0  •  valsartan (DIOVAN) 160 mg tablet, TAKE 1 TABLET BY MOUTH TWICE A DAY, Disp: 180 tablet, Rfl: 3  •  venlafaxine (EFFEXOR) 37.5 mg tablet, TAKE 2 TABLETS AT BEDTIME, Disp: 180 tablet, Rfl: 0  •  LOSARTAN POTASSIUM PO, Take by mouth (Patient not taking: Reported on 8/17/2023), Disp: , Rfl:   •  oxyCODONE (OXY-IR) 5 MG capsule, Take 5 mg by mouth every 4 (four) hours as needed for moderate pain or severe pain (Patient not taking: Reported on 8/8/2023), Disp: , Rfl:   •  potassium chloride (Klor-Con M10) 10 mEq tablet, Take 1 tablet (10 mEq total) by mouth daily (Patient not taking: Reported on 8/8/2023), Disp: 90 tablet, Rfl: 3  Allergies   Allergen Reactions   • Amoxicillin Hives   • Amoxicillin Rash and Hives   • Cardizem [Diltiazem] Rash     Rash     • Statins Myalgia     Severe muscle aching  Terrible pains   • Zofran [Ondansetron] Palpitations     Review of Systems   Constitutional: Positive for fatigue. Eyes:        Wears glasses   Respiratory: Negative for cough and shortness of breath. Cardiovascular: Negative. Gastrointestinal: Positive for constipation. Occasional incontinence   Endocrine: Negative. Genitourinary:        Frequently incontinent    Musculoskeletal: Positive for arthralgias (Mid back, right side, intermittent pain). Skin: Negative. Allergic/Immunologic: Negative. Neurological: Negative. Hematological: Negative. Psychiatric/Behavioral: Negative.         OBJECTIVE:   /82 (BP Location: Left arm, Patient Position: Sitting, Cuff Size: Standard)   Pulse 75   Temp 97.8 °F (36.6 °C) (Temporal)   Resp 18   Ht 5' 2" (1.575 m)   Wt 78.9 kg (174 lb)   LMP  (LMP Unknown)   SpO2 98%   BMI 31.83 kg/m²   Pain Assessment:  0  ECOG/Zubrod/WHO: 1 - Symptomatic but completely ambulatory    Physical Exam   Well appearing. NAD. No increased work of breathing. Lungs CTA bilaterally. RRR. No murmurs appreciated. No vertebral tenderness. Alert and well oriented. No overt neurologic symptoms. RESULTS    Lab Results:   Recent Results (from the past 672 hour(s))   Fingerstick Glucose (POCT)    Collection Time: 07/28/23  9:04 AM   Result Value Ref Range    POC Glucose 138 65 - 140 mg/dl       Imaging Studies:NM PET CT skull base to mid thigh    Result Date: 7/28/2023  Narrative: PET/CT SCAN INDICATION: C34.92: Malignant neoplasm of unspecified part of left bronchus or lung C41.9: Malignant neoplasm of bone and articular cartilage, unspecified, restaging The following clinical information was obtained directly from EPIC: hx metastatic lung ca. restaging - pt c/o new onset of L hip and knee, R lower back pain.   recent CT showed changing configuration of RLL mass, new 6 x 3mm RUL subpleural nodule, significant decrease in size and conspicuity of pancreatic cyst.  s/p L scapular bone bx 9/13/17 (+) metastatic adenocarcinoma; received RT 10/16/17 to L scapula. s/p R mainstem bronchus bx 11/2016 (+) poorly differentiated non small cell ca. RT to L lung 4/2021. pt continues on navelbine. also hx endometrial ca dx 2000 s/p RICHARD/BSO.  prior appendectomy, cholecystectomy, tonsillectomy, TKR. 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 Multiplanar attenuation corrected and non attenuation corrected PET images are available for interpretation, and contiguous, low dose, axial CT sections were obtained from the skull base through the femurs. Intravenous contrast material was not utilized. This examination, like all CT scans performed in the [facility], was performed utilizing techniques to minimize radiation dose exposure, including the use of iterative reconstruction and automated exposure control. Fasting serum glucose: 138 mg/dl FINDINGS: VISUALIZED BRAIN: No acute abnormalities are seen. HEAD/NECK: There is a physiologic distribution of FDG. No FDG avid cervical adenopathy is seen. CT images: Intracranial atherosclerotic calcification is noted. Near complete opacification of left ethmoid air cells. CHEST: Patchy hypermetabolic consolidation involving the left upper lobe has significantly improved with more platelike as opposed to patchy consolidation with minimal associated FDG activity with max SUV of approximately 1.9 (for example image 74) most consistent with posttreatment changes with viable underlying malignancy considered less likely but not excluded.  Along the anterolateral and posterolateral aspects of the left chest wall and most prominently along the left anterolateral aspect of the chest wall is mildly FDG avid soft tissue of uncertain clinical significance. For example, on image 77 anterior to the left third rib is mildly FDG avid soft tissue involving the left anterolateral chest wall measuring 3.7 x 1.6 cm with max SUV of 2.3, previously measuring 3.4 x 1.4 cm without significant FDG activity. This finding appears adjacent and superficial to  an irregular fracture of the anterolateral left third rib suggesting possible pathologic fracture although given the mild FDG activity findings could reflect benign inflammatory FDG activity. Best seen on image 989 is hypermetabolic masslike airspace consolidation/mass involving the medial right lower lobe at the lung base towards the retrocrural region measuring 4.7 x 2.0 cm with max SUV of 10.0; this hypermetabolic masslike consolidation appears medial to the site of previously noted right lower lobe hypermetabolic malignancy which previously measured 2.9 cm with max SUV of 7.9. This area of masslike consolidation measured approximately 2.5 x 1.2 cm on April 18, 2022. Differential diagnosis includes progression of hypermetabolic malignancy in this location versus posttreatment inflammatory changes. Progression of hypermetabolic malignancy is the diagnosis of exclusion. Consider further evaluation with tissue sampling as clinically  indicated. CT images: Right-sided chest port extends towards the caval atrial junction. Stable tiny nodular retroareolar left breast non-FDG avid densities. Atherosclerotic vascular calcifications including those of the coronary arteries are noted. Stable small bilateral lung nodules which were better characterized on dedicated CT of chest dated 6/1/2023 ABDOMEN: Patchy hypermetabolic mural thickening of the gastric wall for which correlation is recommended to exclude underlying gastric mucosal process.  There is additional nonuniform FDG activity involving the bowel without definitive focality and therefore favored to be physiologic/inflammatory in etiology. Given patient's age, correlation is recommended to ensure that the patient is up-to-date with screening colonoscopy. CT images: Small left hepatic hypodensity which was better characterized on recent dedicated patient's known pancreatic body cyst is poorly characterized on current low-dose unenhanced CT. Atherosclerotic vascular calcifications are noted. Diverticulosis  coli. CT. Status post cholecystectomy. Nonobstructing right renal calculus. PELVIS: No FDG avid soft tissue lesions are seen. CT images: Hysterectomy. OSSEOUS STRUCTURES: No FDG avid lesions are seen. CT images: Degenerative changes are noted involving the spine. Mild apex left scoliosis to the lumbar spine. Impression: 1. Hypermetabolic masslike consolidation involving the medial right lower lobe towards the right lung base/region of the diaphragm which has increased in size since April 18, 2022 and is more medial to the location of previously noted hypermetabolic right lower lobe malignancy seen on PET/CT dated 7/14/2021. The FDG avid nature, the difference in location from prior PET/CT, and increase in size since CT dated April 18, 2022 is highly suspicious for progression of hypermetabolic malignancy with differential diagnosis including inflammatory consolidation. Progression of malignancy is the diagnosis of exclusion and further evaluation with tissue sampling is recommended as clinically indicated. 2. New mild FDG activity associated with soft tissue involving the left anterolateral chest wall of uncertain clinical significance. This finding appears adjacent and superficial to an irregular fracture of the left third rib and findings could reflect pathologic fracture or reactive inflammatory FDG activity. . 3. Improving mildly FDG avid left upper lobe consolidation most consistent with posttreatment changes, see discussion above.  4. Scattered non-FDG avid bilateral lung nodules which were better characterized on recent dedicated CT of chest dated 6/1/2023. Continued short interval follow-up is recommended. Please see above for details and additional findings. The study was marked in EPIC for significant notification. Workstation performed: [id number]           Assessment/Plan:  No orders of the defined types were placed in this encounter. We reviewed the patient's recent clinical history and imaging in comparison to multiple prior imaging studies. On my review, she has a gradually enlarging FDG avid soft tissue mass in the inferior medial right lung, anterior to previously known metastatic disease, likely corresponding to gradually progressing disease. Review of PET/CT additionally shows no other evidence of active disease elsewhere. We discussed management options including palliative RT, which could serve both to alleviate her pain and provide improved disease control given the oligoprogressive nature of this disease. Review of prior RT records does indicate that this region received prior RT in 2016 to a dose of 3250cGy in 13 fractions. As such we discussed that more advanced radiation technique (e.g. IMRT, SBRT) may be required to provide adequate pain and disease control while preventing normal tissue toxicity. We reviewed potential side effects of RT, which could include fatigue, esophagitis, nausea, and cough. There is a small risk for more serious complication including esophageal stenosis, perforation, or myelopathy; while reirradiation increases these risks, the absolute risk should remain relatively low considering the prolonged time from prior treatment. The patient was given the opportunity to ask multiple questions, all of which were answered to her satisfaction. She would like to proceed with RT as described. I will bring her back for CT simulation in the coming days.  Final dose and regimen will be determined at the time of treatment planning depending on OAR constraints. She will need to hold her sotorasib prior to and during RT. Summary:   - Recommend palliative RT to symptomatic site of oligoprogression, dose and fractionation to be determined at time of treatment planning  - Coordinate with medical oncology to hold sotorasib prior to and during RT.   - CT simulation on 8/22/23; RT to begin shortly thereafter. Brandie Dela Cruz MD  0/[id number]/7739,5:54 PM    Total time spent reviewing EMR, seeing patient in consultation, documenting visit, placing treatment orders, and communicating with other medical providers: 55 minutes     Portions of the record may have been created with voice recognition software.  Occasional wrong word or "sound a like" substitutions may have occurred due to the inherent limitations of voice recognition software.  Read the chart carefully and recognize, using context, where substitutions have occurred. Admission

## 2024-04-09 NOTE — H&P PST ADULT - PAIN SCALE PREFERRED, PROFILE
Medication: LEVETIRACETAM 750MG TABLETS  passed protocol.   Last office visit date: 11/16/23  Next appointment scheduled?: Yes 4/18/24  Number of refills given: 3 refills    numerical 0-10
